# Patient Record
Sex: FEMALE | Race: WHITE | NOT HISPANIC OR LATINO | Employment: FULL TIME | URBAN - METROPOLITAN AREA
[De-identification: names, ages, dates, MRNs, and addresses within clinical notes are randomized per-mention and may not be internally consistent; named-entity substitution may affect disease eponyms.]

---

## 2017-09-13 ENCOUNTER — PRE VISIT (OUTPATIENT)
Dept: ORTHOPEDICS | Facility: CLINIC | Age: 22
End: 2017-09-13

## 2017-09-13 NOTE — TELEPHONE ENCOUNTER
1.  Date/reason for appt: 10/3/17- Bilateral Patella instability    2.  Referring provider: Self     3.  Call to patient (Yes / No - short description): Yes, LM to call back. Need AGUILA's for records. Any surgery?     4.  Previous care at / records requested from:     1. [facility] - Need AGUILA   2. [facility] - Need AGUILA

## 2017-09-28 NOTE — TELEPHONE ENCOUNTER
Received call from Roxana @ [facility]. Stated the records will be faxed over and the imaging disc will be mailed out.

## 2017-09-28 NOTE — TELEPHONE ENCOUNTER
Spoke with patient on the phone. No previous surgery to either knees. Will email her the AGUILA's to sign.     [email or web address]

## 2017-09-29 NOTE — TELEPHONE ENCOUNTER
Records received from [facility]. Forwarded to clinic.     Includes:  Office note: 3/31/14, 4/9/14    Other: EMG   Radiology Reports:   -XR Bilateral Knee Sunrise 3/31/14    -MRI L Knees 4/4/14    -MRI R Hip 4/4/14 (unrelated)

## 2017-09-29 NOTE — TELEPHONE ENCOUNTER
Imaging disc received from [facility]. Sent to film room.     All images from 3/31/14-4/4/14.

## 2017-09-29 NOTE — TELEPHONE ENCOUNTER
Additional records received from [facility].     -MRI R Knee 4/4/14   -EMG Report & tracing  4/4/14

## 2017-10-03 ENCOUNTER — THERAPY VISIT (OUTPATIENT)
Dept: PHYSICAL THERAPY | Facility: CLINIC | Age: 22
End: 2017-10-03
Payer: COMMERCIAL

## 2017-10-03 ENCOUNTER — OFFICE VISIT (OUTPATIENT)
Dept: ORTHOPEDICS | Facility: CLINIC | Age: 22
End: 2017-10-03

## 2017-10-03 VITALS — WEIGHT: 125.3 LBS | BODY MASS INDEX: 18.56 KG/M2 | HEIGHT: 69 IN

## 2017-10-03 DIAGNOSIS — M25.562 BILATERAL CHRONIC KNEE PAIN: Primary | ICD-10-CM

## 2017-10-03 DIAGNOSIS — M25.562 BILATERAL KNEE PAIN: Primary | ICD-10-CM

## 2017-10-03 DIAGNOSIS — G89.29 CHRONIC PAIN OF BOTH KNEES: ICD-10-CM

## 2017-10-03 DIAGNOSIS — M25.561 CHRONIC PAIN OF BOTH KNEES: ICD-10-CM

## 2017-10-03 DIAGNOSIS — M25.561 BILATERAL CHRONIC KNEE PAIN: Primary | ICD-10-CM

## 2017-10-03 DIAGNOSIS — G89.29 BILATERAL CHRONIC KNEE PAIN: Primary | ICD-10-CM

## 2017-10-03 DIAGNOSIS — M25.562 CHRONIC PAIN OF BOTH KNEES: ICD-10-CM

## 2017-10-03 DIAGNOSIS — M25.561 BILATERAL KNEE PAIN: Primary | ICD-10-CM

## 2017-10-03 PROCEDURE — 97161 PT EVAL LOW COMPLEX 20 MIN: CPT | Mod: GP | Performed by: PHYSICAL THERAPIST

## 2017-10-03 PROCEDURE — 97110 THERAPEUTIC EXERCISES: CPT | Mod: GP | Performed by: PHYSICAL THERAPIST

## 2017-10-03 PROCEDURE — 97112 NEUROMUSCULAR REEDUCATION: CPT | Mod: GP | Performed by: PHYSICAL THERAPIST

## 2017-10-03 RX ORDER — NORELGESTROMIN AND ETHINYL ESTRADIOL 35; 150 UG/MG; UG/MG
1 PATCH TRANSDERMAL WEEKLY
COMMUNITY
End: 2020-11-25

## 2017-10-03 ASSESSMENT — ACTIVITIES OF DAILY LIVING (ADL)
SIT WITH YOUR KNEE BENT: NOT ANSWERED
STAND: NOT ANSWERED
RISE FROM A CHAIR: NOT ANSWERED
PAIN: THE SYMPTOM AFFECTS MY ACTIVITY MODERATELY
LIMPING: THE SYMPTOM AFFECTS MY ACTIVITY SLIGHTLY
WEAKNESS: THE SYMPTOM AFFECTS MY ACTIVITY SLIGHTLY
HOW_WOULD_YOU_RATE_THE_OVERALL_FUNCTION_OF_YOUR_KNEE_DURING_YOUR_USUAL_DAILY_ACTIVITIES?: NOT ANSWERED
STIFFNESS: THE SYMPTOM AFFECTS MY ACTIVITY SLIGHTLY
GO DOWN STAIRS: NOT ANSWERED
AS_A_RESULT_OF_YOUR_KNEE_INJURY,_HOW_WOULD_YOU_RATE_YOUR_CURRENT_LEVEL_OF_DAILY_ACTIVITY?: NOT ANSWERED
SWELLING: THE SYMPTOM AFFECTS MY ACTIVITY SLIGHTLY
WALK: NOT ANSWERED
GIVING WAY, BUCKLING OR SHIFTING OF KNEE: THE SYMPTOM AFFECTS MY ACTIVITY SLIGHTLY
KNEE_ACTIVITY_OF_DAILY_LIVING_SUM: 16
GO UP STAIRS: NOT ANSWERED
KNEEL ON THE FRONT OF YOUR KNEE: NOT ANSWERED
SQUAT: NOT ANSWERED

## 2017-10-03 NOTE — LETTER
10/3/2017       RE: Kaitlynn House  [address]  [facility] [address]     Dear Colleague,    Thank you for referring your patient, Kaitlynn House, to the [facility] at [facility]. Please see a copy of my visit note below.    HISTORY OF PRESENT ILLNESS:  The patient is an articulate 22-year-old female who has had a longstanding history of anterior knee pain.  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  She is here with her mother.  She has been seen on numerous occasions at [facility].  She has had an evaluation of bilateral hip pain, bilateral knee pain and bilateral leg complaints.  She has never had a very consistent diagnosis.  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  Before this, she was on her feet a lot.      Approximately 2 or 3 years ago she began to have a history of swelling and this has not always been present.  I was quite clear on trying to understand when swelling came into the picture.  What is interesting is she is a very thin woman and swelling is very dominant in her leg when it does swell.  She does bring with her, 6 pictures of her knee when it has been swollen with her left being greater than her right and she has one particularly extraordinarily impressive picture of knee swelling that seems to go half way up her thigh.      At this point in time she feels that work and school has dominated her life and she has not been able to do much in regards to any kind of sporting activities.  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      There is at least 1 note that reports that she has always had a history of an awkward gait as a child.      She has had previous workup of her knees and hips but most  recently I have seen numerous x-rays dating from 2014, an MRI of her left knee 2014, an MRI of her right knee 2014, an MRI of her hip in 2014.      Further discussion reports that she is overall healthy.  She has been on gabapentin for pain.  She currently takes 1200 mg in 2 divided doses.  She is on an estradiol patch.  I am not clear why she is using that.  She also takes Aleve.      The patient had endometriosis with heavy periods at age 14 and now is on birth control pill which controls that factor.      She is a very thin woman and always has been thin.  Her current BMI computes to 19.      The patient has also had a full rheumatological workup looking at all inflammatory markers which are reported to be negative.  This is not part of our chart.        PHYSICAL EXAMINATION:  Further examination of patient's hips reveals internal rotation greater than external rotation on both sides.  Her right hip measures 45 degrees of external rotation, left side 30 degrees with internal rotation approximately 60.      Examination of patient's knees reveal positive hyperextension bilaterally.  No knee swelling today.  Good straight leg raising effort without a lag.  No crepitus to active extension, no suggestion of chronic synovitis.      The patient has very mobile kneecaps without apprehension.      Looking at other [place]on factors, the patient has 9/9 with extremely lax fingers, wrists, mild laxity at the elbows and knee hyperextension.      Review by our therapist today reveal significant weakness with restricted ankle dorsiflexion, poor body movement patterns and poor strength throughout.      Taping seemed to improve the patient's sense of confidence with mild relief of pain.      IMAGING:  X-rays were reviewed as well as the MRI.  The x-rays are highly suggestive of version.  Overall alignment is neutral.      ASSESSMENT:  Likely bilateral limb torsion which can explain the patient's pain and awkward gait pattern over  time cannot explain the patient's current knee swelling, particularly to the degree that was evident on some of her imaging.      PLAN:   1.  She will obtain a CT scan today looking for version.   2.  I suggested that the next time she has this degree of swelling that she return to an acute injury center, preferably here and an aspiration should be performed with appropriate cell count.     3.  Continue working with physical therapy.  I prefer that she stays here as even though we not close for her, I do not know of any other close PT place that can look some of her body movement patterns to the degree that is required, I believe for her pain pattern.   4.  We will obtain an MRI on left knee only trying to have an explanation for her pain.      Although the version could explain pain, I do not believe that it can explain swelling and certainly not swelling to the degree that was present in some of these images that she showed me.      She will follow up with me as stated above.      Room time 45 minutes, consultation time 30.        ADDENDUM:  Patient's CT scan was performed.  It is significant for the following:  Right femoral anteversion, 16 left, 23 right, external tibial torsion 53, left 54.  Right TT-TG 12, left TT-TG 17.      We will follow up with the patient in 2 weeks' time after she is working more with therapy and an MRI is obtained.  At that time, coupled with this CT scan information, we will also have her see Dr. Dio Olivarez.       Di Schwartz MD

## 2017-10-03 NOTE — NURSING NOTE
"Reason For Visit:   Chief Complaint   Patient presents with     Eval/Assessment     Ignacio. patella instability     Primary: Dr. Alondra Briggs  Ref. MD: self    Occupation Neuro Diagnostic Tech. / student  Currently working? Yes.  Work status?  Full time.  Date of injury: none    Date of surgery: none  Smoker: No      Ht 1.74 m (5' 8.5\")  Wt 56.8 kg (125 lb 4.8 oz)  BMI 18.77 kg/m2      Pain Assessment  Patient Currently in Pain: Yes  0-10 Pain Scale: 3  Primary Pain Location: Knee  Pain Orientation: Left, Right  Pain Descriptors: Aching  Alleviating Factors: Ice, Rest, NSAIDS  Aggravating Factors: Walking                                                 "

## 2017-10-03 NOTE — MR AVS SNAPSHOT
After Visit Summary   10/3/2017    Kaitlynn House    MRN: [id number]           Patient Information     Date Of Birth          1995        Visit Information        Provider Department      10/3/2017 8:30 AM Di Schwartz MD [facility]        Today's Diagnoses     Bilateral chronic knee pain    -  1    Chronic pain of both knees          Care Instructions    CT scan today and they 3T MRI and Physical Therapy with Roxane on the same day. And Dr. Schwartz          Follow-ups after your visit        Your next 10 appointments already scheduled     Oct 24, 2017  8:30 AM CDT   (Arrive by 8:15 AM)   MR LOWER EXTREMITY JOINT LEFT W/O CONTRAST with [id number]   [facility] [place] [facility] ([facility])    [address]  1st Floor  [facility] [address] [phone]           Take your medicines as usual, unless your doctor tells you not to. Bring a list of your current medicines to your exam (including vitamins, minerals and over-the-counter drugs). Also bring the results of similar scans you may have had.  Please remove any body piercings and hair extensions before you arrive.  Follow your doctor s orders. If you do not, we may have to postpone your exam.  You will not have contrast for this exam. You do not need to do anything special to prepare.  The MRI machine uses a strong magnet. Please wear clothes without metal (snaps, zippers). A sweatsuit works well, or we may give you a hospital gown.   **IMPORTANT** THE INSTRUCTIONS BELOW ARE ONLY FOR THOSE PATIENTS WHO HAVE BEEN TOLD THEY WILL RECEIVE SEDATION OR GENERAL ANESTHESIA DURING THEIR MRI PROCEDURE:  IF YOU WILL RECEIVE SEDATION (take medicine to help you relax during your exam):   You must get the medicine from your doctor before you arrive. Bring the medicine to the exam. Do not take it at home.   Arrive one hour early. Bring someone who can take you home after the test. Your medicine will  make you sleepy. After the exam, you may not drive, take a bus or take a taxi by yourself.   No eating 8 hours before your exam. You may have clear liquids up until 4 hours before your exam. (Clear liquids include water, clear tea, black coffee and fruit juice without pulp.)  IF YOU WILL RECEIVE ANESTHESIA (be asleep for your exam):   Arrive 1 1/2 hours early. Bring someone who can take you home after the test. You may not drive, take a bus or take a taxi by yourself.   No eating 8 hours before your exam. You may have clear liquids up until 4 hours before your exam. (Clear liquids include water, clear tea, black coffee and fruit juice without pulp.)   You will spend four to five hours in the recovery room.  Please call the Imaging Department at your exam site with any questions.            Oct 24, 2017 10:00 AM CDT   ELSY Extremity with Di Ahumada PT   [facility] ([facility])    [address] [place] [address] [phone]            Oct 24, 2017 11:00 AM CDT   (Arrive by 10:45 AM)   RETURN KNEE with Di Schwartz MD   [facility] ([facility])    [address] [place] [address] [phone]            Oct 24, 2017 12:20 PM CDT   (Arrive by 12:05 PM)   NEW FOOT/ANKLE with Dio Olivarez MD   [facility] ([facility])    [address] [place] [address] [phone]              Who to contact     Please call your clinic at [phone] to:    Ask questions about your health    Make or cancel appointments    Discuss your medicines    Learn about your test results    Speak to your doctor   If you have compliments or concerns about an experience at your clinic, or if you wish to file a complaint, please contact [facility] Patient Relations at [phone] or  "email us at [email or web address]         Additional Information About Your Visit        YardsaleharmInfo Information     Arisoko is an electronic gateway that provides easy, online access to your medical records. With Arisoko, you can request a clinic appointment, read your test results, renew a prescription or communicate with your care team.     To sign up for [employer] visit the website at [email or web address]   You will be asked to enter the access code listed below, as well as some personal information. Please follow the directions to create your username and password.     Your access code is: [email or web address]  Expires: 2017  6:31 AM     Your access code will  in 90 days. If you need help or a new code, please contact your [facility] or call [phone] for assistance.        Care EveryWhere ID     This is your Care EveryWhere ID. This could be used by other organizations to access your [place] medical records  [id number]        Your Vitals Were     Height BMI (Body Mass Index)                1.74 m (5' 8.5\") 18.77 kg/m2           Blood Pressure from Last 3 Encounters:   No data found for BP    Weight from Last 3 Encounters:   10/03/17 56.8 kg (125 lb 4.8 oz)                 Today's Medication Changes          These changes are accurate as of: 10/3/17 11:59 PM.  If you have any questions, ask your nurse or doctor.               Stop taking these medicines if you haven't already. Please contact your care team if you have questions.     BACTRIM PO   Stopped by:  Di Schwartz MD                    Primary Care Provider Office Phone # Fax #    PREM Ibarra [phone]       [facility] [address] [place] [id number]        Equal Access to Services     CHRISTOS RICHEY AH: Richelle craigo Jacquelin, waaxda luqadaha, qaybta kaalmada alondra, parag caballero. So [facility] " [phone].    ATENCIÓN: Si guem damon, tiene a cristobal disposición servicios gratuitos de asistencia lingüística. Isis villalta [phone].    We comply with applicable federal civil rights laws and [place] laws. We do not discriminate on the basis of race, color, national origin, age, disability, sex, sexual orientation, or gender identity.            Thank you!     Thank you for choosing [facility]  for your care. Our goal is always to provide you with excellent care. Hearing back from our patients is one way we can continue to improve our services. Please take a few minutes to complete the written survey that you may receive in the mail after your visit with us. Thank you!             Your Updated Medication List - Protect others around you: Learn how to safely use, store and throw away your medicines at www.disposemymeds.org.          This list is accurate as of: 10/3/17 11:59 PM.  Always use your most recent med list.                   Brand Name Dispense Instructions for use Diagnosis    ALEVE PO      Take 2 tablets by mouth every 12 hours as needed for moderate pain        GABAPENTIN PO      Take 600 mg by mouth 2 times daily        XULANE 150-35 MCG/24HR patch   Generic drug:  norelgestromin-ethinyl estradiol      Place 1 patch onto the skin once a week Remove old patch and apply new patch onto the skin once a week for 3 weeks (21 days). Do not wear patch week 4 (days 22-28), then repeat.

## 2017-10-03 NOTE — PROGRESS NOTES
Subjective:    HPI                  PT KEY IMPRESSIONS:  Response to taping trial:   R knee:  Medial glide reduced pain from 4/10 to 1/10 with step up/down  L knee:  Medial glide reduced pain from 2/10 to 1/10 with step up/down  Patient had previously been taught to use Myrick tape, but had difficulty keeping the tape on at work. Patient was instructed in how to tape today, and given tubigrip to help keep the tape on.      KEY PT FINDINGS:  1) Significant proximal hip and core weakness resulting in faulty movement patterns  2) Alignment suspicious for tibial torsion  3) Mild relief of pain and increased sense of stability with Myrick taping    PT Responsive Factors    Proximal LE/Trunk muscle weakness +   Quadriceps muscle dysfunction/weakness +   Poor postural stability +   Poor dynamic movement patterns +   Restricted ankle DF -   Inconsistent/non-specific PT intervention +/-   TOTAL 4-5/6     Non-PT Responsive Factors    Abnormal static patellar orientation +   Abnormal dynamic patellar tracking -   Abnormal LE bony alignment +   TOTAL 2/3         Subjective History:      Question Response   Primary Complaint primarily pain (location of pain: suprapatellar, sometimes infrapatellar)   Onset date of current symptoms Patient notes onset of symptoms 6 years ago - was diagnosed with restless leg syndrome.  Her symptoms worsened over the past year.  She notes swelling with activity and corresponding pain.  Patient underwent two rounds of physical therapy in the past six years.   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    Previous treatment for condition PT  and Patellar taping: Myrick   Symptoms with current condition Appropriate   Worst pain < > Best pain Worst: 9/10 < > Best: 1/10   Symptom exacerbation &   Functional limitations 3 worst activities: walking (Prior: no restrictions) , sleeping (Prior: no  restrictions), stairs (Prior: no restrictions)   Symptom relief Ice, Aleve, elevation   Symptom behavior constant low grade pain, increased pain levels with activity.    Symptom trend more often   Time of day dependent? Not related   Prior Diagnostic Testing x-ray and MRI   Prior Interventions PT  and Patellar taping: Elen.      Lifestyle & General Medical History:  Employment: Neurodiagnostic tech.    General Physical Activity Level (within past year): Limited due to knee pain.    General health status (as reported by patient): excellent.     Other orthopaedic history: None.     Lower Extremity/Patellofemoral Exam    Dynamic Movement Screen:  2 leg stance: Alignment suspicious for tibial internal torsion bilaterally  2 leg squat: Excessive anterior knee excursion (reduced posterior hip excursion) and Femoral ADD/IR noted at bilateral limb(s)    1 leg stance:   Right: proprioceptive challenge  Left: proprioceptive challenge    1 leg squat:   Right: proprioceptive challenge, excessive contralateral pelvic drop and excessive femoral IR/ADD  Left: proprioceptive challenge, excessive contralateral pelvic drop and excessive femoral IR/ADD    Gait: Increased transverse plane rotation at bilateral hips, toed in      Patellofemoral Joint Special Testing:    Static Patellar Positioning in 90 degrees KF (Seated)  Right: patella mt visually observed??  Left: patella mt visually observed??    Patellar tracking with OKC knee extension (Seated)  Right: Crepitus       Left: Crepitus    Static Patellar Positioning in full extension (Supine)  Right: patella mt visually observed      Left: patella mt visually observed    Patellar Quadrant Mobility Test (Med:Lat)  Right: 2:2       Left 2:2        Knee Joint AROM   Right Left Difference   Hyperextension 10 deg 10 deg 0 deg   Extension 0 deg 0 deg 0 deg   Flexion 150 deg 150 deg 0 deg     Basic Muscle Activation:  Quadriceps: Right: Fair, Left: Fair    Knee Joint Effusion  (Stroke Test Assessment):  Right: 0; Left: 0    Suprapatellar swelling noted bilaterally    Palpation:   Tender to palpation at the following structures: distal quad tendon      Hip Joint PROM Screen   Right Left   ER 50 deg 45 deg   IR 30 deg 40 deg   Flex 120 deg 120 deg     Lower Extremity Muscle Strength (x/5)   Right Left   Hip ER 4/5 4/5   Hip IR 5-/5 5-/5   Hip ABD 4/5 4/5   Hip Ext 5-/5 5-/5   Knee Flex 5-/5 5-/5     Lower Extremity Flexibility Screen:   Right Left   Hamstring - -   Hip Flexor - -   ITB/Lat Hip - -   Quadriceps - -   Gastroc/ Soleus - -       System    Physical Exam    General     ROS    Assessment/Plan:      Patient is a 22 year old female with both sides knee complaints.    Patient has the following significant findings with corresponding treatment plan.                Diagnosis 1:  Bilateral knee pain    Pain -  hot/cold therapy, manual therapy, splint/taping/bracing/orthotics, self management, education and home program  Decreased strength - therapeutic exercise and therapeutic activities  Impaired balance - neuro re-education and therapeutic activities  Decreased proprioception - neuro re-education and therapeutic activities  Inflammation - cold therapy  Edema - vasopneumatics  Impaired gait - gait training  Impaired muscle performance - neuro re-education  Decreased function - therapeutic activities    Therapy Evaluation Codes:   1) History comprised of:   Personal factors that impact the plan of care:      Time since onset of symptoms.    Comorbidity factors that impact the plan of care are:      Ehler's Danlos.     Medications impacting care: None.  2) Examination of Body Systems comprised of:   Body structures and functions that impact the plan of care:      Knee.   Activity limitations that impact the plan of care are:      Squatting/kneeling, Stairs, Standing, Walking and Sleeping.  3) Clinical presentation characteristics are:   Stable/Uncomplicated.  4) Decision-Making    Low  complexity using standardized patient assessment instrument and/or measureable assessment of functional outcome.  Cumulative Therapy Evaluation is: Low complexity.    Previous and current functional limitations:  (See Goal Flow Sheet for this information)    Short term and Long term goals: (See Goal Flow Sheet for this information)     Communication ability:  Patient appears to be able to clearly communicate and understand verbal and written communication and follow directions correctly.  Treatment Explanation - The following has been discussed with the patient:   RX ordered/plan of care  Anticipated outcomes  Possible risks and side effects  This patient would benefit from PT intervention to resume normal activities.   Rehab potential is good.    Frequency:  1 X week, once daily  Duration:  for 4 weeks tapering to 2 X a month over 8 weeks  Discharge Plan:  Achieve all LTG.  Independent in home treatment program.  Reach maximal therapeutic benefit.    Please refer to the daily flowsheet for treatment today, total treatment time and time spent performing 1:1 timed codes.

## 2017-10-03 NOTE — MR AVS SNAPSHOT
"              After Visit Summary   10/3/2017    Kaitlynn House    MRN: [id number]           Patient Information     Date Of Birth          1995        Visit Information        Provider Department      10/3/2017 7:20 AM Di Ahumada PT [facility] Physical Therapy [facility]        Today's Diagnoses     Bilateral knee pain    -  1       Follow-ups after your visit        Who to contact     If you have questions or need follow up information about today's clinic visit or your schedule please contact [facility] PHYSICAL THERAPY [facility] directly at [phone].  Normal or non-critical lab and imaging results will be communicated to you by [employer], letter or phone within 4 business days after the clinic has received the results. If you do not hear from us within 7 days, please contact the clinic through [employer] or phone. If you have a critical or abnormal lab result, we will notify you by phone as soon as possible.  Submit refill requests through [employer] or call your pharmacy and they will forward the refill request to us. Please allow 3 business days for your refill to be completed.          Additional Information About Your Visit        MyChart Information     [employer] lets you send messages to your doctor, view your test results, renew your prescriptions, schedule appointments and more. To sign up, go to [email or web address] . Click on \"Log in\" on the left side of the screen, which will take you to the Welcome page. Then click on \"Sign up Now\" on the right side of the page.     You will be asked to enter the access code listed below, as well as some personal information. Please follow the directions to create your username and password.     Your access code is: [email or web address]  Expires: 2017  6:31 AM     Your access code will  in 90 days. If you need help or a new code, please call your [place] clinic or [phone].        Care EveryWhere ID     This is your Care EveryWhere ID. This could be used by " other organizations to access your [place] medical records  [id number]         Blood Pressure from Last 3 Encounters:   No data found for BP    Weight from Last 3 Encounters:   10/03/17 56.8 kg (125 lb 4.8 oz)              We Performed the Following     HC PT EVAL, LOW COMPLEXITY     ELSY INITIAL EVAL REPORT     NEUROMUSCULAR RE-EDUCATION     Therapeutic Exercises          Today's Medication Changes          These changes are accurate as of: 10/3/17  9:16 AM.  If you have any questions, ask your nurse or doctor.               Stop taking these medicines if you haven't already. Please contact your care team if you have questions.     BACTRIM PO   Stopped by:  Di Schwartz MD                    Primary Care Provider Office Phone # Fax #    Claudia PREM Munoz [phone]       [facility] [address] [facility] [address]        Equal Access to Services     [facility]: Hadii zina harper hadasho Soomaali, waaxda luqadaha, qaybta kaalmada adealdenyapan, parag penaloza . So [facility] [phone].    ATENCIÓN: Si habla español, tiene a cristobal disposición servicios gratuitos de asistencia lingüística. Isis al [phone].    We comply with applicable federal civil rights laws and [place] laws. We do not discriminate on the basis of race, color, national origin, age, disability, sex, sexual orientation, or gender identity.            Thank you!     Thank you for choosing [facility] PHYSICAL THERAPY [facility]  for your care. Our goal is always to provide you with excellent care. Hearing back from our patients is one way we can continue to improve our services. Please take a few minutes to complete the written survey that you may receive in the mail after your visit with us. Thank you!             Your Updated Medication List - Protect others around you: Learn how to safely use, store and throw away your medicines at www.disposemymeds.org.          This list is accurate as  of: 10/3/17  9:16 AM.  Always use your most recent med list.                   Brand Name Dispense Instructions for use Diagnosis    ALEVE PO      Take 2 tablets by mouth every 12 hours as needed for moderate pain        GABAPENTIN PO      Take 600 mg by mouth 2 times daily        XULANE 150-35 MCG/24HR patch   Generic drug:  norelgestromin-ethinyl estradiol      Place 1 patch onto the skin once a week Remove old patch and apply new patch onto the skin once a week for 3 weeks (21 days). Do not wear patch week 4 (days 22-28), then repeat.

## 2017-10-04 NOTE — PROGRESS NOTES
HISTORY OF PRESENT ILLNESS:  The patient is an articulate 22-year-old female who has had a longstanding history of anterior knee pain.  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  She is here with her mother.  She has been seen on numerous occasions at [facility].  She has had an evaluation of bilateral hip pain, bilateral knee pain and bilateral leg complaints.  She has never had a very consistent diagnosis.  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  Before this, she was on her feet a lot.      Approximately 2 or 3 years ago she began to have a history of swelling and this has not always been present.  I was quite clear on trying to understand when swelling came into the picture.  What is interesting is she is a very thin woman and swelling is very dominant in her leg when it does swell.  She does bring with her, 6 pictures of her knee when it has been swollen with her left being greater than her right and she has one particularly extraordinarily impressive picture of knee swelling that seems to go half way up her thigh.      At this point in time she feels that work and school has dominated her life and she has not been able to do much in regards to any kind of sporting activities.  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      There is at least 1 note that reports that she has always had a history of an awkward gait as a child.      She has had previous workup of her knees and hips but most recently I have seen numerous x-rays dating from 2014, an MRI of her left knee 2014, an MRI of her right knee 2014, an MRI of her hip in 2014.      Further discussion reports that she is overall healthy.  She has been on gabapentin for pain.  She currently takes 1200 mg in 2 divided doses.  She is on an  estradiol patch.  I am not clear why she is using that.  She also takes Aleve.      The patient had endometriosis with heavy periods at age 14 and now is on birth control pill which controls that factor.      She is a very thin woman and always has been thin.  Her current BMI computes to 19.      The patient has also had a full rheumatological workup looking at all inflammatory markers which are reported to be negative.  This is not part of our chart.        PHYSICAL EXAMINATION:  Further examination of patient's hips reveals internal rotation greater than external rotation on both sides.  Her right hip measures 45 degrees of external rotation, left side 30 degrees with internal rotation approximately 60.      Examination of patient's knees reveal positive hyperextension bilaterally.  No knee swelling today.  Good straight leg raising effort without a lag.  No crepitus to active extension, no suggestion of chronic synovitis.      The patient has very mobile kneecaps without apprehension.      Looking at other [place] factors, the patient has 9/9 with extremely lax fingers, wrists, mild laxity at the elbows and knee hyperextension.      Review by our therapist today reveal significant weakness with restricted ankle dorsiflexion, poor body movement patterns and poor strength throughout.      Taping seemed to improve the patient's sense of confidence with mild relief of pain.      IMAGING:  X-rays were reviewed as well as the MRI.  The x-rays are highly suggestive of version.  Overall alignment is neutral.      ASSESSMENT:  Likely bilateral limb torsion which can explain the patient's pain and awkward gait pattern over time cannot explain the patient's current knee swelling, particularly to the degree that was evident on some of her imaging.      PLAN:   1.  She will obtain a CT scan today looking for version.   2.  I suggested that the next time she has this degree of swelling that she return to an acute injury  [place], preferably here and an aspiration should be performed with appropriate cell count.     3.  Continue working with physical therapy.  I prefer that she stays here as even though we not close for her, I do not know of any other close PT place that can look some of her body movement patterns to the degree that is required, I believe for her pain pattern.   4.  We will obtain an MRI on left knee only trying to have an explanation for her pain.      Although the version could explain pain, I do not believe that it can explain swelling and certainly not swelling to the degree that was present in some of these images that she showed me.      She will follow up with me as stated above.      Room time 45 minutes, consultation time 30.        ADDENDUM:  Patient's CT scan was performed.  It is significant for the following:  Right femoral anteversion, 16 left, 23 right, external tibial torsion 53, left 54.  Right TT-TG 12, left TT-TG 17.      We will follow up with the patient in 2 weeks' time after she is working more with therapy and an MRI is obtained.  At that time, coupled with this CT scan information, we will also have her see Dr. Dio Olivarez.

## 2017-10-11 ENCOUNTER — OFFICE VISIT (OUTPATIENT)
Dept: ORTHOPEDICS | Facility: CLINIC | Age: 22
End: 2017-10-11

## 2017-10-11 VITALS
DIASTOLIC BLOOD PRESSURE: 84 MMHG | HEART RATE: 99 BPM | RESPIRATION RATE: 20 BRPM | OXYGEN SATURATION: 98 % | BODY MASS INDEX: 19.62 KG/M2 | WEIGHT: 125 LBS | SYSTOLIC BLOOD PRESSURE: 122 MMHG | HEIGHT: 67 IN

## 2017-10-11 DIAGNOSIS — M25.469 KNEE SWELLING: Primary | ICD-10-CM

## 2017-10-11 NOTE — MR AVS SNAPSHOT
After Visit Summary   10/11/2017    Kaitlynn House    MRN: [id number]           Patient Information     Date Of Birth          1995        Visit Information        Provider Department      10/11/2017 10:00 AM Milo Vicente DO [facility]        Today's Diagnoses     Knee swelling    -  1       Follow-ups after your visit        Your next 10 appointments already scheduled     Oct 24, 2017  8:30 AM CDT   (Arrive by 8:15 AM)   MR LOWER EXTREMITY JOINT LEFT W/O CONTRAST with [id number]   [facility] ([facility])    [address] [address] [phone]           Take your medicines as usual, unless your doctor tells you not to. Bring a list of your current medicines to your exam (including vitamins, minerals and over-the-counter drugs). Also bring the results of similar scans you may have had.  Please remove any body piercings and hair extensions before you arrive.  Follow your doctor s orders. If you do not, we may have to postpone your exam.  You will not have contrast for this exam. You do not need to do anything special to prepare.  The MRI machine uses a strong magnet. Please wear clothes without metal (snaps, zippers). A sweatsuit works well, or we may give you a hospital gown.   **IMPORTANT** THE INSTRUCTIONS BELOW ARE ONLY FOR THOSE PATIENTS WHO HAVE BEEN TOLD THEY WILL RECEIVE SEDATION OR GENERAL ANESTHESIA DURING THEIR MRI PROCEDURE:  IF YOU WILL RECEIVE SEDATION (take medicine to help you relax during your exam):   You must get the medicine from your doctor before you arrive. Bring the medicine to the exam. Do not take it at home.   Arrive one hour early. Bring someone who can take you home after the test. Your medicine will make you sleepy. After the exam, you may not drive, take a bus or take a taxi by yourself.   No eating 8 hours before your exam. You may have clear liquids up until 4  hours before your exam. (Clear liquids include water, clear tea, black coffee and fruit juice without pulp.)  IF YOU WILL RECEIVE ANESTHESIA (be asleep for your exam):   Arrive 1 1/2 hours early. Bring someone who can take you home after the test. You may not drive, take a bus or take a taxi by yourself.   No eating 8 hours before your exam. You may have clear liquids up until 4 hours before your exam. (Clear liquids include water, clear tea, black coffee and fruit juice without pulp.)   You will spend four to five hours in the recovery room.  Please call the Imaging Department at your exam site with any questions.            Oct 24, 2017 10:00 AM CDT   ELSY Extremity with Di Ahumada PT   [facility] ([facility])    [address] [place] [address] [address] [facility] [address] [phone]            Oct 24, 2017 11:00 AM CDT   (Arrive by 10:45 AM)   RETURN KNEE with Di Schwartz MD   [facility] ([facility])    [address] [place] [id number] [phone]            Oct 24, 2017 12:20 PM CDT   (Arrive by 12:05 PM)   NEW FOOT/ANKLE with Dio Olivarez MD   [facility] ([facility])    [address] [place] [id number] [phone]              Who to contact     Please call your clinic at [phone] to:    Ask questions about your health    Make or cancel appointments    Discuss your medicines    Learn about your test results    Speak to your doctor   If you have compliments or concerns about an experience at your clinic, or if you wish to file a complaint, please contact [facility] Patient Relations at [phone] or email us at [email or web address]         Additional Information About Your Visit        [employer] Information     [employer] is an electronic gateway that  "provides easy, online access to your medical records. With [employer], you can request a clinic appointment, read your test results, renew a prescription or communicate with your care team.     To sign up for [employer] visit the website at [email or web address]   You will be asked to enter the access code listed below, as well as some personal information. Please follow the directions to create your username and password.     Your access code is: [email or web address]  Expires: 2017  6:31 AM     Your access code will  in 90 days. If you need help or a new code, please contact your [facility] or call [phone] for assistance.        Care EveryWhere ID     This is your Care EveryWhere ID. This could be used by other organizations to access your [place] medical records  [id number]        Your Vitals Were     Pulse Respirations Height Pulse Oximetry BMI (Body Mass Index)       99 20 1.702 m (5' 7\") 98% 19.58 kg/m2        Blood Pressure from Last 3 Encounters:   10/11/17 122/84    Weight from Last 3 Encounters:   10/11/17 56.7 kg (125 lb)   10/03/17 56.8 kg (125 lb 4.8 oz)              Today, you had the following     No orders found for display       Primary Care Provider Office Phone # Fax #    PREM Ibarra [phone]       [facility] [address] [facility] [address]        Equal Access to Services     [facility] : Hadii zina ku hadasho Soomaali, waaxda luqadaha, qaybta kaalmada adeegyada, parag penaloza . So [facility] [phone].    ATENCIÓN: Si habla español, tiene a cristobal disposición servicios gratuitos de asistencia lingüística. Isis al [phone].    We comply with applicable federal civil rights laws and [place] laws. We do not discriminate on the basis of race, color, national origin, age, disability, sex, sexual orientation, or gender identity.            Thank you!     Thank you for choosing [facility] " ORTHOPAEDIC CLINIC  for your care. Our goal is always to provide you with excellent care. Hearing back from our patients is one way we can continue to improve our services. Please take a few minutes to complete the written survey that you may receive in the mail after your visit with us. Thank you!             Your Updated Medication List - Protect others around you: Learn how to safely use, store and throw away your medicines at www.disposemymeds.org.          This list is accurate as of: 10/11/17  5:35 PM.  Always use your most recent med list.                   Brand Name Dispense Instructions for use Diagnosis    ALEVE PO      Take 2 tablets by mouth every 12 hours as needed for moderate pain        GABAPENTIN PO      Take 600 mg by mouth 2 times daily        XULANE 150-35 MCG/24HR patch   Generic drug:  norelgestromin-ethinyl estradiol      Place 1 patch onto the skin once a week Remove old patch and apply new patch onto the skin once a week for 3 weeks (21 days). Do not wear patch week 4 (days 22-28), then repeat.

## 2017-10-11 NOTE — NURSING NOTE
"Reason For Visit:   Chief Complaint   Patient presents with     Musculoskeletal Problem     Patient is here for evaluation of bilateral knee swelling.  She was seen by Dr. Schwartz on 10/3/2017 and advised to come into the Walk-In Clinic when swelling occurred for possible aspiration.       Primary MD: Claudia Munoz  Ref. MD: Dr. Schwartz    ?  No  Occupation full-time student/EEG technician.  Currently working? Yes.  Work status?  Full time.  Date of injury: none  Date of surgery: none  Smoker: No      /84 (BP Location: Right arm, Patient Position: Chair, Cuff Size: Adult Regular)  Pulse 99  Resp 20  Ht 1.702 m (5' 7\")  Wt 56.7 kg (125 lb)  SpO2 98%  BMI 19.58 kg/m2    Pain Assessment  Patient Currently in Pain: Yes  0-10 Pain Scale: 4  Primary Pain Location: Knee  Pain Orientation: Right, Left  Pain Descriptors: Aching  Alleviating Factors: Rest, Ice, NSAIDS  Aggravating Factors: Walking, Other (comment) (sleeping)       Ashia Yancey LPN  "

## 2017-10-11 NOTE — LETTER
10/11/2017       RE: Kaitlynn House  [address]  [place] [address]     Dear Colleague,    Thank you for referring your patient, Kaitlynn House, to the [facility] ORTHOP[facility] at [facility]. Please see a copy of my visit note below.    NO CHARGE - patient sent home and discussed conditions for return      Again, thank you for allowing me to participate in the care of your patient.      Sincerely,    Milo Vicente, DO

## 2017-10-12 NOTE — TELEPHONE ENCOUNTER
APPT INFORMATION    Date & Time: 10/24/17 12:20PM   Reason for Appt: Discuss Knee MRI   Referring Name/Clinic: Dr. Schwartz    Yes / No COMMENT / NOTES DATE & ACTION   Patient Contacted? no Internal referral        RECORDS CLINIC NAME  (use N/A if no records ) DATE & ACTION RECEIVED RECS & IMG? Y/N   (may include other helpful notes)   External Clinics: [facility]  Records received on 9/29/17 & forwarded to Ortho Clinic.     Imaging in Pacs         Internal Clinics: [facility]  Records with Dr. Schwartz in [facility]    Imaging in Pacs    [facility] Walk-In Clinic  Records in [facility] with Dr. Vicente

## 2017-10-21 ENCOUNTER — HEALTH MAINTENANCE LETTER (OUTPATIENT)
Age: 22
End: 2017-10-21

## 2017-10-24 ENCOUNTER — PRE VISIT (OUTPATIENT)
Dept: ORTHOPEDICS | Facility: CLINIC | Age: 22
End: 2017-10-24

## 2017-10-24 ENCOUNTER — OFFICE VISIT (OUTPATIENT)
Dept: ORTHOPEDICS | Facility: CLINIC | Age: 22
End: 2017-10-24

## 2017-10-24 ENCOUNTER — THERAPY VISIT (OUTPATIENT)
Dept: PHYSICAL THERAPY | Facility: CLINIC | Age: 22
End: 2017-10-24
Payer: COMMERCIAL

## 2017-10-24 VITALS — HEIGHT: 67 IN | BODY MASS INDEX: 19.46 KG/M2 | RESPIRATION RATE: 16 BRPM | WEIGHT: 124 LBS

## 2017-10-24 DIAGNOSIS — M21.862 TIBIAL TORSION, BILATERAL: Primary | ICD-10-CM

## 2017-10-24 DIAGNOSIS — M21.861 TIBIAL TORSION, BILATERAL: Primary | ICD-10-CM

## 2017-10-24 DIAGNOSIS — M23.92 PATELLAR MALALIGNMENT SYNDROME OF LEFT KNEE: ICD-10-CM

## 2017-10-24 DIAGNOSIS — M25.561 CHRONIC PAIN OF BOTH KNEES: ICD-10-CM

## 2017-10-24 DIAGNOSIS — M25.562 CHRONIC PAIN OF BOTH KNEES: ICD-10-CM

## 2017-10-24 DIAGNOSIS — G89.29 CHRONIC PAIN OF BOTH KNEES: ICD-10-CM

## 2017-10-24 DIAGNOSIS — M21.861 TIBIAL TORSION, RIGHT: Primary | ICD-10-CM

## 2017-10-24 PROCEDURE — 97112 NEUROMUSCULAR REEDUCATION: CPT | Mod: GP | Performed by: PHYSICAL THERAPY ASSISTANT

## 2017-10-24 PROCEDURE — 97110 THERAPEUTIC EXERCISES: CPT | Mod: GP | Performed by: PHYSICAL THERAPY ASSISTANT

## 2017-10-24 ASSESSMENT — ENCOUNTER SYMPTOMS
SYNCOPE: 0
HYPERTENSION: 0
ORTHOPNEA: 0
CLAUDICATION: 0
LEG SWELLING: 0
STIFFNESS: 1
HYPOTENSION: 1
PALPITATIONS: 1
TACHYCARDIA: 1
LEG PAIN: 0
LIGHT-HEADEDNESS: 0
ARTHRALGIAS: 1
SLEEP DISTURBANCES DUE TO BREATHING: 0
EXERCISE INTOLERANCE: 0

## 2017-10-24 NOTE — LETTER
10/24/2017       RE: Kaitlynn House  [address] [facility] [address]     Dear Colleague,    Thank you for referring your patient, Kaitlynn House, to the [facility] ORTHOPAEDIC [facility]INIC at [facility]. Please see a copy of my visit note below.    CHIEF COMPLAINT:  Increased tibia torsion bilaterally.      HISTORY OF PRESENT ILLNESS:  Ms. House is a 22-year-old female who presents today for evaluation of her lower legs.  The patient has been diagnosed by Dr. Schwartz's office with increased tibia torsion bilaterally.  The patient also is in need of undergoing a tibial tubercle osteotomy in the left knee.      The patient presents today to our clinic to have further details with regards to the implications of undergoing a tibia and fibula derotational osteotomy of her lower legs.      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  Reports to be standing during these activities.      PAST MEDICAL HISTORY:  None.      PAST SURGICAL HISTORY:  None.      DRUG ALLERGIES:  Amoxicillin, Zithromax.      MEDICATIONS:  Please refer to encounter form.      PHYSICAL EXAMINATION:  On today's she presents as a pleasant female in no apparent distress with a height of 5 feet 7 inches and a weight of 124 pounds.  Denies to have any constitutional symptoms.      On today's visit, she presents with overall full range of motion of the right knee and ankle.  CMS is intact.  Skin is intact.      IMAGING:  On today's visit, the CT scan was reviewed which is significant for showing a right femoral anteversion of 16 with a left one of 23 as well as a right tibia torsion of 53 degrees with the left one being 54 degrees.      ASSESSMENT:  Increased tibia torsion bilaterally.      PLAN:  I discussed with the patient that at this point, we will plan on pursuing a right tibia and fibula derotational osteotomy.  This will consist of bringing her foot by  internal rotation of 33 degrees.      I discussed with them the most likely postoperative course and complications which include but are not limited to infection, bleeding, nerve damage, residual pain, nonunion and stiffness.      All questions were answered.  The patient was pleased with the discussion.  The patient will proceed with surgery at the best of her convenience, which again will consist of a right tibia and fibula derotational osteotomy by 33 degrees.      TT 30 minutes, CT 10 minutes.         Again, thank you for allowing me to participate in the care of your patient.      Sincerely,    Dio Olivarez MD

## 2017-10-24 NOTE — NURSING NOTE
Reason For Visit:   Chief Complaint   Patient presents with     Follow Up For     Bilateral patella instability        Primary: Dr. Alondra Briggs  Ref. MD: self     Occupation Neuro Diagnostic Tech. / student  Currently working? Yes.  Work status?  Full time.  Date of injury: none     Date of surgery: none  Smoker: No    Pain Assessment  Patient Currently in Pain: Yes  0-10 Pain Scale: 4  Primary Pain Location: Knee  Pain Orientation: Right, Left  Pain Descriptors: Aching, Itching  Alleviating Factors: Ice, Rest  Aggravating Factors: Walking, Other (comment) (sleeping)    Ariela Walters, [facility]

## 2017-10-24 NOTE — PROGRESS NOTES
CHIEF COMPLAINT:  Increased tibia torsion bilaterally.      HISTORY OF PRESENT ILLNESS:  Ms. House is a 22-year-old female who presents today for evaluation of her lower legs.  The patient has been diagnosed by Dr. Schwartz's office with increased tibia torsion bilaterally.  The patient also is in need of undergoing a tibial tubercle osteotomy in the left knee.      The patient presents today to our clinic to have further details with regards to the implications of undergoing a tibia and fibula derotational osteotomy of her lower legs.      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  Reports to be standing during these activities.      PAST MEDICAL HISTORY:  None.      PAST SURGICAL HISTORY:  None.      DRUG ALLERGIES:  Amoxicillin, Zithromax.      MEDICATIONS:  Please refer to encounter form.      PHYSICAL EXAMINATION:  On today's she presents as a pleasant female in no apparent distress with a height of 5 feet 7 inches and a weight of 124 pounds.  Denies to have any constitutional symptoms.      On today's visit, she presents with overall full range of motion of the right knee and ankle.  CMS is intact.  Skin is intact.      IMAGING:  On today's visit, the CT scan was reviewed which is significant for showing a right femoral anteversion of 16 with a left one of 23 as well as a right tibia torsion of 53 degrees with the left one being 54 degrees.      ASSESSMENT:  Increased tibia torsion bilaterally.      PLAN:  I discussed with the patient that at this point, we will plan on pursuing a right tibia and fibula derotational osteotomy.  This will consist of bringing her foot by internal rotation of 33 degrees.      I discussed with them the most likely postoperative course and complications which include but are not limited to infection, bleeding, nerve damage, residual pain, nonunion and stiffness.      All questions were answered.  The patient was pleased with the  discussion.  The patient will proceed with surgery at the best of her convenience, which again will consist of a right tibia and fibula derotational osteotomy by 33 degrees.      TT 30 minutes, CT 10 minutes.

## 2017-10-24 NOTE — MR AVS SNAPSHOT
After Visit Summary   10/24/2017    Kaitlynn House    MRN: [id number]           Patient Information     Date Of Birth          1995        Visit Information        Provider Department      10/24/2017 12:20 PM Dio Olivarez MD [facility]        Today's Diagnoses     Tibial torsion, right    -  1       Follow-ups after your visit        Your next 10 appointments already scheduled     Oct 30, 2017  5:00 PM CDT   Ech Complete with UCECHCR2   [facility] ([facility])    [address] [place] [address]  3rd [facility] [address] [phone]           1. Please bring or wear a comfortable two-piece outfit. 2. You may eat, drink and take your normal medicines. 3. For any questions that cannot be answered, please contact the ordering physician            Nov 15, 2017  1:30 PM CST   (Arrive by 1:15 PM)   Post-Op with  U Ortho Nurse   [facility] ([facility])    [address] [place] [address] [phone]              Who to contact     Please call your clinic at [phone] to:    Ask questions about your health    Make or cancel appointments    Discuss your medicines    Learn about your test results    Speak to your doctor   If you have compliments or concerns about an experience at your clinic, or if you wish to file a complaint, please contact [facility] Patient Relations at [phone] or email us at [email or web address]         Additional Information About Your Visit        MyChart Information     [employer] gives you secure access to your electronic health record. If you see a primary care provider, you can also send messages to your care team and make appointments. If you have questions, please call your primary care clinic.  If you do not have a primary care provider, please call [phone] and they will assist you.       2threads is an electronic gateway that provides easy, online access to your medical records. With 2threads, you can request a clinic appointment, read your test results, renew a prescription or communicate with your care team.     To access your existing account, please contact your [facility] or call [phone] for assistance.        Care EveryWhere ID     This is your Care EveryWhere ID. This could be used by other organizations to access your [place] medical records  [id number]         Blood Pressure from Last 3 Encounters:   10/11/17 122/84    Weight from Last 3 Encounters:   10/24/17 56.2 kg (124 lb)   10/11/17 56.7 kg (125 lb)   10/03/17 56.8 kg (125 lb 4.8 oz)              We Performed the Following     Fatou-Operative Worksheet (Sher)          Today's Medication Changes          These changes are accurate as of: 10/24/17 11:59 PM.  If you have any questions, ask your nurse or doctor.               Start taking these medicines.        Dose/Directions    order for DME   Used for:  Tibial torsion, right   Started by:  Dio Olivarez MD Crutmigue   Quantity:  1 Units   Refills:  0            Where to get your medicines      Some of these will need a paper prescription and others can be bought over the counter.  Ask your nurse if you have questions.     Bring a paper prescription for each of these medications     order for DME                Primary Care Provider Office Phone # Fax #    PREM Ibarra [phone]       [facility] [address] [place] [id number]        Equal Access to Services     CHRISTOS RICHEY : Hadii zina craigo Sogiorgi, waaxda luqadaha, qaybta kaalmada alondra, parag penaloza . So [facility] [phone].    ATENCIÓN: Si habla español, tiene a cristobal disposición servicios gratuitos de asistencia lingüística. Llame al [phone].    We comply with applicable federal civil rights laws  and [place] laws. We do not discriminate on the basis of race, color, national origin, age, disability, sex, sexual orientation, or gender identity.            Thank you!     Thank you for choosing [facility]  for your care. Our goal is always to provide you with excellent care. Hearing back from our patients is one way we can continue to improve our services. Please take a few minutes to complete the written survey that you may receive in the mail after your visit with us. Thank you!             Your Updated Medication List - Protect others around you: Learn how to safely use, store and throw away your medicines at www.disposemymeds.org.          This list is accurate as of: 10/24/17 11:59 PM.  Always use your most recent med list.                   Brand Name Dispense Instructions for use Diagnosis    ALEVE PO      Take 2 tablets by mouth every 12 hours as needed for moderate pain        GABAPENTIN PO      Take 600 mg by mouth 2 times daily        order for DME     1 Units    Crutches    Tibial torsion, right       XULANE 150-35 MCG/24HR patch   Generic drug:  norelgestromin-ethinyl estradiol      Place 1 patch onto the skin once a week Remove old patch and apply new patch onto the skin once a week for 3 weeks (21 days). Do not wear patch week 4 (days 22-28), then repeat.

## 2017-10-24 NOTE — NURSING NOTE
Teaching Flowsheet   Relevant Diagnosis: Right tibia torsion  Teaching Topic: Pre-op Right tib-fib derotational osteotomy      Person(s) involved in teaching:   Patient, Mother and Father     Motivation Level:  Asks Questions: Yes  Eager to Learn: Yes  Cooperative: Yes  Receptive (willing/able to accept information): Yes  Any cultural factors/Samaritan beliefs that may influence understanding or compliance? No       Patient and Family demonstrates understanding of the following:  Reason for the appointment, diagnosis and treatment plan: Yes  Knowledge of proper use of medications and conditions for which they are ordered (with special attention to potential side effects or drug interactions): Yes  Which situations necessitate calling provider and whom to contact: Yes       Teaching Concerns Addressed:   Comments: patient verbalized history of benign tachycardia. She stated she does not take any medication for this.  Pre-op physical to be done by primary. Patient will be having her left side done 4 weeks after this first surgery, it will be a combination surgery with Dr. Schwartz and she will be admitted to the hospital following surgery for an overnight stay.      Proper use and care of  (medical equip, care aids, etc.): Yes  Nutritional needs and diet plan: Yes  Pain management techniques: Yes  Wound Care: Yes  How and/when to access community resources: NA     Instructional Materials Used/Given: pre-op packet, TRIA packet, antiseptic soap, post-operative foot and ankle surgery instructions.      Time spent with patient: 15 minutes.

## 2017-10-24 NOTE — LETTER
"10/24/2017       RE: Kaitlynn House  [address] [address]     Dear Colleague,    Thank you for referring your patient, Kaitlynn House, to the [facility] at [facility]. Please see a copy of my visit note below.    Chief Complaint   Patient presents with     Follow Up For     Bilateral patella instability      Bilateral knee pain    Summary of Clinic Encounter:       Ms. House  was last seen on 10/3/17. The time she was initially referred for bilateral patellar instability. It was determined that more of her symptoms were pain and swelling and not so much instability. Again today she notes she has never had a patellar dislocation however does have some apprehension at times with turning movements. Her main symptoms are pain specifically in the left knee especially at night she has difficulty sleeping. Her knee pain is improved when she's sitting in the knee is bent. At last visit she was sent for MRI of the left knee as well as CT scans to evaluate her version. She seen a rheumatologist who thought she might have Carlos-Danlos. She did have an episode swelling and was seen in aspiration attempted however no fluid was able to obtained.  He continues to work full-time.    Objective:  Resp 16  Ht 1.702 m (5' 7\")  Wt 56.2 kg (124 lb)  BMI 19.42 kg/m2  General - well developed and groomed. No acute distress  CV- regular rate  Pulm- No audible stridor or wheazing, nonlabored  MSK -   left knee-   no effusion. Some tenderness laterally juxtaposed the patella. No joint line tenderness. Patella has 4 quadrants of lateral subluxation. No j-sign. Firm andpoint. Range of motion is -. Stable to varus and valgus Lockman's 1A. Prone exam shows for femoral antversion of 20  and thigh foot angle of 45  external.    Right knee -  no effusion. No tenderness. Patella has 3+ quadrant lateral subluxation. No J sign. Firm endpoint. Range of " motion -15 - 140. Stable to varus and valgus Lockman's 1A. Prone exam shows for femoral antversion of 20  and thigh foot angle of 45  external.      Imaging:     Reviewed.    Right femoral anteversion, 16 left, 23 right, external tibial torsion 53, left 54.  Right TT-TG 12, left TT-TG 17. Tilt left- 31. C/D right- 1.5, C/D left-1.4, left PTI-5%    Patient Active Problem List   Diagnosis     Bilateral knee pain         Assessment:  - 22, female with hyper laxity and told the and external tibial torsion measuring 54 . Main symptoms are pain and swelling in the left knee. However she does have malalignment of both lower extremities. We discussed surgical intervention form of tibial tubercle distillate station on the left as well as a deep rotation osteotomy. Given that she has malalignment on both sides we also discussed that the rotation osteotomy on the right side. Dr. Bond was present for this discussion today. In order to accommodate her work and accomplish both extremities within this year no plan to do the right side 1st. This will allow her early weight-bearing and quicker rehab to prepare for the left side.     Will schedule her surgeries as above.    Seen with Dr. Kapadia.    30 minutes spent with patient greater than 50% in direct care coordination.    Leonard Jernigan  PGY4  [phone]    I have personally examined this patient and have reviewed the clinical presentation and progress note with the resident.  I agree with the treatment plan as outlined.  The plan was formulated with the resident on the day of the resident dictation.      Again, thank you for allowing me to participate in the care of your patient.      Sincerely,    Di Schwartz MD

## 2017-10-24 NOTE — MR AVS SNAPSHOT
After Visit Summary   10/24/2017    Kaitlynn House    MRN: [id number]           Patient Information     Date Of Birth          1995        Visit Information        Provider Department      10/24/2017 11:00 AM Di Schwartz MD [facility]        Today's Diagnoses     Tibial torsion, bilateral    -  1    Patellar malalignment syndrome of left knee           Follow-ups after your visit        Your next 10 appointments already scheduled     Oct 30, 2017  5:00 PM CDT   Ech Complete with UCECHCR2   [facility] ([facility])    [address] [place] [address]  3rd [facility] [address] [phone]           1. Please bring or wear a comfortable two-piece outfit. 2. You may eat, drink and take your normal medicines. 3. For any questions that cannot be answered, please contact the ordering physician            Nov 15, 2017  1:30 PM CST   (Arrive by 1:15 PM)   Post-Op with  U Ortho Nurse   [facility] ([facility] [place] [facility])    [address] [place] [address]  4th [facility] [address] [phone]              Who to contact     Please call your clinic at [phone] to:    Ask questions about your health    Make or cancel appointments    Discuss your medicines    Learn about your test results    Speak to your doctor   If you have compliments or concerns about an experience at your clinic, or if you wish to file a complaint, please contact [facility] Patient Relations at [phone] or email us at [email or web address]         Additional Information About Your Visit        MyChart Information     [employer] gives you secure access to your electronic health record. If you see a primary care provider, you can also send messages to your care team and make appointments. If you have questions, please call your primary care clinic.  If you do not have a primary care provider, please  "call [phone] and they will assist you.      [employer] is an electronic gateway that provides easy, online access to your medical records. With [employer], you can request a clinic appointment, read your test results, renew a prescription or communicate with your care team.     To access your existing account, please contact your [facility] or call [phone] for assistance.        Care EveryWhere ID     This is your Care EveryWhere ID. This could be used by other organizations to access your [place] medical records  [id number]        Your Vitals Were     Respirations Height BMI (Body Mass Index)             16 1.702 m (5' 7\") 19.42 kg/m2          Blood Pressure from Last 3 Encounters:   10/11/17 122/84    Weight from Last 3 Encounters:   10/24/17 56.2 kg (124 lb)   10/11/17 56.7 kg (125 lb)   10/03/17 56.8 kg (125 lb 4.8 oz)              We Performed the Following     Fatou-Operative Worksheet          Today's Medication Changes          These changes are accurate as of: 10/24/17 11:59 PM.  If you have any questions, ask your nurse or doctor.               Start taking these medicines.        Dose/Directions    order for DME   Used for:  Tibial torsion, right   Started by:  Dio Olivarez MD Crutches   Quantity:  1 Units   Refills:  0            Where to get your medicines      Some of these will need a paper prescription and others can be bought over the counter.  Ask your nurse if you have questions.     Bring a paper prescription for each of these medications     order for DME                Primary Care Provider Office Phone # Fax #    PREM Ibarra [phone]       [facility] [address] [address]        Equal Access to Services     CHRISTOS RICHEY : erin Flores, parag chua. So [facility] [phone].    ATENCIÓN: Si habla " español, tiene a cristobal disposición servicios gratuitos de asistencia lingüística. Isis villalta [phone].    We comply with applicable federal civil rights laws and [place] laws. We do not discriminate on the basis of race, color, national origin, age, disability, sex, sexual orientation, or gender identity.            Thank you!     Thank you for choosing [facility]  for your care. Our goal is always to provide you with excellent care. Hearing back from our patients is one way we can continue to improve our services. Please take a few minutes to complete the written survey that you may receive in the mail after your visit with us. Thank you!             Your Updated Medication List - Protect others around you: Learn how to safely use, store and throw away your medicines at www.disposemymeds.org.          This list is accurate as of: 10/24/17 11:59 PM.  Always use your most recent med list.                   Brand Name Dispense Instructions for use Diagnosis    ALEVE PO      Take 2 tablets by mouth every 12 hours as needed for moderate pain        GABAPENTIN PO      Take 600 mg by mouth 2 times daily        order for DME     1 Units    Crutches    Tibial torsion, right       XULANE 150-35 MCG/24HR patch   Generic drug:  norelgestromin-ethinyl estradiol      Place 1 patch onto the skin once a week Remove old patch and apply new patch onto the skin once a week for 3 weeks (21 days). Do not wear patch week 4 (days 22-28), then repeat.

## 2017-10-24 NOTE — PROGRESS NOTES
"Chief Complaint   Patient presents with     Follow Up For     Bilateral patella instability      Bilateral knee pain    Summary of Clinic Encounter:       Ms. House  was last seen on 10/3/17. The time she was initially referred for bilateral patellar instability. It was determined that more of her symptoms were pain and swelling and not so much instability. Again today she notes she has never had a patellar dislocation however does have some apprehension at times with turning movements. Her main symptoms are pain specifically in the left knee especially at night she has difficulty sleeping. Her knee pain is improved when she's sitting in the knee is bent. At last visit she was sent for MRI of the left knee as well as CT scans to evaluate her version. She seen a rheumatologist who thought she might have Carlos-Danlos. She did have an episode swelling and was seen in aspiration attempted however no fluid was able to obtained.  He continues to work full-time.    Objective:  Resp 16  Ht 1.702 m (5' 7\")  Wt 56.2 kg (124 lb)  BMI 19.42 kg/m2  General - well developed and groomed. No acute distress  CV- regular rate  Pulm- No audible stridor or wheazing, nonlabored  MSK -   left knee-   no effusion. Some tenderness laterally juxtaposed the patella. No joint line tenderness. Patella has 4 quadrants of lateral subluxation. No j-sign. Firm andpoint. Range of motion is -. Stable to varus and valgus Lockman's 1A. Prone exam shows for femoral antversion of 20  and thigh foot angle of 45  external.    Right knee -  no effusion. No tenderness. Patella has 3+ quadrant lateral subluxation. No J sign. Firm endpoint. Range of motion -15 - 140. Stable to varus and valgus Lockman's 1A. Prone exam shows for femoral antversion of 20  and thigh foot angle of 45  external.      Imaging:     Reviewed.    Right femoral anteversion, 16 left, 23 right, external tibial torsion 53, left 54.  Right TT-TG 12, left TT-TG 17. Tilt left- " 31. C/D right- 1.5, C/D left-1.4, left PTI-5%    Patient Active Problem List   Diagnosis     Bilateral knee pain         Assessment:  - 22, female with hyper laxity and told the and external tibial torsion measuring 54 . Main symptoms are pain and swelling in the left knee. However she does have malalignment of both lower extremities. We discussed surgical intervention form of tibial tubercle distillate station on the left as well as a deep rotation osteotomy. Given that she has malalignment on both sides we also discussed that the rotation osteotomy on the right side. Dr. Bond was present for this discussion today. In order to accommodate her work and accomplish both extremities within this year no plan to do the right side 1st. This will allow her early weight-bearing and quicker rehab to prepare for the left side.     Will schedule her surgeries as above.    Seen with Dr. Kapadia.    30 minutes spent with patient greater than 50% in direct care coordination.    Leonardarlene Jernigan  PGY4  [phone]    I have personally examined this patient and have reviewed the clinical presentation and progress note with the resident.  I agree with the treatment plan as outlined.  The plan was formulated with the resident on the day of the resident dictation.    Di Schwartz MD  Professor Orthopedic Surgery  [facility]    Answers for HPI/ROS submitted by the patient on 10/24/2017   General Symptoms: No  Skin Symptoms: No  HENT Symptoms: No  EYE SYMPTOMS: No  HEART SYMPTOMS: Yes  LUNG SYMPTOMS: No  INTESTINAL SYMPTOMS: No  URINARY SYMPTOMS: No  GYNECOLOGIC SYMPTOMS: No  BREAST SYMPTOMS: No  SKELETAL SYMPTOMS: Yes  BLOOD SYMPTOMS: No  NERVOUS SYSTEM SYMPTOMS: No  MENTAL HEALTH SYMPTOMS: No  Chest pain or pressure: No  Fast or irregular heartbeat: Yes  Pain in legs with walking: No  Swelling in feet or ankles: No  Trouble breathing while lying down: No  Fingers or Toes appear blue: No  High blood pressure:  No  Low blood pressure: Yes  Fainting: No  Murmurs: No  Chest pain on exertion: No  Chest pain at rest: No  Cramping pain in leg during exercise: No  Pacemaker: No  Varicose veins: Yes  Edema or swelling: No  Fast heart beat: Yes  Wake up at night with shortness of breath: No  Heart flutters: No  Light-headedness: No  Exercise intolerance: No  Joint pain: Yes  Bone pain: Yes  Joint stiffness: Yes

## 2017-10-30 ENCOUNTER — RADIANT APPOINTMENT (OUTPATIENT)
Dept: CARDIOLOGY | Facility: CLINIC | Age: 22
End: 2017-10-30

## 2017-10-30 DIAGNOSIS — R55 SYNCOPE: ICD-10-CM

## 2017-11-01 ENCOUNTER — TRANSFERRED RECORDS (OUTPATIENT)
Dept: HEALTH INFORMATION MANAGEMENT | Facility: CLINIC | Age: 22
End: 2017-11-01

## 2017-11-02 ENCOUNTER — TELEPHONE (OUTPATIENT)
Dept: ORTHOPEDICS | Facility: CLINIC | Age: 22
End: 2017-11-02

## 2017-11-02 NOTE — TELEPHONE ENCOUNTER
Received phone call from surgery nurse at [facility] stating that she called the patient today for follow up from her surgery yesterday (s/p right tib-fib derotational osteotomy 11/1/17) and the patient told her that she is currently admitted to [facility] for fainting spells and pain control with IV morphine.  E-mail sent to Dr. Olivarez to update him.

## 2017-11-03 DIAGNOSIS — G90.529 CRPS (COMPLEX REGIONAL PAIN SYNDROME), LOWER LIMB: Primary | ICD-10-CM

## 2017-11-03 RX ORDER — MORPHINE SULFATE 15 MG/1
15 TABLET, FILM COATED, EXTENDED RELEASE ORAL EVERY 12 HOURS
Qty: 14 TABLET | Refills: 0 | Status: SHIPPED | OUTPATIENT
Start: 2017-11-03 | End: 2017-11-15

## 2017-11-08 ENCOUNTER — PRE VISIT (OUTPATIENT)
Dept: CARDIOLOGY | Facility: CLINIC | Age: 22
End: 2017-11-08

## 2017-11-08 DIAGNOSIS — R42 DIZZINESS: Primary | ICD-10-CM

## 2017-11-08 DIAGNOSIS — Z09 SURGICAL FOLLOW-UP CARE: Primary | ICD-10-CM

## 2017-11-08 RX ORDER — OXYCODONE HYDROCHLORIDE 5 MG/1
5-10 TABLET ORAL EVERY 4 HOURS PRN
Qty: 30 TABLET | Refills: 0 | Status: SHIPPED | OUTPATIENT
Start: 2017-11-08 | End: 2017-11-15

## 2017-11-08 RX ORDER — HYDROXYZINE PAMOATE 25 MG/1
25-50 CAPSULE ORAL 4 TIMES DAILY PRN
Qty: 30 CAPSULE | Refills: 1 | Status: ON HOLD | OUTPATIENT
Start: 2017-11-08 | End: 2017-12-15

## 2017-11-08 NOTE — TELEPHONE ENCOUNTER
Patient called for refill of her pain medication. She currently has a prescription for Oxycodone 5mg with 15 tablets remaining. She states she takes 1 tab a day for the breakthrough pain and takes Morphine 15mg every 12-14 hours and will be taking that through Sunday.  She was told that we normally do not refill the Morphine as that was prescribed for her initial severe post operative pain and we expect her pain to be getting better by the time she is done with that prescription. She was instructed to continue taking the Oxycodone 1-2 tablets every 4 hours after she is done with her Morphine prescription on Sunday along with the Vistaril 1-2 tablets 4 times per day.  A refill the Oxycodone and Vistaril was completed today per standing order and mailed to patients home.  Patient states her pain is tolerable at a 4-5 when taking pain medication, when it wears off her pain is at a 7/10.  She stated that she is still having episodes of tachycardia and passed out yesterday. She stated when she was up to the bathroom her heart rate was 180. She was admitted for this last week as well as for pain control at [facility]. She was instructed to call her doctor today to update them on her status and to schedule her cardiology consult that they placed for her upon discharge.  Patient stated she will contact her doctor today.  If symptoms worsen she should present to the ER. She has our number to call back with any questions or concerns that may arise prior to her return to clinic.

## 2017-11-09 ENCOUNTER — OFFICE VISIT (OUTPATIENT)
Dept: CARDIOLOGY | Facility: CLINIC | Age: 22
End: 2017-11-09
Attending: NURSE PRACTITIONER
Payer: COMMERCIAL

## 2017-11-09 VITALS
SYSTOLIC BLOOD PRESSURE: 124 MMHG | HEIGHT: 69 IN | OXYGEN SATURATION: 100 % | HEART RATE: 180 BPM | DIASTOLIC BLOOD PRESSURE: 79 MMHG

## 2017-11-09 DIAGNOSIS — R42 DIZZINESS: ICD-10-CM

## 2017-11-09 PROCEDURE — 93005 ELECTROCARDIOGRAM TRACING: CPT | Mod: ZF

## 2017-11-09 PROCEDURE — 99213 OFFICE O/P EST LOW 20 MIN: CPT | Mod: ZP | Performed by: NURSE PRACTITIONER

## 2017-11-09 PROCEDURE — 93010 ELECTROCARDIOGRAM REPORT: CPT | Mod: ZP | Performed by: INTERNAL MEDICINE

## 2017-11-09 PROCEDURE — 99213 OFFICE O/P EST LOW 20 MIN: CPT

## 2017-11-09 ASSESSMENT — PAIN SCALES - GENERAL: PAINLEVEL: NO PAIN (0)

## 2017-11-09 NOTE — LETTER
"11/9/2017      RE: Kaitlynn House  [address]  [facility] [address]       Dear Colleague,    Thank you for the opportunity to participate in the care of your patient, Kaitlynn House, at the [facility] HEART [facility]. Please see a copy of my visit note below.      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  She is currently recovering from a right tib-fib derotational osteotomy on 11/1/17 by Dr. Olivarez at [facility] and then presented to [facility] Emergency Department on 11/2/2017 for evaluation of syncopal episode and tachycardia and was admitted for further monitoring and pain control.       Kaitlynn House  presents for tachycardia.  Denies chest pain or pressure, headaches,  angina, dyspnea at rest or with exertion, dry cough, orthopnea, PND, abdominal pain, abdominal edema, pedal edema, or claudication.  Denies easy bruising or bleeding, hematuria, hematochezia, and epistaxis.        Patient states that activity and symptoms prior to episode include stand up gets SOB, shaking, looses vision (black spots), then hears a roar, and then loses muscle tone.     Had surgery on 11/1 and was using crutches at home and took 10 steps, symptoms occurred,  picked her up in a cradle position and brought her downstairs the entire time was speaking \"I dont' know what is going on, I cannot see anything\".    laid her down on the couch, her she felt out of breath, and shaky, vision and hearing slowly comes back, she was weak and tired when she could see clearly again.  Her   witnessed the event and stated she looked very pale during the episode.    She describes another episode while " walking around [place] in August 2017, when the temperature was 90 to 100 degrees F after walking around for 3 hours.  She was standng in line, had LOC, and came too when on the ground.  Felt shaky, drank water, ate food after 5 minutes she was able to walk 10 steps and repeated the incident again.   supported her and they walked to a restaurant.    States similar events often occur in or immediately following a hot shower, while she is standing at work. Also feels palpitatons daily at work.   Denies loss of urine, bowel or tongue biting during these episodes.  States she consumes    24  64 oz water/day; denies take salt tablets, does not eat a high sodium diet.  Consumes 1 Ensure or Boost daily;     Presenting EKG:    Tachycardia with ventricular rate of 137      Current Outpatient Prescriptions   Medication Sig Dispense Refill     oxyCODONE IR (ROXICODONE) 5 MG tablet Take 1-2 tablets (5-10 mg) by mouth every 4 hours as needed for moderate to severe pain 30 tablet 0     hydrOXYzine (VISTARIL) 25 MG capsule Take 1-2 capsules (25-50 mg) by mouth 4 times daily as needed for itching 30 capsule 1     morphine (MS CONTIN) 15 MG 12 hr tablet Take 1 tablet (15 mg) by mouth every 12 hours 14 tablet 0     order for DME Crutches 1 Units 0     Naproxen Sodium (ALEVE PO) Take 2 tablets by mouth every 12 hours as needed for moderate pain       norelgestromin-ethinyl estradiol (XULANE) 150-35 MCG/24HR patch Place 1 patch onto the skin once a week Remove old patch and apply new patch onto the skin once a week for 3 weeks (21 days). Do not wear patch week 4 (days 22-28), then repeat.       GABAPENTIN PO Take 600 mg by mouth 2 times daily          No past medical history on file.    No past surgical history on file.    No family history on file.    Social History   Substance Use Topics     Smoking status: Never Smoker     Smokeless tobacco: Never Used     Alcohol use Not on file       Allergies   Allergen Reactions     Egg  "[Chicken-Derived Products (Egg)] Anaphylaxis     Amoxicillin Hives     Zithromax [Azithromycin] Nausea and Vomiting         ROS:   Negative except for as indicated in HPI.    Physical Examination:  Vitals: Ht 1.753 m (5' 9\")   Vitals:    11/09/17 1727 11/09/17 1743 11/09/17 1745 11/09/17 1746   BP: 123/76 121/77 121/60 124/79   BP Location: Left arm Left arm Left arm Left arm   Patient Position: Supine Sitting Standing Chair   Cuff Size: Adult Small Adult Small Adult Small Adult Small   Pulse: 123 134 160 180   SpO2: 97% 96% 98% 100%   Height: 1.753 m (5' 9\")        BMI= There is no height or weight on file to calculate BMI.    GENERAL APPEARANCE: thin, using a wheelchair alert, and no acute distress  HEENT: no icterus, no xanthelasmas, normal pupil size and reaction, no cyanosis.  NECK: no asymmetry,  carotid upstrokes are normal bilaterally, no cervical bruits, no JVD   CHEST: lungs clear to auscultation - no rales, rhonchi or wheezes, no use of accessory muscles, no retractions, respirations are unlabored, normal respiratory rate  CARDIOVASCULAR: tachycardic regular rhythm, normal S1 with physiologic split S2, no S3 or S4 and no murmur, click or rub, precordium quiet with normal PMI.  ABDOMEN: soft, non-tender,  no masses palpable,   EXTREMITIES: no clubbing, cyanosis, or edema  NEURO: alert and oriented to person/place/time, normal speech, gait and affect  VASC: Radial, dorsalis pedis, and posterior tibialis pulses +2 and symmetric bilaterally.   SKIN: no ecchymoses, no rashes    Laboratory:  No results found for: WBC, HGB, HCT, PLT, NA, POTASSIUM, CHLORIDE, CO2, BUN, CR, GLC, SED, DD, DIMER, NTBNPI, NTBNP, TROPONIN, TROPI, TROPR, TROPN, AST, ALT, GGT, ALKPHOS, BILITOTAL, BILIDIRECT, INO, INR      No results found for: CKTOTAL, CKMB, TROPN  No results found for: CHOL, CHOLHDLRATIO, HDL, LDL    ECHO:   Recent Results (from the past 8760 hour(s))   Echocardiogram    Narrative    " [id number]  [facility]  [id number]  [id number]^CORINE^AMANDA^           [facility],[place]  Echocardiography Laboratory  [address] [place] [id number]     Name: OSIEL OSBORNE  MRN: [id number]  : 1995  Study Date: 10/30/2017 05:06 PM  Age: 22 yrs  Gender: Female  [place] [facility] [place] For Study: Syncope and collapse  Ordering Physician: AMANDA POOL  Referring Physician: AMANDA POOL  Performed By: Beck Osei RDCS     BSA: 1.7 m2  Height: 67 in  Weight: 124 lb  HR: 91  BP: 122/84 mmHg  _____________________________________________________________________________  __        Procedure  Echocardiogram with two-dimensional, color and spectral Doppler performed.  _____________________________________________________________________________  __        Interpretation Summary  Global and regional left ventricular function is normal with an EF of 55-60%.  Global right ventricular function is normal.  No significant valvular abnormalities were noted.  The inferior vena cava was normal in size with preserved respiratory  variability.  No pericardial effusion is present.  Previous study not available for comparison.  _____________________________________________________________________________  __        Left Ventricle  Left ventricular size is normal. Left ventricular wall thickness is normal.  Global and regional left ventricular function is normal with an EF of 55-60%.  Normal left ventricular filling for age. No regional wall motion abnormalities  are seen.     Right Ventricle  The right ventricle is normal size. Global right ventricular function is  normal.     Atria  Both atria appear normal.     Mitral Valve  The mitral valve is normal.        Aortic Valve  Aortic valve is normal in structure and function. The aortic valve is  tricuspid.     Tricuspid Valve  The tricuspid valve is normal. The peak velocity of the tricuspid regurgitant  jet is not  obtainable.     Pulmonic Valve  The pulmonic valve is normal.     Vessels  The aorta root is normal. The inferior vena cava was normal in size with  preserved respiratory variability.     Pericardium  No pericardial effusion is present.        Compared to Previous Study  Previous study not available for comparison.     Attestation  I have personally viewed the imaging and agree with the interpretation and  report as documented by the fellow, Durga Goodman, and/or edited by me.  _____________________________________________________________________________  __     MMode/2D Measurements & Calculations  IVSd: 0.71 cm  LVIDd: 4.0 cm  LVIDs: 2.6 cm  LVPWd: 0.68 cm  FS: 34.3 %  EDV(Teich): 68.7 ml  ESV(Teich): 24.8 ml  LV mass(C)d: 76.4 grams  LV mass(C)dI: 46.3 grams/m2  asc Aorta Diam: 2.9 cm  LVOT diam: 2.0 cm  LVOT area: 3.1 cm2  LA Volume (BP): 32.4 ml  LA Volume Index (BP): 19.6 ml/m2     RWT: 0.34        Doppler Measurements & Calculations  MV E max chelly: 79.9 cm/sec  MV A max chelly: 70.6 cm/sec  MV E/A: 1.1  Ao V2 max: 112.0 cm/sec  Ao max P.0 mmHg  SANTOS(V,D): 2.6 cm2  LV V1 max PG: 3.5 mmHg  LV V1 max: 93.0 cm/sec  E/E' av.9  Lateral E/e': 4.9  Med E to E': 6.9  Medial E/e': 6.9        _____________________________________________________________________________  __        Report approved by: Yared Mtz 10/31/2017 09:18 AM            Assessment and recommendations:    Tachycardia   [place] salt diet; add salt to multiple foods throughout the day  50% Propel (or G2) and 50% water for a total of % per day.    Please wear biker shorts daily  No hot showers  Exercise as you can.       Syncope  - as above   - tilt table with DR. Mendoza as able with bilateral leg surgery and not able to bear weight.    Follow up with Dr. Mendoza at next available.    Patient expresses understanding and agreement with the plan.    I appreciate the chance to help with Kaitlynn Chappell [facility]. Please let me know if you  have any questions or concerns.    Shell HARRINGTON, CNP

## 2017-11-09 NOTE — PATIENT INSTRUCTIONS
Cardiology Provider you saw in clinic today: JUANY Hinton CNP      [place] salt diet; add salt to multiple foods throughout the day  50% Propel (or G2) and 50% water for a total of % per day.    Please wear biker shorts daily  No hot showers  Exercise as you can.       You will receive all normal lab and testing results via [employer] or mail if not reviewed in clinic today. Please contact our office if you need assistance with setting up MyChart.    If you need a medication refill please contact your pharmacy. Please allow 3 business days for your refill to be completed.     As always, thank you for trusting us with your health care needs!    If you have any questions regarding your visit please contact your care team:   Cardiology  Telephone Number    EP RN  Electrophysiology Nurse Coordinator [phone]     Call for EP procedure scheduling concerns  ALICE Hebert  [phone]           Device Clinic (Pacemakers, ICDs, Loop)   RN's : Regine Espinoza Connie, Dawn During business hours: [phone]    After business hours:   [phone]- select option 4 and ask for job code [phone].

## 2017-11-09 NOTE — NURSING NOTE
Chief Complaint   Patient presents with     Follow Up For     EKG; ortho BP     Vitals were taken and medications were reconciled. EKG was performed.  MILLICENT Reilly  5:30 PM

## 2017-11-09 NOTE — PROGRESS NOTES
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  She is currently recovering from a right tib-fib derotational osteotomy on 11/1/17 by Dr. Olivarez at [facility] and then presented to [facility] Emergency Department on 11/2/2017 for evaluation of syncopal episode and tachycardia and was admitted for further monitoring and pain control.       Kaitlynn House  presents for tachycardia.  Denies chest pain or pressure, headaches,  angina, dyspnea at rest or with exertion, dry cough, orthopnea, PND, abdominal pain, abdominal edema, pedal edema, or claudication.  Denies easy bruising or bleeding, hematuria, hematochezia, and epistaxis.        Patient states that activity and symptoms prior to episode include stand up gets SOB, shaking, looses vision (black spots), then hears a roar, and then loses muscle tone.     Had surgery on 11/1 and was using crutches at home and took 10 steps, symptoms occurred,  picked her up in a cradle position and brought her downstairs the entire time was speaking \"I dont' know what is going on, I cannot see anything\".    laid her down on the couch, her she felt out of breath, and shaky, vision and hearing slowly comes back, she was weak and tired when she could see clearly again.  Her   witnessed the event and stated she looked very pale during the episode.    She describes another episode while walking around [place] in August 2017, when the temperature was 90 to 100 degrees F after walking around for 3 hours.  She was standng in line, had LOC, and came too when on the ground.  Felt shaky, drank water, ate food after 5 minutes she was able to walk 10 steps and repeated the incident again.   supported her and they " "walked to a restaurant.    States similar events often occur in or immediately following a hot shower, while she is standing at work. Also feels palpitatons daily at work.   Denies loss of urine, bowel or tongue biting during these episodes.  States she consumes    24  64 oz water/day; denies take salt tablets, does not eat a high sodium diet.  Consumes 1 Ensure or Boost daily;     Presenting EKG:    Tachycardia with ventricular rate of 137      Current Outpatient Prescriptions   Medication Sig Dispense Refill     oxyCODONE IR (ROXICODONE) 5 MG tablet Take 1-2 tablets (5-10 mg) by mouth every 4 hours as needed for moderate to severe pain 30 tablet 0     hydrOXYzine (VISTARIL) 25 MG capsule Take 1-2 capsules (25-50 mg) by mouth 4 times daily as needed for itching 30 capsule 1     morphine (MS CONTIN) 15 MG 12 hr tablet Take 1 tablet (15 mg) by mouth every 12 hours 14 tablet 0     order for DME Crutches 1 Units 0     Naproxen Sodium (ALEVE PO) Take 2 tablets by mouth every 12 hours as needed for moderate pain       norelgestromin-ethinyl estradiol (XULANE) 150-35 MCG/24HR patch Place 1 patch onto the skin once a week Remove old patch and apply new patch onto the skin once a week for 3 weeks (21 days). Do not wear patch week 4 (days 22-28), then repeat.       GABAPENTIN PO Take 600 mg by mouth 2 times daily          No past medical history on file.    No past surgical history on file.    No family history on file.    Social History   Substance Use Topics     Smoking status: Never Smoker     Smokeless tobacco: Never Used     Alcohol use Not on file       Allergies   Allergen Reactions     Egg [Chicken-Derived Products (Egg)] Anaphylaxis     Amoxicillin Hives     Zithromax [Azithromycin] Nausea and Vomiting         ROS:   Negative except for as indicated in HPI.    Physical Examination:  Vitals: Ht 1.753 m (5' 9\")   Vitals:    11/09/17 1727 11/09/17 1743 11/09/17 1745 11/09/17 1746   BP: 123/76 121/77 121/60 124/79   BP " "Location: Left arm Left arm Left arm Left arm   Patient Position: Supine Sitting Standing Chair   Cuff Size: Adult Small Adult Small Adult Small Adult Small   Pulse: 123 134 160 180   SpO2: 97% 96% 98% 100%   Height: 1.753 m (5' 9\")        BMI= There is no height or weight on file to calculate BMI.    GENERAL APPEARANCE: thin, using a wheelchair alert, and no acute distress  HEENT: no icterus, no xanthelasmas, normal pupil size and reaction, no cyanosis.  NECK: no asymmetry,  carotid upstrokes are normal bilaterally, no cervical bruits, no JVD   CHEST: lungs clear to auscultation - no rales, rhonchi or wheezes, no use of accessory muscles, no retractions, respirations are unlabored, normal respiratory rate  CARDIOVASCULAR: tachycardic regular rhythm, normal S1 with physiologic split S2, no S3 or S4 and no murmur, click or rub, precordium quiet with normal PMI.  ABDOMEN: soft, non-tender,  no masses palpable,   EXTREMITIES: no clubbing, cyanosis, or edema  NEURO: alert and oriented to person/place/time, normal speech, gait and affect  VASC: Radial, dorsalis pedis, and posterior tibialis pulses +2 and symmetric bilaterally.   SKIN: no ecchymoses, no rashes    Laboratory:  No results found for: WBC, HGB, HCT, PLT, NA, POTASSIUM, CHLORIDE, CO2, BUN, CR, GLC, SED, DD, DIMER, NTBNPI, NTBNP, TROPONIN, TROPI, TROPR, TROPN, AST, ALT, GGT, ALKPHOS, BILITOTAL, BILIDIRECT, INO, INR      No results found for: CKTOTAL, CKMB, TROPN  No results found for: CHOL, CHOLHDLRATIO, HDL, LDL    ECHO:   Recent Results (from the past 8760 hour(s))   Echocardiogram    Narrative    [id number]  [id number]  [id number]  [id number]^CORINE^AMANDA^           [facility]  Echocardiography Laboratory  [address] [place] [id number]     Name: OSIEL OSBORNE  MRN: [id number]  : 1995  Study Date: 10/30/2017 05:06 PM  Age: 22 yrs  Gender: Female  [place] [facility] [place]  Reason For Study: Syncope and " collapse  Ordering Physician: AMANDA POOL  Referring Physician: AMANDA POOL  Performed By: Beck Osei RDCS     BSA: 1.7 m2  Height: 67 in  Weight: 124 lb  HR: 91  BP: 122/84 mmHg  _____________________________________________________________________________  __        Procedure  Echocardiogram with two-dimensional, color and spectral Doppler performed.  _____________________________________________________________________________  __        Interpretation Summary  Global and regional left ventricular function is normal with an EF of 55-60%.  Global right ventricular function is normal.  No significant valvular abnormalities were noted.  The inferior vena cava was normal in size with preserved respiratory  variability.  No pericardial effusion is present.  Previous study not available for comparison.  _____________________________________________________________________________  __        Left Ventricle  Left ventricular size is normal. Left ventricular wall thickness is normal.  Global and regional left ventricular function is normal with an EF of 55-60%.  Normal left ventricular filling for age. No regional wall motion abnormalities  are seen.     Right Ventricle  The right ventricle is normal size. Global right ventricular function is  normal.     Atria  Both atria appear normal.     Mitral Valve  The mitral valve is normal.        Aortic Valve  Aortic valve is normal in structure and function. The aortic valve is  tricuspid.     Tricuspid Valve  The tricuspid valve is normal. The peak velocity of the tricuspid regurgitant  jet is not obtainable.     Pulmonic Valve  The pulmonic valve is normal.     Vessels  The aorta root is normal. The inferior vena cava was normal in size with  preserved respiratory variability.     Pericardium  No pericardial effusion is present.        Compared to Previous Study  Previous study not available for comparison.     Attestation  I have personally viewed the imaging and  agree with the interpretation and  report as documented by the fellow, Durga Goodman, and/or edited by me.  _____________________________________________________________________________  __     MMode/2D Measurements & Calculations  IVSd: 0.71 cm  LVIDd: 4.0 cm  LVIDs: 2.6 cm  LVPWd: 0.68 cm  FS: 34.3 %  EDV(Teich): 68.7 ml  ESV(Teich): 24.8 ml  LV mass(C)d: 76.4 grams  LV mass(C)dI: 46.3 grams/m2  asc Aorta Diam: 2.9 cm  LVOT diam: 2.0 cm  LVOT area: 3.1 cm2  LA Volume (BP): 32.4 ml  LA Volume Index (BP): 19.6 ml/m2     RWT: 0.34        Doppler Measurements & Calculations  MV E max chelly: 79.9 cm/sec  MV A max chelly: 70.6 cm/sec  MV E/A: 1.1  Ao V2 max: 112.0 cm/sec  Ao max P.0 mmHg  SANTOS(V,D): 2.6 cm2  LV V1 max PG: 3.5 mmHg  LV V1 max: 93.0 cm/sec  E/E' av.9  Lateral E/e': 4.9  Med E to E': 6.9  Medial E/e': 6.9        _____________________________________________________________________________  __        Report approved by: Yared Mtz 10/31/2017 09:18 AM            Assessment and recommendations:    Tachycardia   [place] salt diet; add salt to multiple foods throughout the day  50% Propel (or G2) and 50% water for a total of % per day.    Please wear biker shorts daily  No hot showers  Exercise as you can.       Syncope  - as above   - tilt table with DR. Mendoza as able with bilateral leg surgery and not able to bear weight.    Follow up with Dr. Mendoza at next available.    Patient expresses understanding and agreement with the plan.    I appreciate the chance to help with Kaitlynn Chappell [facility]. Please let me know if you have any questions or concerns.    Shell HARRINGTON, CNP

## 2017-11-10 LAB — INTERPRETATION ECG - MUSE: NORMAL

## 2017-11-15 ENCOUNTER — OFFICE VISIT (OUTPATIENT)
Dept: ORTHOPEDICS | Facility: CLINIC | Age: 22
End: 2017-11-15

## 2017-11-15 ENCOUNTER — TELEPHONE (OUTPATIENT)
Dept: ORTHOPEDICS | Facility: CLINIC | Age: 22
End: 2017-11-15

## 2017-11-15 DIAGNOSIS — Z09 SURGICAL FOLLOW-UP CARE: Primary | ICD-10-CM

## 2017-11-15 RX ORDER — OXYCODONE HYDROCHLORIDE 5 MG/1
5-10 TABLET ORAL EVERY 4 HOURS PRN
Qty: 30 TABLET | Refills: 0 | Status: SHIPPED | OUTPATIENT
Start: 2017-11-15 | End: 2017-11-20

## 2017-11-15 ASSESSMENT — ENCOUNTER SYMPTOMS
SWOLLEN GLANDS: 0
SYNCOPE: 1
POLYDIPSIA: 0
NIGHT SWEATS: 1
NECK PAIN: 0
PALPITATIONS: 1
BRUISES/BLEEDS EASILY: 0
LIGHT-HEADEDNESS: 1
FATIGUE: 1
SLEEP DISTURBANCES DUE TO BREATHING: 0
EXERCISE INTOLERANCE: 1
MUSCLE CRAMPS: 1
ORTHOPNEA: 0
BACK PAIN: 0
JOINT SWELLING: 0
CHILLS: 0
ARTHRALGIAS: 1
WEIGHT LOSS: 0
LEG PAIN: 1
POLYPHAGIA: 0
HYPERTENSION: 0
WEIGHT GAIN: 0
DECREASED APPETITE: 1
HALLUCINATIONS: 0
HYPOTENSION: 1
MYALGIAS: 0
MUSCLE WEAKNESS: 1
STIFFNESS: 1
INCREASED ENERGY: 1
ALTERED TEMPERATURE REGULATION: 1
FEVER: 1

## 2017-11-15 NOTE — TELEPHONE ENCOUNTER
Called patient to let her know we can offer her 12/6/17 as a surgery date. The day will be held until we hear back from her. I left our direct number for her to call back as soon as she is able.

## 2017-11-15 NOTE — PROGRESS NOTES
Reason for visit:    Kaitlynn House came in to the clinic for a two week post op check.    Her surgery was done 11/1/17 by Dr Olivarez.  She had right tibial and fibular derotational osteotomy with an internal rotation of 33 degrees.      Assessment:    Kaitlynn came into the clinic in post op cam boot WBAT with crutches.     The Surgical wounds were exposed and found to be well-healed and without evidence of infection; so the sutures were removed.    Patient denies any numbness or tingling. She states her pain is slowly improving. She states she is taking her pain medication about every 6 hours. She was given a refill of this today.       Plan:     She was placed in her cam walker boot.  She was told to remain WBAT and to begin physical therapy without restrictions for range of motion and strengthening.      She has an appointment to see Dr. Olivarez at that time Dr. Olivarez will determine further restrictions.    She has our phone number and will call with questions or problems.

## 2017-11-15 NOTE — MR AVS SNAPSHOT
After Visit Summary   11/15/2017    Kaitlynn House    MRN: [id number]           Patient Information     Date Of Birth          1995        Visit Information        Provider Department      11/15/2017 1:30 PM Nurse, Whit [facility]        Today's Diagnoses     Surgical follow-up care    -  1       Follow-ups after your visit        Additional Services     PHYSICAL THERAPY REFERRAL (External-Prints)       Physical Therapy Referral                  Your next 10 appointments already scheduled     Jan 18, 2018  4:30 PM CST   (Arrive by 4:15 PM)   RETURN ARRHYTHMIA with JUANY Henning CNP   [facility] ([facility])    [address]  3rd Floor  [facility] [address] [phone]            Feb 22, 2018  6:00 PM CST   (Arrive by 5:45 PM)   RETURN ARRHYTHMIA with Milo Mendoza MD   [facility] ([facility])    [address]  [address]rd [facility] [address] [phone]              Who to contact     Please call your clinic at [phone] to:    Ask questions about your health    Make or cancel appointments    Discuss your medicines    Learn about your test results    Speak to your doctor   If you have compliments or concerns about an experience at your clinic, or if you wish to file a complaint, please contact [facility] Patient Relations at [phone] or email us at [email or web address]         Additional Information About Your Visit        MyChart Information     [employer] gives you secure access to your electronic health record. If you see a primary care provider, you can also send messages to your care team and make appointments. If you have questions, please call your primary care clinic.  If you do not have a primary care provider, please call [phone] and they will assist you.      RebelMouse is an electronic gateway  that provides easy, online access to your medical records. With Weesh, you can request a clinic appointment, read your test results, renew a prescription or communicate with your care team.     To access your existing account, please contact your [facility] or call [phone] for assistance.        Care EveryWhere ID     This is your Care EveryWhere ID. This could be used by other organizations to access your [place] medical records  [id number]         Blood Pressure from Last 3 Encounters:   11/09/17 124/79   10/11/17 122/84    Weight from Last 3 Encounters:   10/24/17 56.2 kg (124 lb)   10/11/17 56.7 kg (125 lb)   10/03/17 56.8 kg (125 lb 4.8 oz)              We Performed the Following     PHYSICAL THERAPY REFERRAL (External-Prints)          Where to get your medicines      Some of these will need a paper prescription and others can be bought over the counter.  Ask your nurse if you have questions.     Bring a paper prescription for each of these medications     oxyCODONE IR 5 MG tablet          Primary Care Provider    Physician No Ref-Primary       NO REF-PRIMARY PHYSICIAN        Equal Access to Services     [facility] : Hadii zina Roper, waaxda chad, qaybta mishaalivet cantu, parag caballero. So [facility] [phone].    ATENCIÓN: Si habla español, tiene a cristobal disposición servicios gratuitos de asistencia lingüística. Llame al [phone].    We comply with applicable federal civil rights laws and [place] laws. We do not discriminate on the basis of race, color, national origin, age, disability, sex, sexual orientation, or gender identity.            Thank you!     Thank you for choosing [facility]  for your care. Our goal is always to provide you with excellent care. Hearing back from our patients is one way we can continue to improve our services. Please take a few minutes to complete the written survey that you  may receive in the mail after your visit with us. Thank you!             Your Updated Medication List - Protect others around you: Learn how to safely use, store and throw away your medicines at www.disposemymeds.org.          This list is accurate as of: 11/15/17  3:09 PM.  Always use your most recent med list.                   Brand Name Dispense Instructions for use Diagnosis    ALEVE PO      Take 2 tablets by mouth every 12 hours as needed for moderate pain        GABAPENTIN PO      Take 600 mg by mouth 2 times daily        hydrOXYzine 25 MG capsule    VISTARIL    30 capsule    Take 1-2 capsules (25-50 mg) by mouth 4 times daily as needed for itching    Surgical follow-up care       order for DME     1 Units    Crutches    Tibial torsion, right       oxyCODONE IR 5 MG tablet    ROXICODONE    30 tablet    Take 1-2 tablets (5-10 mg) by mouth every 4 hours as needed for moderate to severe pain    Surgical follow-up care       XULANE 150-35 MCG/24HR patch   Generic drug:  norelgestromin-ethinyl estradiol      Place 1 patch onto the skin once a week Remove old patch and apply new patch onto the skin once a week for 3 weeks (21 days). Do not wear patch week 4 (days 22-28), then repeat.

## 2017-11-20 ENCOUNTER — THERAPY VISIT (OUTPATIENT)
Dept: PHYSICAL THERAPY | Facility: CLINIC | Age: 22
End: 2017-11-20
Payer: COMMERCIAL

## 2017-11-20 DIAGNOSIS — Z09 SURGICAL FOLLOW-UP CARE: ICD-10-CM

## 2017-11-20 DIAGNOSIS — M25.571 ANKLE PAIN, RIGHT: ICD-10-CM

## 2017-11-20 DIAGNOSIS — M25.469 EFFUSION OF LOWER LEG JOINT: Primary | ICD-10-CM

## 2017-11-20 DIAGNOSIS — Z47.89 AFTERCARE FOLLOWING SURGERY OF THE MUSCULOSKELETAL SYSTEM: ICD-10-CM

## 2017-11-20 PROBLEM — M25.572 ANKLE PAIN, LEFT: Status: RESOLVED | Noted: 2017-11-20 | Resolved: 2017-11-20

## 2017-11-20 PROBLEM — M25.572 ANKLE PAIN, LEFT: Status: ACTIVE | Noted: 2017-11-20

## 2017-11-20 PROCEDURE — 97110 THERAPEUTIC EXERCISES: CPT | Mod: GP | Performed by: PHYSICAL THERAPIST

## 2017-11-20 PROCEDURE — 97162 PT EVAL MOD COMPLEX 30 MIN: CPT | Mod: GP | Performed by: PHYSICAL THERAPIST

## 2017-11-20 PROCEDURE — 97016 VASOPNEUMATIC DEVICE THERAPY: CPT | Mod: GP | Performed by: PHYSICAL THERAPIST

## 2017-11-20 RX ORDER — OXYCODONE HYDROCHLORIDE 5 MG/1
TABLET ORAL
Qty: 30 TABLET | Refills: 0 | Status: ON HOLD | OUTPATIENT
Start: 2017-11-20 | End: 2017-12-15

## 2017-11-20 NOTE — PROGRESS NOTES
[facility] Initial Evaluation    Subjective:    Patient is a 22 year old female presenting with rehab right ankle/foot hpi. The history is provided by the patient.   Kaitlynn House is a 22 year old female with a right ankle condition.  Condition occurred with:  Other reason (Tib-fib torsion and anatomical malalignment.).  Condition occurred: other.  This is a new condition  Patient has had knee pain for 6 years with intermittent patellar subluxations. She had tib-fib derotation on 11/1/17 on the R. Future derotation on the L and tib-tubercal transfer on 12/6/17. R tib-tubercle transfer not scheduled yet..    Site of Pain: Anterior lower leg.    Pain is described as aching and is intermittent and reported as 8/10 and 4/10.  Associated symptoms:  Buckling/giving out, loss of strength and edema. Pain is worse in the P.M..  Exacerbated by: Positional pain and pressure sores (being addressed) and relieved by ice and analgesics.  Since onset symptoms are gradually improving.    Previous treatment includes physical therapy and chiropractic.  There was no improvement following previous treatment.  General health as reported by patient is good.  Pertinent medical history includes:  Heart problems (Tachycardia, POTS, Carlos Danlos).  Medical allergies: yes (Omoxicillin).    Current medications:  Pain medication.  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  .  Patient is currently not working due to present treatment problem.  Primary job tasks include:  Prolonged sitting and prolonged standing.    Barriers include:  Stairs.    Red flags:  None as reported by patient.                        Objective:      Gait:    Weight Bearing Status:  WBAT   Assistive Devices:  CAM and crutches            Ankle/Foot Evaluation  ROM:    AROM:    Dorsiflexion:  Left:   18  Right:   5  Plantarflexion:  Left:  60    Right:  35  Inversion:  Left:  34     Right:   17  Eversion:  15     Right:  -2        Strength:    Dorsiflexion:  Left: 5/5     Pain:   Right: 4/5   Pain:  Plantarflexion: Left: 5/5   Pain:   Right: 4/5  Pain:  Inversion:Left: 5/5  Pain:     Right: 3/5  Pain:  Eversion:Left: 5/5  Pain:  Right: 3/5  Pain:                        EDEMA:   Left ankle edema present at: 48 cm  Right ankle edema present at:  47 cm      Figure 8 left: 48 cmFigure 8 right: 47 cm                                                Knee Evaluation:  ROM:    AROM      Extension:  Left: 0    Right:  -22  Flexion: Left: 152    Right: 110        Strength:         Quad Set Left: Good    Pain:   Quad Set Right: Poor    Pain:        Edema:    Circumference:      Joint Line:  Left:  33 cm   Right:  33 cm            General     ROS    Assessment/Plan:      Patient is a 22 year old female with right side ankle complaints.    Patient has the following significant findings with corresponding treatment plan.                Diagnosis 1:  R tibial and fibular derotation  Pain -  hot/cold therapy, US, electric stimulation, manual therapy, splint/taping/bracing/orthotics, self management, education and home program  Decreased ROM/flexibility - manual therapy, therapeutic exercise, therapeutic activity and home program  Decreased joint mobility - manual therapy, therapeutic exercise, therapeutic activity and home program  Decreased strength - therapeutic exercise, therapeutic activities and home program  Impaired balance - neuro re-education, gait training, therapeutic activities, adaptive equipment/assistive device and home program  Decreased proprioception - neuro re-education, gait training, therapeutic activities and home program  Inflammation - cold therapy, US, electric stimulation and self management/home program  Impaired gait - gait training, assistive devices and home program  Impaired muscle performance - electric stimulation, neuro re-education and home program  Decreased function - therapeutic  activities and home program    Therapy Evaluation Codes:   1) History comprised of:   Personal factors that impact the plan of care:      Age.    Comorbidity factors that impact the plan of care are:      Heart problems, Weakness and Carlos Danlos.     Medications impacting care: Pain.  2) Examination of Body Systems comprised of:   Body structures and functions that impact the plan of care:      Ankle and Knee.   Activity limitations that impact the plan of care are:      Bathing, Bending, Driving, Dressing, Jumping, Lifting, Running, Squatting/kneeling, Stairs, Standing, Walking and Working.  3) Clinical presentation characteristics are:   Evolving/Changing.  4) Decision-Making    Moderate complexity using standardized patient assessment instrument and/or measureable assessment of functional outcome.  Cumulative Therapy Evaluation is: Moderate complexity.    Previous and current functional limitations:  (See Goal Flow Sheet for this information)    Short term and Long term goals: (See Goal Flow Sheet for this information)     Communication ability:  Patient appears to be able to clearly communicate and understand verbal and written communication and follow directions correctly.  Treatment Explanation - The following has been discussed with the patient:   RX ordered/plan of care  Anticipated outcomes  Possible risks and side effects  This patient would benefit from PT intervention to resume normal activities.   Rehab potential is good.    Frequency:  1-2 X week, once daily  Duration:  for 3 weeks  Discharge Plan:  Achieve all LTG.  Independent in home treatment program.  Reach maximal therapeutic benefit.    Please refer to the daily flowsheet for treatment today, total treatment time and time spent performing 1:1 timed codes.

## 2017-11-20 NOTE — MR AVS SNAPSHOT
After Visit Summary   11/20/2017    Kaitlynn House    MRN: [id number]           Patient Information     Date Of Birth          1995        Visit Information        Provider Department      11/20/2017 5:10 PM Rikki Britton, PT  [facility]        Today's Diagnoses     Effusion of lower leg joint    -  1    Aftercare following surgery of the musculoskeletal system        Ankle pain, right           Follow-ups after your visit        Your next 10 appointments already scheduled     Nov 22, 2017  9:40 AM CST   ELSY Extremity with Chris Navarrete PT   [facility] ([facility])    [address] [place] [id number] [phone]            Nov 30, 2017  8:20 AM CST   ELSY Extremity with Olga Wood PTA   [facility] ([facility])    [address] [place] [id number] [phone]            Dec 04, 2017 11:20 AM CST   ELSY Extremity with Di Ahumada PT   [facility] ([facility])    [address] [place] [id number] [phone]            Dec 06, 2017   Procedure with Di Schwartz MD   [facility] [place] (--)    [address] [id number] [phone]            Jan 18, 2018  4:30 PM CST   (Arrive by 4:15 PM)   RETURN ARRHYTHMIA with JUANY Henning CNP   [facility] ([facility])    [address] [place] [id number] [phone]            Feb 22, 2018  6:00 PM CST   (Arrive by 5:45 PM)   RETURN ARRHYTHMIA with Milo Mendoza MD   [facility] ([facility])    [address] [place] [id number] [phone]              Who to contact     If you have questions or need follow up  information about today's clinic visit or your schedule please contact [facility] directly at [phone].  Normal or non-critical lab and imaging results will be communicated to you by Fan TVhart, letter or phone within 4 business days after the clinic has received the results. If you do not hear from us within 7 days, please contact the clinic through Fan TVhart or phone. If you have a critical or abnormal lab result, we will notify you by phone as soon as possible.  Submit refill requests through Likeeds or call your pharmacy and they will forward the refill request to us. Please allow 3 business days for your refill to be completed.          Additional Information About Your Visit        Fan TVhartracx Information     Likeeds gives you secure access to your electronic health record. If you see a primary care provider, you can also send messages to your care team and make appointments. If you have questions, please call your primary care clinic.  If you do not have a primary care provider, please call [phone] and they will assist you.        Care EveryWhere ID     This is your Care EveryWhere ID. This could be used by other organizations to access your [place] medical records  [id number]         Blood Pressure from Last 3 Encounters:   11/09/17 124/79   10/11/17 122/84    Weight from Last 3 Encounters:   10/24/17 56.2 kg (124 lb)   10/11/17 56.7 kg (125 lb)   10/03/17 56.8 kg (125 lb 4.8 oz)              We Performed the Following     C VASOPNEUMATIC DEVICE     HC PT EVAL, MODERATE COMPLEXITY     ELSY INITIAL EVAL REPORT     THERAPEUTIC EXERCISES          Today's Medication Changes          These changes are accurate as of: 11/20/17  9:31 PM.  If you have any questions, ask your nurse or doctor.               These medicines have changed or have updated prescriptions.        Dose/Directions    oxyCODONE IR 5 MG tablet   Commonly known as:  ROXICODONE   This may have changed:    - how much to take  -  how to take this  - when to take this  - reasons to take this  - additional instructions   Used for:  Surgical follow-up care   Changed by:  Dio Olivarez MD        Take 1-2 tabs by mouth every 4-8 hours for moderate to severe pain. Wean as tolerated.   Quantity:  30 tablet   Refills:  0            Where to get your medicines      Some of these will need a paper prescription and others can be bought over the counter.  Ask your nurse if you have questions.     Bring a paper prescription for each of these medications     oxyCODONE IR 5 MG tablet                Primary Care Provider    Physician No Ref-Primary       NO REF-PRIMARY PHYSICIAN        Equal Access to Services     [facility]: Hadkaylah Roper, wavince bonilla, wilbur cantu, parag penaloza . So [facility] [phone].    ATENCIÓN: Si habla español, tiene a cristobal disposición servicios gratuitos de asistencia lingüística. Ll[facility] [phone].    We comply with applicable federal civil rights laws and [place] laws. We do not discriminate on the basis of race, color, national origin, age, disability, sex, sexual orientation, or gender identity.            Thank you!     Thank you for choosing [facility] PHYSICAL THERAP[facility]  for your care. Our goal is always to provide you with excellent care. Hearing back from our patients is one way we can continue to improve our services. Please take a few minutes to complete the written survey that you may receive in the mail after your visit with us. Thank you!             Your Updated Medication List - Protect others around you: Learn how to safely use, store and throw away your medicines at www.disposemymeds.org.          This list is accurate as of: 11/20/17  9:31 PM.  Always use your most recent med list.                   Brand Name Dispense Instructions for use Diagnosis    ALEVE PO      Take 2 tablets by mouth every 12 hours as needed for moderate pain         GABAPENTIN PO      Take 600 mg by mouth 2 times daily        hydrOXYzine 25 MG capsule    VISTARIL    30 capsule    Take 1-2 capsules (25-50 mg) by mouth 4 times daily as needed for itching    Surgical follow-up care       order for DME     1 Units    Crutches    Tibial torsion, right       oxyCODONE IR 5 MG tablet    ROXICODONE    30 tablet    Take 1-2 tabs by mouth every 4-8 hours for moderate to severe pain. Wean as tolerated.    Surgical follow-up care       XULANE 150-35 MCG/24HR patch   Generic drug:  norelgestromin-ethinyl estradiol      Place 1 patch onto the skin once a week Remove old patch and apply new patch onto the skin once a week for 3 weeks (21 days). Do not wear patch week 4 (days 22-28), then repeat.

## 2017-11-20 NOTE — TELEPHONE ENCOUNTER
Orthopedic pt of  s/p right tibial and fibular derotational osteotomy with an internal rotation of 33 degrees on 11/1/2017 at [facility]. Kaitlynn sent a pain med refill request via My Chart today. Called pt back to evaluate. She rates her pain now at 5/10 and she's taking 1-2 pills appox every 6 hours. She states she has 10 pills left but she's coming to the [facility] today and would like to p/u Rx while she's here.   Signed Rx taken to first floor locked area for p/u.

## 2017-11-22 ENCOUNTER — THERAPY VISIT (OUTPATIENT)
Dept: PHYSICAL THERAPY | Facility: CLINIC | Age: 22
End: 2017-11-22
Payer: COMMERCIAL

## 2017-11-22 DIAGNOSIS — Z47.89 AFTERCARE FOLLOWING SURGERY OF THE MUSCULOSKELETAL SYSTEM: ICD-10-CM

## 2017-11-22 DIAGNOSIS — M25.469 EFFUSION OF LOWER LEG JOINT: ICD-10-CM

## 2017-11-22 DIAGNOSIS — M25.571 ANKLE PAIN, RIGHT: ICD-10-CM

## 2017-11-22 PROCEDURE — 97110 THERAPEUTIC EXERCISES: CPT | Mod: GP | Performed by: PHYSICAL THERAPIST

## 2017-11-22 PROCEDURE — 97016 VASOPNEUMATIC DEVICE THERAPY: CPT | Mod: GP | Performed by: PHYSICAL THERAPIST

## 2017-11-22 PROCEDURE — 97116 GAIT TRAINING THERAPY: CPT | Mod: GP | Performed by: PHYSICAL THERAPIST

## 2017-11-30 ENCOUNTER — THERAPY VISIT (OUTPATIENT)
Dept: PHYSICAL THERAPY | Facility: CLINIC | Age: 22
End: 2017-11-30
Payer: COMMERCIAL

## 2017-11-30 ENCOUNTER — MYC MEDICAL ADVICE (OUTPATIENT)
Dept: ORTHOPEDICS | Facility: CLINIC | Age: 22
End: 2017-11-30

## 2017-11-30 DIAGNOSIS — M25.571 ANKLE PAIN, RIGHT: ICD-10-CM

## 2017-11-30 DIAGNOSIS — Z47.89 AFTERCARE FOLLOWING SURGERY OF THE MUSCULOSKELETAL SYSTEM: ICD-10-CM

## 2017-11-30 DIAGNOSIS — M25.469 EFFUSION OF LOWER LEG JOINT: ICD-10-CM

## 2017-11-30 PROCEDURE — 97140 MANUAL THERAPY 1/> REGIONS: CPT | Mod: GP | Performed by: PHYSICAL THERAPY ASSISTANT

## 2017-11-30 PROCEDURE — 97110 THERAPEUTIC EXERCISES: CPT | Mod: GP | Performed by: PHYSICAL THERAPY ASSISTANT

## 2017-11-30 PROCEDURE — 97116 GAIT TRAINING THERAPY: CPT | Mod: GP | Performed by: PHYSICAL THERAPY ASSISTANT

## 2017-11-30 NOTE — TELEPHONE ENCOUNTER
"         Hi,     So I had PT today and then did some walking with one crutch quite a bit. This bulge on my tibia randomly showed up. It wasn't there when I had PT this morning. It is hard to the touch. I haven't had any swelling for a week now. Is this anything concerning? I'm icing it now and hope that it helps. It is hard to visualize in the pictures. The lump itself is the size of an egg.     Thanks,   Kaitlynn       Called pt back to r/o DVT and it seems OK in this regard. The lump is on the front of her leg \"between the incisions\", and about the size of half an egg. It's not red, tender, warm. Doesn't feel like pressure and not swollen. She is elevating and icing and thinks it may be somewhat better. Incisions are clean,dry and intact. Dr. Olivarez's team informed.  "

## 2017-12-04 ENCOUNTER — THERAPY VISIT (OUTPATIENT)
Dept: PHYSICAL THERAPY | Facility: CLINIC | Age: 22
End: 2017-12-04
Payer: COMMERCIAL

## 2017-12-04 DIAGNOSIS — M25.469 EFFUSION OF LOWER LEG JOINT: ICD-10-CM

## 2017-12-04 DIAGNOSIS — Z47.89 AFTERCARE FOLLOWING SURGERY OF THE MUSCULOSKELETAL SYSTEM: ICD-10-CM

## 2017-12-04 DIAGNOSIS — M25.571 ANKLE PAIN, RIGHT: ICD-10-CM

## 2017-12-04 PROCEDURE — 97112 NEUROMUSCULAR REEDUCATION: CPT | Mod: GP | Performed by: PHYSICAL THERAPIST

## 2017-12-04 PROCEDURE — 97110 THERAPEUTIC EXERCISES: CPT | Mod: GP | Performed by: PHYSICAL THERAPIST

## 2017-12-04 PROCEDURE — 97140 MANUAL THERAPY 1/> REGIONS: CPT | Mod: GP | Performed by: PHYSICAL THERAPIST

## 2017-12-06 ENCOUNTER — THERAPY VISIT (OUTPATIENT)
Dept: PHYSICAL THERAPY | Facility: CLINIC | Age: 22
End: 2017-12-06
Payer: COMMERCIAL

## 2017-12-06 DIAGNOSIS — Z47.89 AFTERCARE FOLLOWING SURGERY OF THE MUSCULOSKELETAL SYSTEM: ICD-10-CM

## 2017-12-06 DIAGNOSIS — M25.469 EFFUSION OF LOWER LEG JOINT: ICD-10-CM

## 2017-12-06 DIAGNOSIS — M25.571 ANKLE PAIN, RIGHT: ICD-10-CM

## 2017-12-06 PROCEDURE — 97140 MANUAL THERAPY 1/> REGIONS: CPT | Mod: GP | Performed by: PHYSICAL THERAPY ASSISTANT

## 2017-12-06 PROCEDURE — 97110 THERAPEUTIC EXERCISES: CPT | Mod: GP | Performed by: PHYSICAL THERAPY ASSISTANT

## 2017-12-06 PROCEDURE — 97112 NEUROMUSCULAR REEDUCATION: CPT | Mod: GP | Performed by: PHYSICAL THERAPY ASSISTANT

## 2017-12-08 DIAGNOSIS — Z47.89 AFTERCARE FOLLOWING SURGERY OF THE MUSCULOSKELETAL SYSTEM: Primary | ICD-10-CM

## 2017-12-11 ENCOUNTER — MYC MEDICAL ADVICE (OUTPATIENT)
Dept: CARDIOLOGY | Facility: CLINIC | Age: 22
End: 2017-12-11

## 2017-12-11 DIAGNOSIS — R55 SYNCOPE, UNSPECIFIED SYNCOPE TYPE: Primary | ICD-10-CM

## 2017-12-12 ENCOUNTER — OFFICE VISIT (OUTPATIENT)
Dept: ORTHOPEDICS | Facility: CLINIC | Age: 22
End: 2017-12-12

## 2017-12-12 ENCOUNTER — THERAPY VISIT (OUTPATIENT)
Dept: PHYSICAL THERAPY | Facility: CLINIC | Age: 22
End: 2017-12-12
Payer: COMMERCIAL

## 2017-12-12 DIAGNOSIS — Z47.89 AFTERCARE FOLLOWING SURGERY OF THE MUSCULOSKELETAL SYSTEM: ICD-10-CM

## 2017-12-12 DIAGNOSIS — M25.562 CHRONIC PAIN OF LEFT KNEE: Primary | ICD-10-CM

## 2017-12-12 DIAGNOSIS — G89.29 CHRONIC PAIN OF LEFT KNEE: Primary | ICD-10-CM

## 2017-12-12 DIAGNOSIS — M25.571 ANKLE PAIN, RIGHT: ICD-10-CM

## 2017-12-12 DIAGNOSIS — M25.469 EFFUSION OF LOWER LEG JOINT: ICD-10-CM

## 2017-12-12 PROCEDURE — 97112 NEUROMUSCULAR REEDUCATION: CPT | Mod: GP | Performed by: PHYSICAL THERAPY ASSISTANT

## 2017-12-12 PROCEDURE — 97140 MANUAL THERAPY 1/> REGIONS: CPT | Mod: GP | Performed by: PHYSICAL THERAPY ASSISTANT

## 2017-12-12 PROCEDURE — 97110 THERAPEUTIC EXERCISES: CPT | Mod: GP | Performed by: PHYSICAL THERAPY ASSISTANT

## 2017-12-12 NOTE — MR AVS SNAPSHOT
After Visit Summary   12/12/2017    Kaitlynn House    MRN: [id number]           Patient Information     Date Of Birth          1995        Visit Information        Provider Department      12/12/2017 5:40 PM Dio Olivarez MD [facility]        Today's Diagnoses     Chronic pain of left knee    -  1       Follow-ups after your visit        Your next 10 appointments already scheduled     Dec 15, 2017   Procedure with Dio Olivarez MD   [facility] [place], Same Day Surgery (--)    [address]  [facility] [place] [address] [phone]            Dec 20, 2017  3:30 PM CST   ELSY Extremity with HAROLDO [facility] ([facility])    [address] [place] [address] [phone]            Dec 22, 2017  2:10 PM CST   ELSY Extremity with HAROLDO [facility] ([facility])    [address] [place] [address] [phone]            Dec 27, 2017  3:20 PM CST   ELSY Extremity with HAROLDO [facility] ([facility])    [address] [place] [address] [phone]            Dec 29, 2017  2:40 PM CST   ELSY Extremity with HAROLDO [facility] ([facility])    [address] [place] [address] [phone]            Jan 02, 2018  5:10 PM CST   ELSY Extremity with Di Ahumada PT   [facility] ([facility])    [address] [place] [address] [phone]            Jan 04, 2018  3:30 PM CST   ELSY Extremity with BRINA [facility] ([facility])    [address] [place] [address]  Floor  [facility] [id number] [phone]            Jan 18, 2018  4:30 PM CST   (Arrive by 4:15 PM)   RETURN ARRHYTHMIA with JUANY Henning CNP   [facility] ([facility])    [address] [place] [address]  3rd [facility] [id number] [phone]            Feb 22, 2018  6:00 PM CST   (Arrive by 5:45 PM)   RETURN ARRHYTHMIA with Milo Mendoza MD   [facility] ([facility])    [address] [place] [address]  3rd [facility] [id number] [phone]              Future tests that were ordered for you today     Open Future Orders        Priority Expected Expires Ordered    EP study tilt table Routine  3/4/2018 12/14/2017            Who to contact     Please call your clinic at [phone] to:    Ask questions about your health    Make or cancel appointments    Discuss your medicines    Learn about your test results    Speak to your doctor   If you have compliments or concerns about an experience at your clinic, or if you wish to file a complaint, please contact [facility] Patient Relations at [phone] or email us at [email or web address]         Additional Information About Your Visit        [employer] Information     [employer] gives you secure access to your electronic health record. If you see a primary care provider, you can also send messages to your care team and make appointments. If you have questions, please call your primary care clinic.  If you do not have a primary care provider, please call [phone] and they will assist you.      [employer] is an electronic gateway that provides easy, online access to your medical records. With [employer], you can request a clinic appointment, read your test results, renew a prescription or communicate with your care team.     To access your existing account, please contact your [facility] or call [phone] for  assistance.        Care EveryWhere ID     This is your Care EveryWhere ID. This could be used by other organizations to access your [facility] medical records  [id number]         Blood Pressure from Last 3 Encounters:   12/15/17 125/79   11/09/17 124/79   10/11/17 122/84    Weight from Last 3 Encounters:   12/15/17 54.7 kg (120 lb 9.5 oz)   10/24/17 56.2 kg (124 lb)   10/11/17 56.7 kg (125 lb)              We Performed the Following     Fatou-Operative Worksheet (Olivarez)        Primary Care Provider Fax #    Physician No Ref-Primary [phone]       No address on file        Equal Access to Services     CHRISTOS RICHEY : Richelle Roper, wavince bonilla, wilbur kaalmada alondra, parag penaloza . So [facility] [phone].    ATENCIÓN: Si habla español, tiene a cristobal disposición servicios gratuitos de asistencia lingüística. Llame al [phone].    We comply with applicable federal civil rights laws and [place] laws. We do not discriminate on the basis of race, color, national origin, age, disability, sex, sexual orientation, or gender identity.            Thank you!     Thank you for choosing [facility]  for your care. Our goal is always to provide you with excellent care. Hearing back from our patients is one way we can continue to improve our services. Please take a few minutes to complete the written survey that you may receive in the mail after your visit with us. Thank you!             Your Updated Medication List - Protect others around you: Learn how to safely use, store and throw away your medicines at www.disposemymeds.org.          This list is accurate as of: 12/12/17 11:59 PM.  Always use your most recent med list.                   Brand Name Dispense Instructions for use Diagnosis    ALEVE PO      Take 2 tablets by mouth every 12 hours as needed for moderate pain        GABAPENTIN PO      Take 600 mg by mouth 2 times daily        hydrOXYzine 25 MG  capsule    VISTARIL    30 capsule    Take 1-2 capsules (25-50 mg) by mouth 4 times daily as needed for itching    Surgical follow-up care       order for DME     1 Units    Crutches    Tibial torsion, right       oxyCODONE IR 5 MG tablet    ROXICODONE    30 tablet    Take 1-2 tabs by mouth every 4-8 hours for moderate to severe pain. Wean as tolerated.    Surgical follow-up care       XULANE 150-35 MCG/24HR patch   Generic drug:  norelgestromin-ethinyl estradiol      Place 1 patch onto the skin once a week Remove old patch and apply new patch onto the skin once a week for 3 weeks (21 days). Do not wear patch week 4 (days 22-28), then repeat.

## 2017-12-12 NOTE — NURSING NOTE
Reason For Visit:   Chief Complaint   Patient presents with     Surgical Followup     DOS 11/1/17- right tib-fib derotational osteotomy       There were no vitals taken for this visit.    Pain Assessment  Patient Currently in Pain: Yes  0-10 Pain Scale: 3  Primary Pain Location: Ankle  Pain Orientation: Right  Alleviating Factors:  (Tylenol)    Awilda Alegria, ATC

## 2017-12-12 NOTE — LETTER
12/12/2017       RE: Kaitlynn House  [address]  [facility] [address]     Dear Colleague,    Thank you for referring your patient, Kaitlynn House, to the [facility] at [facility] Please see a copy of my visit note below.    CHIEF COMPLAINT:     1.  Status post right tibia and fibula derotational osteotomy performed on 11/01/2017.   2.  Left chronic knee pain secondary to increased tibia torsion.      HISTORY OF PRESENT ILLNESS:  Ms. House presents today for further followup.  Overall, she reports to be doing well with the right lower leg.  On today's visit, Dr. Schwartz as well as a therapist, also has participated in the evaluation of her ability to carry weight with the right lower leg.      PHYSICAL EXAMINATION:  On today's exam, she presents with excellent range of motion of the knee which is from full extension to full flexion.  Presents with well-healed surgical incisions.  CMS is intact.  Skin intact.  Presents with still some internal rotation of the foot which I suspect is related to lack of core strength.  Patient presented with an unremarkable neurovascular exam of her left lower leg.      RADIOGRAPHIC EVALUATION:  AP and lateral x-rays of the tib-fib were reviewed today which were significant for showing excellent consolidation of the osteotomy.  We still do not visualize the full callus formations on the AP view, but clearly there is callus formation posteriorly across the tibia as well as across the fibula.  Hardware is intact and in place.  Alignment is excellent.      ASSESSMENT:     1.  Status post right tibia and fibula derotational osteotomy.   2.  Left tibia increased torsion.      PLAN:  Today after an evaluation of all 3 providers, we came to the conclusion the patient will be ready to proceed with surgery within the next 3 days, which will consist of left tibia and fibula derotational osteotomy with another  procedure performed by Dr. Schwartz in the form of a tibial tubercle osteotomy.      We discussed with the patient the most likely postoperative course and complications which include but are not limited to infection, bleeding, nerve damage, residual pain, nonunion and stiffness.      All questions were answered.  The patient will follow up accordingly.         Again, thank you for allowing me to participate in the care of your patient.      Sincerely,    Dio Olivarez MD

## 2017-12-13 NOTE — NURSING NOTE
Teaching Flowsheet   Relevant Diagnosis: Left tibia torsion  Teaching Topic: Pre-op left tib-fib osteotomy  Dr. Schwartz ( Left distal tibial tubercle osteotomy, possible ACI biopsy)      Person(s) involved in teaching:   Patient and      Motivation Level:  Asks Questions: Yes  Eager to Learn: Yes  Cooperative: Yes  Receptive (willing/able to accept information): Yes  Any cultural factors/Latter-day beliefs that may influence understanding or compliance? No       Patient and Family demonstrates understanding of the following:  Reason for the appointment, diagnosis and treatment plan: Yes  Knowledge of proper use of medications and conditions for which they are ordered (with special attention to potential side effects or drug interactions): Yes  Which situations necessitate calling provider and whom to contact: Yes        Teaching Concerns Addressed:   Comments: patient recently had surgery on her Right leg with Dr. Olviarez. Patient has Postural orthostatic tachycardia syndrome that is being controlled with a high sodium diet. Patient states her pulse is staying out of the 180's and she hasn't passed out since right after surgery.  She is getting her pre-op physical tomorrow, surgery will be Friday.      Proper use and care of post op cam boot (medical equip, care aids, etc.): Yes  Nutritional needs and diet plan: Yes  Pain management techniques: Yes  Wound Care: Yes  How and/when to access community resources: NA     Instructional Materials Used/Given: pre-op packet, antiseptic soap, post-operative foot and ankle surgery instructions.      Time spent with patient: 10 minutes.

## 2017-12-13 NOTE — PROGRESS NOTES
CHIEF COMPLAINT:     1.  Status post right tibia and fibula derotational osteotomy performed on 11/01/2017.   2.  Left chronic knee pain secondary to increased tibia torsion.      HISTORY OF PRESENT ILLNESS:  Ms. House presents today for further followup.  Overall, she reports to be doing well with the right lower leg.  On today's visit, Dr. Schwartz as well as a therapist, also has participated in the evaluation of her ability to carry weight with the right lower leg.      PHYSICAL EXAMINATION:  On today's exam, she presents with excellent range of motion of the knee which is from full extension to full flexion.  Presents with well-healed surgical incisions.  CMS is intact.  Skin intact.  Presents with still some internal rotation of the foot which I suspect is related to lack of core strength.  Patient presented with an unremarkable neurovascular exam of her left lower leg.      RADIOGRAPHIC EVALUATION:  AP and lateral x-rays of the tib-fib were reviewed today which were significant for showing excellent consolidation of the osteotomy.  We still do not visualize the full callus formations on the AP view, but clearly there is callus formation posteriorly across the tibia as well as across the fibula.  Hardware is intact and in place.  Alignment is excellent.      ASSESSMENT:     1.  Status post right tibia and fibula derotational osteotomy.   2.  Left tibia increased torsion.      PLAN:  Today after an evaluation of all 3 providers, we came to the conclusion the patient will be ready to proceed with surgery within the next 3 days, which will consist of left tibia and fibula derotational osteotomy with another procedure performed by Dr. Schwartz in the form of a tibial tubercle osteotomy.      We discussed with the patient the most likely postoperative course and complications which include but are not limited to infection, bleeding, nerve damage, residual pain, nonunion and stiffness.      All questions were  answered.  The patient will follow up accordingly.

## 2017-12-14 ENCOUNTER — THERAPY VISIT (OUTPATIENT)
Dept: PHYSICAL THERAPY | Facility: CLINIC | Age: 22
End: 2017-12-14
Payer: COMMERCIAL

## 2017-12-14 ENCOUNTER — ANESTHESIA EVENT (OUTPATIENT)
Dept: SURGERY | Facility: CLINIC | Age: 22
End: 2017-12-14
Payer: COMMERCIAL

## 2017-12-14 DIAGNOSIS — Z47.89 AFTERCARE FOLLOWING SURGERY OF THE MUSCULOSKELETAL SYSTEM: Primary | ICD-10-CM

## 2017-12-14 DIAGNOSIS — R55 SYNCOPE, UNSPECIFIED SYNCOPE TYPE: Primary | ICD-10-CM

## 2017-12-14 DIAGNOSIS — M25.469 EFFUSION OF LOWER LEG JOINT: ICD-10-CM

## 2017-12-14 DIAGNOSIS — M25.571 ANKLE PAIN, RIGHT: ICD-10-CM

## 2017-12-14 PROCEDURE — 97530 THERAPEUTIC ACTIVITIES: CPT | Mod: GP | Performed by: PHYSICAL THERAPY ASSISTANT

## 2017-12-14 PROCEDURE — 97112 NEUROMUSCULAR REEDUCATION: CPT | Mod: GP | Performed by: PHYSICAL THERAPY ASSISTANT

## 2017-12-14 PROCEDURE — 97110 THERAPEUTIC EXERCISES: CPT | Mod: GP | Performed by: PHYSICAL THERAPY ASSISTANT

## 2017-12-14 RX ORDER — LIDOCAINE 40 MG/G
CREAM TOPICAL
Status: CANCELLED | OUTPATIENT
Start: 2017-12-14

## 2017-12-14 RX ORDER — SODIUM CHLORIDE 9 MG/ML
INJECTION, SOLUTION INTRAVENOUS CONTINUOUS
Status: CANCELLED | OUTPATIENT
Start: 2017-12-14

## 2017-12-15 ENCOUNTER — HOSPITAL ENCOUNTER (OUTPATIENT)
Facility: CLINIC | Age: 22
Setting detail: OBSERVATION
Discharge: HOME | End: 2017-12-16
Attending: ORTHOPAEDIC SURGERY | Admitting: ORTHOPAEDIC SURGERY
Payer: COMMERCIAL

## 2017-12-15 ENCOUNTER — APPOINTMENT (OUTPATIENT)
Dept: GENERAL RADIOLOGY | Facility: CLINIC | Age: 22
End: 2017-12-15
Attending: ORTHOPAEDIC SURGERY
Payer: COMMERCIAL

## 2017-12-15 ENCOUNTER — ANESTHESIA (OUTPATIENT)
Dept: SURGERY | Facility: CLINIC | Age: 22
End: 2017-12-15
Payer: COMMERCIAL

## 2017-12-15 DIAGNOSIS — Z98.890 STATUS POST SURGERY: Primary | ICD-10-CM

## 2017-12-15 PROBLEM — M25.369 PATELLAR INSTABILITY: Status: ACTIVE | Noted: 2017-12-15

## 2017-12-15 LAB
ABO + RH BLD: NORMAL
ABO + RH BLD: NORMAL
BLD GP AB SCN SERPL QL: NORMAL
BLOOD BANK CMNT PATIENT-IMP: NORMAL
GLUCOSE BLDC GLUCOMTR-MCNC: 96 MG/DL (ref 70–99)
HCG UR QL: NEGATIVE
SPECIMEN EXP DATE BLD: NORMAL

## 2017-12-15 PROCEDURE — 71000015 ZZH RECOVERY PHASE 1 LEVEL 2 EA ADDTL HR: Performed by: ORTHOPAEDIC SURGERY

## 2017-12-15 PROCEDURE — 37000009 ZZH ANESTHESIA TECHNICAL FEE, EACH ADDTL 15 MIN: Performed by: ORTHOPAEDIC SURGERY

## 2017-12-15 PROCEDURE — 86900 BLOOD TYPING SEROLOGIC ABO: CPT | Performed by: ANESTHESIOLOGY

## 2017-12-15 PROCEDURE — G0378 HOSPITAL OBSERVATION PER HR: HCPCS

## 2017-12-15 PROCEDURE — 25000128 H RX IP 250 OP 636: Performed by: NURSE ANESTHETIST, CERTIFIED REGISTERED

## 2017-12-15 PROCEDURE — 25000132 ZZH RX MED GY IP 250 OP 250 PS 637: Performed by: ANESTHESIOLOGY

## 2017-12-15 PROCEDURE — 36000064 ZZH SURGERY LEVEL 4 EA 15 ADDTL MIN - UMMC: Performed by: ORTHOPAEDIC SURGERY

## 2017-12-15 PROCEDURE — 25000566 ZZH SEVOFLURANE, EA 15 MIN: Performed by: ORTHOPAEDIC SURGERY

## 2017-12-15 PROCEDURE — 27211024 ZZHC OR SUPPLY OTHER OPNP: Performed by: ORTHOPAEDIC SURGERY

## 2017-12-15 PROCEDURE — 25000128 H RX IP 250 OP 636: Performed by: ANESTHESIOLOGY

## 2017-12-15 PROCEDURE — 25000128 H RX IP 250 OP 636: Performed by: ORTHOPAEDIC SURGERY

## 2017-12-15 PROCEDURE — 25000125 ZZHC RX 250: Performed by: ANESTHESIOLOGY

## 2017-12-15 PROCEDURE — 81025 URINE PREGNANCY TEST: CPT | Performed by: ANESTHESIOLOGY

## 2017-12-15 PROCEDURE — 86901 BLOOD TYPING SEROLOGIC RH(D): CPT | Performed by: ANESTHESIOLOGY

## 2017-12-15 PROCEDURE — C1713 ANCHOR/SCREW BN/BN,TIS/BN: HCPCS | Performed by: ORTHOPAEDIC SURGERY

## 2017-12-15 PROCEDURE — 86850 RBC ANTIBODY SCREEN: CPT | Performed by: ANESTHESIOLOGY

## 2017-12-15 PROCEDURE — 96374 THER/PROPH/DIAG INJ IV PUSH: CPT | Mod: 59

## 2017-12-15 PROCEDURE — 25000125 ZZHC RX 250: Performed by: ORTHOPAEDIC SURGERY

## 2017-12-15 PROCEDURE — 40000170 ZZH STATISTIC PRE-PROCEDURE ASSESSMENT II: Performed by: ORTHOPAEDIC SURGERY

## 2017-12-15 PROCEDURE — 36000066 ZZH SURGERY LEVEL 4 W FLUORO 1ST 30 MIN - UMMC: Performed by: ORTHOPAEDIC SURGERY

## 2017-12-15 PROCEDURE — 40000278 XR SURGERY CARM FLUORO LESS THAN 5 MIN: Mod: TC

## 2017-12-15 PROCEDURE — 40000986 XR TIBIA & FIBULA LT 2 VW: Mod: LT

## 2017-12-15 PROCEDURE — 71000014 ZZH RECOVERY PHASE 1 LEVEL 2 FIRST HR: Performed by: ORTHOPAEDIC SURGERY

## 2017-12-15 PROCEDURE — 37000008 ZZH ANESTHESIA TECHNICAL FEE, 1ST 30 MIN: Performed by: ORTHOPAEDIC SURGERY

## 2017-12-15 PROCEDURE — 27210995 ZZH RX 272: Performed by: ORTHOPAEDIC SURGERY

## 2017-12-15 PROCEDURE — 36415 COLL VENOUS BLD VENIPUNCTURE: CPT | Performed by: ANESTHESIOLOGY

## 2017-12-15 PROCEDURE — 25000125 ZZHC RX 250: Performed by: NURSE ANESTHETIST, CERTIFIED REGISTERED

## 2017-12-15 PROCEDURE — C9290 INJ, BUPIVACAINE LIPOSOME: HCPCS | Performed by: ANESTHESIOLOGY

## 2017-12-15 PROCEDURE — C1762 CONN TISS, HUMAN(INC FASCIA): HCPCS | Performed by: ORTHOPAEDIC SURGERY

## 2017-12-15 PROCEDURE — 00000146 ZZHCL STATISTIC GLUCOSE BY METER IP

## 2017-12-15 PROCEDURE — 25800025 ZZH RX 258: Performed by: ORTHOPAEDIC SURGERY

## 2017-12-15 PROCEDURE — 27210794 ZZH OR GENERAL SUPPLY STERILE: Performed by: ORTHOPAEDIC SURGERY

## 2017-12-15 PROCEDURE — 25000132 ZZH RX MED GY IP 250 OP 250 PS 637: Performed by: ORTHOPAEDIC SURGERY

## 2017-12-15 DEVICE — GRAFT BONE PUTTY DBX 01ML 038010: Type: IMPLANTABLE DEVICE | Site: TIBIA | Status: FUNCTIONAL

## 2017-12-15 DEVICE — GRAFT BONE PUTTY DBX 10ML 038100: Type: IMPLANTABLE DEVICE | Site: TIBIA | Status: FUNCTIONAL

## 2017-12-15 DEVICE — IMP SCR SYN CORTEX 3.5X38MM SELF TAP SS 204.838
Type: IMPLANTABLE DEVICE | Site: TIBIA | Status: NON-FUNCTIONAL
Removed: 2023-05-10

## 2017-12-15 DEVICE — IMP SCR SYN CORTEX 3.5X45MM SELF TAP SS 204.845
Type: IMPLANTABLE DEVICE | Site: TIBIA | Status: NON-FUNCTIONAL
Removed: 2023-05-10

## 2017-12-15 DEVICE — IMPLANTABLE DEVICE
Type: IMPLANTABLE DEVICE | Site: TIBIA | Status: NON-FUNCTIONAL
Removed: 2023-05-10

## 2017-12-15 DEVICE — IMP SCR SYN CORTEX 3.5X40MM SELF TAP SS 204.840
Type: IMPLANTABLE DEVICE | Site: TIBIA | Status: NON-FUNCTIONAL
Removed: 2023-05-10

## 2017-12-15 RX ORDER — MAGNESIUM HYDROXIDE 1200 MG/15ML
LIQUID ORAL PRN
Status: DISCONTINUED | OUTPATIENT
Start: 2017-12-15 | End: 2017-12-15 | Stop reason: HOSPADM

## 2017-12-15 RX ORDER — OXYCODONE HYDROCHLORIDE 5 MG/1
5-10 TABLET ORAL
Status: DISCONTINUED | OUTPATIENT
Start: 2017-12-15 | End: 2017-12-16 | Stop reason: HOSPADM

## 2017-12-15 RX ORDER — NALOXONE HYDROCHLORIDE 0.4 MG/ML
.1-.4 INJECTION, SOLUTION INTRAMUSCULAR; INTRAVENOUS; SUBCUTANEOUS
Status: DISCONTINUED | OUTPATIENT
Start: 2017-12-15 | End: 2017-12-16 | Stop reason: HOSPADM

## 2017-12-15 RX ORDER — FLUMAZENIL 0.1 MG/ML
0.2 INJECTION, SOLUTION INTRAVENOUS
Status: DISCONTINUED | OUTPATIENT
Start: 2017-12-15 | End: 2017-12-15 | Stop reason: HOSPADM

## 2017-12-15 RX ORDER — ACETAMINOPHEN 325 MG/1
650 TABLET ORAL EVERY 6 HOURS PRN
Status: DISCONTINUED | OUTPATIENT
Start: 2017-12-15 | End: 2017-12-16 | Stop reason: HOSPADM

## 2017-12-15 RX ORDER — HYDROMORPHONE HCL/0.9% NACL/PF 0.2MG/0.2
0.2 SYRINGE (ML) INTRAVENOUS
Status: DISCONTINUED | OUTPATIENT
Start: 2017-12-15 | End: 2017-12-15

## 2017-12-15 RX ORDER — ONDANSETRON 2 MG/ML
INJECTION INTRAMUSCULAR; INTRAVENOUS PRN
Status: DISCONTINUED | OUTPATIENT
Start: 2017-12-15 | End: 2017-12-15

## 2017-12-15 RX ORDER — ACETAMINOPHEN 325 MG/1
650 TABLET ORAL EVERY 4 HOURS PRN
Qty: 100 TABLET | Refills: 0 | Status: SHIPPED | OUTPATIENT
Start: 2017-12-15

## 2017-12-15 RX ORDER — FENTANYL CITRATE 50 UG/ML
25-50 INJECTION, SOLUTION INTRAMUSCULAR; INTRAVENOUS
Status: DISCONTINUED | OUTPATIENT
Start: 2017-12-15 | End: 2017-12-15 | Stop reason: HOSPADM

## 2017-12-15 RX ORDER — AMOXICILLIN 250 MG
1-2 CAPSULE ORAL 2 TIMES DAILY
Qty: 26 TABLET | Refills: 0 | Status: SHIPPED | OUTPATIENT
Start: 2017-12-15 | End: 2018-03-20

## 2017-12-15 RX ORDER — BUPIVACAINE HYDROCHLORIDE AND EPINEPHRINE 2.5; 5 MG/ML; UG/ML
INJECTION, SOLUTION INFILTRATION; PERINEURAL PRN
Status: DISCONTINUED | OUTPATIENT
Start: 2017-12-15 | End: 2017-12-15

## 2017-12-15 RX ORDER — CLINDAMYCIN PHOSPHATE 900 MG/50ML
900 INJECTION, SOLUTION INTRAVENOUS SEE ADMIN INSTRUCTIONS
Status: DISCONTINUED | OUTPATIENT
Start: 2017-12-15 | End: 2017-12-15 | Stop reason: HOSPADM

## 2017-12-15 RX ORDER — CLINDAMYCIN PHOSPHATE 900 MG/50ML
900 INJECTION, SOLUTION INTRAVENOUS
Status: DISCONTINUED | OUTPATIENT
Start: 2017-12-15 | End: 2017-12-15 | Stop reason: HOSPADM

## 2017-12-15 RX ORDER — ACETAMINOPHEN 325 MG/1
975 TABLET ORAL ONCE
Status: COMPLETED | OUTPATIENT
Start: 2017-12-15 | End: 2017-12-15

## 2017-12-15 RX ORDER — PHENYLEPHRINE HCL IN 0.9% NACL 1 MG/10 ML
SYRINGE (ML) INTRAVENOUS PRN
Status: DISCONTINUED | OUTPATIENT
Start: 2017-12-15 | End: 2017-12-15

## 2017-12-15 RX ORDER — SCOLOPAMINE TRANSDERMAL SYSTEM 1 MG/1
1 PATCH, EXTENDED RELEASE TRANSDERMAL ONCE
Status: COMPLETED | OUTPATIENT
Start: 2017-12-15 | End: 2017-12-15

## 2017-12-15 RX ORDER — SODIUM CHLORIDE, SODIUM LACTATE, POTASSIUM CHLORIDE, CALCIUM CHLORIDE 600; 310; 30; 20 MG/100ML; MG/100ML; MG/100ML; MG/100ML
INJECTION, SOLUTION INTRAVENOUS CONTINUOUS PRN
Status: DISCONTINUED | OUTPATIENT
Start: 2017-12-15 | End: 2017-12-15

## 2017-12-15 RX ORDER — ONDANSETRON 2 MG/ML
4 INJECTION INTRAMUSCULAR; INTRAVENOUS EVERY 6 HOURS PRN
Status: DISCONTINUED | OUTPATIENT
Start: 2017-12-15 | End: 2017-12-16 | Stop reason: HOSPADM

## 2017-12-15 RX ORDER — OXYCODONE HYDROCHLORIDE 5 MG/1
5-10 TABLET ORAL EVERY 4 HOURS PRN
Qty: 50 TABLET | Refills: 0 | Status: SHIPPED | OUTPATIENT
Start: 2017-12-15 | End: 2017-12-22

## 2017-12-15 RX ORDER — ONDANSETRON 4 MG/1
4 TABLET, ORALLY DISINTEGRATING ORAL EVERY 6 HOURS PRN
Status: DISCONTINUED | OUTPATIENT
Start: 2017-12-15 | End: 2017-12-16 | Stop reason: HOSPADM

## 2017-12-15 RX ORDER — MEPERIDINE HYDROCHLORIDE 25 MG/ML
12.5 INJECTION INTRAMUSCULAR; INTRAVENOUS; SUBCUTANEOUS
Status: DISCONTINUED | OUTPATIENT
Start: 2017-12-15 | End: 2017-12-15 | Stop reason: HOSPADM

## 2017-12-15 RX ORDER — DEXAMETHASONE SODIUM PHOSPHATE 4 MG/ML
4 INJECTION, SOLUTION INTRA-ARTICULAR; INTRALESIONAL; INTRAMUSCULAR; INTRAVENOUS; SOFT TISSUE EVERY 10 MIN PRN
Status: DISCONTINUED | OUTPATIENT
Start: 2017-12-15 | End: 2017-12-15 | Stop reason: HOSPADM

## 2017-12-15 RX ORDER — LIDOCAINE 40 MG/G
CREAM TOPICAL
Status: DISCONTINUED | OUTPATIENT
Start: 2017-12-15 | End: 2017-12-16 | Stop reason: HOSPADM

## 2017-12-15 RX ORDER — FENTANYL CITRATE 50 UG/ML
INJECTION, SOLUTION INTRAMUSCULAR; INTRAVENOUS PRN
Status: DISCONTINUED | OUTPATIENT
Start: 2017-12-15 | End: 2017-12-15

## 2017-12-15 RX ORDER — GABAPENTIN 300 MG/1
300 CAPSULE ORAL ONCE
Status: DISCONTINUED | OUTPATIENT
Start: 2017-12-15 | End: 2017-12-15 | Stop reason: HOSPADM

## 2017-12-15 RX ORDER — ACETAMINOPHEN 325 MG/1
975 TABLET ORAL ONCE
Status: DISCONTINUED | OUTPATIENT
Start: 2017-12-15 | End: 2017-12-15 | Stop reason: HOSPADM

## 2017-12-15 RX ORDER — METOCLOPRAMIDE HYDROCHLORIDE 5 MG/ML
5-10 INJECTION INTRAMUSCULAR; INTRAVENOUS
Status: COMPLETED | OUTPATIENT
Start: 2017-12-15 | End: 2017-12-15

## 2017-12-15 RX ORDER — ALBUTEROL SULFATE 0.83 MG/ML
2.5 SOLUTION RESPIRATORY (INHALATION) EVERY 4 HOURS PRN
Status: DISCONTINUED | OUTPATIENT
Start: 2017-12-15 | End: 2017-12-15 | Stop reason: HOSPADM

## 2017-12-15 RX ORDER — NALOXONE HYDROCHLORIDE 0.4 MG/ML
.1-.4 INJECTION, SOLUTION INTRAMUSCULAR; INTRAVENOUS; SUBCUTANEOUS
Status: DISCONTINUED | OUTPATIENT
Start: 2017-12-15 | End: 2017-12-15 | Stop reason: HOSPADM

## 2017-12-15 RX ORDER — DEXAMETHASONE SODIUM PHOSPHATE 4 MG/ML
INJECTION, SOLUTION INTRA-ARTICULAR; INTRALESIONAL; INTRAMUSCULAR; INTRAVENOUS; SOFT TISSUE PRN
Status: DISCONTINUED | OUTPATIENT
Start: 2017-12-15 | End: 2017-12-15

## 2017-12-15 RX ORDER — LIDOCAINE HYDROCHLORIDE 20 MG/ML
INJECTION, SOLUTION INFILTRATION; PERINEURAL PRN
Status: DISCONTINUED | OUTPATIENT
Start: 2017-12-15 | End: 2017-12-15

## 2017-12-15 RX ORDER — MORPHINE SULFATE 2 MG/ML
2-4 INJECTION, SOLUTION INTRAMUSCULAR; INTRAVENOUS EVERY 5 MIN PRN
Status: DISCONTINUED | OUTPATIENT
Start: 2017-12-15 | End: 2017-12-15 | Stop reason: HOSPADM

## 2017-12-15 RX ORDER — CELECOXIB 200 MG/1
200 CAPSULE ORAL ONCE
Status: COMPLETED | OUTPATIENT
Start: 2017-12-15 | End: 2017-12-15

## 2017-12-15 RX ORDER — SODIUM CHLORIDE, SODIUM LACTATE, POTASSIUM CHLORIDE, CALCIUM CHLORIDE 600; 310; 30; 20 MG/100ML; MG/100ML; MG/100ML; MG/100ML
INJECTION, SOLUTION INTRAVENOUS CONTINUOUS
Status: DISCONTINUED | OUTPATIENT
Start: 2017-12-15 | End: 2017-12-15 | Stop reason: HOSPADM

## 2017-12-15 RX ORDER — ONDANSETRON 4 MG/1
4 TABLET, ORALLY DISINTEGRATING ORAL EVERY 30 MIN PRN
Status: DISCONTINUED | OUTPATIENT
Start: 2017-12-15 | End: 2017-12-15 | Stop reason: HOSPADM

## 2017-12-15 RX ORDER — MORPHINE SULFATE 2 MG/ML
2-4 INJECTION, SOLUTION INTRAMUSCULAR; INTRAVENOUS EVERY 4 HOURS PRN
Status: DISCONTINUED | OUTPATIENT
Start: 2017-12-15 | End: 2017-12-16 | Stop reason: HOSPADM

## 2017-12-15 RX ORDER — PROPOFOL 10 MG/ML
INJECTION, EMULSION INTRAVENOUS PRN
Status: DISCONTINUED | OUTPATIENT
Start: 2017-12-15 | End: 2017-12-15

## 2017-12-15 RX ORDER — HYDROXYZINE HYDROCHLORIDE 25 MG/1
25 TABLET, FILM COATED ORAL EVERY 6 HOURS PRN
Qty: 30 TABLET | Refills: 0 | Status: SHIPPED | OUTPATIENT
Start: 2017-12-15 | End: 2018-04-12

## 2017-12-15 RX ORDER — KETOROLAC TROMETHAMINE 30 MG/ML
30 INJECTION, SOLUTION INTRAMUSCULAR; INTRAVENOUS EVERY 6 HOURS PRN
Status: DISCONTINUED | OUTPATIENT
Start: 2017-12-15 | End: 2017-12-15 | Stop reason: HOSPADM

## 2017-12-15 RX ORDER — ONDANSETRON 2 MG/ML
4 INJECTION INTRAMUSCULAR; INTRAVENOUS EVERY 30 MIN PRN
Status: DISCONTINUED | OUTPATIENT
Start: 2017-12-15 | End: 2017-12-15 | Stop reason: HOSPADM

## 2017-12-15 RX ADMIN — SCOLOPAMINE TRANSDERMAL SYSTEM 1 PATCH: 1 PATCH, EXTENDED RELEASE TRANSDERMAL at 19:30

## 2017-12-15 RX ADMIN — PHENYLEPHRINE HYDROCHLORIDE 0.1 MCG/KG/MIN: 10 INJECTION, SOLUTION INTRAMUSCULAR; INTRAVENOUS; SUBCUTANEOUS at 16:45

## 2017-12-15 RX ADMIN — FENTANYL CITRATE 50 MCG: 50 INJECTION, SOLUTION INTRAMUSCULAR; INTRAVENOUS at 17:47

## 2017-12-15 RX ADMIN — MORPHINE SULFATE 4 MG: 2 INJECTION, SOLUTION INTRAMUSCULAR; INTRAVENOUS at 20:37

## 2017-12-15 RX ADMIN — ONDANSETRON 4 MG: 2 INJECTION INTRAMUSCULAR; INTRAVENOUS at 22:00

## 2017-12-15 RX ADMIN — MIDAZOLAM 2 MG: 1 INJECTION INTRAMUSCULAR; INTRAVENOUS at 15:00

## 2017-12-15 RX ADMIN — FENTANYL CITRATE 50 MCG: 50 INJECTION INTRAMUSCULAR; INTRAVENOUS at 18:34

## 2017-12-15 RX ADMIN — ONDANSETRON 4 MG: 2 INJECTION INTRAMUSCULAR; INTRAVENOUS at 18:49

## 2017-12-15 RX ADMIN — FENTANYL CITRATE 50 MCG: 50 INJECTION INTRAMUSCULAR; INTRAVENOUS at 18:50

## 2017-12-15 RX ADMIN — ACETAMINOPHEN 975 MG: 325 TABLET, FILM COATED ORAL at 13:52

## 2017-12-15 RX ADMIN — HYDROMORPHONE HYDROCHLORIDE 0.5 MG: 1 INJECTION, SOLUTION INTRAMUSCULAR; INTRAVENOUS; SUBCUTANEOUS at 16:22

## 2017-12-15 RX ADMIN — HYDROMORPHONE HYDROCHLORIDE 0.3 MG: 1 INJECTION, SOLUTION INTRAMUSCULAR; INTRAVENOUS; SUBCUTANEOUS at 17:51

## 2017-12-15 RX ADMIN — BUPIVACAINE 10 ML: 13.3 INJECTION, SUSPENSION, LIPOSOMAL INFILTRATION at 14:16

## 2017-12-15 RX ADMIN — CELECOXIB 200 MG: 200 CAPSULE ORAL at 13:52

## 2017-12-15 RX ADMIN — FENTANYL CITRATE 25 MCG: 50 INJECTION INTRAMUSCULAR; INTRAVENOUS at 18:08

## 2017-12-15 RX ADMIN — HYDROMORPHONE HYDROCHLORIDE 0.5 MG: 1 INJECTION, SOLUTION INTRAMUSCULAR; INTRAVENOUS; SUBCUTANEOUS at 20:01

## 2017-12-15 RX ADMIN — LIDOCAINE HYDROCHLORIDE 40 MG: 20 INJECTION, SOLUTION INFILTRATION; PERINEURAL at 15:06

## 2017-12-15 RX ADMIN — SODIUM CHLORIDE, POTASSIUM CHLORIDE, SODIUM LACTATE AND CALCIUM CHLORIDE: 600; 310; 30; 20 INJECTION, SOLUTION INTRAVENOUS at 15:00

## 2017-12-15 RX ADMIN — HYDROMORPHONE HYDROCHLORIDE 0.5 MG: 1 INJECTION, SOLUTION INTRAMUSCULAR; INTRAVENOUS; SUBCUTANEOUS at 19:15

## 2017-12-15 RX ADMIN — Medication 50 MCG: at 16:52

## 2017-12-15 RX ADMIN — BUPIVACAINE HYDROCHLORIDE AND EPINEPHRINE BITARTRATE 10 ML: 2.5; .005 INJECTION, SOLUTION INFILTRATION; PERINEURAL at 14:16

## 2017-12-15 RX ADMIN — Medication 50 MCG: at 16:45

## 2017-12-15 RX ADMIN — METOCLOPRAMIDE 5 MG: 5 INJECTION, SOLUTION INTRAMUSCULAR; INTRAVENOUS at 19:32

## 2017-12-15 RX ADMIN — FENTANYL CITRATE 50 MCG: 50 INJECTION INTRAMUSCULAR; INTRAVENOUS at 18:20

## 2017-12-15 RX ADMIN — DEXAMETHASONE SODIUM PHOSPHATE 4 MG: 4 INJECTION, SOLUTION INTRAMUSCULAR; INTRAVENOUS at 15:06

## 2017-12-15 RX ADMIN — FENTANYL CITRATE 25 MCG: 50 INJECTION, SOLUTION INTRAMUSCULAR; INTRAVENOUS at 15:43

## 2017-12-15 RX ADMIN — HYDROMORPHONE HYDROCHLORIDE 0.2 MG: 1 INJECTION, SOLUTION INTRAMUSCULAR; INTRAVENOUS; SUBCUTANEOUS at 17:28

## 2017-12-15 RX ADMIN — MIDAZOLAM 1 MG: 1 INJECTION INTRAMUSCULAR; INTRAVENOUS at 14:10

## 2017-12-15 RX ADMIN — Medication 50 MCG: at 16:38

## 2017-12-15 RX ADMIN — MORPHINE SULFATE 2 MG: 2 INJECTION, SOLUTION INTRAMUSCULAR; INTRAVENOUS at 23:43

## 2017-12-15 RX ADMIN — FENTANYL CITRATE 50 MCG: 50 INJECTION INTRAMUSCULAR; INTRAVENOUS at 14:10

## 2017-12-15 RX ADMIN — ONDANSETRON 4 MG: 2 INJECTION INTRAMUSCULAR; INTRAVENOUS at 17:11

## 2017-12-15 RX ADMIN — CLINDAMYCIN PHOSPHATE 900 MG: 18 INJECTION, SOLUTION INTRAVENOUS at 15:15

## 2017-12-15 RX ADMIN — FENTANYL CITRATE 50 MCG: 50 INJECTION, SOLUTION INTRAMUSCULAR; INTRAVENOUS at 15:06

## 2017-12-15 RX ADMIN — FENTANYL CITRATE 50 MCG: 50 INJECTION, SOLUTION INTRAMUSCULAR; INTRAVENOUS at 17:39

## 2017-12-15 RX ADMIN — FENTANYL CITRATE 25 MCG: 50 INJECTION INTRAMUSCULAR; INTRAVENOUS at 18:16

## 2017-12-15 RX ADMIN — PROPOFOL 150 MG: 10 INJECTION, EMULSION INTRAVENOUS at 15:06

## 2017-12-15 RX ADMIN — SODIUM CHLORIDE, POTASSIUM CHLORIDE, SODIUM LACTATE AND CALCIUM CHLORIDE: 600; 310; 30; 20 INJECTION, SOLUTION INTRAVENOUS at 16:14

## 2017-12-15 RX ADMIN — FENTANYL CITRATE 25 MCG: 50 INJECTION, SOLUTION INTRAMUSCULAR; INTRAVENOUS at 16:21

## 2017-12-15 RX ADMIN — OXYCODONE HYDROCHLORIDE 10 MG: 5 TABLET ORAL at 21:56

## 2017-12-15 ASSESSMENT — PAIN DESCRIPTION - DESCRIPTORS: DESCRIPTORS: CRUSHING;STABBING

## 2017-12-15 NOTE — ANESTHESIA PREPROCEDURE EVALUATION
Anesthesia Evaluation     .             ROS/MED HX    ENT/Pulmonary:  - neg pulmonary ROS     Neurologic:  - neg neurologic ROS     Cardiovascular:  - neg cardiovascular ROS       METS/Exercise Tolerance:     Hematologic:  - neg hematologic  ROS       Musculoskeletal:  - neg musculoskeletal ROS       GI/Hepatic:  - neg GI/hepatic ROS       Renal/Genitourinary:  - ROS Renal section negative       Endo:  - neg endo ROS       Psychiatric:  - neg psychiatric ROS       Infectious Disease:  - neg infectious disease ROS       Malignancy:      - no malignancy   Other:    - neg other ROS                 Physical Exam  Normal systems: cardiovascular, pulmonary and dental    Airway   Mallampati: II  TM distance: >3 FB  Neck ROM: full    Dental     Cardiovascular       Pulmonary                     Anesthesia Plan      History & Physical Review  History and physical reviewed and following examination; no interval change.    ASA Status:  1 .    NPO Status:  > 8 hours    Plan for General and LMA with Intravenous induction. Maintenance will be Balanced.    PONV prophylaxis:  Ondansetron (or other 5HT-3) and Dexamethasone or Solumedrol       Postoperative Care  Postoperative pain management:  IV analgesics and Oral pain medications.      Consents  Anesthetic plan, risks, benefits and alternatives discussed with:  Patient..                          .

## 2017-12-15 NOTE — ANESTHESIA PROCEDURE NOTES
Peripheral Nerve Block Procedure Note    Staff:     Anesthesiologist:  JAQUELINE DUFFY    Referred By:  SHAHRZAD BUTLER  Location: Pre-op  Procedure Start/Stop TImes:      12/15/2017 2:11 PM     12/15/2017 2:17 PM    patient identified, IV checked, site marked, risks and benefits discussed, informed consent, monitors and equipment checked, pre-op evaluation, at physician/surgeon's request and post-op pain management      Correct Patient: Yes      Correct Position: Yes      Correct Site: Yes      Correct Procedure: Yes      Correct Laterality:  Yes    Site Marked:  Yes  Procedure details:     Procedure:  Adductor canal    ASA:  1    Laterality:  Left    Position:  Supine    Sterile Prep: chloraprep, mask and sterile gloves      Local skin infiltration:  None    Needle:  Short bevel and insulated    Needle gauge:  21    Needle length (inches):  3.13    Ultrasound: Yes      Ultrasound used to identify targeted nerve, plexus, or vascular structure and placed a needle adjacent to it      Permanent Image entered into patiient's record      Abnormal pain on injection: No      Blood Aspirated: No      Paresthesias:  No    Bleeding at site: No      Bolus via:  Needle    Infusion Method:  Single Shot    Complications:  None  Assessment/Narrative:     Injection made incrementally with aspirations every (mL):  5

## 2017-12-15 NOTE — BRIEF OP NOTE
Orthopaedic Surgery Brief Op-Note     Patient: Kaitlynn oHuse  : 1995  Date of Service: 12/15/2017 3:44 PM    Preoperative Diagnosis: Left Tibial Torsion      Postoperative Diagnosis: same    Procedure: Procedure(s):  Left Knee Arthroscopy, Tibial Osteotomy with Derotational Osteotomy, Distal Tibial Tubercle Osteotomy    - Wound Class: I-Clean   - Wound Class: I-Clean    Surgeon: Dr. Schwartz & Dr. Olivarez    Assistants:  Kalin North MD    Anesthesia: General     Estimated Blood Loss: 50 cc    Tourniquet Time: 40 minutes at 250 mmHg     Specimen: none       Complications: none    Condition: stable    Findings: please see dictated operative note    Plan:    PWB, <50% weight bearing to operative extremity with hinged knee brace on and locked at 10 degrees and assistive devices as needed    Hinged knee brace is to be set at 10 degrees to 90 degrees; lock at 10 degrees flexion while ambulating; the brace is to be worn at all times except with range of motion exercises and CPM use    Night splint is to be worn to promote dorsiflexion; wear frequently, but may be removed for comfort    CPM to be set at 10 degrees to flexion tolerance with goal of 90 degrees; advance aggressively in 5 degree increments as tolerated by the patient    PT as outpatient; if patient is registered to observation, we would appreciate assistance throughout the perioperative period; please see the protocol below          ADAT    Pain control with orals prn    Bowel prophylaxis with senna-docusate    DVT prophylaxis: mechanical with ASA 325mg q day x 28 days     Future Appointments  Date Time Provider Department Center   2017 3:30 PM Di Ahumada, PT [facility]   2017 2:10 PM Di Ahumada, PT [facility]   2017 3:20 PM Tatiana Pereira PT [facility]   2017 2:40 PM Tatiana Pereira, PT [facility]   2018 5:10 PM Di Ahumada, PT [facility]   2018 3:30 PM [facility]   1/18/2018 4:30 PM Shell Watkins APRN CNP [facility]   2/22/2018 6:00 PM Milo Mendoza MD [facility]       Disposition: The patient will be monitored after surgery. Given the extent of the procedure, the patient may be admitted to observation due to concerns of pain control and safety with ambulation following surgery.       Richy Orozco PA-C  Orthopaedic Surgery    Please page me at [phone] with any questions/concerns. If there is no response, if it is a weekend, or if it is during evening hours, please page the orthopaedic surgery resident on call.

## 2017-12-15 NOTE — IP AVS SNAPSHOT
[facility] [address]    [address]    [place] [address]    Phone:  [phone]                                       After Visit Summary   12/15/2017    Kaitlynn House    MRN: [id number]           After Visit Summary Signature Page     I have received my discharge instructions, and my questions have been answered. I have discussed any challenges I see with this plan with the nurse or doctor.    ..........................................................................................................................................  Patient/Patient Representative Signature      ..........................................................................................................................................  Patient Representative Print Name and Relationship to Patient    ..................................................               ................................................  Date                                            Time    ..........................................................................................................................................  Reviewed by Signature/Title    ...................................................              ..............................................  Date                                                            Time

## 2017-12-15 NOTE — PROGRESS NOTES
Call received from Richy Orozco requesting a night splint for this patient.  He requested that I drop at the surgery control desk for him to place on the patient in the OR. A  medium Coreline nightsplint was provided to the control desk/Richy.

## 2017-12-15 NOTE — ANESTHESIA CARE TRANSFER NOTE
Patient: Kaitlynn House    Procedure(s):  Left Knee Arthroscopy, Tibial Osteotomy with Derotational Osteotomy, Distal Tibial Tubercle Osteotomy    - Wound Class: I-Clean   - Wound Class: I-Clean    Diagnosis: Left Tibial Torsion   Diagnosis Additional Information: No value filed.    Anesthesia Type:   General, LMA     Note:  Airway :Face Mask  Patient transferred to:PACU  Handoff Report: Identifed the Patient, Identified the Reponsible Provider, Reviewed the pertinent medical history, Discussed the surgical course, Reviewed Intra-OP anesthesia mangement and issues during anesthesia, Set expectations for post-procedure period and Allowed opportunity for questions and acknowledgement of understanding      Vitals: (Last set prior to Anesthesia Care Transfer)    CRNA VITALS  12/15/2017 1717 - 12/15/2017 1754      12/15/2017             Pulse: 111    SpO2: 100 %                Electronically Signed By: JUANY Armenta CRNA  December 15, 2017  5:54 PM

## 2017-12-15 NOTE — IP AVS SNAPSHOT
MRN:[id number]                      After Visit Summary   12/15/2017    Kaitlynn House    MRN: [id number]           Thank you!     Thank you for choosing [facility] for your care. Our goal is always to provide you with excellent care. Hearing back from our patients is one way we can continue to improve our services. Please take a few minutes to complete the written survey that you may receive in the mail after you visit with us. Thank you!        Patient Information     Date Of Birth          1995        Designated Caregiver       Most Recent Value    Caregiver    Will someone help with your care after discharge? yes    Name of designated caregiver Max    Phone number of caregiver [phone]    Caregiver address [address] [address]      About your hospital stay     You were admitted on:  December 15, 2017 You last received care in the:  [facility] 8A    You were discharged on:  December 16, 2017       Who to Call     For medical emergencies, please call 911.  For non-urgent questions about your medical care, please call your primary care provider or clinic, None  For questions related to your surgery, please call your surgery clinic        Attending Provider     Provider Specialty    Dio Olivarez MD Orthopedics    [facility]       Primary Care Provider Fax #    Physician No Ref-Primary [phone]      After Care Instructions      Diet as Tolerated       Return to diet before surgery, unless instructed otherwise.            CPM machine       Set to 0 degrees to flexion tolerance with goal of 90 degrees flexion. Advance aggressively in 5 degree increments as tolerated by patient            Discharge Instructions       POSTOPERATIVE INSTRUCTIONS    A.  COMFORT:  -Elevation: Elevate your knee and ankle above the level of your heart. The best position is lying down with two pillows lengthwise under your entire leg. This should be done for the  first several days after surgery.  -Swelling: An ice pack will be provided to control swelling and discomfort. Once the initial dressing is removed, place a thin towel between your skin and the ice pack.  -Pain Medication: Take medication as prescribed, but only as often as necessary.  Avoid alcohol and driving if you are taking pain medication.  Remember that Tylenol (acetaminophen) can be used for pain relief as well, as you wean off the narcotics.  Maximum Tylenol dose for a single day is 3000 mg.            B.  ACTIVITIES:  -Hinged knee brace: The brace is to be locked at 10 degrees knee flexion at all times except with CPM or P/AAROM exercises. The brace will be worn for up to 3-6 weeks following surgery. When directed by your doctor or physical therapist, you may unlock the brace while sitting but lock the brace before standing.  Sleep with the brace on until directed.   -Night splint: The splint will be place intraoperatively to keep the ankle in dorsiflexion. It should be worn frequently, but may be removed for comfort  -CPM: to be set at 0 degrees to flexion tolerance with goal of 90 degrees flexion; advance aggressively in increments of 5 degrees  -Exercises: Point and flex your feet and wiggle your toes, move your ankle around in a [place], to help prevent complications such as blood clotting in your legs. Thigh muscle tightening exercises should begin the day of surgery and should be done for 10 to 15 minutes, 3 times a day, for the first few weeks after surgery.    -Weight bearing status: You are allowed partial weight bearing status (less than or equal to 50% body weight) on your operative leg, but realize that this may be limited due to pain. You may  tandem for brief periods during the first three weeks.   -Physical therapy: A prescription for physical therapy and a protocol sheet will be sent home with you and must be taken to your first therapy visit  -Driving: Driving is NOT permitted for  3-4 weeks following right knee surgery, 2 weeks after left knee surgery (once the brace can be comfortably unlocked with knee bending to 90 degrees).  -Athletic activities: Athletic activities, such as swimming, bicycling, jogging, running and stop-and-go-sports should be avoided until allowed by your doctor.  -Return to work: Desk/sitting jobs/school in 3-4 days; Heavy labor jobs in up to 3 months.      C. INCISION CARE:    -Keep the initial post-op dressing on, clean and dry for the first day after surgery. You may then remove the dressing down to the PRINEO layer (surgical mesh) and replace with new 4x4s and tubigrip. The steri-strips (small white tape that is directly on the incision areas) should be left on until they fall off or are removed at your first office visit.    Tub bathing, swimming, and soaking of the knee should be avoided until allowed by your doctor - usually 4 weeks after your surgery.    D. CALL YOUR PHYSICIAN IF:  -Pain in your knee persists or worsens in the first few days after surgery.  -Excessive redness or drainage of cloudy or bloody material from the wounds (Clear red tinted fluid and some mild drainage should be expected). Drainage of any kind 5 days after surgery should be reported to the doctor.  -You have a temperature elevation greater than 101.5    -You have pain, swelling or redness in your calf.  -You have numbness or weakness in your leg or foot.    You may contact your physician at [phone] during business hours.    For after hours or weekend calls, you may contact the hospital at [phone] and ask for the on-call orthopedic resident            Discharge Instructions       Follow up appointment as instructed by Provider and/or Nurse.            Discharge Instructions - diet       Resume pre procedure diet.            Do not immerse incision in water        until the sutures are removed.            Dressing       Keep dressing clean and dry.  Dressing / incision care  as instructed by Provider and/or Nurse. Ok to remove dressing on POD 7. Do not submerge or soak wound until cleared by your physician.            Ice to affected area       Apply Active Ice pack to surgical site every 15 minutes per hour for 24 hours            Ice to affected area       Ice to operative site, as needed.            Weight bearing - Partial       Do not apply greater than 50% total body weight to operative extremity                  Your next 10 appointments already scheduled     Dec 20, 2017  3:30 PM CST   ELSY Extremity with HAROLDO Rodriguez [facility] ([facility])    [address] [place] [address] [id number] [phone]            Dec 22, 2017  2:10 PM CST   ELSY Extremity with HAROLDO Rodriguez [facility] ([facility])    [address] [place] [id number] [phone]            Dec 27, 2017  3:20 PM CST   ELSY Extremity with HAROLDO Macdonald [facility] ([facility])    [address] [place] [id number] [phone]            Dec 29, 2017  2:40 PM CST   ELSY Extremity with HAROLDO Macdonald [facility] ([facility])    [address] [place] [id number] [phone]            Jan 02, 2018  5:10 PM CST   ELSY Extremity with HAROLDO Rodriguez [facility] ([facility])    [address] [place] [id number] [phone]            Jan 04, 2018  3:30 PM CST   ELSY Extremity with BRINA Stack [facility] ([facility])    [address] [place] [address] [id number] [phone]            Jan 18, 2018  4:30 PM CST   (Arrive by 4:15 PM)   RETURN  "ARRHYTHMIA with JUANY Henning CNP   [facility] ([facility])    [address]  3rd Floor  [facility] [id number] [phone]            Feb 22, 2018  6:00 PM CST   (Arrive by 5:45 PM)   RETURN ARRHYTHMIA with Milo Mendoza MD   [facility] ([facility])    [address]  [address] Floor  [facility] [phone] [phone]              Additional Services     [facility] PT, HAND, AND CHIROPRACTIC REFERRAL                 Further instructions from your care team                 Pending Results     No orders found for last 3 day(s).            Admission Information     Date & Time Provider Department Dept. Phone    12/15/2017 Di Schwartz MD  8A [phone]      Your Vitals Were     Blood Pressure Pulse Temperature Respirations Height Weight    100/56 (BP Location: Left arm) 100 97.7  F (36.5  C) (Oral) 14 1.753 m (5' 9\") 54.7 kg (120 lb 9.5 oz)    Last Period Pulse Oximetry BMI (Body Mass Index)             12/01/2017 (Approximate) 99% 17.81 kg/m2         [employer] Information     [employer] gives you secure access to your electronic health record. If you see a primary care provider, you can also send messages to your care team and make appointments. If you have questions, please call your primary care clinic.  If you do not have a primary care provider, please call [phone] and they will assist you.        Care EveryWhere ID     This is your Care EveryWhere ID. This could be used by other organizations to access your [facility] medical records  [id number]        Equal Access to Services     [facility] Center[facility] C : Hadii aad ku hadashmanuel Sogiorgi, waaxda luqadaha, qaybta kaalmada parag cantu. So [facility] [phone].    ATENCIÓN: Si habla español, tiene a cristobal disposición servicios gratuitos de asistencia lingüística. Llame al [phone].    We comply with applicable federal civil " rights laws and [place] laws. We do not discriminate on the basis of race, color, national origin, age, disability, sex, sexual orientation, or gender identity.               Review of your medicines      START taking        Dose / Directions    acetaminophen 325 MG tablet   Commonly known as:  TYLENOL   Used for:  Status post surgery        Dose:  650 mg   Take 2 tablets (650 mg) by mouth every 4 hours as needed for other (mild pain)   Quantity:  100 tablet   Refills:  0       aspirin  MG EC tablet   Used for:  Status post surgery        Dose:  325 mg   Take 1 tablet (325 mg) by mouth daily To be taken for DVT Prophylaxis daily   Quantity:  28 tablet   Refills:  0       hydrOXYzine 25 MG tablet   Commonly known as:  ATARAX   Used for:  Status post surgery        Dose:  25 mg   Take 1 tablet (25 mg) by mouth every 6 hours as needed for itching (and nausea)   Quantity:  30 tablet   Refills:  0       senna-docusate 8.6-50 MG per tablet   Commonly known as:  SENOKOT-S;PERICOLACE   Used for:  Status post surgery        Dose:  1-2 tablet   Take 1-2 tablets by mouth 2 times daily Take while on oral narcotics to prevent or treat constipation.   Quantity:  26 tablet   Refills:  0         CONTINUE these medicines which may have CHANGED, or have new prescriptions. If we are uncertain of the size of tablets/capsules you have at home, strength may be listed as something that might have changed.        Dose / Directions    * oxyCODONE IR 5 MG tablet   Commonly known as:  ROXICODONE   This may have changed:    - how much to take  - how to take this  - when to take this  - reasons to take this  - additional instructions   Used for:  Status post surgery        Dose:  5-10 mg   Take 1-2 tablets (5-10 mg) by mouth every 4 hours as needed for pain or other (Moderate to Severe)   Quantity:  50 tablet   Refills:  0       * oxyCODONE 10 MG 12 hr tablet   Commonly known as:  OXYCONTIN   This may have changed:  You were already  taking a medication with the same name, and this prescription was added. Make sure you understand how and when to take each.   Used for:  Status post surgery        Dose:  10 mg   Take 1 tablet (10 mg) by mouth nightly as needed for moderate to severe pain   Quantity:  5 tablet   Refills:  0       * Notice:  This list has 2 medication(s) that are the same as other medications prescribed for you. Read the directions carefully, and ask your doctor or other care provider to review them with you.      CONTINUE these medicines which have NOT CHANGED        Dose / Directions    ALEVE PO        Dose:  2 tablet   Take 2 tablets by mouth every 12 hours as needed for moderate pain   Refills:  0       AMITRIPTYLINE HCL PO        Dose:  20 mg   Take 20 mg by mouth nightly as needed for sleep   Refills:  0       order for DME   Used for:  Tibial torsion, right        Crutches   Quantity:  1 Units   Refills:  0       XULANE 150-35 MCG/24HR patch   Generic drug:  norelgestromin-ethinyl estradiol        Dose:  1 patch   Place 1 patch onto the skin once a week Remove old patch and apply new patch onto the skin once a week for 3 weeks (21 days). Do not wear patch week 4 (days 22-28), then repeat.   Refills:  0         STOP taking     GABAPENTIN PO           hydrOXYzine 25 MG capsule   Commonly known as:  VISTARIL                Where to get your medicines      These medications were sent to [facility] [place], [place] - [address]  [address] [facility] [address] [address]     Phone:  [phone]     acetaminophen 325 MG tablet    aspirin  MG EC tablet    hydrOXYzine 25 MG tablet    senna-docusate 8.6-50 MG per tablet         Some of these will need a paper prescription and others can be bought over the counter. Ask your nurse if you have questions.     Bring a paper prescription for each of these medications     oxyCODONE 10 MG 12 hr tablet    oxyCODONE IR 5 MG tablet                Protect others  around you: Learn how to safely use, store and throw away your medicines at www.disposemymeds.org.             Medication List: This is a list of all your medications and when to take them. Check marks below indicate your daily home schedule. Keep this list as a reference.      Medications           Morning Afternoon Evening Bedtime As Needed    acetaminophen 325 MG tablet   Commonly known as:  TYLENOL   Take 2 tablets (650 mg) by mouth every 4 hours as needed for other (mild pain)   Last time this was given:  650 mg on 12/16/2017  1:23 PM                                ALEVE PO   Take 2 tablets by mouth every 12 hours as needed for moderate pain                                AMITRIPTYLINE HCL PO   Take 20 mg by mouth nightly as needed for sleep                                aspirin  MG EC tablet   Take 1 tablet (325 mg) by mouth daily To be taken for DVT Prophylaxis daily                                hydrOXYzine 25 MG tablet   Commonly known as:  ATARAX   Take 1 tablet (25 mg) by mouth every 6 hours as needed for itching (and nausea)                                order for DME   Crutches                                * oxyCODONE IR 5 MG tablet   Commonly known as:  ROXICODONE   Take 1-2 tablets (5-10 mg) by mouth every 4 hours as needed for pain or other (Moderate to Severe)   Last time this was given:  10 mg on 12/16/2017  1:23 PM                                * oxyCODONE 10 MG 12 hr tablet   Commonly known as:  OXYCONTIN   Take 1 tablet (10 mg) by mouth nightly as needed for moderate to severe pain                                senna-docusate 8.6-50 MG per tablet   Commonly known as:  SENOKOT-S;PERICOLACE   Take 1-2 tablets by mouth 2 times daily Take while on oral narcotics to prevent or treat constipation.                                XULANE 150-35 MCG/24HR patch   Place 1 patch onto the skin once a week Remove old patch and apply new patch onto the skin once a week for 3 weeks (21 days). Do not  wear patch week 4 (days 22-28), then repeat.   Generic drug:  norelgestromin-ethinyl estradiol                                * Notice:  This list has 2 medication(s) that are the same as other medications prescribed for you. Read the directions carefully, and ask your doctor or other care provider to review them with you.

## 2017-12-16 VITALS
WEIGHT: 120.59 LBS | DIASTOLIC BLOOD PRESSURE: 56 MMHG | RESPIRATION RATE: 14 BRPM | TEMPERATURE: 97.7 F | HEIGHT: 69 IN | HEART RATE: 100 BPM | OXYGEN SATURATION: 99 % | SYSTOLIC BLOOD PRESSURE: 100 MMHG | BODY MASS INDEX: 17.86 KG/M2

## 2017-12-16 PROCEDURE — 96375 TX/PRO/DX INJ NEW DRUG ADDON: CPT

## 2017-12-16 PROCEDURE — 25000132 ZZH RX MED GY IP 250 OP 250 PS 637: Performed by: ORTHOPAEDIC SURGERY

## 2017-12-16 PROCEDURE — 40000893 ZZH STATISTIC PT IP EVAL DEFER

## 2017-12-16 PROCEDURE — 25000132 ZZH RX MED GY IP 250 OP 250 PS 637: Performed by: STUDENT IN AN ORGANIZED HEALTH CARE EDUCATION/TRAINING PROGRAM

## 2017-12-16 PROCEDURE — G0378 HOSPITAL OBSERVATION PER HR: HCPCS

## 2017-12-16 PROCEDURE — 25000128 H RX IP 250 OP 636: Performed by: ORTHOPAEDIC SURGERY

## 2017-12-16 RX ORDER — KETOROLAC TROMETHAMINE 30 MG/ML
30 INJECTION, SOLUTION INTRAMUSCULAR; INTRAVENOUS ONCE
Status: COMPLETED | OUTPATIENT
Start: 2017-12-16 | End: 2017-12-16

## 2017-12-16 RX ORDER — OXYCODONE HCL 10 MG/1
10 TABLET, FILM COATED, EXTENDED RELEASE ORAL
Qty: 5 TABLET | Refills: 0 | Status: SHIPPED | OUTPATIENT
Start: 2017-12-16 | End: 2018-03-20

## 2017-12-16 RX ORDER — GABAPENTIN 600 MG/1
600 TABLET ORAL ONCE
Status: COMPLETED | OUTPATIENT
Start: 2017-12-16 | End: 2017-12-16

## 2017-12-16 RX ORDER — GABAPENTIN 300 MG/1
600 CAPSULE ORAL 2 TIMES DAILY
Status: DISCONTINUED | OUTPATIENT
Start: 2017-12-16 | End: 2017-12-16 | Stop reason: HOSPADM

## 2017-12-16 RX ADMIN — ACETAMINOPHEN 650 MG: 325 TABLET, FILM COATED ORAL at 07:54

## 2017-12-16 RX ADMIN — OXYCODONE HYDROCHLORIDE 10 MG: 5 TABLET ORAL at 07:37

## 2017-12-16 RX ADMIN — GABAPENTIN 600 MG: 300 CAPSULE ORAL at 09:32

## 2017-12-16 RX ADMIN — OXYCODONE HYDROCHLORIDE 10 MG: 5 TABLET ORAL at 10:34

## 2017-12-16 RX ADMIN — OXYCODONE HYDROCHLORIDE 10 MG: 5 TABLET ORAL at 04:25

## 2017-12-16 RX ADMIN — KETOROLAC TROMETHAMINE 30 MG: 30 INJECTION, SOLUTION INTRAMUSCULAR at 09:32

## 2017-12-16 RX ADMIN — ACETAMINOPHEN 650 MG: 325 TABLET, FILM COATED ORAL at 13:23

## 2017-12-16 RX ADMIN — GABAPENTIN 600 MG: 600 TABLET, FILM COATED ORAL at 00:14

## 2017-12-16 RX ADMIN — OXYCODONE HYDROCHLORIDE 10 MG: 5 TABLET ORAL at 13:23

## 2017-12-16 RX ADMIN — OXYCODONE HYDROCHLORIDE 10 MG: 5 TABLET ORAL at 01:15

## 2017-12-16 ASSESSMENT — ACTIVITIES OF DAILY LIVING (ADL)
AMBULATION: 1-->ASSISTIVE EQUIPMENT
SWALLOWING: 0-->SWALLOWS FOODS/LIQUIDS WITHOUT DIFFICULTY
TRANSFERRING: 1-->ASSISTIVE EQUIPMENT
COGNITION: 0 - NO COGNITION ISSUES REPORTED
BATHING: 1-->ASSISTIVE EQUIPMENT
WHICH_OF_THE_ABOVE_FUNCTIONAL_RISKS_HAD_A_RECENT_ONSET_OR_CHANGE?: AMBULATION;TRANSFERRING;TOILETING;BATHING;DRESSING
RETIRED_COMMUNICATION: 0-->UNDERSTANDS/COMMUNICATES WITHOUT DIFFICULTY
FALL_HISTORY_WITHIN_LAST_SIX_MONTHS: NO
RETIRED_EATING: 0-->INDEPENDENT
TOILETING: 1-->ASSISTIVE EQUIPMENT
DRESS: 2-->ASSISTIVE PERSON

## 2017-12-16 NOTE — PROGRESS NOTES
House Physician Note:    Called for patient desire to receive her nightly gabapentin dose, ordered.    Robin Ac,   Internal Medicine

## 2017-12-16 NOTE — ANESTHESIA POSTPROCEDURE EVALUATION
Patient: Kaitlynn House    Procedure(s):  Left Knee Arthroscopy, Tibial Derotational Osteotomy with Intermedullary Rodding, Fibular Derotational Osteotmy, Distal Tibial Tubercle Osteotomy - Wound Class: I-Clean   - Wound Class: I-Clean    Diagnosis:Left Tibial Torsion   Diagnosis Additional Information: No value filed.    Anesthesia Type:  General, LMA    Note:  Anesthesia Post Evaluation    Patient location during evaluation: PACU  Patient participation: Able to fully participate in evaluation  Level of consciousness: awake and alert  Pain management: adequate  Airway patency: patent  Cardiovascular status: acceptable  Respiratory status: acceptable  Hydration status: acceptable  PONV: controlled     Anesthetic complications: None          Last vitals:  Vitals:    12/15/17 1915 12/15/17 1930 12/15/17 1945   BP: 103/60 103/59 102/57   Pulse:      Resp: 14 9 13   Temp:   36.7  C (98.1  F)   SpO2: 96% 96% 93%         Electronically Signed By: Fermín Christie MD  December 15, 2017  8:36 PM

## 2017-12-16 NOTE — PLAN OF CARE
2200  Observations goals  -adequate pain control using oral analgesics - pt nauseous, IV zofran given. Oral PO meds given and kept down   -Tolerates oral intake -pt tolerating sips  -Cleared for discharge per Provider  -Vital signs at baseline - vital signs stable.

## 2017-12-16 NOTE — PROGRESS NOTES
Patient required IV pain medication overnight.  IV toradol given this AM.  Has been upright with increase pain in limb, will try again this PM.  Feeling better with pain management.    If able to safely ambulate will D/C this PM.  All questions answered for patient and her .    Di Schwartz MD  Professor Orthopedic Surgery  [facility]

## 2017-12-16 NOTE — OR NURSING
PACU to Inpatient Nursing Handoff    Patient Kaitlynn House is a 22 year old female who speaks English.   Procedure Procedure(s):  Left Knee Arthroscopy, Tibial Derotational Osteotomy with Intermedullary Rodding, Fibular Derotational Osteotmy, Distal Tibial Tubercle Osteotomy - Wound Class: I-Clean   - Wound Class: I-Clean   Surgeon(s) Primary: Dio Olivarez MD  Assisting: Di Schwartz MD  Resident - Assisting: Kalin North MD     Allergies   Allergen Reactions     Egg [Chicken-Derived Products (Egg)] Anaphylaxis     Amoxicillin Hives     Zithromax [Azithromycin] Nausea and Vomiting     Compazine [Prochlorperazine] Other (See Comments)     delirious       Isolation  No active isolations    Past Medical History   has a past medical history of Restless legs syndrome. She also has no past medical history of Complication of anesthesia.    Anesthesia Combined General with Block   Dermatome Level     Preop Meds acetaminophen (Tylenol) - time given: 1352, [id number] @1352   Nerve block Adductor canal.  Location:left. Med:Exparel (liposomal bupivacaine). Time given: 1345   Intraop Meds  Fentanyl 200mcg,Cleicin 900, Versed 2mg,Zofran 4mg,Decadron 4mg, Dilaudid 1mg   Local Meds No   Antibiotics clindamycin (Cleocin) - last given at 1515     Pain Patient Currently in Pain: yes  Comfort: comfortably manageable  Pain Control: partially effective   PACU meds  Fentanyl 200mcg, last @1850, Duilaudid 1mg, last @ 2001, Reglan 5mg @1932, Zofran 4mg @1850, Scop. Patch @1930   PCA / epidural No   Capnography     Telemetry ECG Rhythm: Sinus tachycardia      Labs Glucose No results found for: GLC    Hgb No results found for: HGB    INR No results found for: INR   PACU Imaging Completed     Wound/Incision Incision/Surgical Site 12/15/17 Left Leg (Active)   Incision Assessment UTV 12/15/2017  7:45 PM   Closure Sutures;Liquid bandage 12/15/2017  5:34 PM   Incision Drainage Amount None 12/15/2017  7:45 PM    Dressing Intervention Clean, dry, intact 12/15/2017  7:45 PM   Number of days:0      CMS Peripheral Neurovascular WDL: WDL (12/15/17 1750)  LLE Temperature: warm (12/15/17 1750)  LLE Color: no discoloration (12/15/17 1750)  LLE Sensation: no numbness;no tingling (12/15/17 1750)  RLE Temperature: cool (12/15/17 1314)  RLE Color: no discoloration (12/15/17 1314)  RLE Sensation: no numbness;no tingling (12/15/17 1314)   Equipment ice pack   Other LDA       IV Access Peripheral IV 12/15/17 Right Lower forearm (Active)   Site Assessment WDL 12/15/2017  7:15 PM   Line Status Infusing 12/15/2017  7:15 PM   Phlebitis Scale 0-->no symptoms 12/15/2017  7:15 PM   Infiltration Scale 0 12/15/2017  7:15 PM   Infiltration Site Treatment Method  None 12/15/2017  2:25 PM   Extravasation? No 12/15/2017  2:25 PM   Dressing Intervention New dressing  12/15/2017  2:25 PM   Number of days:0      Blood Products Not applicable EBL 50 mL   Intake/Output Date 12/15/17 0700 - 12/16/17 0659   Shift 3546-1950 4589-7168 0489-2183 24 Hour Total   I  N  T  A  K  E   P.O.  120  120    I.V.  1200  1200    Shift Total  (mL/kg)  1320  (24.13)  1320  (24.13)   O  U  T  P  U  T   Blood  50  50    Shift Total  (mL/kg)  50  (0.91)  50  (0.91)   Weight (kg) 54.7 54.7 54.7 54.7        Drains / Kimbrough     Time of void PreOp Void Prior to Procedure: 1250 (12/15/17 1329)    PostOp     Bladder Scan      mL (apple juice) (12/15/17 1845)  Apple juice 200, saltines     Vitals    B/P: 102/57  T: 98.1  F (36.7  C)    Temp src: Oral  P:  Pulse: 125 (12/15/17 1246)    Heart Rate: 114 (12/15/17 1945)     R: 13  O2:  SpO2: 93 %    O2 Device: None (Room air) (12/15/17 1945)    Oxygen Delivery: 6 LPM (12/15/17 5312)         Family/support present    Patient belongings Patient Belongings: cell phone/electronics;clothing;shoes  Disposition of Belongings: Locker   Patient transported on Cart, air mat   DC meds/scripts (obs/outpt) Scripts in pt chart     Special  needs/considerations Orthostatic postural tachycardia, fainting spells   Tasks needing completion n/a       Viri Del Castillo RN  [facility] [id number]

## 2017-12-16 NOTE — PLAN OF CARE
Problem: Patient Care Overview  Goal: Plan of Care/Patient Progress Review  Outcome: Improving  Patient A/Ox4, spouse in room. VSS ex slightly tachycardic at baseline. Denies CP, SOB, dizziness/LH. LSCTA. +BS. Voided in bathroom on evening shift. CMS intact ex some numbness in fingers bilaterally. Dressing to left CDI. Tolerating regular diet without NV, pt has not taken anything further for n/v but does have scopolamine patch in place. IS encouraged. Activity level is good, pt has been sleeping overnight. IV SL and patent. Pain rated as tolerable throughout shift, managed with one dose of IV morphine at the beginning of shift and oral oxycodone Q3H otherwise. Patient has demonstrated ability to call appropriately. Patient is resting with call light within reach. Will continue to monitor.

## 2017-12-16 NOTE — PROVIDER NOTIFICATION
Giovani notified of pt request/needs for IV medications    MD in 807 arrived from surgery at approx. 2130.  She needs IV pain meds but cannot tolerated IV dilaudid. Other options for her.  Kassie LOPEZ [address]

## 2017-12-16 NOTE — PROVIDER NOTIFICATION
Ortho on-call notified of pt desire/need for IV pain control changes:    MD arrive post op around 2130.  We need to have IV pain meds but pt states she can't take IV dilaudid.  Do we have another option for her IV pain control. She states she's taken morphine and torodol in the past.

## 2017-12-16 NOTE — PLAN OF CARE
Problem: Patient Care Overview  Goal: Discharge Needs Assessment  Outcome: Improving  1400:  -Adequate pain control using oral analgesics: Pt pain is tolerable with discomfort, continuing with ice application and oxycodone Q3H, tylenol Q4H, and gabapentin 2x daily.  -Tolerates oral intake: Adequate intake of PO fluids without N/V, pt ate breakfast without N/V. Scop patch in place.  -Cleared for discharge per Provider: Per ortho, okay to discharge once pain is under control. Dr. Schwartz saw pt this afternoon and ok with pt leaving after PT evaluates/assess pt.   -Vital Signs stable at baseline: VSS, no tachycardia noted      Pt okay to leave after Physical Therapy clears patient.

## 2017-12-16 NOTE — OR NURSING
Still working on treating pt's pain.  Also c/o nausea.  Have given zofran, reglan, and put on a scopolamine patch.   Plan to admit overnight for pain control.  Report given to Viri Del Castillo RN.

## 2017-12-16 NOTE — PROGRESS NOTES
"Ortho Progress Note     S: No acute overnight events. Pain intermittently high especially with mobilization. Block wearing off, regaining sensation in foot.     O:  /56 (BP Location: Left arm)  Pulse 100  Temp 97.7  F (36.5  C) (Oral)  Resp 14  Ht 1.753 m (5' 9\")  Wt 54.7 kg (120 lb 9.5 oz)  LMP 12/01/2017 (Approximate)  SpO2 99%  BMI 17.81 kg/m2  Gen: A&Ox3, no acute distress  CV: 2+ dp/pt pulses, capillary refill < 2sec  Resp: breathing equal and non-labored, no wheezing  Extremities:  LLE: dressings cdi. Compartments soft, minimal pain w/ compression of all compartments. Fires ehl, ghl, ta weakly. Endorses sensation in sp/dp, still dull in TN. Toes wwp.   \          A/P:    PWB, <50% weight bearing to operative extremity with hinged knee brace on and locked at 10 degrees and assistive devices as needed    Hinged knee brace is to be set at 10 degrees to 90 degrees; lock at 10 degrees flexion while ambulating; the brace is to be worn at all times except with range of motion exercises and CPM use    Night splint is to be worn to promote dorsiflexion; wear frequently, but may be removed for comfort    CPM to be set at 10 degrees to flexion tolerance with goal of 90 degrees; advance aggressively in 5 degree increments as tolerated by the patient    PT as outpatient; if patient is registered to observation, we would appreciate assistance throughout the perioperative period; please see the protocol below      Pending pain control, patient may dc today. If pain poorly controlled, would anticipate tomorrow.       Yamil Crawford MD  PGY-4, Orthopaedic Surgery  [phone]        "

## 2017-12-16 NOTE — PLAN OF CARE
1153     Observations goals  -adequate pain control using oral analgesics - No more nausea. IV morphine given for pain  -Tolerates oral intake -pt tolerating sips and small bits of food.  -Cleared for discharge per Provider  -Vital signs at baseline - vital signs stable on RA

## 2017-12-16 NOTE — PLAN OF CARE
Problem: Patient Care Overview  Goal: Discharge Needs Assessment  0800:  -Adequate pain control using oral analgesics: Pt states pain is at 8/10 this morning, increasing with movement. Pt given 10 mg oxycodone and 650 mg tylenol. Will talk to provider to see if we can order gabapentin and toradol x1.    -Tolerates oral intake: Adequate intake of PO fluids without N/V, pt will order breakfast this morning.  -Cleared for discharge per Provider: Per ortho, okay to discharge once pain is under control  -Vital Signs stable at baseline: VSS, capnography on

## 2017-12-16 NOTE — PLAN OF CARE
Problem: Patient Care Overview  Goal: Discharge Needs Assessment  Outcome: Improving  Outpatient/Observation goals to be met before discharge home:  -Adequate pain control using oral analgesics,   -Tolerates oral intake,   -Cleared for discharge per Provider.  -Vital signs at baseline.  0453-3505    Pt tolerating oral pain control thus far.  Resting between cares.  Pt drinking some water, will more earnestly try food in morning.  Not cleared for d/c as of yet.  CAPNO still doing well, no issues at this time.

## 2017-12-16 NOTE — PLAN OF CARE
Problem: Patient Care Overview  Goal: Discharge Needs Assessment  Outcome: Improving  Outpatient/Observation goals to be met before discharge home:    -Adequate pain control using oral analgesics,   -Tolerates oral intake,   -Cleared for discharge per Provider.  -Vital signs at baseline.    4147-5211:    Pt tolerating pain management, resting between cares.  No new concerns. CAPNO good.  Will be seen by primary team for POC, poss. d/c today.

## 2017-12-16 NOTE — PLAN OF CARE
Problem: Patient Care Overview  Goal: Discharge Needs Assessment  Outcome: Improving  1000:  -Adequate pain control using oral analgesics: Pt pain has improved since morning dose of oxycodone. Given gabapentin and 1 time dose of toradol. Ice applied.  -Tolerates oral intake: Adequate intake of PO fluids without N/V, pt ate breakfast without N/V. Scop patch in place.  -Cleared for discharge per Provider: Per ortho, okay to discharge once pain is under control  -Vital Signs stable at baseline: VSS, capnography on

## 2017-12-16 NOTE — PLAN OF CARE
Problem: Patient Care Overview  Goal: Discharge Needs Assessment  Outcome: Improving  Outpatient/Observation goals to be met before discharge home:  -Adequate pain control using oral analgesics,   -Tolerates oral intake,   -Cleared for discharge per Provider.  -Vital signs at baseline.    2775-3165  Pt did take one dose of IV morphine near beginning of shift.  Plan overnight is to take oral oxycodone when it is due and pt will call if not adequate.  Pt reporting she is eating slowly to reduce risk of vomiting, stated she feels better with nausea.  Not cleared overnight for d/c at this time.  CAPNO doing well, no further concerns.  S/O sleeping in room next to pt, they are planning for d/c in AM.

## 2017-12-16 NOTE — PROGRESS NOTES
Orthopaedic Surgery Post-Op Check     S: Patient seen in PACU post-op.  Mild pain in left leg.  Denies chest pain, shortness of breath, nausea, or vomiting.     O: Alert, drowsy.  Compartments soft x4, fires ELH/FHL wiggles lesser toes.  Toes warm and well perfused.  Brace in place.  Dressings c/d/i.    A: Kaitlynn House is a 22 year old female s/p L tibia derotational osteotomy, tibial tubercle osteotomy and knee arthroscopy with Dr. Schwartz & Sher, doing well.     P: Continue current cares as outlined in brief op note.    Kalin North MD  Orthopedic Surgery, PGY-2  Pager: [phone]  6:02 PM  December 15, 2017

## 2017-12-16 NOTE — PLAN OF CARE
"Pt and spouse given \"What is observation\" handout earlier this shift, pt understands and is agreeable.  "

## 2017-12-16 NOTE — PLAN OF CARE
Problem: Patient Care Overview  Goal: Plan of Care/Patient Progress Review  PT order received and chart reviewed. Patient currently on observation status, did not complete full PT evaluation as patient familiar with protocol from last surgery. Did indicate this was a different brace. Went over how to lock/unlock, adjust and manage brace- verbalized good understanding overall. Aware of <50% PWB status. Completes supine<>sit transfer with supervision. Has walker and crutches at home, plans to use walker initially. Completes sit<>stand transfer with walker and supervision, safe throughout. Tolerated ambulating in [place], weight bearing status maintained. Does have stairs but spouse will lift patient into their home (did this last time). No further acute inpatient PT needs. Safe to d/c home today with spouse assist and OP PT (has appointment on Wednesday).

## 2017-12-16 NOTE — PLAN OF CARE
Problem: Surgery Nonspecified (Adult)  Goal: Signs and Symptoms of Listed Potential Problems Will be Absent, Minimized or Managed (Surgery Nonspecified)  Signs and symptoms of listed potential problems will be absent, minimized or managed by discharge/transition of care (reference Surgery Nonspecified (Adult) CPG).   Outcome: Adequate for Discharge Date Met: 12/16/17    VS: VSS. Incentive spirometry encouraged   O2: RA   Output: Pt voiding in bathroom without difficulty   Last BM: LBM 12/14 per pt report, denies passing flatus, BS active   Activity: Activity with min assist of 1, gait steady. Up with walker. Up in hallway with PT, PT cleared pt for discharge   Skin: Skin is intact except for incisions and previous scars   Pain: Pain is being managed utilizing around-the-clock dosing of oxycodone 5-10 mg Q3H, receiving 10 mg at a time. Also given 1x dose of toradol and added 600 mg gabapentin 2x daily. Pain is tolerable with discomfort and improving per pt report. Has not required any IV pain medications.    CMS: CMS intact except for numbness/tingling in fingertips, improving per pt report. DP +2 bilaterally, able to wiggle toes bilaterally.   Dressing: Dressing to LLE CDI, some scant drainage noted at end of shift.   Diet: Pt tolerated regular diet without N/V, adequate intake of PO fluids   LDA: PIV removed    Equipment: Walker, commode, PCD's, HKB, and personal belongings at bedside.   Plan: Discharge home with assist from spouse. Pt has met all observation goals and cleared for D/C by ortho and PT. Spouse will transport home.   Additional Info: Pt will be discharging with oxycontin for additional pain management.         Pt and family were given all discharge instructions and discharge medications. Pt and spouse stated no further questions. Pt has all personal belongings. MD gave pt dressing change supplies. Pt left unit 8A at 1515 with transport in a wheelchair. Spouse will transport home.

## 2017-12-16 NOTE — PLAN OF CARE
Problem: Patient Care Overview  Goal: Discharge Needs Assessment  Outcome: Improving  Outpatient/Observation goals to be met before discharge home:  -Adequate pain control using oral analgesics,   -Tolerates oral intake,   -Cleared for discharge per Provider.  -Vital signs at baseline.    [id number]  Pt took oxycodone again at 0425, resting between cares.  Pt waiting to see if pain control OK as block wears off.  No other concerns at this time.

## 2017-12-16 NOTE — PLAN OF CARE
Problem: Surgery Nonspecified (Adult)  Goal: Signs and Symptoms of Listed Potential Problems Will be Absent, Minimized or Managed (Surgery Nonspecified)  Signs and symptoms of listed potential problems will be absent, minimized or managed by discharge/transition of care (reference Surgery Nonspecified (Adult) CPG).   1200:  -Adequate pain control using oral analgesics: Pt pain is tolerable with discomfort, continuing with ice application and oxycodone Q3H, tylenol Q4H, and gabapentin 2x daily.  -Tolerates oral intake: Adequate intake of PO fluids without N/V, pt ate breakfast without N/V. Scop patch in place.  -Cleared for discharge per Provider: Per ortho, okay to discharge once pain is under control. Dr. Schwartz saw pt this afternoon.  -Vital Signs stable at baseline: VSS, no tachycardia noted

## 2017-12-18 ENCOUNTER — TELEPHONE (OUTPATIENT)
Dept: CARDIOLOGY | Facility: CLINIC | Age: 22
End: 2017-12-18

## 2017-12-18 NOTE — TELEPHONE ENCOUNTER
Per the request of Shell Watkins, NP - writer called the patient to  her for a tilt table study and the patient informed the writer that she just had surgery bilateral LE and is unable to stand. Writer left her phone number and the patient indicated that she would call back to schedule her tilt table study when she's able to bear weight.     Aimee Gomes  Periop Electrophysiology   [phone]

## 2017-12-18 NOTE — DISCHARGE SUMMARY
ORTHOPAEDIC DISCHARGE SUMMARY     Date of Admission: 12/15/2017  Date of Discharge: 12/16/2017  Disposition: Home  Staff Physician: No att. providers found  Primary Care Provider: No Ref-Primary, Physician    DISCHARGE DIAGNOSIS:  Left Tibial Torsion     PROCEDURES: Procedure(s):  OPEN REDUCTION INTERNAL FIXATION RODDING INTRAMEDULLARY TIBIA  ARTHROSCOPY KNEE WITH TIBIAL TUBERCLE SHIFT on 12/15/2017    BRIEF HISTORY:  Patient w/ hx of knee pain     HOSPITAL COURSE:    Surgery was uncomplicated. Kaitlynn House has done well post-operatively. The patient received routine nursing cares and is medically stable. Vital signs are stable. The patient is tolerating a regular diet without GI distress/nausea or vomiting. Voiding spontaneously. All PT/OT goals have been met for safe mobility. Pain is now controlled on oral medications which will be available on discharge. Stool softeners have been used while taking pain medications to help prevent constipation. Kaitlynn House is deemed medically safe to discharge.     Antibiotics:   given periop and 24 hours postop.   DVT prophylaxis:  Lovenox daily   PT Progress: Has met PT/OT goals for safe mobility.    Pain Meds:  Weaned off all IV pain meds by discharge.  Inpatient Events: No significant events or complications.     Discharge orders and instructions as below.    FOLLOWUP:    Follow up with Dr. Schwartz at 2 weeks postoperatively.  Future Appointments  Date Time Provider Department Center   12/20/2017 3:30 PM Di Ahumada, PT [facility]   12/22/2017 2:10 PM Di Ahumada, PT [facility]   12/27/2017 3:20 PM Tatiana Pereira, PT [facility]   12/29/2017 2:40 PM Tatiana Pereira, PT [facility]   1/2/2018 5:10 PM Di Ahumada, PT [facility]   1/4/2018 3:30 PM Olga Wood PTA [facility]   1/18/2018 4:30 PM Shell Watkins APRN [facility]   2/22/2018 6:00 PM Milo Mendoza MD [facility]       Appointments on  [facility]. Call [phone] if you haven't heard regarding these appointments within 7 days of discharge.    PLANNED DISCHARGE ORDERS:           Discharge Medication List as of 12/16/2017  2:31 PM      START taking these medications    Details   oxyCODONE (OXYCONTIN) 10 MG 12 hr tablet Take 1 tablet (10 mg) by mouth nightly as needed for moderate to severe pain, Disp-5 tablet, R-0, Local Print      acetaminophen (TYLENOL) 325 MG tablet Take 2 tablets (650 mg) by mouth every 4 hours as needed for other (mild pain), Disp-100 tablet, R-0, E-Prescribe      senna-docusate (SENOKOT-S;PERICOLACE) 8.6-50 MG per tablet Take 1-2 tablets by mouth 2 times daily Take while on oral narcotics to prevent or treat constipation., Disp-26 tablet, R-0, E-PrescribeWhile on narcotics      hydrOXYzine (ATARAX) 25 MG tablet Take 1 tablet (25 mg) by mouth every 6 hours as needed for itching (and nausea), Disp-30 tablet, R-0, E-Prescribe      aspirin  MG EC tablet Take 1 tablet (325 mg) by mouth daily To be taken for DVT Prophylaxis daily, Disp-28 tablet, R-0, E-Prescribe         CONTINUE these medications which have CHANGED    Details   oxyCODONE IR (ROXICODONE) 5 MG tablet Take 1-2 tablets (5-10 mg) by mouth every 4 hours as needed for pain or other (Moderate to Severe), Disp-50 tablet, R-0, Local PrintDo not exceed 8 tablets in a 24 hour period         CONTINUE these medications which have NOT CHANGED    Details   AMITRIPTYLINE HCL PO Take 20 mg by mouth nightly as needed for sleep, Historical      order for DME CrutchesDisp-1 Units, R-0, Local Print      Naproxen Sodium (ALEVE PO) Take 2 tablets by mouth every 12 hours as needed for moderate pain, Historical      norelgestromin-ethinyl estradiol (XULANE) 150-35 MCG/24HR patch Place 1 patch onto the skin once a week Remove old patch and apply new patch onto the skin once a week for 3 weeks (21 days). Do not wear patch week 4 (days 22-28), then  repeat., Historical         STOP taking these medications       hydrOXYzine (VISTARIL) 25 MG capsule Comments:   Reason for Stopping:         GABAPENTIN PO Comments:   Reason for Stopping:                 Discharge Procedure Orders  [facility], [facility] REFERRAL   Referral Type: [facility]     Ice to affected area   Order Comments: Apply Active Ice pack to surgical site every 15 minutes per hour for 24 hours     Diet as Tolerated   Order Comments: Return to diet before surgery, unless instructed otherwise.     Weight bearing - Partial   Order Comments: Do not apply greater than 50% total body weight to operative extremity     CPM machine   Order Comments: Set to 0 degrees to flexion tolerance with goal of 90 degrees flexion. Advance aggressively in 5 degree increments as tolerated by patient     Discharge Instructions   Order Comments: POSTOPERATIVE INSTRUCTIONS    A.  COMFORT:  -Elevation: Elevate your knee and ankle above the level of your heart. The best position is lying down with two pillows lengthwise under your entire leg. This should be done for the first several days after surgery.  -Swelling: An ice pack will be provided to control swelling and discomfort. Once the initial dressing is removed, place a thin towel between your skin and the ice pack.  -Pain Medication: Take medication as prescribed, but only as often as necessary.  Avoid alcohol and driving if you are taking pain medication.  Remember that Tylenol (acetaminophen) can be used for pain relief as well, as you wean off the narcotics.  Maximum Tylenol dose for a single day is 3000 mg.            B.  ACTIVITIES:  -Hinged knee brace: The brace is to be locked at 10 degrees knee flexion at all times except with CPM or P/AAROM exercises. The brace will be worn for up to 3-6 weeks following surgery. When directed by your doctor or physical therapist, you may unlock the brace while sitting but lock the brace before standing.  Sleep  with the brace on until directed.   -Night splint: The splint will be place intraoperatively to keep the ankle in dorsiflexion. It should be worn frequently, but may be removed for comfort  -CPM: to be set at 0 degrees to flexion tolerance with goal of 90 degrees flexion; advance aggressively in increments of 5 degrees  -Exercises: Point and flex your feet and wiggle your toes, move your ankle around in a Lummi, to help prevent complications such as blood clotting in your legs. Thigh muscle tightening exercises should begin the day of surgery and should be done for 10 to 15 minutes, 3 times a day, for the first few weeks after surgery.    -Weight bearing status: You are allowed partial weight bearing status (less than or equal to 50% body weight) on your operative leg, but realize that this may be limited due to pain. You may  tandem for brief periods during the first three weeks.   -Physical therapy: A prescription for physical therapy and a protocol sheet will be sent home with you and must be taken to your first therapy visit  -Driving: Driving is NOT permitted for 3-4 weeks following right knee surgery, 2 weeks after left knee surgery (once the brace can be comfortably unlocked with knee bending to 90 degrees).  -Athletic activities: Athletic activities, such as swimming, bicycling, jogging, running and stop-and-go-sports should be avoided until allowed by your doctor.  -Return to work: Desk/sitting jobs/school in 3-4 days; Heavy labor jobs in up to 3 months.      C. INCISION CARE:    -Keep the initial post-op dressing on, clean and dry for the first day after surgery. You may then remove the dressing down to the PRINEO layer (surgical mesh) and replace with new 4x4s and tubigrip. The steri-strips (small white tape that is directly on the incision areas) should be left on until they fall off or are removed at your first office visit.    Tub bathing, swimming, and soaking of the knee should be avoided  until allowed by your doctor - usually 4 weeks after your surgery.    D. CALL YOUR PHYSICIAN IF:  -Pain in your knee persists or worsens in the first few days after surgery.  -Excessive redness or drainage of cloudy or bloody material from the wounds (Clear red tinted fluid and some mild drainage should be expected). Drainage of any kind 5 days after surgery should be reported to the doctor.  -You have a temperature elevation greater than 101.5    -You have pain, swelling or redness in your calf.  -You have numbness or weakness in your leg or foot.    You may contact your physician at [phone] during business hours.    For after hours or weekend calls, you may contact the hospital at [phone] and ask for the on-call orthopedic resident     Do not immerse incision in water    Order Comments: until the sutures are removed.     Dressing   Order Comments: Keep dressing clean and dry.  Dressing / incision care as instructed by Provider and/or Nurse. Ok to remove dressing on POD 7. Do not submerge or soak wound until cleared by your physician.     Ice to affected area   Order Comments: Ice to operative site, as needed.     Discharge Instructions   Order Comments: Follow up appointment as instructed by Provider and/or Nurse.     Discharge Instructions - diet   Order Comments: Resume pre procedure diet.         Yamil Crawford MD  PGY-4 Orthopaedic Surgery

## 2017-12-19 ENCOUNTER — HOSPITAL ENCOUNTER (EMERGENCY)
Facility: CLINIC | Age: 22
Discharge: HOME | End: 2017-12-20
Attending: EMERGENCY MEDICINE | Admitting: EMERGENCY MEDICINE
Payer: COMMERCIAL

## 2017-12-19 DIAGNOSIS — T50.905A ADVERSE EFFECT OF DRUG, INITIAL ENCOUNTER: ICD-10-CM

## 2017-12-19 LAB — INTERPRETATION ECG - MUSE: NORMAL

## 2017-12-19 PROCEDURE — 25000128 H RX IP 250 OP 636: Performed by: EMERGENCY MEDICINE

## 2017-12-19 PROCEDURE — 80048 BASIC METABOLIC PNL TOTAL CA: CPT | Performed by: EMERGENCY MEDICINE

## 2017-12-19 PROCEDURE — 25000131 ZZH RX MED GY IP 250 OP 636 PS 637: Performed by: EMERGENCY MEDICINE

## 2017-12-19 PROCEDURE — 85025 COMPLETE CBC W/AUTO DIFF WBC: CPT | Performed by: EMERGENCY MEDICINE

## 2017-12-19 PROCEDURE — 99284 EMERGENCY DEPT VISIT MOD MDM: CPT | Mod: 25

## 2017-12-19 PROCEDURE — 96361 HYDRATE IV INFUSION ADD-ON: CPT

## 2017-12-19 PROCEDURE — 93005 ELECTROCARDIOGRAM TRACING: CPT

## 2017-12-19 PROCEDURE — 96374 THER/PROPH/DIAG INJ IV PUSH: CPT

## 2017-12-19 RX ORDER — ONDANSETRON 2 MG/ML
4 INJECTION INTRAMUSCULAR; INTRAVENOUS ONCE
Status: COMPLETED | OUTPATIENT
Start: 2017-12-19 | End: 2017-12-19

## 2017-12-19 RX ORDER — MECLIZINE HYDROCHLORIDE 25 MG/1
50 TABLET ORAL ONCE
Status: COMPLETED | OUTPATIENT
Start: 2017-12-19 | End: 2017-12-19

## 2017-12-19 RX ADMIN — ONDANSETRON 4 MG: 2 INJECTION INTRAMUSCULAR; INTRAVENOUS at 22:58

## 2017-12-19 RX ADMIN — MECLIZINE 50 MG: 25 TABLET ORAL at 22:58

## 2017-12-19 RX ADMIN — SODIUM CHLORIDE 1000 ML: 9 INJECTION, SOLUTION INTRAVENOUS at 22:52

## 2017-12-19 ASSESSMENT — ENCOUNTER SYMPTOMS
COUGH: 0
NECK PAIN: 0
NUMBNESS: 0
SHORTNESS OF BREATH: 0
MYALGIAS: 0
VOMITING: 1
FEVER: 0
PHOTOPHOBIA: 0
NECK STIFFNESS: 0
DIZZINESS: 1
HEADACHES: 0
PALPITATIONS: 0
CHILLS: 0
LIGHT-HEADEDNESS: 0
NAUSEA: 1

## 2017-12-19 NOTE — OP NOTE
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      POSTOPERATIVE DIAGNOSIS:  Increased tibia torsion of the left lower leg.      PROCEDURE:  Left tibia derotational osteotomy.      SURGEON:  Dio Olivarez MD      COMPLICATIONS:  None.      DRAINS:  None.      ESTIMATED BLOOD LOSS:  100 mL.      ANESTHESIA:  General endotracheal.      INDICATIONS:  Please refer to clinic note regarding discussions and indications of Ms. House's case.      DESCRIPTION OF PROCEDURE:  On 12/15/2017, patient was taken to surgery.  After successful induction of general endotracheal anesthesia, she was placed supine on the operating table.  The left lower extremity was prepped and draped in a sterile fashion.      The pause for the cause was performed according to institution's policy, which confirmed the laterality of the procedure.      The procedure was started by a left knee arthroscopy followed by a left tibial tubercle osteotomy.  Both portions of the procedure were performed by Dr. Schwartz.  Please refer to her note for further details.      Once the tibial tubercle was mobilized, we proceeded with placement of a K-wire through the proximal portion of the tibia in order to have an entry point into the canal.  This was done without any complications and eventually we proceeded with placement of an opening reamer proximally.  Subsequent to this, we proceeded with reaming up to 11.5 mm, and we proceeded with placement of a Eros nail measuring 340 x 10 mm in size.      Eventually, we proceeded with placement of a K-wire through the proximal metaphysis as well as a K-wire along the distal metaphysis.  The K-wires were ff set by 35 degrees.      Along the mid portion of the tibia and the mid third, we proceeded with placement of incision.  Subcutaneous tissues were dissected.  Periosteum was incised and elevated.  We proceeded with multiple perforations with a drill in order to perform with an osteotomy  "of the mid-third of the tibia.      An incision was made also along the lateral aspect of the leg, also along the mid-third.  The dissection was carried down, and we proceeded with exposure of the fibula.  We proceeded with an oblique osteotomy with an oscillating saw without any complications.      At this point, a guide wire was placed back into the canal.  The foot was internally rotated, until it was felt to be neutral with respect to the patella which was less than the 35 degrees plan.  It was estimated to be in the ballpark of 27 degrees.  Then we proceeded with placement of a nail down into the canal.  We locked the nail proximally through the static hole given the fact that we did not have any purchase through the dynamic hole after the tibial tubercle osteotomy was performed.  Subsequent to this, we proceeded with further fine tuning of the rotation and we proceeded with a distal locking of the nail as well.  K-wires for a reference point were removed and then we proceeded with fixation of the tibial tubercle which was done with 3 screws and the purchase of 6 cortices.  Excellent purchase was noticed in all screws.  Special attention was placed to apply distraction on distal translation of the tibial tubercle.      Given the compression accomplished with the tibial tubercle fixation, we felt that we did not require any further proximal tension across the nail and, therefore, we chose to just leave it with 1 proximal locking screw, given the fact that the nail would have been \"sandwiched\" anteriorly by the tibial tubercle and posteriorly by the posterior cortex of the tibia.      AP and lateral x-rays of the tib-fib were obtained which confirmed satisfactory location of the hardware as well as successful alignment of the tibia.  These images were sent to PACS for definitive documentation.      Of note, the tourniquet cuff was deflated assisted through the procedure, prior to performing the osteotomy of the " tibia.      The wound will be closed in layers.  Sterile dressings will be applied.  The patient will be placed in a hinged knee brace with a night splint and transferred in stable condition to PACU.      PLAN:  The patient will remain weightbearing as tolerated.  The patient will return to clinic in 2 weeks for suture removal.  At that time, she will proceed with range of motion exercises without any restrictions except the ones dictated by the tibial tubercle portion of the procedure.      The patient will return to clinic at 6 weeks from surgery.  At that time, AP and lateral x-rays of the tib-fib will be obtained and based on those findings, further recommendations will be given to the patient.         MANDI BOB MD             D: 12/15/2017 17:31   T: 12/15/2017 18:17   MT: al      Name:     OSIEL OSBORNE   MRN:      [id number]        Account:        [id number]   :      1995           Procedure Date: 12/15/2017      Document: [id number]

## 2017-12-19 NOTE — ED AVS SNAPSHOT
Emergency Department    [address] [place] [address]    Phone:  [phone]    Fax:  [phone]                                       Kaitlynn House   MRN: [id number]    Department:   Emergency Department   Date of Visit:  12/19/2017           Patient Information     Date Of Birth          1995        Your diagnoses for this visit were:     Adverse effect of drug, initial encounter        You were seen by Paulino Stern MD.      Follow-up Information     Please follow up.    Why:  wean the opiate treatment as tolerated - zofran for nausea - meclizine for vertigo        Discharge Instructions         Drug Reaction (Other Type)  You are having a reaction to a drug you have taken. This may not be the same as an allergic reaction. It is an undesired, unfavorable reaction, or a side effect of a medicine. This can cause a variety of symptoms, including:    Dizziness or headache    Rash    Flushing or hot sensation    Nausea, vomiting, or stomach pain    Diarrhea or constipation    Trouble breathing    High or low blood pressure  A reaction can be an upset stomach from something like aspirin or ibuprofen, feeling faint after taking a blood pressure medicine, feeling anxious, and many other things. Symptoms of a drug reaction can range from very mild to very severe.  In most cases, the reaction goes away within 1 to 12 hours. However, it will probably occur again if you take this same medicine. Your healthcare provider will advise you whether to change how much, when, or how often you take this medicine. He or she may also advise you to discontinue this medicine or switch to another one.   Home care    Another medicine may be recommended to reduce your symptoms until the drug s effect wears off. Follow your healthcare provider s advice.    When the drug s effect has worn off, there should be no further problem as long as you don't take the same drug again.     Ask your healthcare  provider if you should also avoid similar drugs. Write down the information so you will remember it.    Make certain the drug reaction is documented in your medical record.  Follow-up care  Follow up with your healthcare provider, or as advised if your symptoms are not better within 24 hours.  When to seek medical advice  Call your healthcare provider right away if any of these occur.    New symptoms that concern you    Worsening of your current symptoms, including rash or facial swelling    Symptoms that are not relieved by the treatment advised    A fever of 100.4 F (38 C),or as advised by your healthcare provider  Call 911  Call emergency services right away if any of these occur:    Trouble breathing or swallowing, or wheezing    Hoarse voice or trouble speaking    Confusion    Extreme drowsiness or trouble awakening    Fainting or loss of consciousness    Rapid heart rate or slow heart rate    Very low or very high blood pressure    Vomiting blood, or large amounts of blood in stool    Seizure  Date Last Reviewed: 4/1/2017 2000-2017 [employer]. [address] [place] [address]. All rights reserved. This information is not intended as a substitute for professional medical care. Always follow your healthcare professional's instructions.          Future Appointments        Provider Department Dept Phone Center    12/20/2017 3:30 PM Di Ahumada PT [facility] [phone] [facility]    12/22/2017 2:10 PM Di Ahumada PT [facility] [phone] [facility]    12/27/2017 3:20 PM Tatiana Pereira PT [facility] [phone] [facility]    12/29/2017 2:40 PM Tatiana Pereira PT [facility] [phone] [facility]    1/2/2018 5:10 PM Di Ahumada PT [facility] [phone] [facility]    1/4/2018 3:30 PM Olga Wood PTA [facility] [phone] [facility]    1/18/2018 4:30 PM Shell LUCERO  Stoesz, APRN CNP [facility] [phone] [facility]    2/22/2018 6:00 PM Milo Mendoza MD [facility] [phone] [facility]      24 Hour Appointment Hotline       To make an appointment at any [facility], call [phone] ([phone]). If you don't have a family doctor or clinic, we will help you find one. [facility] are conveniently located to serve the needs of you and your family.             Review of your medicines      START taking        Dose / Directions Last dose taken    ondansetron 4 MG ODT tab   Commonly known as:  ZOFRAN ODT   Dose:  4 mg   Quantity:  10 tablet        Take 1 tablet (4 mg) by mouth every 8 hours as needed for nausea   Refills:  1          Our records show that you are taking the medicines listed below. If these are incorrect, please call your family doctor or clinic.        Dose / Directions Last dose taken    acetaminophen 325 MG tablet   Commonly known as:  TYLENOL   Dose:  650 mg   Quantity:  100 tablet        Take 2 tablets (650 mg) by mouth every 4 hours as needed for other (mild pain)   Refills:  0        ALEVE PO   Dose:  2 tablet        Take 2 tablets by mouth every 12 hours as needed for moderate pain   Refills:  0        AMITRIPTYLINE HCL PO   Dose:  20 mg        Take 20 mg by mouth nightly as needed for sleep   Refills:  0        aspirin  MG EC tablet   Dose:  325 mg   Quantity:  28 tablet        Take 1 tablet (325 mg) by mouth daily To be taken for DVT Prophylaxis daily   Refills:  0        gabapentin 300 MG capsule   Commonly known as:  NEURONTIN   Dose:  300 mg   Quantity:  15 capsule        Take 1 capsule (300 mg) by mouth daily   Refills:  1        hydrOXYzine 25 MG tablet   Commonly known as:  ATARAX   Dose:  25 mg   Quantity:  30 tablet        Take 1 tablet (25 mg) by mouth every 6 hours as needed for itching (and nausea)   Refills:  0        order for DME   Quantity:  1 Units        Crutches   Refills:  0        * oxyCODONE IR 5  MG tablet   Commonly known as:  ROXICODONE   Dose:  5-10 mg   Quantity:  50 tablet        Take 1-2 tablets (5-10 mg) by mouth every 4 hours as needed for pain or other (Moderate to Severe)   Refills:  0        * oxyCODONE 10 MG 12 hr tablet   Commonly known as:  OXYCONTIN   Dose:  10 mg   Quantity:  5 tablet        Take 1 tablet (10 mg) by mouth nightly as needed for moderate to severe pain   Refills:  0        senna-docusate 8.6-50 MG per tablet   Commonly known as:  SENOKOT-S;PERICOLACE   Dose:  1-2 tablet   Quantity:  26 tablet        Take 1-2 tablets by mouth 2 times daily Take while on oral narcotics to prevent or treat constipation.   Refills:  0        XULANE 150-35 MCG/24HR patch   Dose:  1 patch   Generic drug:  norelgestromin-ethinyl estradiol        Place 1 patch onto the skin once a week Remove old patch and apply new patch onto the skin once a week for 3 weeks (21 days). Do not wear patch week 4 (days 22-28), then repeat.   Refills:  0        * Notice:  This list has 2 medication(s) that are the same as other medications prescribed for you. Read the directions carefully, and ask your doctor or other care provider to review them with you.            Prescriptions were sent or printed at these locations (1 Prescription)                   [facility] [address] [place] [address] & [address] [address]    Telephone:  [phone]   Fax:  [phone]   Hours:                  E-Prescribed (1 of 1)         ondansetron (ZOFRAN ODT) 4 MG ODT tab                Procedures and tests performed during your visit     Procedure/Test Number of Times Performed    Basic metabolic panel 2    CBC with platelets differential 2    EKG 12-lead, tracing only 1      Orders Needing Specimen Collection     None      Pending Results     No orders found for last 3 day(s).            Pending Culture Results     No orders found for last 3 day(s).             Pending Results Instructions     If you had any lab results that were not finalized at the time of your Discharge, you can call the ED Lab Result RN at [phone]. You will be contacted by this team for any positive Lab results or changes in treatment. The nurses are available 7 days a week from 10A to 6:30P.  You can leave a message 24 hours per day and they will return your call.        Test Results From Your Hospital Stay        12/20/2017 12:11 AM      Component Results     Component Value Ref Range & Units Status    Sodium 138 133 - 144 mmol/L Final    Potassium 3.8 3.4 - 5.3 mmol/L Final    Chloride 104 94 - 109 mmol/L Final    Carbon Dioxide 29 20 - 32 mmol/L Final    Anion Gap 5 3 - 14 mmol/L Final    Glucose 80 70 - 99 mg/dL Final    Urea Nitrogen 10 7 - 30 mg/dL Final    Creatinine 0.54 0.52 - 1.04 mg/dL Final    GFR Estimate >90 >60 mL/min/1.7m2 Corrected    CORRECTED ON 12/20 AT 0011: PREVIOUSLY REPORTED AS GFR not calculated, patient   <16 years old. Non  GFR Calc      GFR Estimate If Black >90 >60 mL/min/1.7m2 Corrected    CORRECTED ON 12/20 AT 0011: PREVIOUSLY REPORTED AS GFR not calculated, patient   <16 years old.  GFR Calc      Calcium 8.6 8.5 - 10.1 mg/dL Final         12/20/2017 12:03 AM      Component Results     Component Value Ref Range & Units Status    WBC 5.9 4.0 - 11.0 10e9/L Final    RBC Count 3.39 (L) 3.8 - 5.2 10e12/L Final    Hemoglobin 10.2 (L) 11.7 - 15.7 g/dL Final    Hematocrit 29.3 (L) 35.0 - 47.0 % Final    MCV 86 78 - 100 fl Final    MCH 30.1 26.5 - 33.0 pg Final    MCHC 34.8 31.5 - 36.5 g/dL Final    RDW 12.3 10.0 - 15.0 % Final    Platelet Count 287 150 - 450 10e9/L Final    Diff Method Automated Method  Final    % Neutrophils 50.6 % Final    % Lymphocytes 38.6 % Final    % Monocytes 7.9 % Final    % Eosinophils 2.5 % Final    % Basophils 0.2 % Final    % Immature Granulocytes 0.2 % Final    Absolute Neutrophil 3.0 1.6 - 8.3 10e9/L Final     Absolute Lymphocytes 2.3 0.8 - 5.3 10e9/L Final    Absolute Monocytes 0.5 0.0 - 1.3 10e9/L Final    Absolute Eosinophils 0.2 0.0 - 0.7 10e9/L Final    Absolute Basophils 0.0 0.0 - 0.2 10e9/L Final    Abs Immature Granulocytes 0.0 0 - 0.4 10e9/L Final                Clinical Quality Measure: Blood Pressure Screening     Your blood pressure was checked while you were in the emergency department today. The last reading we obtained was  BP: 101/70 . Please read the guidelines below about what these numbers mean and what you should do about them.  If your systolic blood pressure (the top number) is less than 120 and your diastolic blood pressure (the bottom number) is less than 80, then your blood pressure is normal. There is nothing more that you need to do about it.  If your systolic blood pressure (the top number) is 120-139 or your diastolic blood pressure (the bottom number) is 80-89, your blood pressure may be higher than it should be. You should have your blood pressure rechecked within a year by a primary care provider.  If your systolic blood pressure (the top number) is 140 or greater or your diastolic blood pressure (the bottom number) is 90 or greater, you may have high blood pressure. High blood pressure is treatable, but if left untreated over time it can put you at risk for heart attack, stroke, or kidney failure. You should have your blood pressure rechecked by a primary care provider within the next 4 weeks.  If your provider in the emergency department today gave you specific instructions to follow-up with your doctor or provider even sooner than that, you should follow that instruction and not wait for up to 4 weeks for your follow-up visit.        Thank you for choosing [facility]       Thank you for choosing [facility] for your care. Our goal is always to provide you with excellent care. Hearing back from our patients is one way we can continue to improve our services. Please take a few minutes to  complete the written survey that you may receive in the mail after you visit with us. Thank you!        [employer] Information     [employer] gives you secure access to your electronic health record. If you see a primary care provider, you can also send messages to your care team and make appointments. If you have questions, please call your primary care clinic.  If you do not have a primary care provider, please call [phone] and they will assist you.        Care EveryWhere ID     This is your Care EveryWhere ID. This could be used by other organizations to access your [facility] medical records  [id number]        Equal Access to Services     [facility] campusJOSE C : Richelle Roper, erin bonilla, parag chua. [facility] [phone].    ATENCIÓN: Si habla español, tiene a cristobal disposición servicios gratuitos de asistencia lingüística. Llame al [phone].    We comply with applicable federal civil rights laws and [place] laws. We do not discriminate on the basis of race, color, national origin, age, disability, sex, sexual orientation, or gender identity.            After Visit Summary       This is your record. Keep this with you and show to your community pharmacist(s) and doctor(s) at your next visit.

## 2017-12-19 NOTE — ED AVS SNAPSHOT
Emergency Department    [address] [facility] [address]    Phone:  [phone]    Fax:  [phone]                                       Kaitlynn House   MRN: [id number]    Department:   Emergency Department   Date of Visit:  12/19/2017           After Visit Summary Signature Page     I have received my discharge instructions, and my questions have been answered. I have discussed any challenges I see with this plan with the nurse or doctor.    ..........................................................................................................................................  Patient/Patient Representative Signature      ..........................................................................................................................................  Patient Representative Print Name and Relationship to Patient    ..................................................               ................................................  Date                                            Time    ..........................................................................................................................................  Reviewed by Signature/Title    ...................................................              ..............................................  Date                                                            Time

## 2017-12-20 ENCOUNTER — THERAPY VISIT (OUTPATIENT)
Dept: PHYSICAL THERAPY | Facility: CLINIC | Age: 22
End: 2017-12-20
Payer: COMMERCIAL

## 2017-12-20 VITALS
HEART RATE: 84 BPM | HEIGHT: 69 IN | DIASTOLIC BLOOD PRESSURE: 74 MMHG | TEMPERATURE: 99.5 F | RESPIRATION RATE: 18 BRPM | SYSTOLIC BLOOD PRESSURE: 109 MMHG | OXYGEN SATURATION: 99 %

## 2017-12-20 DIAGNOSIS — M25.561 BILATERAL KNEE PAIN: Primary | ICD-10-CM

## 2017-12-20 DIAGNOSIS — R60.9 EDEMA: ICD-10-CM

## 2017-12-20 DIAGNOSIS — M25.562 BILATERAL KNEE PAIN: Primary | ICD-10-CM

## 2017-12-20 DIAGNOSIS — Z98.890 S/P KNEE SURGERY: ICD-10-CM

## 2017-12-20 DIAGNOSIS — R26.9 ABNORMAL GAIT: ICD-10-CM

## 2017-12-20 LAB
ANION GAP SERPL CALCULATED.3IONS-SCNC: 5 MMOL/L (ref 3–14)
BASOPHILS # BLD AUTO: 0 10E9/L (ref 0–0.2)
BASOPHILS NFR BLD AUTO: 0.2 %
BUN SERPL-MCNC: 10 MG/DL (ref 7–30)
CALCIUM SERPL-MCNC: 8.6 MG/DL (ref 8.5–10.1)
CHLORIDE SERPL-SCNC: 104 MMOL/L (ref 94–109)
CO2 SERPL-SCNC: 29 MMOL/L (ref 20–32)
CREAT SERPL-MCNC: 0.54 MG/DL (ref 0.52–1.04)
DIFFERENTIAL METHOD BLD: ABNORMAL
EOSINOPHIL # BLD AUTO: 0.2 10E9/L (ref 0–0.7)
EOSINOPHIL NFR BLD AUTO: 2.5 %
ERYTHROCYTE [DISTWIDTH] IN BLOOD BY AUTOMATED COUNT: 12.3 % (ref 10–15)
GFR SERPL CREATININE-BSD FRML MDRD: >90 ML/MIN/1.7M2
GLUCOSE SERPL-MCNC: 80 MG/DL (ref 70–99)
HCT VFR BLD AUTO: 29.3 % (ref 35–47)
HGB BLD-MCNC: 10.2 G/DL (ref 11.7–15.7)
IMM GRANULOCYTES # BLD: 0 10E9/L (ref 0–0.4)
IMM GRANULOCYTES NFR BLD: 0.2 %
LYMPHOCYTES # BLD AUTO: 2.3 10E9/L (ref 0.8–5.3)
LYMPHOCYTES NFR BLD AUTO: 38.6 %
MCH RBC QN AUTO: 30.1 PG (ref 26.5–33)
MCHC RBC AUTO-ENTMCNC: 34.8 G/DL (ref 31.5–36.5)
MCV RBC AUTO: 86 FL (ref 78–100)
MONOCYTES # BLD AUTO: 0.5 10E9/L (ref 0–1.3)
MONOCYTES NFR BLD AUTO: 7.9 %
NEUTROPHILS # BLD AUTO: 3 10E9/L (ref 1.6–8.3)
NEUTROPHILS NFR BLD AUTO: 50.6 %
PLATELET # BLD AUTO: 287 10E9/L (ref 150–450)
POTASSIUM SERPL-SCNC: 3.8 MMOL/L (ref 3.4–5.3)
RBC # BLD AUTO: 3.39 10E12/L (ref 3.8–5.2)
SODIUM SERPL-SCNC: 138 MMOL/L (ref 133–144)
WBC # BLD AUTO: 5.9 10E9/L (ref 4–11)

## 2017-12-20 PROCEDURE — 97110 THERAPEUTIC EXERCISES: CPT | Mod: GP | Performed by: PHYSICAL THERAPIST

## 2017-12-20 PROCEDURE — 97016 VASOPNEUMATIC DEVICE THERAPY: CPT | Mod: GP | Performed by: PHYSICAL THERAPIST

## 2017-12-20 PROCEDURE — 97530 THERAPEUTIC ACTIVITIES: CPT | Mod: GP | Performed by: PHYSICAL THERAPIST

## 2017-12-20 PROCEDURE — 97161 PT EVAL LOW COMPLEX 20 MIN: CPT | Mod: GP | Performed by: PHYSICAL THERAPIST

## 2017-12-20 RX ORDER — ONDANSETRON 4 MG/1
4 TABLET, ORALLY DISINTEGRATING ORAL EVERY 8 HOURS PRN
Qty: 10 TABLET | Refills: 1 | Status: SHIPPED | OUTPATIENT
Start: 2017-12-20 | End: 2018-06-19

## 2017-12-20 NOTE — DISCHARGE INSTRUCTIONS
Drug Reaction (Other Type)  You are having a reaction to a drug you have taken. This may not be the same as an allergic reaction. It is an undesired, unfavorable reaction, or a side effect of a medicine. This can cause a variety of symptoms, including:    Dizziness or headache    Rash    Flushing or hot sensation    Nausea, vomiting, or stomach pain    Diarrhea or constipation    Trouble breathing    High or low blood pressure  A reaction can be an upset stomach from something like aspirin or ibuprofen, feeling faint after taking a blood pressure medicine, feeling anxious, and many other things. Symptoms of a drug reaction can range from very mild to very severe.  In most cases, the reaction goes away within 1 to 12 hours. However, it will probably occur again if you take this same medicine. Your healthcare provider will advise you whether to change how much, when, or how often you take this medicine. He or she may also advise you to discontinue this medicine or switch to another one.   Home care    Another medicine may be recommended to reduce your symptoms until the drug s effect wears off. Follow your healthcare provider s advice.    When the drug s effect has worn off, there should be no further problem as long as you don't take the same drug again.     Ask your healthcare provider if you should also avoid similar drugs. Write down the information so you will remember it.    Make certain the drug reaction is documented in your medical record.  Follow-up care  Follow up with your healthcare provider, or as advised if your symptoms are not better within 24 hours.  When to seek medical advice  Call your healthcare provider right away if any of these occur.    New symptoms that concern you    Worsening of your current symptoms, including rash or facial swelling    Symptoms that are not relieved by the treatment advised    A fever of 100.4 F (38 C),or as advised by your healthcare provider  Call 911  Call emergency  services right away if any of these occur:    Trouble breathing or swallowing, or wheezing    Hoarse voice or trouble speaking    Confusion    Extreme drowsiness or trouble awakening    Fainting or loss of consciousness    Rapid heart rate or slow heart rate    Very low or very high blood pressure    Vomiting blood, or large amounts of blood in stool    Seizure  Date Last Reviewed: 4/1/2017 2000-2017 [employer]. [address] [place], [place] [address]. All rights reserved. This information is not intended as a substitute for professional medical care. Always follow your healthcare professional's instructions.

## 2017-12-20 NOTE — MR AVS SNAPSHOT
After Visit Summary   12/20/2017    Kaitlynn House    MRN: [id number]           Patient Information     Date Of Birth          1995        Visit Information        Provider Department      12/20/2017 3:30 PM Di Ahumada [facility]        Today's Diagnoses     Bilateral knee pain    -  1    S/P knee surgery        Edema        Abnormal gait           Follow-ups after your visit        Your next 10 appointments already scheduled     Dec 22, 2017  2:10 PM CST   ELSY Extremity with [facility]OLDO [facility] ([facility])    [address] [place] [id number] [phone]            Dec 27, 2017  3:20 PM CST   ELSY Extremity with Tatiana Pereira PT   [facility] ([facility])    [address] [place] [id number] [phone]            Dec 29, 2017  2:40 PM CST   ELSY Extremity with HAROLDO Macdonald [facility] ([facility])    [address] [place] [id number] [phone]            Jan 02, 2018  5:10 PM CST   ELSY Extremity with [facility] ([facility])    [address] [place] [id number] [phone]            Jan 04, 2018  3:30 PM CST   ELSY Extremity with BRINA Stack [facility] ([facility])    [address] [place] [id number] [phone]            Jan 18, 2018  4:30 PM CST   (Arrive by 4:15 PM)   RETURN ARRHYTHMIA with JUANY Henning [facility] ([facility])    [address] [place] [id number] [phone]            Feb 22, 2018  6:00 PM CST   (Arrive by 5:45 PM)    RETURN ARRHYTHMIA with Milo Mendoza MD   [facility] ([facility])    [address]  3rd Floor  [facility] [address] [phone]              Who to contact     If you have questions or need follow up information about today's clinic visit or your schedule please contact [facility] directly at [phone].  Normal or non-critical lab and imaging results will be communicated to you by Vnomicshart, letter or phone within 4 business days after the clinic has received the results. If you do not hear from us within 7 days, please contact the clinic through Vnomicshart or phone. If you have a critical or abnormal lab result, we will notify you by phone as soon as possible.  Submit refill requests through Cloudvu or call your pharmacy and they will forward the refill request to us. Please allow 3 business days for your refill to be completed.          Additional Information About Your Visit        Vnomicshart Information     Cloudvu gives you secure access to your electronic health record. If you see a primary care provider, you can also send messages to your care team and make appointments. If you have questions, please call your primary care clinic.  If you do not have a primary care provider, please call [phone] and they will assist you.        Care EveryWhere ID     This is your Care EveryWhere ID. This could be used by other organizations to access your [facility] medical records  [id number]        Your Vitals Were     Last Period                   12/01/2017 (Approximate)            Blood Pressure from Last 3 Encounters:   12/20/17 109/74   12/16/17 100/56   11/09/17 124/79    Weight from Last 3 Encounters:   12/15/17 54.7 kg (120 lb 9.5 oz)   10/24/17 56.2 kg (124 lb)   10/11/17 56.7 kg (125 lb)              We Performed the Following     C VASOPNEUMATIC DEVICE     HC PT EVAL, LOW COMPLEXITY     ELSY INITIAL EVAL REPORT     THERAPEUTIC ACTIVITIES      THERAPEUTIC EXERCISES        Primary Care Provider Fax #    Physician No Ref-Primary [phone]       No address on file        Equal Access to Services     CHRISTOS RICHEY : Richelle aad ku hadsaulmanuel Jacquelin, erin fideboniha, wilbur mishachristine dipakdallin, parag robin hayaaarlene quesadaalden gonzalez laDelfinavictoriano caballero. So [facility] [phone].    ATENCIÓN: Si habla español, tiene a cristobal disposición servicios gratuitos de asistencia lingüística. Llame al [phone].    We comply with applicable federal civil rights laws and [place] laws. We do not discriminate on the basis of race, color, national origin, age, disability, sex, sexual orientation, or gender identity.            Thank you!     Thank you for choosing [facility]  for your care. Our goal is always to provide you with excellent care. Hearing back from our patients is one way we can continue to improve our services. Please take a few minutes to complete the written survey that you may receive in the mail after your visit with us. Thank you!             Your Updated Medication List - Protect others around you: Learn how to safely use, store and throw away your medicines at www.disposemymeds.org.          This list is accurate as of: 12/20/17 11:59 PM.  Always use your most recent med list.                   Brand Name Dispense Instructions for use Diagnosis    acetaminophen 325 MG tablet    TYLENOL    100 tablet    Take 2 tablets (650 mg) by mouth every 4 hours as needed for other (mild pain)    Status post surgery       ALEVE PO      Take 2 tablets by mouth every 12 hours as needed for moderate pain        AMITRIPTYLINE HCL PO      Take 20 mg by mouth nightly as needed for sleep        aspirin  MG EC tablet     28 tablet    Take 1 tablet (325 mg) by mouth daily To be taken for DVT Prophylaxis daily    Status post surgery       gabapentin 300 MG capsule    NEURONTIN    15 capsule    Take 1 capsule (300 mg) by mouth daily    Pain at surgical site        hydrOXYzine 25 MG tablet    ATARAX    30 tablet    Take 1 tablet (25 mg) by mouth every 6 hours as needed for itching (and nausea)    Status post surgery       ondansetron 4 MG ODT tab    ZOFRAN ODT    10 tablet    Take 1 tablet (4 mg) by mouth every 8 hours as needed for nausea        order for DME     1 Units    Crutches    Tibial torsion, right       * oxyCODONE IR 5 MG tablet    ROXICODONE    50 tablet    Take 1-2 tablets (5-10 mg) by mouth every 4 hours as needed for pain or other (Moderate to Severe)    Status post surgery       * oxyCODONE 10 MG 12 hr tablet    OXYCONTIN    5 tablet    Take 1 tablet (10 mg) by mouth nightly as needed for moderate to severe pain    Status post surgery       senna-docusate 8.6-50 MG per tablet    SENOKOT-S;PERICOLACE    26 tablet    Take 1-2 tablets by mouth 2 times daily Take while on oral narcotics to prevent or treat constipation.    Status post surgery       XULANE 150-35 MCG/24HR patch   Generic drug:  norelgestromin-ethinyl estradiol      Place 1 patch onto the skin once a week Remove old patch and apply new patch onto the skin once a week for 3 weeks (21 days). Do not wear patch week 4 (days 22-28), then repeat.        * Notice:  This list has 2 medication(s) that are the same as other medications prescribed for you. Read the directions carefully, and ask your doctor or other care provider to review them with you.

## 2017-12-20 NOTE — OP NOTE
DATE OF SERVICE:  12/15/2017      PREOPERATIVE DIAGNOSES:   1.  External tibial torsion of left lower limb.   2.  History of recurrent knee swelling.   3.  Patella mt.      POSTOPERATIVE DIAGNOSES:   1.  External tibial torsion as judged by CT scan.   2.  Minimal cartilage wear pattern inferior patella with no cartilage wear in trochlear groove.   3.  Pristine tibiofemoral joint.      PROCEDURES: Left limb:  1.  Derotation osteotomy of the tibia with intramedullary fixation.   2.  Derotation osteotomy of fibula.   3.  Distalization of the tibial tubercle.        SURGEON: Dio Olivarez MD; Di Cesar MD; Kalin North MD     ANESTHESIA:  General.      FINDINGS:  By CT measurements, the patient had and 54 degrees of right tibial torsion.      The TTTG distance was 12 mm by MRI.     The patella mt by C/D measurements measured 1.3     DESCRIPTION OF PROCEDURE:  Under general anesthesia, Patient was positioned supine on the operating table.  The patient's right leg was prepped and draped in the usual sterile fashion.  The patient was appropriately covered with lead for the anticipated use of C-arm magnification.      The plan of the procedure was to do a diagnostic arthroscopy, make a midline incision to remove the tibial tubercle, and do the derotation tibial osteotomy through the tibial tubercle region once this was complete to re-secure the tibial tubercle osteotomy in the appropriate position.      Diagnostic arthroscopy was performed using an anterolateral portal for visualization.  Findings as above.      We then elevated the tourniquet to 250 mmHg after Esmarch exsanguination of the leg.  The tourniquet was left down after the initial dissection, was then let down at this point in time and kept down   for the duration of the procedure.      A midline incision was made starting at approximately the distal tip of the patella and extending distally to extend 4 cm below the tibial tubercle region.  We  carried this down through skin.  The medial and lateral sides of the patella were elevated.  We then removed a segment of bone approximately 5 cm long and retracted this superiorly.      Through this, a derotation osteotomy was performed.  This was done by Dr. Dio Olivarez.     Following Dr. Olivarez's procedure, Dr. Schwartz returned to the room.  We then distalized the tibial tubercle 10 mm, moving it a few  mm medially as well.  We secured this in place with three 3.5 bicortical screws placed in a compression mode.  We felt we had excellent fixation with all 3 screws.      At the end of the procedure, the wound was well irrigated.  Bone grafting was placed in the proximal segment of the tibial tubercle region.  The subcutaneous tissue was closed with 2-0 Vicryl.  The skin was closed with a running Monocryl, Steri-Strips, Betadine, Adaptic and sterile dressing and Tubigrip stockinette was put in place.  Prineo wound closure system was used on the skin.  All incisions were closed with the above closure system.      The patient was placed in a hinged brace at the knee locked at 10 degrees with a night splint placed on the foot.  The night splint can be removed for ankle movement as needed.  The brace can be opened when sitting or for CPM.  The patient should be maintained on partial weightbearing on crutches.     Di Schwartz MD  Professor Orthopedic Surgery  [facility]

## 2017-12-20 NOTE — PROGRESS NOTES
[facility] Evaluation  Subjective:    HPI                  Physical Therapy Initial Evaluation: Subjective History  Date of Surgery: 12/15/17.    Surgical Procedure/Limb: S/P TTT, derotation osteotomies of tibia and fibula  Surgeon Name: Dr. Schwartz and Dr. Olivarez  Average Daily Pain Levels: 6-7/10 (Location: anterior; Quality: Sharp)  Other Symptoms: swelling, vertigo, nausea  Symptom Mgmt Strategies: icing, immobilization  Prior orthopaedic history/procedures: s/p derotation ostetomy of tibia and fibula  Prior non-operative management: PT  Next MD Appt Date: 2 weeks    Functional limitations following procedure: difficulty with ambulation, transfers  Previous level of function: no restrictions    Patient Employment: [employer]  Typical Physical Activities: swimming, biking,     Post-Operative Physical Therapy Examination    Physical Mobility Status  Gait: SBA with TTWB and bilateral axillary crutches  Transfers:  SBA    Anthropometric Measures     Right Left Difference   Knee Joint ROM      Hyperextension 5 deg 2 deg 3 deg   Extension 0 deg 0 deg 0 deg   Flexion 150 deg 52 deg 98 deg   Ankle Joint ROM      Dorsiflexion 10 5 5 deg   Plantarflexion 70 60 10 deg   Inversion 30  15 15 deg   Eversion 20 10 10 deg   Circumferential Measures      Joint Line 36.5 cm 37.5 cm 1 cm   15 cm Prox 37 cm 40.5 cm 3.5 cm   Figure 8 51 cm 46.5 cm 4.5 cm   Effusion 0 1+        Quadriceps Muscle Activation Right Left   Isometric Quad Activation Good Poor   Straight Leg Raising No extensor lag NT d/t poor quad isometric     Status of Incision: Clean & healing      Objective:    System    Physical Exam    General     ROS    Assessment/Plan:      Patient is a 22 year old female with left side knee and right side ankle complaints.    Patient has the following significant findings with corresponding treatment plan.                Diagnosis 1:  S/p TTT, derotation osteotomies of tibia and fibula    Pain -  hot/cold therapy,  electric stimulation, manual therapy, splint/taping/bracing/orthotics, self management, education and home program  Decreased ROM/flexibility - manual therapy and therapeutic exercise  Decreased joint mobility - manual therapy and therapeutic exercise  Decreased strength - therapeutic exercise and therapeutic activities  Impaired balance - neuro re-education and therapeutic activities  Decreased proprioception - neuro re-education and therapeutic activities  Inflammation - cold therapy  Edema - vasopneumatics  Impaired gait - gait training  Impaired muscle performance - neuro re-education  Decreased function - therapeutic activities    Therapy Evaluation Codes:   1) History comprised of:   Personal factors that impact the plan of care:      None.    Comorbidity factors that impact the plan of care are:      Heart problems.     Medications impacting care: Pain.  2) Examination of Body Systems comprised of:   Body structures and functions that impact the plan of care:      Ankle and Knee.   Activity limitations that impact the plan of care are:      Bathing, Bending, Driving, Dressing, Squatting/kneeling, Stairs, Standing and Walking.  3) Clinical presentation characteristics are:   Stable/Uncomplicated.  4) Decision-Making    Low complexity using standardized patient assessment instrument and/or measureable assessment of functional outcome.  Cumulative Therapy Evaluation is: Low complexity.    Previous and current functional limitations:  (See Goal Flow Sheet for this information)    Short term and Long term goals: (See Goal Flow Sheet for this information)     Communication ability:  Patient appears to be able to clearly communicate and understand verbal and written communication and follow directions correctly.  Treatment Explanation - The following has been discussed with the patient:   RX ordered/plan of care  Anticipated outcomes  Possible risks and side effects  This patient would benefit from PT intervention  to resume normal activities.   Rehab potential is good.    Frequency:  2 X week, once daily  Duration:  for 4 weeks tapering to 1 X a week over 6 weeks tapering to 2 x month x 3 months  Discharge Plan:  Achieve all LTG.  Independent in home treatment program.  Reach maximal therapeutic benefit.    Please refer to the daily flowsheet for treatment today, total treatment time and time spent performing 1:1 timed codes.

## 2017-12-20 NOTE — ED PROVIDER NOTES
"  History     Chief Complaint:  Nausea & Vomiting     HPI   Kaitlynn House is a 22-year-old female four days status post left leg surgery who presents with multiple complaints. The patient reports that today she woke up and felt \"off,\" stating that she has had nausea and vomiting, dizziness, and some vision changes. The patient comments that she had post-operative nausea and vomiting after her last surgery in November that lasted about a week. She states that this is similar to how she feels tonight, except she has new dizziness and visions changes which is why she presents tonight. The patient reports dizziness like the room is spinning and states that she \"feels like I am underwater.\" The patient states that she has had a blurred visual field that goes with her field of vision. She states that it looks like she has tunnel vision in her right eye, but this has gotten better and does not have it now. Interestingly, the patient states that her mouth was numb when she woke up from a nap this afternoon, but this has also gotten better since. She denies fever, chills, myalgias, neck pain, headache, cough, heart palpitations, numbness or tingling.     Of note, the patient has a history of POTS and she states that her symptoms with this have gotten worse since her recent surgeries; however, she has not been able to start any medications for this issue due to the surgeries.     Allergies:  Amoxicillin--Hives   Zithromax--Nausea and Vomiting   Compazine    Medications:    Gabapentin   Oxycodone   Senokot   Atarax   Amitriptyline   Xulane   Aspirin 81 mg   Tylenol     Past Medical History:    RLS  POTS     Past Surgical History:    Arthroscopy Knee with Tibial Tubercle Shift, Left--12/15/2017  ORIF Intramedullary Tibia, Left--12/15/2017     Family History:    History reviewed. No pertinent family history.     Social History:  Marital Status:   Presents to the ED with    Tobacco Use: Never   Alcohol Use: " "No      Review of Systems   Constitutional: Negative for chills and fever.   Eyes: Positive for visual disturbance. Negative for photophobia.   Respiratory: Negative for cough and shortness of breath.    Cardiovascular: Negative for palpitations.   Gastrointestinal: Positive for nausea and vomiting.   Musculoskeletal: Negative for myalgias, neck pain and neck stiffness.   Neurological: Positive for dizziness. Negative for light-headedness, numbness and headaches.   All other systems reviewed and are negative.    Physical Exam   First Vitals:  BP: 121/73  Pulse: 84  Temp: 99.5  F (37.5  C)  Resp: 18  Height: 175.3 cm (5' 9\")  SpO2: 99 %    Physical Exam  SKIN:  Warm, dry.  HEMATOLOGIC/IMMUNOLOGIC/LYMPHATIC:  No pallor.  Edematous left dorsal foot.  HENT:  Moist oral mucosa.  Painless ROM head and neck.  No carotid bruit.    EYES:  Conjunctivae normal.  PERRL.  No nystagmus.  Normal EOM.  EOM exacerbates the vertigo.  CARDIOVASCULAR:  Regular rate and rhythm.  No murmur.  RESPIRATORY:  No respiratory distress, breath sounds equal and normal.  MUSCULOSKELETAL:  Full upper and lower extremity range of motion.  NEUROLOGIC:  Alert, conversant.  Oriented.  No aphasia.  No dysarthria.  No gross motor or sensory deficit.  No limb ataxia.  PSYCHIATRIC:  Normal mood.    Emergency Department Course   ECG:  @ 2252  Indication: Dizziness   Vent. Rate 105 bpm. SD interval 190 ms. QRS duration 72 ms. QT/QTc 334/441 ms. P-R-T axis 65 84 72.   Sinus tachycardia. Otherwise normal ECG.  No significant change when compared to previous ECG from 11/9/2017   Read @ 2311 by Dr. Stern.     Laboratory:  Blood:  CBC:  WBC 5.9, HGB 10.2,   BMP: WNL (Creatinine 0.54)     Interventions:  (2252) Normal Saline, 1 liter, IV bolus   (2258) Zofran, 4 mg, IV injection   (2258) Antivert, 50 mg, PO      Emergency Department Course:  Nursing notes and vitals reviewed.    (2231) I entered the room with my scribe, obtained the history, and performed an " exam of the patient as documented above.    EKG was done, interpretation as above.     A peripheral IV was established. Blood was drawn from the patient. This was sent for laboratory testing, findings above.      (64531) I went to update the patient on the findings. She reports that she is feeling well enough to go home.     Findings and plan explained to the patient and her . Patient discharged home with instructions regarding supportive care, medications, and reasons to return. The importance of close follow-up was reviewed. The patient was prescribed Zofran.       Impression & Plan    Medical Decision Making:  Kaitlynn House is a 22-year-old female who presents with symptoms of vertigo and vomiting with recent surgery and I suspect her medications may be causing adverse reactions with these symptoms. She suffered the same, similar, vomiting after her other surgery on the leg about a month or so ago. She responded well to treatment with respect to the vomiting and vertigo. Neurologically she is intact and I do not think she is suffering occult cerebrovascular disease based on the history and exam. Visual acuity intact, specifically right-sided. Unclear exactly her right eye blurred vision and black spots in the visual field represent. She feels ween enough to be discharged so I prescribed her Zofran as she just ran out and I have advised over the counter meclizine if needed and try to ween the opiates as quickly as possible. Return with any concerns.      Diagnosis:    ICD-10-CM   1. Adverse effect of drug, initial encounter T88.7XXA     Disposition:  discharged to home    Discharge Medications:  New Prescriptions    ONDANSETRON (ZOFRAN ODT) 4 MG ODT TAB    Take 1 tablet (4 mg) by mouth every 8 hours as needed for nausea         [facility] EMERGENCY DEPARTMENT       Scribe Disclosure:  Jeffery CHURCHILL, am serving as a scribe on 12/19/2017 at 10:31 PM to personally document services  performed by Paulino Stern MD, based on my observations and the provider's statements to me.                  Paulino Stern MD  12/20/17 0529

## 2017-12-21 PROBLEM — M25.571 ANKLE PAIN, RIGHT: Status: RESOLVED | Noted: 2017-11-20 | Resolved: 2017-12-21

## 2017-12-21 PROBLEM — M25.561 BILATERAL KNEE PAIN: Status: RESOLVED | Noted: 2017-10-03 | Resolved: 2017-12-21

## 2017-12-21 PROBLEM — Z47.89 AFTERCARE FOLLOWING SURGERY OF THE MUSCULOSKELETAL SYSTEM: Status: RESOLVED | Noted: 2017-11-20 | Resolved: 2017-12-21

## 2017-12-21 PROBLEM — R60.9 EDEMA: Status: ACTIVE | Noted: 2017-12-21

## 2017-12-21 PROBLEM — M25.561 BILATERAL KNEE PAIN: Status: ACTIVE | Noted: 2017-12-21

## 2017-12-21 PROBLEM — M25.469 EFFUSION OF LOWER LEG JOINT: Status: RESOLVED | Noted: 2017-11-20 | Resolved: 2017-12-21

## 2017-12-21 PROBLEM — M25.562 BILATERAL KNEE PAIN: Status: ACTIVE | Noted: 2017-12-21

## 2017-12-21 PROBLEM — M25.562 BILATERAL KNEE PAIN: Status: RESOLVED | Noted: 2017-10-03 | Resolved: 2017-12-21

## 2017-12-22 ENCOUNTER — MYC REFILL (OUTPATIENT)
Dept: OTHER | Age: 22
End: 2017-12-22

## 2017-12-22 ENCOUNTER — THERAPY VISIT (OUTPATIENT)
Dept: PHYSICAL THERAPY | Facility: CLINIC | Age: 22
End: 2017-12-22
Payer: COMMERCIAL

## 2017-12-22 DIAGNOSIS — R60.9 EDEMA: ICD-10-CM

## 2017-12-22 DIAGNOSIS — Z98.890 S/P KNEE SURGERY: ICD-10-CM

## 2017-12-22 DIAGNOSIS — R26.9 ABNORMAL GAIT: ICD-10-CM

## 2017-12-22 DIAGNOSIS — M25.562 BILATERAL KNEE PAIN: ICD-10-CM

## 2017-12-22 DIAGNOSIS — Z98.890 STATUS POST SURGERY: ICD-10-CM

## 2017-12-22 DIAGNOSIS — M25.561 BILATERAL KNEE PAIN: ICD-10-CM

## 2017-12-22 PROCEDURE — 97016 VASOPNEUMATIC DEVICE THERAPY: CPT | Mod: GP | Performed by: PHYSICAL THERAPIST

## 2017-12-22 PROCEDURE — 97110 THERAPEUTIC EXERCISES: CPT | Mod: GP | Performed by: PHYSICAL THERAPIST

## 2017-12-22 PROCEDURE — 97140 MANUAL THERAPY 1/> REGIONS: CPT | Mod: GP | Performed by: PHYSICAL THERAPIST

## 2017-12-22 RX ORDER — OXYCODONE HYDROCHLORIDE 5 MG/1
5-10 TABLET ORAL EVERY 4 HOURS PRN
Qty: 50 TABLET | Refills: 0 | Status: SHIPPED | OUTPATIENT
Start: 2017-12-22 | End: 2017-12-29

## 2017-12-22 RX ORDER — OXYCODONE HYDROCHLORIDE 5 MG/1
5-10 TABLET ORAL EVERY 4 HOURS PRN
Qty: 50 TABLET | Refills: 0 | Status: SHIPPED | OUTPATIENT
Start: 2017-12-22 | End: 2017-12-22

## 2017-12-22 NOTE — TELEPHONE ENCOUNTER
Message from [place]:  Original authorizing provider: OCTAVIO Morelabbi House would like a refill of the following medications:  oxyCODONE IR (ROXICODONE) 5 MG tablet [Richy Orozco PA-C]    Preferred pharmacy: [place], [place] - [address]    Comment:  1 week post op - continue to have severe pain with a 6-7 when not moving which goes to a 9 when standing.

## 2017-12-22 NOTE — TELEPHONE ENCOUNTER
DOS: Left tibial derotational osteotomy with rodding, fibular derotational osteotomy, tibial tubercle osteotomy.    Patient calls today requesting a refill on her oxycodone 5 mg. She states she still has 9/10 pain when she stands and 6-7/10 while seated; she is taking 2 tablets every 4 hours for this pain. Patient is also taking hydroxyzine and is icing frequently to help with pain management. Paper prescription placed in lockbox for patient to pick after physical therapy today.

## 2017-12-27 ENCOUNTER — THERAPY VISIT (OUTPATIENT)
Dept: PHYSICAL THERAPY | Facility: CLINIC | Age: 22
End: 2017-12-27
Payer: COMMERCIAL

## 2017-12-27 DIAGNOSIS — R60.0 LOCALIZED EDEMA: ICD-10-CM

## 2017-12-27 DIAGNOSIS — Z98.890 S/P KNEE SURGERY: ICD-10-CM

## 2017-12-27 DIAGNOSIS — M25.562 PAIN IN BOTH KNEES, UNSPECIFIED CHRONICITY: ICD-10-CM

## 2017-12-27 DIAGNOSIS — M25.561 PAIN IN BOTH KNEES, UNSPECIFIED CHRONICITY: ICD-10-CM

## 2017-12-27 DIAGNOSIS — R26.9 ABNORMAL GAIT: ICD-10-CM

## 2017-12-27 PROCEDURE — 97016 VASOPNEUMATIC DEVICE THERAPY: CPT | Mod: GP | Performed by: PHYSICAL THERAPIST

## 2017-12-27 PROCEDURE — 97140 MANUAL THERAPY 1/> REGIONS: CPT | Mod: GP | Performed by: PHYSICAL THERAPIST

## 2017-12-27 PROCEDURE — 97110 THERAPEUTIC EXERCISES: CPT | Mod: GP | Performed by: PHYSICAL THERAPIST

## 2017-12-29 ENCOUNTER — THERAPY VISIT (OUTPATIENT)
Dept: PHYSICAL THERAPY | Facility: CLINIC | Age: 22
End: 2017-12-29
Payer: COMMERCIAL

## 2017-12-29 DIAGNOSIS — M25.362 PATELLAR INSTABILITY OF BOTH KNEES: Primary | ICD-10-CM

## 2017-12-29 DIAGNOSIS — R26.9 ABNORMAL GAIT: ICD-10-CM

## 2017-12-29 DIAGNOSIS — M25.361 PATELLAR INSTABILITY OF BOTH KNEES: Primary | ICD-10-CM

## 2017-12-29 DIAGNOSIS — R60.0 LOCALIZED EDEMA: ICD-10-CM

## 2017-12-29 DIAGNOSIS — M25.561 PAIN IN BOTH KNEES, UNSPECIFIED CHRONICITY: ICD-10-CM

## 2017-12-29 DIAGNOSIS — M25.562 PAIN IN BOTH KNEES, UNSPECIFIED CHRONICITY: ICD-10-CM

## 2017-12-29 DIAGNOSIS — Z98.890 S/P KNEE SURGERY: ICD-10-CM

## 2017-12-29 PROCEDURE — 97530 THERAPEUTIC ACTIVITIES: CPT | Mod: GP | Performed by: PHYSICAL THERAPIST

## 2017-12-29 PROCEDURE — 97016 VASOPNEUMATIC DEVICE THERAPY: CPT | Mod: GP | Performed by: PHYSICAL THERAPIST

## 2017-12-29 PROCEDURE — 97110 THERAPEUTIC EXERCISES: CPT | Mod: GP | Performed by: PHYSICAL THERAPIST

## 2017-12-29 PROCEDURE — 97140 MANUAL THERAPY 1/> REGIONS: CPT | Mod: GP | Performed by: PHYSICAL THERAPIST

## 2017-12-29 RX ORDER — OXYCODONE HYDROCHLORIDE 5 MG/1
5-10 TABLET ORAL EVERY 4 HOURS PRN
Qty: 30 TABLET | Refills: 0 | Status: SHIPPED | OUTPATIENT
Start: 2017-12-29 | End: 2018-01-03

## 2018-01-02 ENCOUNTER — THERAPY VISIT (OUTPATIENT)
Dept: PHYSICAL THERAPY | Facility: CLINIC | Age: 23
End: 2018-01-02
Payer: COMMERCIAL

## 2018-01-02 DIAGNOSIS — M25.362 PATELLAR INSTABILITY OF BOTH KNEES: ICD-10-CM

## 2018-01-02 DIAGNOSIS — Z98.890 S/P KNEE SURGERY: ICD-10-CM

## 2018-01-02 DIAGNOSIS — M25.561 PAIN IN BOTH KNEES, UNSPECIFIED CHRONICITY: ICD-10-CM

## 2018-01-02 DIAGNOSIS — M25.562 PAIN IN BOTH KNEES, UNSPECIFIED CHRONICITY: ICD-10-CM

## 2018-01-02 DIAGNOSIS — R60.0 LOCALIZED EDEMA: ICD-10-CM

## 2018-01-02 DIAGNOSIS — R26.9 ABNORMAL GAIT: ICD-10-CM

## 2018-01-02 DIAGNOSIS — M25.361 PATELLAR INSTABILITY OF BOTH KNEES: ICD-10-CM

## 2018-01-02 PROCEDURE — 97140 MANUAL THERAPY 1/> REGIONS: CPT | Mod: GP | Performed by: PHYSICAL THERAPIST

## 2018-01-02 PROCEDURE — 97016 VASOPNEUMATIC DEVICE THERAPY: CPT | Mod: GP | Performed by: PHYSICAL THERAPIST

## 2018-01-02 PROCEDURE — 97110 THERAPEUTIC EXERCISES: CPT | Mod: GP | Performed by: PHYSICAL THERAPIST

## 2018-01-03 ENCOUNTER — OFFICE VISIT (OUTPATIENT)
Dept: ORTHOPEDICS | Facility: CLINIC | Age: 23
End: 2018-01-03
Payer: COMMERCIAL

## 2018-01-03 ENCOUNTER — THERAPY VISIT (OUTPATIENT)
Dept: PHYSICAL THERAPY | Facility: CLINIC | Age: 23
End: 2018-01-03
Payer: COMMERCIAL

## 2018-01-03 DIAGNOSIS — Z98.890 S/P KNEE SURGERY: ICD-10-CM

## 2018-01-03 DIAGNOSIS — M25.361 PATELLAR INSTABILITY OF BOTH KNEES: ICD-10-CM

## 2018-01-03 DIAGNOSIS — R60.0 LOCALIZED EDEMA: Primary | ICD-10-CM

## 2018-01-03 DIAGNOSIS — Z98.890 STATUS POST SURGERY: ICD-10-CM

## 2018-01-03 DIAGNOSIS — R26.9 ABNORMAL GAIT: ICD-10-CM

## 2018-01-03 DIAGNOSIS — M25.561 PAIN IN BOTH KNEES, UNSPECIFIED CHRONICITY: ICD-10-CM

## 2018-01-03 DIAGNOSIS — M25.562 PAIN IN BOTH KNEES, UNSPECIFIED CHRONICITY: ICD-10-CM

## 2018-01-03 DIAGNOSIS — M25.362 PATELLAR INSTABILITY OF BOTH KNEES: ICD-10-CM

## 2018-01-03 DIAGNOSIS — Z98.890 S/P KNEE SURGERY: Primary | ICD-10-CM

## 2018-01-03 PROCEDURE — 97110 THERAPEUTIC EXERCISES: CPT | Mod: GP | Performed by: PHYSICAL THERAPIST

## 2018-01-03 PROCEDURE — 97140 MANUAL THERAPY 1/> REGIONS: CPT | Mod: GP | Performed by: PHYSICAL THERAPIST

## 2018-01-03 PROCEDURE — 97016 VASOPNEUMATIC DEVICE THERAPY: CPT | Mod: GP | Performed by: PHYSICAL THERAPIST

## 2018-01-03 RX ORDER — OXYCODONE HYDROCHLORIDE 5 MG/1
5 TABLET ORAL EVERY 4 HOURS PRN
Qty: 30 TABLET | Refills: 0 | Status: SHIPPED | OUTPATIENT
Start: 2018-01-03 | End: 2018-01-11

## 2018-01-03 NOTE — MR AVS SNAPSHOT
After Visit Summary   1/3/2018    Kaitlynn House    MRN: [id number]           Patient Information     Date Of Birth          1995        Visit Information        Provider Department      1/3/2018 12:00 PM Nurse, [facility]        Today's Diagnoses     S/P knee surgery    -  1    Status post surgery           Follow-ups after your visit        Additional Services     PHYSICAL THERAPY REFERRAL (Internal)       Physical Therapy Referral    PROCEDURES: Left limb:  1.  Derotation osteotomy of the tibia with intramedullary fixation.   2.  Derotation osteotomy of fibula.   3.  Distalization of the tibial tubercle.                  Your next 10 appointments already scheduled     Jan 09, 2018 12:50 PM CST   ELSY Extremity with Olga Wood PTA   [facility] ([facility])    [address] [place] [id number] [phone]            Jan 12, 2018  2:00 PM CST   ELSY Extremity with Tatiana Pereira PT   [facility] ([facility])    [address] [place] [id number] [phone]            Jan 12, 2018  3:00 PM CST   (Arrive by 2:45 PM)   RETURN ARRHYTHMIA with Milo Mendoza MD   [facility] ([facility])    [address] [place] [id number] [phone]            Jan 16, 2018 12:50 PM CST   ELSY Extremity with Olga Wood PTA   [facility] ([facility])    [address] [place] [address] [phone]            Jan 19, 2018  2:50 PM CST   ELSY Extremity with Di Ahumada PT   [facility] ([facility])    [address] [place] [address] [phone]            Jan 23, 2018 12:50 PM CST   ELSY Extremity with  Olga Wood PTA   [facility] ([facility])    [address] [place] [id number] [phone]            Jan 25, 2018 12:50 PM CST   ELSY Extremity with Tatiana Pereira PT   [facility] ([facility])    [address] [place] [id number] [phone]            Jan 30, 2018 12:50 PM CST   ELSY Extremity with Tatiana Pereira PT   [facility] ([facility])    [address] [place] [id number] [phone]            Jan 30, 2018  2:45 PM CST   (Arrive by 2:30 PM)   RETURN KNEE with Di Schwartz MD   [facility] ([facility])    [address] [place] [id number] [phone]            Jan 30, 2018  3:00 PM CST   (Arrive by 2:45 PM)   POST-OP FOOT/ANKLE with Dio Olivarez MD   [facility] ([facility])    [address] [place] [id number] [phone]              Who to contact     Please call your clinic at [phone] to:    Ask questions about your health    Make or cancel appointments    Discuss your medicines    Learn about your test results    Speak to your doctor   If you have compliments or concerns about an experience at your clinic, or if you wish to file a complaint, please contact [facility] Patient Relations at [phone] or email us at [email or web address]         Additional Information About Your Visit        MyChart Information     [employer] gives you secure access to your electronic health record. If you see a primary care provider, you can also send messages to your care team and make appointments. If you have questions, please call your primary care clinic.  If you do not have a primary care  provider, please call [phone] and they will assist you.      tuul is an electronic gateway that provides easy, online access to your medical records. With tuul, you can request a clinic appointment, read your test results, renew a prescription or communicate with your care team.     To access your existing account, please contact your [facility] or call [phone] for assistance.        Care EveryWhere ID     This is your Care EveryWhere ID. This could be used by other organizations to access your [facility] medical records  [id number]        Your Vitals Were     Last Period                   12/01/2017 (Approximate)            Blood Pressure from Last 3 Encounters:   12/20/17 109/74   12/16/17 100/56   11/09/17 124/79    Weight from Last 3 Encounters:   12/15/17 54.7 kg (120 lb 9.5 oz)   10/24/17 56.2 kg (124 lb)   10/11/17 56.7 kg (125 lb)              We Performed the Following     PHYSICAL THERAPY REFERRAL (Internal)          Today's Medication Changes          These changes are accurate as of: 1/3/18 12:13 PM.  If you have any questions, ask your nurse or doctor.               These medicines have changed or have updated prescriptions.        Dose/Directions    * oxyCODONE 10 MG 12 hr tablet   Commonly known as:  OXYCONTIN   This may have changed:  Another medication with the same name was changed. Make sure you understand how and when to take each.   Used for:  Status post surgery        Dose:  10 mg   Take 1 tablet (10 mg) by mouth nightly as needed for moderate to severe pain   Quantity:  5 tablet   Refills:  0       * oxyCODONE IR 5 MG tablet   Commonly known as:  ROXICODONE   This may have changed:  how much to take   Used for:  Status post surgery   Changed by:  [facility]        Dose:  5 mg   Take 1 tablet (5 mg) by mouth every 4 hours as needed for pain or other (Moderate to Severe)   Quantity:  30 tablet   Refills:  0       * Notice:  This list has 2  medication(s) that are the same as other medications prescribed for you. Read the directions carefully, and ask your doctor or other care provider to review them with you.         Where to get your medicines      Some of these will need a paper prescription and others can be bought over the counter.  Ask your nurse if you have questions.     Bring a paper prescription for each of these medications     oxyCODONE IR 5 MG tablet                Primary Care Provider Fax #    Physician No Ref-Primary [phone]       No address on file        Equal Access to Services     CHRISTOS RICHEY : Hadii zina ku hadasho Soomaali, waaxda luqadaha, qaybta kaalmada adeegyada, waxmadelin blisslalylizz penaloza . So [facility] [place] [phone].    ATENCIÓN: Si trinidadla espphuc, tiene a cristobal disposición servicios gratuitos de asistencia lingüística. [facility] [phone].    We comply with applicable federal civil rights laws and [place] laws. We do not discriminate on the basis of race, color, national origin, age, disability, sex, sexual orientation, or gender identity.            Thank you!     Thank you for choosing [facility]  for your care. Our goal is always to provide you with excellent care. Hearing back from our patients is one way we can continue to improve our services. Please take a few minutes to complete the written survey that you may receive in the mail after your visit with us. Thank you!             Your Updated Medication List - Protect others around you: Learn how to safely use, store and throw away your medicines at www.disposemymeds.org.          This list is accurate as of: 1/3/18 12:13 PM.  Always use your most recent med list.                   Brand Name Dispense Instructions for use Diagnosis    acetaminophen 325 MG tablet    TYLENOL    100 tablet    Take 2 tablets (650 mg) by mouth every 4 hours as needed for other (mild pain)    Status post surgery       ALEVE PO      Take 2 tablets by mouth every  12 hours as needed for moderate pain        AMITRIPTYLINE HCL PO      Take 20 mg by mouth nightly as needed for sleep        aspirin  MG EC tablet     28 tablet    Take 1 tablet (325 mg) by mouth daily To be taken for DVT Prophylaxis daily    Status post surgery       gabapentin 300 MG capsule    NEURONTIN    15 capsule    Take 1 capsule (300 mg) by mouth daily    Pain at surgical site       hydrOXYzine 25 MG tablet    ATARAX    30 tablet    Take 1 tablet (25 mg) by mouth every 6 hours as needed for itching (and nausea)    Status post surgery       ondansetron 4 MG ODT tab    ZOFRAN ODT    10 tablet    Take 1 tablet (4 mg) by mouth every 8 hours as needed for nausea        order for DME     1 Units    Crutches    Tibial torsion, right       * oxyCODONE 10 MG 12 hr tablet    OXYCONTIN    5 tablet    Take 1 tablet (10 mg) by mouth nightly as needed for moderate to severe pain    Status post surgery       * oxyCODONE IR 5 MG tablet    ROXICODONE    30 tablet    Take 1 tablet (5 mg) by mouth every 4 hours as needed for pain or other (Moderate to Severe)    Status post surgery       senna-docusate 8.6-50 MG per tablet    SENOKOT-S;PERICOLACE    26 tablet    Take 1-2 tablets by mouth 2 times daily Take while on oral narcotics to prevent or treat constipation.    Status post surgery       XULANE 150-35 MCG/24HR patch   Generic drug:  norelgestromin-ethinyl estradiol      Place 1 patch onto the skin once a week Remove old patch and apply new patch onto the skin once a week for 3 weeks (21 days). Do not wear patch week 4 (days 22-28), then repeat.        * Notice:  This list has 2 medication(s) that are the same as other medications prescribed for you. Read the directions carefully, and ask your doctor or other care provider to review them with you.

## 2018-01-03 NOTE — PROGRESS NOTES
Reason for visit:    Kaitlynn House came in to the clinic for a two week post op check.    Her surgery was done 12/15/17 by Dr Olivarez.  She had Left tibia derotational osteotomy.      Assessment:    Kaitlynn came into the clinic in post op knee brace and night splint Non-WB in a wheelchair.     The Surgical wounds were exposed and found to be well-healed and without evidence of infection; incisions were cleansed and the suture ends were clipped. She states that her pain is improving and she has the goal of being off of her pain medication by the end of next week. She was given a refill today and she was encouraged to try and stretch out her pain medication to 1 tablet every 5 hours.  She complains of numbness at her incisions and on the top of her foot. She states she gets some shooting/burning pain but the gabapentin increase has helped.      Plan:    She was placed in her knee brace and night splint.  She was told she can be WBAT    She has an appointment to see Dr. Olivarez at that time Dr. Olivarez will determine further restrictions.    She has our phone number and will call with questions or problems.

## 2018-01-09 ENCOUNTER — THERAPY VISIT (OUTPATIENT)
Dept: PHYSICAL THERAPY | Facility: CLINIC | Age: 23
End: 2018-01-09
Payer: COMMERCIAL

## 2018-01-09 DIAGNOSIS — M25.561 PAIN IN BOTH KNEES, UNSPECIFIED CHRONICITY: Primary | ICD-10-CM

## 2018-01-09 DIAGNOSIS — R26.9 ABNORMAL GAIT: ICD-10-CM

## 2018-01-09 DIAGNOSIS — R60.0 LOCALIZED EDEMA: ICD-10-CM

## 2018-01-09 DIAGNOSIS — Z98.890 S/P KNEE SURGERY: ICD-10-CM

## 2018-01-09 DIAGNOSIS — M25.562 PAIN IN BOTH KNEES, UNSPECIFIED CHRONICITY: Primary | ICD-10-CM

## 2018-01-09 PROCEDURE — 97110 THERAPEUTIC EXERCISES: CPT | Mod: GP | Performed by: PHYSICAL THERAPY ASSISTANT

## 2018-01-09 PROCEDURE — 97140 MANUAL THERAPY 1/> REGIONS: CPT | Mod: GP | Performed by: PHYSICAL THERAPY ASSISTANT

## 2018-01-09 PROCEDURE — 97016 VASOPNEUMATIC DEVICE THERAPY: CPT | Mod: GP | Performed by: PHYSICAL THERAPY ASSISTANT

## 2018-01-11 ENCOUNTER — PRE VISIT (OUTPATIENT)
Dept: CARDIOLOGY | Facility: CLINIC | Age: 23
End: 2018-01-11

## 2018-01-11 DIAGNOSIS — Z98.890 STATUS POST SURGERY: ICD-10-CM

## 2018-01-11 RX ORDER — OXYCODONE HYDROCHLORIDE 5 MG/1
5 TABLET ORAL EVERY 4 HOURS PRN
Qty: 30 TABLET | Refills: 0 | Status: SHIPPED | OUTPATIENT
Start: 2018-01-11 | End: 2018-03-20

## 2018-01-11 NOTE — TELEPHONE ENCOUNTER
"  HPI:   Kaitlynn House is a 22 year old female who was initially evaluated by Jose Watkins NP on 11/9/2017. 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  She is currently recovering from a right tib-fib derotational osteotomy on 11/1/17 by Dr. Olivarez at [facility] and then presented to [facility] on 11/2/2017 for evaluation of syncopal episode and tachycardia and was admitted for further monitoring and pain control.       Kaitlynn House  presents for tachycardia.  Denies chest pain or pressure, headaches,  angina, dyspnea at rest or with exertion, dry cough, orthopnea, PND, abdominal pain, abdominal edema, pedal edema, or claudication.  Denies easy bruising or bleeding, hematuria, hematochezia, and epistaxis.      Patient states that activity and symptoms prior to episode include stand up gets SOB, shaking, looses vision (black spots), then hears a roar, and then loses muscle tone.     Had surgery on 11/1 and was using crutches at home and took 10 steps, symptoms occurred,  picked her up in a cradle position and brought her downstairs the entire time was speaking \"I dont' know what is going on, I cannot see anything\".    laid her down on the couch, her she felt out of breath, and shaky, vision and hearing slowly comes back, she was weak and tired when she could see clearly again.  Her   witnessed the event and stated she looked very pale during the episode.    She describes another episode while walking around [place] in August 2017, when the temperature was 90 to 100 degrees F after walking around for 3 hours.  She was standng in line, had LOC, and came too when on the ground.  Felt shaky, drank water, ate food after 5 minutes she was able to walk 10 steps and repeated the incident again.   supported her and they walked to a restaurant.  "   States similar events often occur in or immediately following a hot shower, while she is standing at work. Also feels palpitatons daily at work.   Denies loss of urine, bowel or tongue biting during these episodes.  States she consumes    24  64 oz water/day; denies take salt tablets, does not eat a high sodium diet.  Consumes 1 Ensure or Boost daily;     11/9/2017: EKG:    Tachycardia with ventricular rate of 137      PAST MEDICAL HISTORY:  Past Medical History:   Diagnosis Date     Restless legs syndrome        CURRENT MEDICATIONS:  Current Outpatient Prescriptions   Medication Sig Dispense Refill     oxyCODONE IR (ROXICODONE) 5 MG tablet Take 1 tablet (5 mg) by mouth every 4 hours as needed for pain or other (Moderate to Severe) 30 tablet 0     ondansetron (ZOFRAN ODT) 4 MG ODT tab Take 1 tablet (4 mg) by mouth every 8 hours as needed for nausea 10 tablet 1     gabapentin (NEURONTIN) 300 MG capsule Take 1 capsule (300 mg) by mouth daily 15 capsule 1     oxyCODONE (OXYCONTIN) 10 MG 12 hr tablet Take 1 tablet (10 mg) by mouth nightly as needed for moderate to severe pain 5 tablet 0     acetaminophen (TYLENOL) 325 MG tablet Take 2 tablets (650 mg) by mouth every 4 hours as needed for other (mild pain) 100 tablet 0     senna-docusate (SENOKOT-S;PERICOLACE) 8.6-50 MG per tablet Take 1-2 tablets by mouth 2 times daily Take while on oral narcotics to prevent or treat constipation. 26 tablet 0     hydrOXYzine (ATARAX) 25 MG tablet Take 1 tablet (25 mg) by mouth every 6 hours as needed for itching (and nausea) 30 tablet 0     aspirin  MG EC tablet Take 1 tablet (325 mg) by mouth daily To be taken for DVT Prophylaxis daily 28 tablet 0     AMITRIPTYLINE HCL PO Take 20 mg by mouth nightly as needed for sleep       order for DME Crutches 1 Units 0     Naproxen Sodium (ALEVE PO) Take 2 tablets by mouth every 12 hours as needed for moderate pain       norelgestromin-ethinyl estradiol (XULANE) 150-35 MCG/24HR patch Place  1 patch onto the skin once a week Remove old patch and apply new patch onto the skin once a week for 3 weeks (21 days). Do not wear patch week 4 (days 22-28), then repeat.         PAST SURGICAL HISTORY:  Past Surgical History:   Procedure Laterality Date     ARTHROSCOPY KNEE WITH TIBIAL TUBERCLE SHIFT Left 12/15/2017    Procedure: ARTHROSCOPY KNEE WITH TIBIAL TUBERCLE SHIFT;;  Surgeon: Di Schwartz MD;  Location: [facility] OR     OPEN REDUCTION INTERNAL FIXATION RODDING INTRAMEDULLARY TIBIA Left 12/15/2017    Procedure: OPEN REDUCTION INTERNAL FIXATION RODDING INTRAMEDULLARY TIBIA;  Left Knee Arthroscopy, Tibial Derotational Osteotomy with Intermedullary Rodding, Fibular Derotational Osteotmy, Distal Tibial Tubercle Osteotomy;  Surgeon: Dio Olivarez MD;  Location: [facility] OR       ALLERGIES:     Allergies   Allergen Reactions     Egg [Chicken-Derived Products (Egg)] Anaphylaxis     Amoxicillin Hives     Zithromax [Azithromycin] Nausea and Vomiting     Compazine [Prochlorperazine] Other (See Comments)     delirious       FAMILY HISTORY:  No family history on file.     SOCIAL HISTORY:  Social History   Substance Use Topics     Smoking status: Never Smoker     Smokeless tobacco: Never Used     Alcohol use No       ROS:   Constitutional: No fever, chills, or sweats. Weight stable.   ENT: No visual disturbance, ear ache, epistaxis, sore throat.   Cardiovascular: As per HPI.   Respiratory: No cough, hemoptysis.    GI: No nausea, vomiting, hematemesis, melena, or hematochezia.   : No hematuria.   Integument: Negative.   Psychiatric: Negative.   Hematologic:  Easy bruising, no easy bleeding.  Neuro: Negative.   Endocrinology: No significant heat or cold intolerance   Musculoskeletal: No myalgia.    Exam:  [facility] 12/01/2017 (Approximate)  GENERAL APPEARANCE: healthy, alert and no distress  HEENT: no icterus, no xanthelasmas, normal pupil size and reaction, normal palate, mucosa moist, no central cyanosis  NECK: no  adenopathy, no asymmetry, masses, or scars, thyroid normal to palpation and no bruits, JVP not elevated  RESPIRATORY: lungs clear to auscultation - no rales, rhonchi or wheezes, no use of accessory muscles, no retractions, respirations are unlabored, normal respiratory rate  CARDIOVASCULAR: regular rhythm, normal S1 with physiologic split S2, no S3 or S4 and no murmur, click or rub, precordium quiet with normal PMI.  ABDOMEN: soft, non tender, without hepatosplenomegaly, no masses palpable, bowel sounds normal, aorta not enlarged by palpation, no abdominal bruits  EXTREMITIES: peripheral pulses normal, no edema, no bruits  NEURO: alert and oriented to person/place/time, normal speech, gait and affect  VASC: Radial, femoral, dorsalis pedis and posterior tibialis pulses are normal in volumes and symmetric bilaterally. No bruits are heard.  SKIN: no ecchymoses, no rashes    Labs:  CBC RESULTS:   Lab Results   Component Value Date    WBC 5.9 2017    RBC 3.39 (L) 2017    HGB 10.2 (L) 2017    HCT 29.3 (L) 2017    MCV 86 2017    MCH 30.1 2017    MCHC 34.8 2017    RDW 12.3 2017     2017       BMP RESULTS:  Lab Results   Component Value Date     2017    POTASSIUM 3.8 2017    CHLORIDE 104 2017    CO2 29 2017    ANIONGAP 5 2017    GLC 80 2017    BUN 10 2017    CR 0.54 2017    GFRESTIMATED >90 2017    GFRESTBLACK >90 2017    CHANI 8.6 2017        Procedures:    ECHOCARDIOGRAM:   Recent Results (from the past 8760 hour(s))   Echocardiogram    Narrative    [id number]  [facility]  [id number]  [id number]^CORINE^AMANDA^           [facility],[place]  Echocardiography Laboratory  [address] [place] [id number]     Name: OSIEL OSBORNE  MRN: [id number]  : 1995  Study Date: 10/30/2017 05:06 PM  Age: 22 yrs  Gender: Female  Patient Location: [facility]  Reason For  Study: Syncope and collapse  Ordering Physician: AMANDA POOL  Referring Physician: AMANDA POOL  Performed By: Beck Osei RDCS     BSA: 1.7 m2  Height: 67 in  Weight: 124 lb  HR: 91  BP: 122/84 mmHg  _____________________________________________________________________________  __        Procedure  Echocardiogram with two-dimensional, color and spectral Doppler performed.  _____________________________________________________________________________  __        Interpretation Summary  Global and regional left ventricular function is normal with an EF of 55-60%.  Global right ventricular function is normal.  No significant valvular abnormalities were noted.  The inferior vena cava was normal in size with preserved respiratory  variability.  No pericardial effusion is present.  Previous study not available for comparison.  _____________________________________________________________________________  __        Left Ventricle  Left ventricular size is normal. Left ventricular wall thickness is normal.  Global and regional left ventricular function is normal with an EF of 55-60%.  Normal left ventricular filling for age. No regional wall motion abnormalities  are seen.     Right Ventricle  The right ventricle is normal size. Global right ventricular function is  normal.     Atria  Both atria appear normal.     Mitral Valve  The mitral valve is normal.        Aortic Valve  Aortic valve is normal in structure and function. The aortic valve is  tricuspid.     Tricuspid Valve  The tricuspid valve is normal. The peak velocity of the tricuspid regurgitant  jet is not obtainable.     Pulmonic Valve  The pulmonic valve is normal.     Vessels  The aorta root is normal. The inferior vena cava was normal in size with  preserved respiratory variability.     Pericardium  No pericardial effusion is present.        Compared to Previous Study  Previous study not available for comparison.     Attestation  I have personally  viewed the imaging and agree with the interpretation and  report as documented by the fellow, Durga Goodman, and/or edited by me.  _____________________________________________________________________________  __     MMode/2D Measurements & Calculations  IVSd: 0.71 cm  LVIDd: 4.0 cm  LVIDs: 2.6 cm  LVPWd: 0.68 cm  FS: 34.3 %  EDV(Teich): 68.7 ml  ESV(Teich): 24.8 ml  LV mass(C)d: 76.4 grams  LV mass(C)dI: 46.3 grams/m2  asc Aorta Diam: 2.9 cm  LVOT diam: 2.0 cm  LVOT area: 3.1 cm2  LA Volume (BP): 32.4 ml  LA Volume Index (BP): 19.6 ml/m2     RWT: 0.34        Doppler Measurements & Calculations  MV E max chelly: 79.9 cm/sec  MV A max chelly: 70.6 cm/sec  MV E/A: 1.1  Ao V2 max: 112.0 cm/sec  Ao max P.0 mmHg  SANTOS(V,D): 2.6 cm2  LV V1 max PG: 3.5 mmHg  LV V1 max: 93.0 cm/sec  E/E' av.9  Lateral E/e': 4.9  Med E to E': 6.9  Medial E/e': 6.9        _____________________________________________________________________________  __        Report approved by: Yared Mtz 10/31/2017 09:18 AM             Assessment and Plan:       CC

## 2018-01-11 NOTE — TELEPHONE ENCOUNTER
Her surgery was done 12/15/17 by Dr Olivarez.  She had Left tibia derotational osteotomy.  She is calling for an oxycodone refill.  This was refilled for her, it will be dropped off at the [facility] pharmacy and patient will  on 1/12/2018 when she is in the building for PT.

## 2018-01-12 ENCOUNTER — OFFICE VISIT (OUTPATIENT)
Dept: CARDIOLOGY | Facility: CLINIC | Age: 23
End: 2018-01-12
Attending: INTERNAL MEDICINE
Payer: COMMERCIAL

## 2018-01-12 ENCOUNTER — THERAPY VISIT (OUTPATIENT)
Dept: PHYSICAL THERAPY | Facility: CLINIC | Age: 23
End: 2018-01-12
Payer: COMMERCIAL

## 2018-01-12 VITALS
BODY MASS INDEX: 18.46 KG/M2 | SYSTOLIC BLOOD PRESSURE: 118 MMHG | HEIGHT: 69 IN | DIASTOLIC BLOOD PRESSURE: 88 MMHG | OXYGEN SATURATION: 100 % | HEART RATE: 159 BPM | WEIGHT: 124.6 LBS

## 2018-01-12 DIAGNOSIS — G90.A POTS (POSTURAL ORTHOSTATIC TACHYCARDIA SYNDROME): Primary | ICD-10-CM

## 2018-01-12 DIAGNOSIS — M25.561 PAIN IN BOTH KNEES, UNSPECIFIED CHRONICITY: ICD-10-CM

## 2018-01-12 DIAGNOSIS — Z98.890 S/P KNEE SURGERY: ICD-10-CM

## 2018-01-12 DIAGNOSIS — R60.0 LOCALIZED EDEMA: ICD-10-CM

## 2018-01-12 DIAGNOSIS — R26.9 ABNORMAL GAIT: ICD-10-CM

## 2018-01-12 DIAGNOSIS — M25.562 PAIN IN BOTH KNEES, UNSPECIFIED CHRONICITY: ICD-10-CM

## 2018-01-12 DIAGNOSIS — M25.362 PATELLAR INSTABILITY OF BOTH KNEES: ICD-10-CM

## 2018-01-12 DIAGNOSIS — M25.361 PATELLAR INSTABILITY OF BOTH KNEES: ICD-10-CM

## 2018-01-12 DIAGNOSIS — Q79.60 EDS (EHLERS-DANLOS SYNDROME): ICD-10-CM

## 2018-01-12 PROCEDURE — 97140 MANUAL THERAPY 1/> REGIONS: CPT | Mod: GP | Performed by: PHYSICAL THERAPIST

## 2018-01-12 PROCEDURE — 97110 THERAPEUTIC EXERCISES: CPT | Mod: GP | Performed by: PHYSICAL THERAPIST

## 2018-01-12 PROCEDURE — G0463 HOSPITAL OUTPT CLINIC VISIT: HCPCS | Mod: ZF

## 2018-01-12 PROCEDURE — 99214 OFFICE O/P EST MOD 30 MIN: CPT | Mod: ZP | Performed by: INTERNAL MEDICINE

## 2018-01-12 RX ORDER — FLUDROCORTISONE ACETATE 0.1 MG/1
0.1 TABLET ORAL 2 TIMES DAILY
Qty: 180 TABLET | Refills: 3 | Status: SHIPPED | OUTPATIENT
Start: 2018-01-12 | End: 2018-03-20

## 2018-01-12 ASSESSMENT — PAIN SCALES - GENERAL: PAINLEVEL: NO PAIN (0)

## 2018-01-12 NOTE — PROGRESS NOTES
HPI:   Clarke is a pleasant 22 year old female who was initially evaluated by Jose Watkins NP on 11/9/2017. 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  She is currently recovering from a right tib-fib derotational osteotomy on 11/1/17 and more recent Left leg procedure for which she has braces in place currently.    Kailtynn  presents for  Rx of postural tachycardia. If she is up more than 2-3 min, may faint    Denies chest pain or pressure or palpit at rest sitting/supine    Today was able to stand 3 min but HR kept climbing and eventually she felt near faint.    Currently on no meds. Uses salt liberally and also Propel or Pedialyte.    We spent approx 45 min reviewing history and overall Rx strategy      11/9/2017: EKG:    Tachycardia with ventricular rate of 137      PAST MEDICAL HISTORY:  Past Medical History:   Diagnosis Date     Restless legs syndrome        CURRENT MEDICATIONS:  Current Outpatient Prescriptions   Medication Sig Dispense Refill     oxyCODONE IR (ROXICODONE) 5 MG tablet Take 1 tablet (5 mg) by mouth every 4 hours as needed for pain or other (Moderate to Severe) 30 tablet 0     ondansetron (ZOFRAN ODT) 4 MG ODT tab Take 1 tablet (4 mg) by mouth every 8 hours as needed for nausea 10 tablet 1     gabapentin (NEURONTIN) 300 MG capsule Take 1 capsule (300 mg) by mouth daily 15 capsule 1     acetaminophen (TYLENOL) 325 MG tablet Take 2 tablets (650 mg) by mouth every 4 hours as needed for other (mild pain) 100 tablet 0     senna-docusate (SENOKOT-S;PERICOLACE) 8.6-50 MG per tablet Take 1-2 tablets by mouth 2 times daily Take while on oral narcotics to prevent or treat constipation. 26 tablet 0     hydrOXYzine (ATARAX) 25 MG tablet Take 1  tablet (25 mg) by mouth every 6 hours as needed for itching (and nausea) 30 tablet 0     aspirin  MG EC tablet Take 1 tablet (325 mg) by mouth daily To be taken for DVT Prophylaxis daily 28 tablet 0     AMITRIPTYLINE HCL PO Take 20 mg by mouth nightly as needed for sleep       order for DME Crutches 1 Units 0     Naproxen Sodium (ALEVE PO) Take 2 tablets by mouth every 12 hours as needed for moderate pain       norelgestromin-ethinyl estradiol (XULANE) 150-35 MCG/24HR patch Place 1 patch onto the skin once a week Remove old patch and apply new patch onto the skin once a week for 3 weeks (21 days). Do not wear patch week 4 (days 22-28), then repeat.       oxyCODONE (OXYCONTIN) 10 MG 12 hr tablet Take 1 tablet (10 mg) by mouth nightly as needed for moderate to severe pain (Patient not taking: Reported on 1/12/2018) 5 tablet 0       PAST SURGICAL HISTORY:  Past Surgical History:   Procedure Laterality Date     ARTHROSCOPY KNEE WITH TIBIAL TUBERCLE SHIFT Left 12/15/2017    Procedure: ARTHROSCOPY KNEE WITH TIBIAL TUBERCLE SHIFT;;  Surgeon: Di Schwartz MD;  [facility]     OPEN REDUCTION INTERNAL FIXATION RODDING INTRAMEDULLARY TIBIA Left 12/15/2017    Procedure: OPEN REDUCTION INTERNAL FIXATION RODDING INTRAMEDULLARY TIBIA;  Left Knee Arthroscopy, Tibial Derotational Osteotomy with Intermedullary Rodding, Fibular Derotational Osteotmy, Distal Tibial Tubercle Osteotomy;  Surgeon: Dio Olivarez MD;  [facility]       ALLERGIES:     Allergies   Allergen Reactions     Egg [Chicken-Derived Products (Egg)] Anaphylaxis     Amoxicillin Hives     Zithromax [Azithromycin] Nausea and Vomiting     Compazine [Prochlorperazine] Other (See Comments)     delirious       FAMILY HISTORY:  No family history on file.     SOCIAL HISTORY:  Social History   Substance Use Topics     Smoking status: Never Smoker     Smokeless tobacco: Never Used     Alcohol use No       ROS:   Constitutional: No fever, chills, or  "sweats. Weight stable.   ENT: No visual disturbance, ear ache, epistaxis, sore throat.   Cardiovascular: As per HPI.   Respiratory: No cough, hemoptysis.    GI: No nausea, vomiting, hematemesis, melena, or hematochezia.   : No hematuria.   Integument: Negative.   Psychiatric: Negative.   Hematologic:  Easy bruising, no easy bleeding.  Neuro: Negative.   Endocrinology: No significant heat or cold intolerance   Musculoskeletal: Multiple ortho issues with current braces on Left leg.    Exam:  /88 (BP Location: Right arm, Patient Position: Standing, Cuff Size: Adult Regular)  Pulse 159  Ht 1.753 m (5' 9\")  Wt 56.5 kg (124 lb 9.6 oz)  LMP 12/01/2017 (Approximate)  SpO2 100%  BMI 18.4 kg/m2  GENERAL APPEARANCE: Thin , pleasant, alert and no distress but lying on exam table as has brace ion Left leg  HEENT: no icterus, no xanthelasmas, normal pupil size and reaction, normal palate, mucosa moist, no central cyanosis  NECK: no adenopathy, no asymmetry, masses, or scars, thyroid normal to palpation and no bruits, JVP not elevated  RESPIRATORY: lungs clear to auscultation - no rales, rhonchi or wheezes, no use of accessory muscles, no retractions, respirations are unlabored, normal respiratory rate  CARDIOVASCULAR: regular rhythm, normal S1 with physiologic split S2, no S3 or S4 and no murmur, click or rub, precordium quiet with normal PMI.  ABDOMEN: soft, non tender,  bowel sounds normal, aorta not enlarged by palpation, no abdominal bruits  EXTREMITIES: peripheral pulses normal, no edema, no bruits  NEURO: alert and oriented to person/place/time, normal speech, gait and affect  VASC: pulses are normal in volumes and symmetric bilaterally. No bruits are heard.  SKIN: no ecchymoses, no rashes    Labs:  CBC RESULTS:   Lab Results   Component Value Date    WBC 5.9 12/19/2017    RBC 3.39 (L) 12/19/2017    HGB 10.2 (L) 12/19/2017    HCT 29.3 (L) 12/19/2017    MCV 86 12/19/2017    MCH 30.1 12/19/2017    MCHC 34.8 " 2017    RDW 12.3 2017     2017       BMP RESULTS:  Lab Results   Component Value Date     2017    POTASSIUM 3.8 2017    CHLORIDE 104 2017    CO2 29 2017    ANIONGAP 5 2017    GLC 80 2017    BUN 10 2017    CR 0.54 2017    GFRESTIMATED >90 2017    GFRESTBLACK >90 2017    CHANI 8.6 2017        Procedures:    ECHOCARDIOGRAM:   Recent Results (from the past 8760 hour(s))   Echocardiogram    Narrative    [id number]  [id number]  [id number]  [id number]^CORINE^AMANDA^           [facility]  Echocardiography Laboratory  [address] [place] [id number]     Name: OSIEL OSBORNE  MRN: [id number]  : 1995  Study Date: 10/30/2017 05:06 PM  Age: 22 yrs  Gender: Female  Patient Location: [facility]  Reason For Study: Syncope and collapse  Ordering Physician: AMANDA POOL  Referring Physician: AMANDA POOL  Performed By: Beck Osei RDCS     BSA: 1.7 m2  Height: 67 in  Weight: 124 lb  HR: 91  BP: 122/84 mmHg  _____________________________________________________________________________  __        Procedure  Echocardiogram with two-dimensional, color and spectral Doppler performed.  _____________________________________________________________________________  __        Interpretation Summary  Global and regional left ventricular function is normal with an EF of 55-60%.  Global right ventricular function is normal.  No significant valvular abnormalities were noted.  The inferior vena cava was normal in size with preserved respiratory  variability.  No pericardial effusion is present.  Previous study not available for comparison.  _____________________________________________________________________________  __        Left Ventricle  Left ventricular size is normal. Left ventricular wall thickness is normal.  Global and regional left ventricular function is normal with an EF of  55-60%.  Normal left ventricular filling for age. No regional wall motion abnormalities  are seen.     Right Ventricle  The right ventricle is normal size. Global right ventricular function is  normal.     Atria  Both atria appear normal.     Mitral Valve  The mitral valve is normal.        Aortic Valve  Aortic valve is normal in structure and function. The aortic valve is  tricuspid.     Tricuspid Valve  The tricuspid valve is normal. The peak velocity of the tricuspid regurgitant  jet is not obtainable.     Pulmonic Valve  The pulmonic valve is normal.     Vessels  The aorta root is normal. The inferior vena cava was normal in size with  preserved respiratory variability.     Pericardium  No pericardial effusion is present.        Compared to Previous Study  Previous study not available for comparison.     Attestation  I have personally viewed the imaging and agree with the interpretation and  report as documented by the fellow, Durga Goodman, and/or edited by me.  _____________________________________________________________________________  __     MMode/2D Measurements & Calculations  IVSd: 0.71 cm  LVIDd: 4.0 cm  LVIDs: 2.6 cm  LVPWd: 0.68 cm  FS: 34.3 %  EDV(Teich): 68.7 ml  ESV(Teich): 24.8 ml  LV mass(C)d: 76.4 grams  LV mass(C)dI: 46.3 grams/m2  asc Aorta Diam: 2.9 cm  LVOT diam: 2.0 cm  LVOT area: 3.1 cm2  LA Volume (BP): 32.4 ml  LA Volume Index (BP): 19.6 ml/m2     RWT: 0.34        Doppler Measurements & Calculations  MV E max chelly: 79.9 cm/sec  MV A max chelly: 70.6 cm/sec  MV E/A: 1.1  Ao V2 max: 112.0 cm/sec  Ao max P.0 mmHg  SANTOS(V,D): 2.6 cm2  LV V1 max PG: 3.5 mmHg  LV V1 max: 93.0 cm/sec  E/E' av.9  Lateral E/e': 4.9  Med E to E': 6.9  Medial E/e': 6.9        _____________________________________________________________________________  __        Report approved by: Yared Mtz 10/31/2017 09:18 AM             Assessment and Plan:   1. POTS assoc with prob E-D. High hRs and near syncope  2.  Recent ortho surgeries >> both legs    PLAN  1. Discussed Nutritional strategy. Add Vitassium (2 tabs BID). Incorp smaller meals. K+ replenishment . Maintain dietary salt    2. Discussed Exercise strategy. Isometrics reviewed (espec exersize bands), but limirted by orth issues. Start Standing training slowly    3. Discussed meds. Will start fludrocortisone 0.2 QD . May add Midodrine later    RTC approx 2 months    I very much appreciated the opportunity to see and assess Mrs Kaitlynn House in the clinic today. I spent approx 45 min with patient and spouse reviewing items noted above.    Please do not hesitate to contact my office if you have any questions or concerns.      Milo Mendoza MD  Cardiac Arrhythmia Service  [facility]  [phone]      CC

## 2018-01-12 NOTE — PATIENT INSTRUCTIONS
You will be scheduled for a follow up visit: in about 2 months with Dr Mendoza    New Medications: Fludrocortisone 0.1 mg tablets. Take 2 tablets daily.     Use vitassium 2 capsules twice daily in addition to 60-80 oz of electrolyte fluid daily. (1/2 pedialyte, 1/2 water)    Try to eat foods high in potassium    Standing training- follow protocol/sheet    We encourage you to use My Chart as your primary form of communication if possible. If you need assistance in setting this up, please contact our office or ask at your follow up visit.     If you need a medication refill please contact your pharmacy. Please allow at least 3 business days for your refill to be completed.       Cardiology  Telephone Number    Rosa Andrade [phone]   Or send a message to your provider via my chart.   For scheduling procedures:    [facility]imee [facility]    Clinic appointments       [phone]   For the Device Clinic (Pacemakers and ICD's)   RN's :   Olga Pinto  During business hours: [phone]    After business hours:   [phone]- select option 4 and ask for job code [id number].          As always, Thank you for trusting us with your health care needs!

## 2018-01-12 NOTE — LETTER
1/12/2018      RE: Kaitlynn House  [address] [address]       Dear Colleague,    Thank you for the opportunity to participate in the care of your patient, Kaitlynn House, at the [facility] at [facility]. Please see a copy of my visit note below.      HPI:   Clarke is a pleasant 22 year old female who was initially evaluated by Jose Watkins NP on 11/9/2017. 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  She is currently recovering from a right tib-fib derotational osteotomy on 11/1/17 and more recent Left leg procedure for which she has braces in place currently.    Kaitlynn  presents for  Rx of postural tachycardia. If she is up more than 2-3 min, may faint    Denies chest pain or pressure or palpit at rest sitting/supine    Today was able to stand 3 min but HR kept climbing and eventually she felt near faint.    Currently on no meds. Uses salt liberally and also Propel or Pedialyte.    We spent approx 45 min reviewing history and overall Rx strategy      11/9/2017: EKG:    Tachycardia with ventricular rate of 137      PAST MEDICAL HISTORY:  Past Medical History:   Diagnosis Date     Restless legs syndrome        CURRENT MEDICATIONS:  Current Outpatient Prescriptions   Medication Sig Dispense Refill     oxyCODONE IR (ROXICODONE) 5 MG tablet Take 1 tablet (5 mg) by mouth every 4 hours as needed for pain or other (Moderate to Severe) 30 tablet 0     ondansetron (ZOFRAN ODT) 4 MG ODT tab Take 1 tablet (4 mg) by mouth every 8 hours as needed for nausea 10 tablet 1     gabapentin (NEURONTIN) 300 MG capsule Take 1 capsule (300 mg) by mouth daily 15 capsule 1     acetaminophen  (TYLENOL) 325 MG tablet Take 2 tablets (650 mg) by mouth every 4 hours as needed for other (mild pain) 100 tablet 0     senna-docusate (SENOKOT-S;PERICOLACE) 8.6-50 MG per tablet Take 1-2 tablets by mouth 2 times daily Take while on oral narcotics to prevent or treat constipation. 26 tablet 0     hydrOXYzine (ATARAX) 25 MG tablet Take 1 tablet (25 mg) by mouth every 6 hours as needed for itching (and nausea) 30 tablet 0     aspirin  MG EC tablet Take 1 tablet (325 mg) by mouth daily To be taken for DVT Prophylaxis daily 28 tablet 0     AMITRIPTYLINE HCL PO Take 20 mg by mouth nightly as needed for sleep       order for DME Crutches 1 Units 0     Naproxen Sodium (ALEVE PO) Take 2 tablets by mouth every 12 hours as needed for moderate pain       norelgestromin-ethinyl estradiol (XULANE) 150-35 MCG/24HR patch Place 1 patch onto the skin once a week Remove old patch and apply new patch onto the skin once a week for 3 weeks (21 days). Do not wear patch week 4 (days 22-28), then repeat.       oxyCODONE (OXYCONTIN) 10 MG 12 hr tablet Take 1 tablet (10 mg) by mouth nightly as needed for moderate to severe pain (Patient not taking: Reported on 1/12/2018) 5 tablet 0       PAST SURGICAL HISTORY:  Past Surgical History:   Procedure Laterality Date     ARTHROSCOPY KNEE WITH TIBIAL TUBERCLE SHIFT Left 12/15/2017    Procedure: ARTHROSCOPY KNEE WITH TIBIAL TUBERCLE SHIFT;;  Surgeon: Di Schwartz MD;  [facility]     OPEN REDUCTION INTERNAL FIXATION RODDING INTRAMEDULLARY TIBIA Left 12/15/2017    Procedure: OPEN REDUCTION INTERNAL FIXATION RODDING INTRAMEDULLARY TIBIA;  Left Knee Arthroscopy, Tibial Derotational Osteotomy with Intermedullary Rodding, Fibular Derotational Osteotmy, Distal Tibial Tubercle Osteotomy;  Surgeon: Dio Olivarez MD;  [facility]       ALLERGIES:     Allergies   Allergen Reactions     Egg [Chicken-Derived Products (Egg)] Anaphylaxis     Amoxicillin Hives     Zithromax  "[Azithromycin] Nausea and Vomiting     Compazine [Prochlorperazine] Other (See Comments)     delirious       FAMILY HISTORY:  No family history on file.     SOCIAL HISTORY:  Social History   Substance Use Topics     Smoking status: Never Smoker     Smokeless tobacco: Never Used     Alcohol use No       ROS:   Constitutional: No fever, chills, or sweats. Weight stable.   ENT: No visual disturbance, ear ache, epistaxis, sore throat.   Cardiovascular: As per HPI.   Respiratory: No cough, hemoptysis.    GI: No nausea, vomiting, hematemesis, melena, or hematochezia.   : No hematuria.   Integument: Negative.   Psychiatric: Negative.   Hematologic:  Easy bruising, no easy bleeding.  Neuro: Negative.   Endocrinology: No significant heat or cold intolerance   Musculoskeletal: Multiple ortho issues with current braces on Left leg.    Exam:  /88 (BP Location: Right arm, Patient Position: Standing, Cuff Size: Adult Regular)  Pulse 159  Ht 1.753 m (5' 9\")  Wt 56.5 kg (124 lb 9.6 oz)  LMP 12/01/2017 (Approximate)  SpO2 100%  BMI 18.4 kg/m2  GENERAL APPEARANCE: Thin , pleasant, alert and no distress but lying on exam table as has brace ion Left leg  HEENT: no icterus, no xanthelasmas, normal pupil size and reaction, normal palate, mucosa moist, no central cyanosis  NECK: no adenopathy, no asymmetry, masses, or scars, thyroid normal to palpation and no bruits, JVP not elevated  RESPIRATORY: lungs clear to auscultation - no rales, rhonchi or wheezes, no use of accessory muscles, no retractions, respirations are unlabored, normal respiratory rate  CARDIOVASCULAR: regular rhythm, normal S1 with physiologic split S2, no S3 or S4 and no murmur, click or rub, precordium quiet with normal PMI.  ABDOMEN: soft, non tender,  bowel sounds normal, aorta not enlarged by palpation, no abdominal bruits  EXTREMITIES: peripheral pulses normal, no edema, no bruits  NEURO: alert and oriented to person/place/time, normal speech, gait and " affect  VASC: pulses are normal in volumes and symmetric bilaterally. No bruits are heard.  SKIN: no ecchymoses, no rashes    Labs:  CBC RESULTS:   Lab Results   Component Value Date    WBC 5.9 2017    RBC 3.39 (L) 2017    HGB 10.2 (L) 2017    HCT 29.3 (L) 2017    MCV 86 2017    MCH 30.1 2017    MCHC 34.8 2017    RDW 12.3 2017     2017       BMP RESULTS:  Lab Results   Component Value Date     2017    POTASSIUM 3.8 2017    CHLORIDE 104 2017    CO2 29 2017    ANIONGAP 5 2017    GLC 80 2017    BUN 10 2017    CR 0.54 2017    GFRESTIMATED >90 2017    GFRESTBLACK >90 2017    CHANI 8.6 2017        Procedures:    ECHOCARDIOGRAM:   Recent Results (from the past 8760 hour(s))   Echocardiogram    Narrative    [id number]  [id number]  [id number]  [id number]^CORINE^AMANDA^           [facility]  Echocardiography Laboratory  [address] [place] [address]     Name: OSIEL OSBORNE  MRN: [id number]  : 1995  Study Date: 10/30/2017 05:06 PM  Age: 22 yrs  Gender: Female  Patient Location: [facility] [place]  Reason For Study: Syncope and collapse  Ordering Physician: AMANDA POOL  Referring Physician: AMANDA POOL  Performed By: Beck Osei RDCS     BSA: 1.7 m2  Height: 67 in  Weight: 124 lb  HR: 91  BP: 122/84 mmHg  _____________________________________________________________________________  __        Procedure  Echocardiogram with two-dimensional, color and spectral Doppler performed.  _____________________________________________________________________________  __        Interpretation Summary  Global and regional left ventricular function is normal with an EF of 55-60%.  Global right ventricular function is normal.  No significant valvular abnormalities were noted.  The inferior vena cava was normal in size with preserved respiratory  variability.  No  pericardial effusion is present.  Previous study not available for comparison.  _____________________________________________________________________________  __        Left Ventricle  Left ventricular size is normal. Left ventricular wall thickness is normal.  Global and regional left ventricular function is normal with an EF of 55-60%.  Normal left ventricular filling for age. No regional wall motion abnormalities  are seen.     Right Ventricle  The right ventricle is normal size. Global right ventricular function is  normal.     Atria  Both atria appear normal.     Mitral Valve  The mitral valve is normal.        Aortic Valve  Aortic valve is normal in structure and function. The aortic valve is  tricuspid.     Tricuspid Valve  The tricuspid valve is normal. The peak velocity of the tricuspid regurgitant  jet is not obtainable.     Pulmonic Valve  The pulmonic valve is normal.     Vessels  The aorta root is normal. The inferior vena cava was normal in size with  preserved respiratory variability.     Pericardium  No pericardial effusion is present.        Compared to Previous Study  Previous study not available for comparison.     Attestation  I have personally viewed the imaging and agree with the interpretation and  report as documented by the fellow, Durga Goodman, and/or edited by me.  _____________________________________________________________________________  __     MMode/2D Measurements & Calculations  IVSd: 0.71 cm  LVIDd: 4.0 cm  LVIDs: 2.6 cm  LVPWd: 0.68 cm  FS: 34.3 %  EDV(Teich): 68.7 ml  ESV(Teich): 24.8 ml  LV mass(C)d: 76.4 grams  LV mass(C)dI: 46.3 grams/m2  asc Aorta Diam: 2.9 cm  LVOT diam: 2.0 cm  LVOT area: 3.1 cm2  LA Volume (BP): 32.4 ml  LA Volume Index (BP): 19.6 ml/m2     RWT: 0.34        Doppler Measurements & Calculations  MV E max chelly: 79.9 cm/sec  MV A max chelly: 70.6 cm/sec  MV E/A: 1.1  Ao V2 max: 112.0 cm/sec  Ao max P.0 mmHg  SANTOS(V,D): 2.6 cm2  LV V1 max PG: 3.5 mmHg  LV V1  max: 93.0 cm/sec  E/E' av.9  Lateral E/e': 4.9  Med E to E': 6.9  Medial E/e': 6.9        _____________________________________________________________________________  __        Report approved by: Yared Mtz 10/31/2017 09:18 AM          Assessment and Plan:   1. POTS assoc with prob E-D. High hRs and near syncope  2. Recent ortho surgeries >> both legs    PLAN  1. Discussed Nutritional strategy. Add Vitassium (2 tabs BID). Incorp smaller meals. K+ replenishment . Maintain dietary salt    2. Discussed Exercise strategy. Isometrics reviewed (espec exersize bands), but limirted by orth issues. Start Standing training slowly    3. Discussed meds. Will start fludrocortisone 0.2 QD . May add Midodrine later    RTC approx 2 months    I very much appreciated the opportunity to see and assess Mrs Kaitlynn House in the clinic today. I spent approx 45 min with patient and spouse reviewing items noted above.    Please do not hesitate to contact my office if you have any questions or concerns.      Milo Mendoza MD  Cardiac Arrhythmia Service  [facility]  [phone]

## 2018-01-12 NOTE — MR AVS SNAPSHOT
After Visit Summary   1/12/2018    Kaitlynn House    MRN: [id number]           Patient Information     Date Of Birth          1995        Visit Information        Provider Department      1/12/2018 3:00 PM Milo Mendoza MD [facility]        Today's Diagnoses     POTS (postural orthostatic tachycardia syndrome)    -  1    EDS (Carlos-Danlos syndrome)          Care Instructions    You will be scheduled for a follow up visit: in about 2 months with Dr Mendoza    New Medications: Fludrocortisone 0.1 mg tablets. Take 2 tablets daily.     Use vitassium 2 capsules twice daily in addition to 60-80 oz of electrolyte fluid daily. (1/2 pedialyte, 1/2 water)    Try to eat foods high in potassium    Standing training- follow protocol/sheet    We encourage you to use My Chart as your primary form of communication if possible. If you need assistance in setting this up, please contact our office or ask at your follow up visit.     If you need a medication refill please contact your pharmacy. Please allow at least 3 business days for your refill to be completed.       Cardiology  Telephone Number    Rosa Andrade [phone]   Or send a message to your provider via my chart.   For scheduling procedures:    [facility]    Clinic appointments       [phone]   For the Device Clinic (Pacemakers and ICD's)   RN's :   Olga Pinto  During business hours: [phone]    After business hours:   [phone]- select option 4 and ask for job code [id number].          As always, Thank you for trusting us with your health care needs!          Follow-ups after your visit        Additional Services     Follow-Up with Electrophysiologist - 3 Months                 Your next 10 appointments already scheduled     Jan 16, 2018 12:50 PM CST   ELSY Extremity with BRINA Stack [facility] ELSY ([facility])    [address] [place] [address]  [address] [id number] [phone]            Jan 19, 2018  2:50 PM CST   ELSY Extremity with Di Ahumada PT   [facility] E[facility] ([facility])    [address] [place] [phone]            Jan 23, 2018 12:50 PM CST   ELSY Extremity with Olga Wood PTA   [facility] E[facility]Y ([facility] [place])    [address] [place] [address] [phone]            Jan 25, 2018 12:50 PM CST   ELSY Extremity with Tatiana Pereira PT   [facility] E[facility]Y ([facility])    [address] [place] [address] [phone]            Jan 30, 2018 12:50 PM CST   ELSY Extremity with Tatiana Pereira PT   [facility] E[facility]Y ([facility])    [address] [place] [address] [phone]            Jan 30, 2018  2:45 PM CST   (Arrive by 2:30 PM)   RETURN KNEE with Di Schwartz MD   [facility] ([facility] [place])    [address] [place] [address] [phone] [phone]            Jan 30, 2018  3:00 PM CST   (Arrive by 2:45 PM)   POST-OP FOOT/ANKLE with Dio Olivarez MD   [facility] ([facility])    [address] [place] [phone] [phone]            Feb 01, 2018 12:50 PM CST   ELSY Extremity with Tatiana Pereira PT   [facility] E[facility]Y ([facility])    [address] [place] [address] [phone]            Apr 12, 2018  1:10 PM CDT   (Arrive by 12:55 PM)   RETURN ARRHYTHMIA with Milo Mendoza MD   [facility] ([facility])    [address] [place] [id number]  "  [phone]              Future tests that were ordered for you today     Open Future Orders        Priority Expected Expires Ordered    Follow-Up with Electrophysiologist - 3 Months Routine 4/12/2018 7/11/2018 1/12/2018            Who to contact     If you have questions or need follow up information about today's clinic visit or your schedule please contact [facility] directly at [phone].  Normal or non-critical lab and imaging results will be communicated to you by [employer], letter or phone within 4 business days after the clinic has received the results. If you do not hear from us within 7 days, please contact the clinic through Mash[employer] or phone. If you have a critical or abnormal lab result, we will notify you by phone as soon as possible.  Submit refill requests through [employer] or call your pharmacy and they will forward the refill request to us. Please allow 3 business days for your refill to be completed.          Additional Information About Your Visit        [employer] Information     Term[employer]out gives you secure access to your electronic health record. If you see a primary care provider, you can also send messages to your care team and make appointments. If you have questions, please call your primary care clinic.  If you do not have a primary care provider, please call [phone] and they will assist you.        Care EveryWhere ID     This is your Care EveryWhere ID. This could be used by other organizations to access your [place] medical records  [id number]        Your Vitals Were     Pulse Height Last Period Pulse Oximetry BMI (Body Mass Index)       159 1.753 m (5' 9\") 12/01/2017 (Approximate) 100% 18.4 kg/m2        Blood Pressure from Last 3 Encounters:   01/12/18 118/88   12/20/17 109/74   12/16/17 100/56    Weight from Last 3 Encounters:   01/12/18 56.5 kg (124 lb 9.6 oz)   12/15/17 54.7 kg (120 lb 9.5 oz)   10/24/17 56.2 kg (124 lb)                 Today's Medication Changes        "   These changes are accurate as of: 1/12/18  4:05 PM.  If you have any questions, ask your nurse or doctor.               Start taking these medicines.        Dose/Directions    fludrocortisone 0.1 MG tablet   Commonly known as:  FLORINEF   Used for:  POTS (postural orthostatic tachycardia syndrome), EDS (Carlos-Danlos syndrome)   Started by:  Milo Mendoza MD        Dose:  0.1 mg   Take 1 tablet (0.1 mg) by mouth 2 times daily   Quantity:  180 tablet   Refills:  3            Where to get your medicines      These medications were sent to [facility] [address] [place] [address] [address] [address]     Phone:  [phone]     fludrocortisone 0.1 MG tablet                Primary Care Provider Fax #    Physician No Ref-Primary [phone]       No address on file        Equal Access to Services     [facility]: Richelle Roper, wavince bonilla, wilbur kaalmada alondra, parag penaloza . So [facility] [phone].    ATENCIÓN: Si habla español, tiene a cristobal disposición servicios gratuitos de asistencia lingüística. Isis al [phone].    We comply with applicable federal civil rights laws and [place] laws. We do not discriminate on the basis of race, color, national origin, age, disability, sex, sexual orientation, or gender identity.            Thank you!     Thank you for choosing [facility]  for your care. Our goal is always to provide you with excellent care. Hearing back from our patients is one way we can continue to improve our services. Please take a few minutes to complete the written survey that you may receive in the mail after your visit with us. Thank you!             Your Updated Medication List - Protect others around you: Learn how to safely use, store and throw away your medicines at www.disposemymeds.org.          This list is accurate as of: 1/12/18  4:05 PM.   Always use your most recent med list.                   Brand Name Dispense Instructions for use Diagnosis    acetaminophen 325 MG tablet    TYLENOL    100 tablet    Take 2 tablets (650 mg) by mouth every 4 hours as needed for other (mild pain)    Status post surgery       ALEVE PO      Take 2 tablets by mouth every 12 hours as needed for moderate pain        AMITRIPTYLINE HCL PO      Take 20 mg by mouth nightly as needed for sleep        aspirin  MG EC tablet     28 tablet    Take 1 tablet (325 mg) by mouth daily To be taken for DVT Prophylaxis daily    Status post surgery       fludrocortisone 0.1 MG tablet    FLORINEF    180 tablet    Take 1 tablet (0.1 mg) by mouth 2 times daily    POTS (postural orthostatic tachycardia syndrome), EDS (Carlos-Danlos syndrome)       gabapentin 300 MG capsule    NEURONTIN    15 capsule    Take 1 capsule (300 mg) by mouth daily    Pain at surgical site       hydrOXYzine 25 MG tablet    ATARAX    30 tablet    Take 1 tablet (25 mg) by mouth every 6 hours as needed for itching (and nausea)    Status post surgery       ondansetron 4 MG ODT tab    ZOFRAN ODT    10 tablet    Take 1 tablet (4 mg) by mouth every 8 hours as needed for nausea        order for DME     1 Units    Crutches    Tibial torsion, right       * oxyCODONE 10 MG 12 hr tablet    OXYCONTIN    5 tablet    Take 1 tablet (10 mg) by mouth nightly as needed for moderate to severe pain    Status post surgery       * oxyCODONE IR 5 MG tablet    ROXICODONE    30 tablet    Take 1 tablet (5 mg) by mouth every 4 hours as needed for pain or other (Moderate to Severe)    Status post surgery       senna-docusate 8.6-50 MG per tablet    SENOKOT-S;PERICOLACE    26 tablet    Take 1-2 tablets by mouth 2 times daily Take while on oral narcotics to prevent or treat constipation.    Status post surgery       XULANE 150-35 MCG/24HR patch   Generic drug:  norelgestromin-ethinyl estradiol      Place 1 patch onto the skin once a week  Remove old patch and apply new patch onto the skin once a week for 3 weeks (21 days). Do not wear patch week 4 (days 22-28), then repeat.        * Notice:  This list has 2 medication(s) that are the same as other medications prescribed for you. Read the directions carefully, and ask your doctor or other care provider to review them with you.

## 2018-01-12 NOTE — NURSING NOTE
Chief Complaint   Patient presents with     Follow Up For     ortho b/p, ref by Shell Godwin NP for POTS. recommended tilt study- but unable to schd r/t knee surgeries. (11/9/17- June BENSON)     Vitals were taken and medications were reconciled.   Julia Leung MA    2:52 PM

## 2018-01-19 ENCOUNTER — THERAPY VISIT (OUTPATIENT)
Dept: PHYSICAL THERAPY | Facility: CLINIC | Age: 23
End: 2018-01-19
Payer: COMMERCIAL

## 2018-01-19 DIAGNOSIS — R26.9 ABNORMAL GAIT: ICD-10-CM

## 2018-01-19 DIAGNOSIS — Z98.890 S/P KNEE SURGERY: ICD-10-CM

## 2018-01-19 DIAGNOSIS — M25.362 PATELLAR INSTABILITY OF BOTH KNEES: ICD-10-CM

## 2018-01-19 DIAGNOSIS — M25.561 PAIN IN BOTH KNEES, UNSPECIFIED CHRONICITY: ICD-10-CM

## 2018-01-19 DIAGNOSIS — M25.562 PAIN IN BOTH KNEES, UNSPECIFIED CHRONICITY: ICD-10-CM

## 2018-01-19 DIAGNOSIS — M25.361 PATELLAR INSTABILITY OF BOTH KNEES: ICD-10-CM

## 2018-01-19 DIAGNOSIS — R60.0 LOCALIZED EDEMA: ICD-10-CM

## 2018-01-19 PROCEDURE — 97110 THERAPEUTIC EXERCISES: CPT | Mod: GP | Performed by: PHYSICAL THERAPIST

## 2018-01-19 PROCEDURE — 97016 VASOPNEUMATIC DEVICE THERAPY: CPT | Mod: GP | Performed by: PHYSICAL THERAPIST

## 2018-01-23 ENCOUNTER — THERAPY VISIT (OUTPATIENT)
Dept: PHYSICAL THERAPY | Facility: CLINIC | Age: 23
End: 2018-01-23
Payer: COMMERCIAL

## 2018-01-23 DIAGNOSIS — M25.362 PATELLAR INSTABILITY OF BOTH KNEES: ICD-10-CM

## 2018-01-23 DIAGNOSIS — M25.361 PATELLAR INSTABILITY OF BOTH KNEES: ICD-10-CM

## 2018-01-23 DIAGNOSIS — M25.562 PAIN IN BOTH KNEES, UNSPECIFIED CHRONICITY: ICD-10-CM

## 2018-01-23 DIAGNOSIS — R26.9 ABNORMAL GAIT: ICD-10-CM

## 2018-01-23 DIAGNOSIS — M25.561 PAIN IN BOTH KNEES, UNSPECIFIED CHRONICITY: ICD-10-CM

## 2018-01-23 DIAGNOSIS — Z98.890 S/P KNEE SURGERY: ICD-10-CM

## 2018-01-23 DIAGNOSIS — R60.0 LOCALIZED EDEMA: ICD-10-CM

## 2018-01-23 PROCEDURE — 97016 VASOPNEUMATIC DEVICE THERAPY: CPT | Mod: GP | Performed by: PHYSICAL THERAPY ASSISTANT

## 2018-01-23 PROCEDURE — 97110 THERAPEUTIC EXERCISES: CPT | Mod: GP | Performed by: PHYSICAL THERAPY ASSISTANT

## 2018-01-23 PROCEDURE — 97140 MANUAL THERAPY 1/> REGIONS: CPT | Mod: GP | Performed by: PHYSICAL THERAPY ASSISTANT

## 2018-01-25 ENCOUNTER — THERAPY VISIT (OUTPATIENT)
Dept: PHYSICAL THERAPY | Facility: CLINIC | Age: 23
End: 2018-01-25
Payer: COMMERCIAL

## 2018-01-25 DIAGNOSIS — R26.9 ABNORMAL GAIT: ICD-10-CM

## 2018-01-25 DIAGNOSIS — M25.362 PATELLAR INSTABILITY OF BOTH KNEES: ICD-10-CM

## 2018-01-25 DIAGNOSIS — M25.361 PATELLAR INSTABILITY OF BOTH KNEES: ICD-10-CM

## 2018-01-25 DIAGNOSIS — M25.562 PAIN IN BOTH KNEES, UNSPECIFIED CHRONICITY: ICD-10-CM

## 2018-01-25 DIAGNOSIS — R60.0 LOCALIZED EDEMA: ICD-10-CM

## 2018-01-25 DIAGNOSIS — Z98.890 S/P KNEE SURGERY: ICD-10-CM

## 2018-01-25 DIAGNOSIS — M25.561 PAIN IN BOTH KNEES, UNSPECIFIED CHRONICITY: ICD-10-CM

## 2018-01-25 PROCEDURE — 97110 THERAPEUTIC EXERCISES: CPT | Mod: GP | Performed by: PHYSICAL THERAPIST

## 2018-01-25 PROCEDURE — 97140 MANUAL THERAPY 1/> REGIONS: CPT | Mod: GP | Performed by: PHYSICAL THERAPIST

## 2018-01-29 ENCOUNTER — PRE VISIT (OUTPATIENT)
Dept: ORTHOPEDICS | Facility: CLINIC | Age: 23
End: 2018-01-29

## 2018-01-29 DIAGNOSIS — Z98.890 STATUS POST OSTEOTOMY: Primary | ICD-10-CM

## 2018-01-29 NOTE — TELEPHONE ENCOUNTER
Patient is s/p left derotation of tibia and fibula osteotomy and distilization of the tibial tunercle on 12/19/2017.    Patient was last seen on 1/3/2018 for a nurse post op visit.    Patient is coming to clinic for 6 weeks post op visit.      X-ray imaging has been ordered and scheduled.  Left Tib/Fib AP and Lat and left knee 20 degree  Patient is also seeing Dr. Olivarez at 3:00pm     Patient visit type and questionnaires have been reviewed and are correct for this appointment? Yes    Marimar Bender ATC

## 2018-01-30 ENCOUNTER — RADIANT APPOINTMENT (OUTPATIENT)
Dept: GENERAL RADIOLOGY | Facility: CLINIC | Age: 23
End: 2018-01-30
Attending: ORTHOPAEDIC SURGERY
Payer: COMMERCIAL

## 2018-01-30 ENCOUNTER — OFFICE VISIT (OUTPATIENT)
Dept: ORTHOPEDICS | Facility: CLINIC | Age: 23
End: 2018-01-30
Payer: COMMERCIAL

## 2018-01-30 ENCOUNTER — THERAPY VISIT (OUTPATIENT)
Dept: PHYSICAL THERAPY | Facility: CLINIC | Age: 23
End: 2018-01-30
Payer: COMMERCIAL

## 2018-01-30 DIAGNOSIS — R26.9 ABNORMAL GAIT: ICD-10-CM

## 2018-01-30 DIAGNOSIS — R60.0 LOCALIZED EDEMA: ICD-10-CM

## 2018-01-30 DIAGNOSIS — Z98.890 S/P KNEE SURGERY: ICD-10-CM

## 2018-01-30 DIAGNOSIS — M25.361 PATELLAR INSTABILITY OF BOTH KNEES: ICD-10-CM

## 2018-01-30 DIAGNOSIS — M25.561 PAIN IN BOTH KNEES, UNSPECIFIED CHRONICITY: ICD-10-CM

## 2018-01-30 DIAGNOSIS — Z98.890 STATUS POST OSTEOTOMY: ICD-10-CM

## 2018-01-30 DIAGNOSIS — M25.562 PAIN IN BOTH KNEES, UNSPECIFIED CHRONICITY: ICD-10-CM

## 2018-01-30 DIAGNOSIS — G89.29 CHRONIC PAIN OF LEFT KNEE: Primary | ICD-10-CM

## 2018-01-30 DIAGNOSIS — Z98.890 STATUS POST OSTEOTOMY: Primary | ICD-10-CM

## 2018-01-30 DIAGNOSIS — M25.362 PATELLAR INSTABILITY OF BOTH KNEES: ICD-10-CM

## 2018-01-30 DIAGNOSIS — M25.562 CHRONIC PAIN OF LEFT KNEE: Primary | ICD-10-CM

## 2018-01-30 PROCEDURE — 97110 THERAPEUTIC EXERCISES: CPT | Mod: GP | Performed by: PHYSICAL THERAPIST

## 2018-01-30 PROCEDURE — 97530 THERAPEUTIC ACTIVITIES: CPT | Mod: GP | Performed by: PHYSICAL THERAPIST

## 2018-01-30 NOTE — LETTER
1/30/2018       RE: Kaitlynn House  [address]  [facility] [address]     Dear Colleague,    Thank you for referring your patient, Kaitlynn House, to the [facility] ORTHOP[facility]ED[facility]IC at [facility]. Please see a copy of my visit note below.    CHIEF COMPLAINT:     1.  Status post left tibia and fibula derotational osteotomy performed on 12/15/2017.   2.  Status post right tibia and fibula derotational osteotomy performed on 11/01/2017.      HISTORY OF PRESENT ILLNESS:  Mrs. House presents today for further followup in the accompaniment of her .  Reports to be doing well.  Reports to be making progress.  She presents with full range of motion of the right ankle and knee as well as well-healed surgical incisions.  On the left knee, she presents with approximately 20 degrees short of full flexion which would __ full against her buttocks.  Presents with full range of motion of the ankle joint.  Presents with well-healed surgical incisions.        The patient reports to have some pain and discomfort along the right lower leg osteotomy sites.      RADIOGRAPHIC EVALUATION:  AP and lateral x-rays of the bilateral tibias and fibulas were obtained today which were significant for showing some callus formation across the right lower leg which is a clear improvement when compared to the previous x-ray in December and there is still some radiolucency across the left lower leg.  The  hardware is intact and in place.  Alignment is excellent.      ASSESSMENT:  Status post bilateral distal tibia and fibula derotational osteotomies.      PLAN:  I discussed with the patient and her  that at this point I think that she just needs to keep advancing her level of activities.  I discussed with her that I believe that the right leg pain is secondary to the overload that she has been doing in order to protect the left one.      The patient will follow up in 6 weeks  from today.  At that time, a left knee lateral x-ray will be obtained in order to assess the tibial tubercle.      The patient will proceed with physical therapy, further advancement of it, which will be instructed by Dr. Schwartz.      All questions were answered.  The patient was pleased with the discussion.  The patient will follow up accordingly.           Again, thank you for allowing me to participate in the care of your patient.      Sincerely,    Dio Olivarez MD

## 2018-01-30 NOTE — MR AVS SNAPSHOT
After Visit Summary   1/30/2018    Kaitlynn House    MRN: [id number]           Patient Information     Date Of Birth          1995        Visit Information        Provider Department      1/30/2018 3:00 PM Dio Olivarez MD [facility]        Today's Diagnoses     Chronic pain of left knee    -  1       Follow-ups after your visit        Your next 10 appointments already scheduled     Feb 06, 2018  4:10 PM CST   ELSY Extremity with Olga Wood PTA   [facility] Physical Therapy ELSY ([facility])    [address] [place] [address] 5th [facility] [phone]            Mar 20, 2018  4:10 PM CDT   (Arrive by 3:55 PM)   POST-OP FOOT/ANKLE with Dio Olivarez MD   [facility] Orthopaedic [facility] ([facility] [place])    [address] [place] [phone] [phone]            Mar 20, 2018  4:15 PM CDT   (Arrive by 4:00 PM)   RETURN KNEE with Di Schwartz MD   [facility] ([facility] [place])    [address] [place] [phone]            Apr 12, 2018  1:10 PM CDT   (Arrive by 12:55 PM)   RETURN ARRHYTHMIA with Milo Mendoza MD   [facility] ([facility] [place])    [address] [place] [address]  [address] [place] [phone]              Who to contact     Please call your clinic at [phone] to:    Ask questions about your health    Make or cancel appointments    Discuss your medicines    Learn about your test results    Speak to your doctor   If you have compliments or concerns about an experience at your clinic, or if you wish to file a complaint, please contact [facility] Patient Relations at [phone] or email us at [email or web address]         Additional Information About Your Visit        MyChart Information      [employer] gives you secure access to your electronic health record. If you see a primary care provider, you can also send messages to your care team and make appointments. If you have questions, please call your primary care clinic.  If you do not have a primary care provider, please call [phone] and they will assist you.      [employer] is an electronic gateway that provides easy, online access to your medical records. With [employer], you can request a clinic appointment, read your test results, renew a prescription or communicate with your care team.     To access your existing account, please contact your [facility] or call [phone] for assistance.        Care EveryWhere ID     This is your Care EveryWhere ID. This could be used by other organizations to access your [place] medical records  [id number]         Blood Pressure from Last 3 Encounters:   01/12/18 118/88   12/20/17 109/74   12/16/17 100/56    Weight from Last 3 Encounters:   01/12/18 56.5 kg (124 lb 9.6 oz)   12/15/17 54.7 kg (120 lb 9.5 oz)   10/24/17 56.2 kg (124 lb)              Today, you had the following     No orders found for display       Primary Care Provider Fax #    Physician No Ref-Primary [phone]       No address on file        Equal Access to Services     CHRISTOS RICHEY : Hadii zina ku hadasho Soomaali, waaxda luqadaha, qaybta kaalmada adeegyada, parag penaloza . So [facility] [phone].    ATENCIÓN: Si habla español, tiene a cristobal disposición servicios gratuitos de asistencia lingüística. Llame al [phone].    We comply with applicable federal civil rights laws and [place] laws. We do not discriminate on the basis of race, color, national origin, age, disability, sex, sexual orientation, or gender identity.            Thank you!     Thank you for choosing [facility]TH[facility] [place]  for your care. Our goal is always to provide you with excellent care. Hearing back from  our patients is one way we can continue to improve our services. Please take a few minutes to complete the written survey that you may receive in the mail after your visit with us. Thank you!             Your Updated Medication List - Protect others around you: Learn how to safely use, store and throw away your medicines at www.disposemymeds.org.          This list is accurate as of 1/30/18 11:59 PM.  Always use your most recent med list.                   Brand Name Dispense Instructions for use Diagnosis    acetaminophen 325 MG tablet    TYLENOL    100 tablet    Take 2 tablets (650 mg) by mouth every 4 hours as needed for other (mild pain)    Status post surgery       ALEVE PO      Take 2 tablets by mouth every 12 hours as needed for moderate pain        AMITRIPTYLINE HCL PO      Take 20 mg by mouth nightly as needed for sleep        aspirin  MG EC tablet     28 tablet    Take 1 tablet (325 mg) by mouth daily To be taken for DVT Prophylaxis daily    Status post surgery       fludrocortisone 0.1 MG tablet    FLORINEF    180 tablet    Take 1 tablet (0.1 mg) by mouth 2 times daily    POTS (postural orthostatic tachycardia syndrome), EDS (Carlos-Danlos syndrome)       gabapentin 300 MG capsule    NEURONTIN    15 capsule    Take 1 capsule (300 mg) by mouth daily    Pain at surgical site       hydrOXYzine 25 MG tablet    ATARAX    30 tablet    Take 1 tablet (25 mg) by mouth every 6 hours as needed for itching (and nausea)    Status post surgery       ondansetron 4 MG ODT tab    ZOFRAN ODT    10 tablet    Take 1 tablet (4 mg) by mouth every 8 hours as needed for nausea        order for DME     1 Units    Crutches    Tibial torsion, right       * oxyCODONE 10 MG 12 hr tablet    OXYCONTIN    5 tablet    Take 1 tablet (10 mg) by mouth nightly as needed for moderate to severe pain    Status post surgery       * oxyCODONE IR 5 MG tablet    ROXICODONE    30 tablet    Take 1 tablet (5 mg) by mouth every 4 hours as needed  for pain or other (Moderate to Severe)    Status post surgery       senna-docusate 8.6-50 MG per tablet    SENOKOT-S;PERICOLACE    26 tablet    Take 1-2 tablets by mouth 2 times daily Take while on oral narcotics to prevent or treat constipation.    Status post surgery       XULANE 150-35 MCG/24HR patch   Generic drug:  norelgestromin-ethinyl estradiol      Place 1 patch onto the skin once a week Remove old patch and apply new patch onto the skin once a week for 3 weeks (21 days). Do not wear patch week 4 (days 22-28), then repeat.        * Notice:  This list has 2 medication(s) that are the same as other medications prescribed for you. Read the directions carefully, and ask your doctor or other care provider to review them with you.

## 2018-01-30 NOTE — LETTER
1/30/2018       RE: Kaitlynn House  [address]  [facility] [address]     Dear Colleague,    Thank you for referring your patient, Kaitlynn House, to the [facility] ORTHOPAEDIC [facility]INIC at [facility]. Please see a copy of my visit note below.    Postoperative follow-up clinic visit    Diagnosis: Left external tibial torsion, patella mt, right external tibial torsion  Treatment:   11/16/2017: Right tibia and fibula derotational osteotomy -Olivarez  12/15/2017 left tibial tubercle osteotomy, and tibia and fibula derotational osteotomy with fixation with intramedullary nail - Allidt    Kaitlynn House is doing fair after last surgery listed above.  She is been progressive full weightbearing on the right lower extremity 6 weeks ago. She is now 6 weeks status post the most recent left tibia and fibular derotational osteotomy with tibial tubercle osteotomy. Although she is progressing with her ambulatory status she is still using crutches of course. She has some increased soreness in the right tibia and fibula localized the osteotomy sites and is most significant with the initial impact of weightbearing as each step    EXAM:  Incision healing, clean and dry.  Joint range of motion 0-130  on the left, 0-150  on the right.  Effusion: none  Periarticular swelling or leg edema: There is no significant edema to the right lower extremity however on the left there is edema through the lower leg. It is also dependent rubor. Incisions of the bilateral knees and legs are well-healed and well approximated without erythema however there is a Vicryl stitch that is spitting over the lateral incision in the tibial tubercle incision. These were removed with an Adson. She has decreased sensation in the superficial peroneal nerve distribution on the left compared to the right. However 5 out of 5 strength with tibialis anterior gastrocsoleus complex.  Gait: Ambulates with  crutches in full weightbearing on the right lower extremity toe-touch on the left.      IMAGING:    X-ray of the right tibia and fibula reveals 3 cortices of bridging callus on the tibial osteotomy. There is not yet bridging callous anteriorly. The fibula has robust callus formation circumferentially    X-ray of left tibia and fibula reveals a very small amount of callus formation posteriorly and medially. Stable fixation with intramedullary nail. The tibial tubercle osteotomy is well fixed.    IMPRESSION:      PLAN:    Continue physical therapy.    She may continue partial weightbearing on the left lower extremity and have the brace open. We will decide at the next visit if we progress her to full weightbearing. She may be full weightbearing on the right lower extremity.    Next follow-up visit in 6 weeks with Dr. Olivarez. Recommend tibia and fibula x-rays as well as a lateral x-ray of the left knee to evaluate the tibial tubercle osteotomy.     RT: 20 minutes.     Katerin Gillis PGY-4  Orthopedic Surgery            Again, thank you for allowing me to participate in the care of your patient.      Sincerely,    Di Schwartz MD

## 2018-01-30 NOTE — NURSING NOTE
Reason For Visit:   Chief Complaint   Patient presents with     Surgical Followup     s/p left tib-fib derotational osteotomy 12/15/17       Primary MD: None  Referring MD: Self    ?  No    Occupation: Neuro Diagnostic Technician.  Currently working? No.  Work status?  short term disability.    Date of injury: None  Type of injury: NA.    Date of surgery: 12/15/17  Type of surgery:   Left limb:  1.  Derotation osteotomy of the tibia with intramedullary fixation.   2.  Derotation osteotomy of fibula.   3.  Distalization of the tibial tubercle.       Smoker: No    There were no vitals taken for this visit.    Pain Assessment  Patient Currently in Pain: Yes  0-10 Pain Scale: 4  Primary Pain Location: Knee  Pain Orientation: Left  Pain Descriptors: Sharp, Aching  Aggravating Factors:  (sharp pains with certain movements)    Lori Marti CMA  1/30/2018

## 2018-01-30 NOTE — PROGRESS NOTES
CHIEF COMPLAINT:     1.  Status post left tibia and fibula derotational osteotomy performed on 12/15/2017.   2.  Status post right tibia and fibula derotational osteotomy performed on 11/01/2017.      HISTORY OF PRESENT ILLNESS:  Mrs. House presents today for further followup in the accompaniment of her .  Reports to be doing well.  Reports to be making progress.  She presents with full range of motion of the right ankle and knee as well as well-healed surgical incisions.  On the left knee, she presents with approximately 20 degrees short of full flexion which would __ full against her buttocks.  Presents with full range of motion of the ankle joint.  Presents with well-healed surgical incisions.        The patient reports to have some pain and discomfort along the right lower leg osteotomy sites.      RADIOGRAPHIC EVALUATION:  AP and lateral x-rays of the bilateral tibias and fibulas were obtained today which were significant for showing some callus formation across the right lower leg which is a clear improvement when compared to the previous x-ray in December and there is still some radiolucency across the left lower leg.  The  hardware is intact and in place.  Alignment is excellent.      ASSESSMENT:  Status post bilateral distal tibia and fibula derotational osteotomies.      PLAN:  I discussed with the patient and her  that at this point I think that she just needs to keep advancing her level of activities.  I discussed with her that I believe that the right leg pain is secondary to the overload that she has been doing in order to protect the left one.      The patient will follow up in 6 weeks from today.  At that time, a left knee lateral x-ray will be obtained in order to assess the tibial tubercle.      The patient will proceed with physical therapy, further advancement of it, which will be instructed by Dr. Schwartz.      All questions were answered.  The patient was pleased with the  discussion.  The patient will follow up accordingly.

## 2018-01-30 NOTE — MR AVS SNAPSHOT
After Visit Summary   1/30/2018    Kaitlynn House    MRN: [id number]           Patient Information     Date Of Birth          1995        Visit Information        Provider Department      1/30/2018 2:45 PM Di Schwartz MD [facility]        Today's Diagnoses     Status post osteotomy    -  1       Follow-ups after your visit        Your next 10 appointments already scheduled     Feb 06, 2018  4:10 PM CST   ELSY Extremity with Olga Wood PTA   [facility] ([facility])    [address] [place] [address] 5th [facility] [id number] [phone]            Mar 20, 2018  4:10 PM CDT   (Arrive by 3:55 PM)   POST-OP FOOT/ANKLE with Dio Olivarez MD   [facility] ([facility])    [address] [place] [id number] [phone]            Mar 20, 2018  4:15 PM CDT   (Arrive by 4:00 PM)   RETURN KNEE with Di Schwartz MD   [facility] ([facility])    [address] [place] [id number] [phone]            Apr 12, 2018  1:10 PM CDT   (Arrive by 12:55 PM)   RETURN ARRHYTHMIA with Milo Mendoza MD   [facility] ([facility])    [address] [place] [id number] [phone]              Who to contact     Please call your clinic at [phone] to:    Ask questions about your health    Make or cancel appointments    Discuss your medicines    Learn about your test results    Speak to your doctor   If you have compliments or concerns about an experience at your clinic, or if you wish to file a complaint, please contact [facility] Patient Relations at [phone] or email us at [email or web address]         Additional Information About Your Visit        MyChart Information      [employer] gives you secure access to your electronic health record. If you see a primary care provider, you can also send messages to your care team and make appointments. If you have questions, please call your primary care clinic.  If you do not have a primary care provider, please call [phone] and they will assist you.      [employer] is an electronic gateway that provides easy, online access to your medical records. With [employer], you can request a clinic appointment, read your test results, renew a prescription or communicate with your care team.     To access your existing account, please contact your [facility] or call [phone] for assistance.        Care EveryWhere ID     This is your Care EveryWhere ID. This could be used by other organizations to access your [place] medical records  [id number]         Blood Pressure from Last 3 Encounters:   01/12/18 118/88   12/20/17 109/74   12/16/17 100/56    Weight from Last 3 Encounters:   01/12/18 56.5 kg (124 lb 9.6 oz)   12/15/17 54.7 kg (120 lb 9.5 oz)   10/24/17 56.2 kg (124 lb)               Primary Care Provider Fax #    Physician No Ref-Primary [phone]       No address on file        Equal Access to Services     CHRISTOS RICHEY AH: Hadii zina harper hadasho Soomaali, waaxda luqadaha, qaybta kaalmada adeegyada, parag caballero. So [facility] [phone].    ATENCIÓN: Si habla español, tiene a cristobal disposición servicios gratuitos de asistencia lingüística. Llame al [phone].    We comply with applicable federal civil rights laws and [place] laws. We do not discriminate on the basis of race, color, national origin, age, disability, sex, sexual orientation, or gender identity.            Thank you!     Thank you for choosing [facility]TH[facility]  for your care. Our goal is always to provide you with excellent care. Hearing back from our patients is one way we can continue to improve our services.  Please take a few minutes to complete the written survey that you may receive in the mail after your visit with us. Thank you!             Your Updated Medication List - Protect others around you: Learn how to safely use, store and throw away your medicines at www.disposemymeds.org.          This list is accurate as of 1/30/18 11:59 PM.  Always use your most recent med list.                   Brand Name Dispense Instructions for use Diagnosis    acetaminophen 325 MG tablet    TYLENOL    100 tablet    Take 2 tablets (650 mg) by mouth every 4 hours as needed for other (mild pain)    Status post surgery       ALEVE PO      Take 2 tablets by mouth every 12 hours as needed for moderate pain        AMITRIPTYLINE HCL PO      Take 20 mg by mouth nightly as needed for sleep        aspirin  MG EC tablet     28 tablet    Take 1 tablet (325 mg) by mouth daily To be taken for DVT Prophylaxis daily    Status post surgery       fludrocortisone 0.1 MG tablet    FLORINEF    180 tablet    Take 1 tablet (0.1 mg) by mouth 2 times daily    POTS (postural orthostatic tachycardia syndrome), EDS (Carlos-Danlos syndrome)       gabapentin 300 MG capsule    NEURONTIN    15 capsule    Take 1 capsule (300 mg) by mouth daily    Pain at surgical site       hydrOXYzine 25 MG tablet    ATARAX    30 tablet    Take 1 tablet (25 mg) by mouth every 6 hours as needed for itching (and nausea)    Status post surgery       ondansetron 4 MG ODT tab    ZOFRAN ODT    10 tablet    Take 1 tablet (4 mg) by mouth every 8 hours as needed for nausea        order for DME     1 Units    Crutches    Tibial torsion, right       * oxyCODONE 10 MG 12 hr tablet    OXYCONTIN    5 tablet    Take 1 tablet (10 mg) by mouth nightly as needed for moderate to severe pain    Status post surgery       * oxyCODONE IR 5 MG tablet    ROXICODONE    30 tablet    Take 1 tablet (5 mg) by mouth every 4 hours as needed for pain or other (Moderate to Severe)    Status post surgery        senna-docusate 8.6-50 MG per tablet    SENOKOT-S;PERICOLACE    26 tablet    Take 1-2 tablets by mouth 2 times daily Take while on oral narcotics to prevent or treat constipation.    Status post surgery       XULANE 150-35 MCG/24HR patch   Generic drug:  norelgestromin-ethinyl estradiol      Place 1 patch onto the skin once a week Remove old patch and apply new patch onto the skin once a week for 3 weeks (21 days). Do not wear patch week 4 (days 22-28), then repeat.        * Notice:  This list has 2 medication(s) that are the same as other medications prescribed for you. Read the directions carefully, and ask your doctor or other care provider to review them with you.

## 2018-01-30 NOTE — PROGRESS NOTES
Postoperative follow-up clinic visit    Diagnosis: Left external tibial torsion, patella mt, right external tibial torsion  Treatment:   11/16/2017: Right tibia and fibula derotational osteotomy -Olivarez  12/15/2017 left tibial tubercle osteotomy, and tibia and fibula derotational osteotomy with fixation with intramedullary nail - Efren Chappell Lacy is doing fair after last surgery listed above.  She is been progressive full weightbearing on the right lower extremity 6 weeks ago. She is now 6 weeks status post the most recent left tibia and fibular derotational osteotomy with tibial tubercle osteotomy. Although she is progressing with her ambulatory status she is still using crutches of course. She has some increased soreness in the right tibia and fibula localized the osteotomy sites and is most significant with the initial impact of weightbearing as each step    EXAM:  Incision healing, clean and dry.  Joint range of motion 0-130  on the left, 0-150  on the right.  Effusion: none  Periarticular swelling or leg edema: There is no significant edema to the right lower extremity however on the left there is edema through the lower leg. It is also dependent rubor. Incisions of the bilateral knees and legs are well-healed and well approximated without erythema however there is a Vicryl stitch that is spitting over the lateral incision in the tibial tubercle incision. These were removed with an Adson. She has decreased sensation in the superficial peroneal nerve distribution on the left compared to the right. However 5 out of 5 strength with tibialis anterior gastrocsoleus complex.  Gait: Ambulates with crutches in full weightbearing on the right lower extremity toe-touch on the left.      IMAGING:    X-ray of the right tibia and fibula reveals 3 cortices of bridging callus on the tibial osteotomy. There is not yet bridging callous anteriorly. The fibula has robust callus formation circumferentially    X-ray  of left tibia and fibula reveals a very small amount of callus formation posteriorly and medially. Stable fixation with intramedullary nail. The tibial tubercle osteotomy is well fixed.    IMPRESSION:      PLAN:    Continue physical therapy.    She may continue partial weightbearing on the left lower extremity and have the brace open. We will decide at the next visit if we progress her to full weightbearing. She may be full weightbearing on the right lower extremity.    Next follow-up visit in 6 weeks with Dr. Olivarez. Recommend tibia and fibula x-rays as well as a lateral x-ray of the left knee to evaluate the tibial tubercle osteotomy.     RT: 20 minutes.     Katerin Gillis PGY-4  Orthopedic Surgery

## 2018-01-30 NOTE — NURSING NOTE
Reason For Visit:   Chief Complaint   Patient presents with     Surgical Followup     s/p Left tib fib osteotomy DOS 12/15/17. Right leg pain. s/p Status post right tibia and fibula derotational osteotomy performed on 11/01/2017       Pain Assessment  Patient Currently in Pain: Yes  0-10 Pain Scale: 4  Primary Pain Location: Knee  Pain Orientation: Left               HEIGHT: Data Unavailable, WEIGHT: 0 lbs 0 oz, BMI: There is no height or weight on file to calculate BMI.      Current Outpatient Prescriptions   Medication Sig Dispense Refill     fludrocortisone (FLORINEF) 0.1 MG tablet Take 1 tablet (0.1 mg) by mouth 2 times daily 180 tablet 3     oxyCODONE IR (ROXICODONE) 5 MG tablet Take 1 tablet (5 mg) by mouth every 4 hours as needed for pain or other (Moderate to Severe) 30 tablet 0     ondansetron (ZOFRAN ODT) 4 MG ODT tab Take 1 tablet (4 mg) by mouth every 8 hours as needed for nausea 10 tablet 1     gabapentin (NEURONTIN) 300 MG capsule Take 1 capsule (300 mg) by mouth daily 15 capsule 1     oxyCODONE (OXYCONTIN) 10 MG 12 hr tablet Take 1 tablet (10 mg) by mouth nightly as needed for moderate to severe pain 5 tablet 0     acetaminophen (TYLENOL) 325 MG tablet Take 2 tablets (650 mg) by mouth every 4 hours as needed for other (mild pain) 100 tablet 0     senna-docusate (SENOKOT-S;PERICOLACE) 8.6-50 MG per tablet Take 1-2 tablets by mouth 2 times daily Take while on oral narcotics to prevent or treat constipation. 26 tablet 0     hydrOXYzine (ATARAX) 25 MG tablet Take 1 tablet (25 mg) by mouth every 6 hours as needed for itching (and nausea) (Patient not taking: Reported on 1/30/2018) 30 tablet 0     aspirin  MG EC tablet Take 1 tablet (325 mg) by mouth daily To be taken for DVT Prophylaxis daily (Patient not taking: Reported on 1/30/2018) 28 tablet 0     AMITRIPTYLINE HCL PO Take 20 mg by mouth nightly as needed for sleep       order for DME Crutches 1 Units 0     Naproxen Sodium (ALEVE PO) Take 2  tablets by mouth every 12 hours as needed for moderate pain       norelgestromin-ethinyl estradiol (XULANE) 150-35 MCG/24HR patch Place 1 patch onto the skin once a week Remove old patch and apply new patch onto the skin once a week for 3 weeks (21 days). Do not wear patch week 4 (days 22-28), then repeat.            Allergies   Allergen Reactions     Egg [Chicken-Derived Products (Egg)] Anaphylaxis     Amoxicillin Hives     Zithromax [Azithromycin] Nausea and Vomiting     Compazine [Prochlorperazine] Other (See Comments)     delirious

## 2018-01-31 ENCOUNTER — CARE COORDINATION (OUTPATIENT)
Dept: CARDIOLOGY | Facility: CLINIC | Age: 23
End: 2018-01-31

## 2018-01-31 DIAGNOSIS — G90.A POTS (POSTURAL ORTHOSTATIC TACHYCARDIA SYNDROME): Primary | ICD-10-CM

## 2018-01-31 NOTE — PROGRESS NOTES
Date: 1/31/2018    Time of Call: 8:04 AM     Diagnosis: tachycardia.  Pt sent msg:   I'm still experiencing a lot of tachycardia. Not using the wheelchair anymore and it's been hard. My heart rate is still 100 at resting and can get up to 190 standing. 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 (Will still be on crutches too until March). Is there anyway we can try a different medication. I've been doing the standing exercises too everytime I get up which I think is helping with pushing through but my heart rate is still crazy and I'm always out of breath     [ TORB ] Ordering provider: Dr Mendoza  Order:   Start Midodrine 5 mg at 8 am, 12 noon and 4 pm  Continue fludrocortisone    Order received by:  Rosa Andrade Rn      Follow-up/additional notes:  msg sent to pt, pt verbalized understanding, script sent. See my chart msg

## 2018-02-01 RX ORDER — MIDODRINE HYDROCHLORIDE 5 MG/1
5 TABLET ORAL 3 TIMES DAILY
Qty: 270 TABLET | Refills: 3 | Status: SHIPPED | OUTPATIENT
Start: 2018-02-01 | End: 2018-04-12

## 2018-02-06 ENCOUNTER — THERAPY VISIT (OUTPATIENT)
Dept: PHYSICAL THERAPY | Facility: CLINIC | Age: 23
End: 2018-02-06
Payer: COMMERCIAL

## 2018-02-06 DIAGNOSIS — M25.562 PAIN IN BOTH KNEES, UNSPECIFIED CHRONICITY: ICD-10-CM

## 2018-02-06 DIAGNOSIS — R26.9 ABNORMAL GAIT: ICD-10-CM

## 2018-02-06 DIAGNOSIS — M25.362 PATELLAR INSTABILITY OF BOTH KNEES: ICD-10-CM

## 2018-02-06 DIAGNOSIS — R60.0 LOCALIZED EDEMA: ICD-10-CM

## 2018-02-06 DIAGNOSIS — M25.561 PAIN IN BOTH KNEES, UNSPECIFIED CHRONICITY: ICD-10-CM

## 2018-02-06 DIAGNOSIS — Z98.890 S/P KNEE SURGERY: ICD-10-CM

## 2018-02-06 DIAGNOSIS — M25.361 PATELLAR INSTABILITY OF BOTH KNEES: ICD-10-CM

## 2018-02-06 PROCEDURE — 97530 THERAPEUTIC ACTIVITIES: CPT | Mod: GP | Performed by: PHYSICAL THERAPY ASSISTANT

## 2018-02-06 PROCEDURE — 97140 MANUAL THERAPY 1/> REGIONS: CPT | Mod: GP | Performed by: PHYSICAL THERAPY ASSISTANT

## 2018-02-06 PROCEDURE — 97110 THERAPEUTIC EXERCISES: CPT | Mod: GP | Performed by: PHYSICAL THERAPY ASSISTANT

## 2018-02-16 ENCOUNTER — THERAPY VISIT (OUTPATIENT)
Dept: PHYSICAL THERAPY | Facility: CLINIC | Age: 23
End: 2018-02-16
Payer: COMMERCIAL

## 2018-02-16 DIAGNOSIS — Z98.890 S/P KNEE SURGERY: ICD-10-CM

## 2018-02-16 DIAGNOSIS — M25.361 PATELLAR INSTABILITY OF BOTH KNEES: ICD-10-CM

## 2018-02-16 DIAGNOSIS — M25.561 PAIN IN BOTH KNEES, UNSPECIFIED CHRONICITY: ICD-10-CM

## 2018-02-16 DIAGNOSIS — R60.0 LOCALIZED EDEMA: ICD-10-CM

## 2018-02-16 DIAGNOSIS — M25.562 PAIN IN BOTH KNEES, UNSPECIFIED CHRONICITY: ICD-10-CM

## 2018-02-16 DIAGNOSIS — R26.9 ABNORMAL GAIT: ICD-10-CM

## 2018-02-16 DIAGNOSIS — M25.362 PATELLAR INSTABILITY OF BOTH KNEES: ICD-10-CM

## 2018-02-16 PROCEDURE — 97530 THERAPEUTIC ACTIVITIES: CPT | Mod: GP | Performed by: PHYSICAL THERAPIST

## 2018-02-16 PROCEDURE — 97110 THERAPEUTIC EXERCISES: CPT | Mod: GP | Performed by: PHYSICAL THERAPIST

## 2018-02-16 PROCEDURE — 97140 MANUAL THERAPY 1/> REGIONS: CPT | Mod: GP | Performed by: PHYSICAL THERAPIST

## 2018-02-20 ENCOUNTER — THERAPY VISIT (OUTPATIENT)
Dept: PHYSICAL THERAPY | Facility: CLINIC | Age: 23
End: 2018-02-20
Payer: COMMERCIAL

## 2018-02-20 DIAGNOSIS — M25.562 PAIN IN BOTH KNEES, UNSPECIFIED CHRONICITY: ICD-10-CM

## 2018-02-20 DIAGNOSIS — M25.561 PAIN IN BOTH KNEES, UNSPECIFIED CHRONICITY: ICD-10-CM

## 2018-02-20 DIAGNOSIS — M25.361 PATELLAR INSTABILITY OF BOTH KNEES: ICD-10-CM

## 2018-02-20 DIAGNOSIS — R26.9 ABNORMAL GAIT: ICD-10-CM

## 2018-02-20 DIAGNOSIS — R60.0 LOCALIZED EDEMA: ICD-10-CM

## 2018-02-20 DIAGNOSIS — M25.362 PATELLAR INSTABILITY OF BOTH KNEES: ICD-10-CM

## 2018-02-20 DIAGNOSIS — Z98.890 S/P KNEE SURGERY: ICD-10-CM

## 2018-02-20 PROCEDURE — 97110 THERAPEUTIC EXERCISES: CPT | Mod: GP | Performed by: PHYSICAL THERAPY ASSISTANT

## 2018-02-20 PROCEDURE — 97140 MANUAL THERAPY 1/> REGIONS: CPT | Mod: GP | Performed by: PHYSICAL THERAPY ASSISTANT

## 2018-02-20 PROCEDURE — 97530 THERAPEUTIC ACTIVITIES: CPT | Mod: GP | Performed by: PHYSICAL THERAPY ASSISTANT

## 2018-03-01 ENCOUNTER — THERAPY VISIT (OUTPATIENT)
Dept: PHYSICAL THERAPY | Facility: CLINIC | Age: 23
End: 2018-03-01
Payer: COMMERCIAL

## 2018-03-01 DIAGNOSIS — M25.561 PAIN IN BOTH KNEES, UNSPECIFIED CHRONICITY: ICD-10-CM

## 2018-03-01 DIAGNOSIS — R26.9 ABNORMAL GAIT: ICD-10-CM

## 2018-03-01 DIAGNOSIS — M25.562 PAIN IN BOTH KNEES, UNSPECIFIED CHRONICITY: ICD-10-CM

## 2018-03-01 DIAGNOSIS — M25.361 PATELLAR INSTABILITY OF BOTH KNEES: ICD-10-CM

## 2018-03-01 DIAGNOSIS — R60.0 LOCALIZED EDEMA: ICD-10-CM

## 2018-03-01 DIAGNOSIS — Z98.890 S/P KNEE SURGERY: ICD-10-CM

## 2018-03-01 DIAGNOSIS — M25.362 PATELLAR INSTABILITY OF BOTH KNEES: ICD-10-CM

## 2018-03-01 PROCEDURE — 97140 MANUAL THERAPY 1/> REGIONS: CPT | Mod: GP | Performed by: PHYSICAL THERAPIST

## 2018-03-01 PROCEDURE — 97112 NEUROMUSCULAR REEDUCATION: CPT | Mod: GP | Performed by: PHYSICAL THERAPIST

## 2018-03-01 PROCEDURE — 97110 THERAPEUTIC EXERCISES: CPT | Mod: GP | Performed by: PHYSICAL THERAPIST

## 2018-03-06 ENCOUNTER — THERAPY VISIT (OUTPATIENT)
Dept: PHYSICAL THERAPY | Facility: CLINIC | Age: 23
End: 2018-03-06
Payer: COMMERCIAL

## 2018-03-06 DIAGNOSIS — R60.0 LOCALIZED EDEMA: ICD-10-CM

## 2018-03-06 DIAGNOSIS — M25.361 PATELLAR INSTABILITY OF BOTH KNEES: ICD-10-CM

## 2018-03-06 DIAGNOSIS — Z98.890 S/P KNEE SURGERY: ICD-10-CM

## 2018-03-06 DIAGNOSIS — M25.362 PATELLAR INSTABILITY OF BOTH KNEES: ICD-10-CM

## 2018-03-06 DIAGNOSIS — M25.561 PAIN IN BOTH KNEES, UNSPECIFIED CHRONICITY: ICD-10-CM

## 2018-03-06 DIAGNOSIS — R26.9 ABNORMAL GAIT: ICD-10-CM

## 2018-03-06 DIAGNOSIS — M25.562 PAIN IN BOTH KNEES, UNSPECIFIED CHRONICITY: ICD-10-CM

## 2018-03-06 PROCEDURE — 97016 VASOPNEUMATIC DEVICE THERAPY: CPT | Mod: GP | Performed by: PHYSICAL THERAPY ASSISTANT

## 2018-03-06 PROCEDURE — 97110 THERAPEUTIC EXERCISES: CPT | Mod: GP | Performed by: PHYSICAL THERAPY ASSISTANT

## 2018-03-06 PROCEDURE — 97140 MANUAL THERAPY 1/> REGIONS: CPT | Mod: GP | Performed by: PHYSICAL THERAPY ASSISTANT

## 2018-03-06 NOTE — MR AVS SNAPSHOT
After Visit Summary   3/6/2018    Kaitlynn House    MRN: [id number]           Patient Information     Date Of Birth          1995        Visit Information        Provider Department      3/6/2018 8:20 AM Olga Wood PTA [facility]        Today's Diagnoses     Pain in both knees, unspecified chronicity        Localized edema        S/P knee surgery        Abnormal gait        Patellar instability of both knees           Follow-ups after your visit        Your next 10 appointments already scheduled     Mar 13, 2018  8:20 AM CDT   ELSY Extremity with Tatiana Pereira PT   [facility] Therapy [facility] ([facility])    [address] [place] [id number] [phone]            Mar 20, 2018  2:10 PM CDT   ELSY Extremity with Tatiana Pereira PT   [facility] Therapy [facility] ([facility])    [address] [place] [id number] [phone]            Mar 20, 2018  4:10 PM CDT   (Arrive by 3:55 PM)   POST-OP FOOT/ANKLE with Dio Olivarez MD   [facility] ([facility])    [address] [place] [id number] [phone]            Mar 20, 2018  4:15 PM CDT   (Arrive by 4:00 PM)   RETURN KNEE with Di Schwartz MD   [facility] Orthopa[facility] ([facility])    [address] [place] [id number] [phone]            Apr 12, 2018  1:10 PM CDT   (Arrive by 12:55 PM)   RETURN ARRHYTHMIA with Milo Mendoza MD   [facility] Care ([facility])    [address] [place] [address]  [address] [place] [id number] [phone]              Who to contact     If you have questions or need follow up information about today's clinic visit or your schedule please contact [facility] PHYSICAL THERAPY ELSY directly at  [phone].  Normal or non-critical lab and imaging results will be communicated to you by MyChart, letter or phone within 4 business days after the clinic has received the results. If you do not hear from us within 7 days, please contact the clinic through [facility] or phone. If you have a critical or abnormal lab result, we will notify you by phone as soon as possible.  Submit refill requests through [employer] or call your pharmacy and they will forward the refill request to us. Please allow 3 business days for your refill to be completed.          Additional Information About Your Visit        [facility] Information     [employer] gives you secure access to your electronic health record. If you see a primary care provider, you can also send messages to your care team and make appointments. If you have questions, please call your primary care clinic.  If you do not have a primary care provider, please call [phone] and they will assist you.        Care EveryWhere ID     This is your Care EveryWhere ID. This could be used by other organizations to access your [facility] medical records  [id number]         Blood Pressure from Last 3 Encounters:   01/12/18 118/88   12/20/17 109/74   12/16/17 100/56    Weight from Last 3 Encounters:   01/12/18 56.5 kg (124 lb 9.6 oz)   12/15/17 54.7 kg (120 lb 9.5 oz)   10/24/17 56.2 kg (124 lb)              We Performed the Following     Manual Ther Tech, 1+Regions, EA 15 min     Therapeutic Exercises     Vasopneumatic Device        Primary Care Provider Fax #    Physician No Ref-Primary [phone]       No address on file        Equal Access to Services     CHRISTOS RICHEY : Hadii aad ku hadasho Soomaali, waaxda luqadaha, qaybta kaalmada alondra, parag caballero. So [facility] [phone].    ATENCIÓN: Si habla español, tiene a cristobal disposición servicios gratuitos de asistencia lingüística. Llame al [phone].    We comply with applicable federal civil rights laws  and [place] laws. We do not discriminate on the basis of race, color, national origin, age, disability, sex, sexual orientation, or gender identity.            Thank you!     Thank you for choosing [facility] PHYSICAL THERAPY [facility]  for your care. Our goal is always to provide you with excellent care. Hearing back from our patients is one way we can continue to improve our services. Please take a few minutes to complete the written survey that you may receive in the mail after your visit with us. Thank you!             Your Updated Medication List - Protect others around you: Learn how to safely use, store and throw away your medicines at www.disposemymeds.org.          This list is accurate as of 3/6/18 10:21 AM.  Always use your most recent med list.                   Brand Name Dispense Instructions for use Diagnosis    acetaminophen 325 MG tablet    TYLENOL    100 tablet    Take 2 tablets (650 mg) by mouth every 4 hours as needed for other (mild pain)    Status post surgery       ALEVE PO      Take 2 tablets by mouth every 12 hours as needed for moderate pain        AMITRIPTYLINE HCL PO      Take 20 mg by mouth nightly as needed for sleep        aspirin  MG EC tablet     28 tablet    Take 1 tablet (325 mg) by mouth daily To be taken for DVT Prophylaxis daily    Status post surgery       fludrocortisone 0.1 MG tablet    FLORINEF    180 tablet    Take 1 tablet (0.1 mg) by mouth 2 times daily    POTS (postural orthostatic tachycardia syndrome), EDS (Carlos-Danlos syndrome)       gabapentin 300 MG capsule    NEURONTIN    15 capsule    Take 1 capsule (300 mg) by mouth daily    Pain at surgical site       hydrOXYzine 25 MG tablet    ATARAX    30 tablet    Take 1 tablet (25 mg) by mouth every 6 hours as needed for itching (and nausea)    Status post surgery       midodrine 5 MG tablet    PROAMATINE    270 tablet    Take 1 tablet (5 mg) by mouth 3 times daily    POTS (postural orthostatic tachycardia syndrome)        ondansetron 4 MG ODT tab    ZOFRAN ODT    10 tablet    Take 1 tablet (4 mg) by mouth every 8 hours as needed for nausea        order for DME     1 Units    Crutches    Tibial torsion, right       * oxyCODONE 10 MG 12 hr tablet    OXYCONTIN    5 tablet    Take 1 tablet (10 mg) by mouth nightly as needed for moderate to severe pain    Status post surgery       * oxyCODONE IR 5 MG tablet    ROXICODONE    30 tablet    Take 1 tablet (5 mg) by mouth every 4 hours as needed for pain or other (Moderate to Severe)    Status post surgery       senna-docusate 8.6-50 MG per tablet    SENOKOT-S;PERICOLACE    26 tablet    Take 1-2 tablets by mouth 2 times daily Take while on oral narcotics to prevent or treat constipation.    Status post surgery       XULANE 150-35 MCG/24HR patch   Generic drug:  norelgestromin-ethinyl estradiol      Place 1 patch onto the skin once a week Remove old patch and apply new patch onto the skin once a week for 3 weeks (21 days). Do not wear patch week 4 (days 22-28), then repeat.        * Notice:  This list has 2 medication(s) that are the same as other medications prescribed for you. Read the directions carefully, and ask your doctor or other care provider to review them with you.

## 2018-03-13 NOTE — PROGRESS NOTES
A software change was implemented that had a deleterious effect on the cosign function for 3/6/18 - 3/8/18.  The software change was reversed.  This statement is replacing the electronic cosign function for this chart after review and approval that the service and billing provided were appropriate.

## 2018-03-14 DIAGNOSIS — Z98.890 S/P KNEE SURGERY: Primary | ICD-10-CM

## 2018-03-15 ENCOUNTER — PRE VISIT (OUTPATIENT)
Dept: ORTHOPEDICS | Facility: CLINIC | Age: 23
End: 2018-03-15

## 2018-03-15 NOTE — TELEPHONE ENCOUNTER
Patient is s/p left tib/fib osteotomy on 12/15/2017.    Patient was last seen on 1/30/2018 by Dr. Schwartz and Dr. Olivarez.    Patient is coming to clinic for 3 month post-op visit.      Per the last clinic note, plan is for Left knee Lat and left Tibia and Fibula AP and Lat.  Those radiographs have been ordered and scheduled by Dr. Olivarez's ATC.     Patient visit type and questionnaires have been reviewed and are correct for this appointment? Yes    Marimar Bender ATC

## 2018-03-20 ENCOUNTER — THERAPY VISIT (OUTPATIENT)
Dept: PHYSICAL THERAPY | Facility: CLINIC | Age: 23
End: 2018-03-20
Payer: COMMERCIAL

## 2018-03-20 ENCOUNTER — OFFICE VISIT (OUTPATIENT)
Dept: ORTHOPEDICS | Facility: CLINIC | Age: 23
End: 2018-03-20
Payer: COMMERCIAL

## 2018-03-20 ENCOUNTER — RADIANT APPOINTMENT (OUTPATIENT)
Dept: GENERAL RADIOLOGY | Facility: CLINIC | Age: 23
End: 2018-03-20
Attending: ORTHOPAEDIC SURGERY
Payer: COMMERCIAL

## 2018-03-20 DIAGNOSIS — M25.362 PATELLAR INSTABILITY OF BOTH KNEES: ICD-10-CM

## 2018-03-20 DIAGNOSIS — Z98.890 S/P KNEE SURGERY: ICD-10-CM

## 2018-03-20 DIAGNOSIS — R60.0 LOCALIZED EDEMA: ICD-10-CM

## 2018-03-20 DIAGNOSIS — G89.29 CHRONIC PAIN OF LEFT KNEE: Primary | ICD-10-CM

## 2018-03-20 DIAGNOSIS — M25.361 PATELLAR INSTABILITY OF BOTH KNEES: ICD-10-CM

## 2018-03-20 DIAGNOSIS — M25.561 PAIN IN BOTH KNEES, UNSPECIFIED CHRONICITY: ICD-10-CM

## 2018-03-20 DIAGNOSIS — R26.9 ABNORMAL GAIT: ICD-10-CM

## 2018-03-20 DIAGNOSIS — M25.562 CHRONIC PAIN OF LEFT KNEE: Primary | ICD-10-CM

## 2018-03-20 DIAGNOSIS — Z98.890 S/P KNEE SURGERY: Primary | ICD-10-CM

## 2018-03-20 DIAGNOSIS — M25.562 PAIN IN BOTH KNEES, UNSPECIFIED CHRONICITY: ICD-10-CM

## 2018-03-20 PROCEDURE — 97530 THERAPEUTIC ACTIVITIES: CPT | Mod: GP | Performed by: PHYSICAL THERAPIST

## 2018-03-20 PROCEDURE — 97110 THERAPEUTIC EXERCISES: CPT | Mod: GP | Performed by: PHYSICAL THERAPIST

## 2018-03-20 ASSESSMENT — ENCOUNTER SYMPTOMS
BLOATING: 0
INCREASED ENERGY: 0
HEARTBURN: 1
ARTHRALGIAS: 1
SINUS CONGESTION: 1
TASTE DISTURBANCE: 0
SMELL DISTURBANCE: 0
DYSURIA: 1
EYE WATERING: 1
DIFFICULTY URINATING: 1
NECK PAIN: 1
POLYPHAGIA: 0
BACK PAIN: 1
TROUBLE SWALLOWING: 0
PALPITATIONS: 1
SLEEP DISTURBANCES DUE TO BREATHING: 0
MUSCLE WEAKNESS: 1
DIARRHEA: 1
EYE PAIN: 1
HEMATURIA: 0
HOARSE VOICE: 0
ORTHOPNEA: 0
EXERCISE INTOLERANCE: 1
SYNCOPE: 0
HYPERTENSION: 0
HYPOTENSION: 1
STIFFNESS: 1
RECTAL PAIN: 0
FEVER: 0
ALTERED TEMPERATURE REGULATION: 0
LIGHT-HEADEDNESS: 0
LEG PAIN: 1
CHILLS: 0
ABDOMINAL PAIN: 1
SINUS PAIN: 1
JAUNDICE: 0
JOINT SWELLING: 1
EYE REDNESS: 1
WEIGHT GAIN: 0
WEIGHT LOSS: 1
FATIGUE: 1
EYE IRRITATION: 1
BLOOD IN STOOL: 0
NIGHT SWEATS: 0
DECREASED APPETITE: 1
VOMITING: 1
MYALGIAS: 1
HALLUCINATIONS: 0
SORE THROAT: 0
BOWEL INCONTINENCE: 0
NAUSEA: 1
CONSTIPATION: 1
POLYDIPSIA: 0
FLANK PAIN: 1
MUSCLE CRAMPS: 1
DOUBLE VISION: 1

## 2018-03-20 NOTE — MR AVS SNAPSHOT
After Visit Summary   3/20/2018    Kaitlynn House    MRN: [id number]           Patient Information     Date Of Birth          1995        Visit Information        Provider Department      3/20/2018 4:10 PM Dio Olivarez MD [facility]        Today's Diagnoses     Chronic pain of left knee    -  1       Follow-ups after your visit        Your next 10 appointments already scheduled     Mar 28, 2018 10:20 AM CDT   ELSY Extremity with Nirav Tang PT   [facility] ([facility])    [address] [place] [id number]   [phone]            Apr 05, 2018  9:40 AM CDT   ELSY Extremity with Nirav Tang PT   [facility] ([facility])    [address] [place] [id number] [phone]            Apr 11, 2018  3:50 PM CDT   ELSY Extremity with Nirav Tang PT   [facility] ([facility])    [address] [place] [id number] [phone]            Apr 12, 2018  1:10 PM CDT   (Arrive by 12:55 PM)   RETURN ARRHYTHMIA with Milo Mendoza MD   [facility] ([facility])    [address] [place] [id number] [phone]            Jun 19, 2018  8:20 AM CDT   ELSY Extremity with Tatiana Pereira PT   [facility] ([facility])    [address] [place] [address] [address] [facility] [id number] [phone]            Jun 19, 2018  9:45 AM CDT   (Arrive by 9:30 AM)   RETURN KNEE with Di Schwartz MD   [facility] ([facility])    [address] [place] [id number] [phone]              Who to contact     Please call your clinic at [phone] to:    Ask questions about your health    Make or cancel  appointments    Discuss your medicines    Learn about your test results    Speak to your doctor            Additional Information About Your Visit        [facility] Information     [employer] gives you secure access to your electronic health record. If you see a primary care provider, you can also send messages to your care team and make appointments. If you have questions, please call your primary care clinic.  If you do not have a primary care provider, please call [phone] and they will assist you.      [employer] is an electronic gateway that provides easy, online access to your medical records. With [employer], you can request a clinic appointment, read your test results, renew a prescription or communicate with your care team.     To access your existing account, please contact your [facility] or call [phone] for assistance.        Care EveryWhere ID     This is your Care EveryWhere ID. This could be used by other organizations to access your [place] medical records  [id number]         Blood Pressure from Last 3 Encounters:   01/12/18 118/88   12/20/17 109/74   12/16/17 100/56    Weight from Last 3 Encounters:   01/12/18 56.5 kg (124 lb 9.6 oz)   12/15/17 54.7 kg (120 lb 9.5 oz)   10/24/17 56.2 kg (124 lb)              Today, you had the following     No orders found for display         Today's Medication Changes          These changes are accurate as of 3/20/18 11:59 PM.  If you have any questions, ask your nurse or doctor.               Stop taking these medicines if you haven't already. Please contact your care team if you have questions.     fludrocortisone 0.1 MG tablet   Commonly known as:  FLORINEF   Stopped by:  Dio Olivarez MD           oxyCODONE 10 MG 12 hr tablet   Commonly known as:  OXYCONTIN   Stopped by:  Dio Olivarez MD           oxyCODONE IR 5 MG tablet   Commonly known as:  ROXICODONE   Stopped by:  Dio Olivarez MD            senna-docusate 8.6-50 MG per tablet   Commonly known as:  SENOKOT-S;PERICOLACE   Stopped by:  Dio Olivarez MD                    Primary Care Provider Fax #    Physician No Ref-Primary [phone]       No address on file        Equal Access to Services     [facility] : Hadii zina harper hadsaulo Soomaali, waaxda luqadaha, qaybta kaalmada adeegyada, waxay nickie giovanaarlene campos eribertolizz penaloza . So [facility] [phone].    ATENCIÓN: Si habla español, tiene a cristobal disposición servicios gratuitos de asistencia lingüística. Llame al [phone].    We comply with applicable federal civil rights laws and [place] laws. We do not discriminate on the basis of race, color, national origin, age, disability, sex, sexual orientation, or gender identity.            Thank you!     Thank you for choosing [facility]  for your care. Our goal is always to provide you with excellent care. Hearing back from our patients is one way we can continue to improve our services. Please take a few minutes to complete the written survey that you may receive in the mail after your visit with us. Thank you!             Your Updated Medication List - Protect others around you: Learn how to safely use, store and throw away your medicines at www.disposemymeds.org.          This list is accurate as of 3/20/18 11:59 PM.  Always use your most recent med list.                   Brand Name Dispense Instructions for use Diagnosis    acetaminophen 325 MG tablet    TYLENOL    100 tablet    Take 2 tablets (650 mg) by mouth every 4 hours as needed for other (mild pain)    Status post surgery       ALEVE PO      Take 2 tablets by mouth every 12 hours as needed for moderate pain        AMITRIPTYLINE HCL PO      Take 20 mg by mouth nightly as needed for sleep        aspirin  MG EC tablet     28 tablet    Take 1 tablet (325 mg) by mouth daily To be taken for DVT Prophylaxis daily    Status post surgery       gabapentin 300 MG capsule     NEURONTIN    15 capsule    Take 1 capsule (300 mg) by mouth daily    Pain at surgical site       hydrOXYzine 25 MG tablet    ATARAX    30 tablet    Take 1 tablet (25 mg) by mouth every 6 hours as needed for itching (and nausea)    Status post surgery       midodrine 5 MG tablet    PROAMATINE    270 tablet    Take 1 tablet (5 mg) by mouth 3 times daily    POTS (postural orthostatic tachycardia syndrome)       ondansetron 4 MG ODT tab    ZOFRAN ODT    10 tablet    Take 1 tablet (4 mg) by mouth every 8 hours as needed for nausea        order for DME     1 Units    Crutches    Tibial torsion, right       XULANE 150-35 MCG/24HR patch   Generic drug:  norelgestromin-ethinyl estradiol      Place 1 patch onto the skin once a week Remove old patch and apply new patch onto the skin once a week for 3 weeks (21 days). Do not wear patch week 4 (days 22-28), then repeat.

## 2018-03-20 NOTE — LETTER
3/20/2018       RE: Kaitlynn House  [address]  [place] [address]     Dear Colleague,    Thank you for referring your patient, Kaitlynn House, to the [facility] at [facility]. Please see a copy of my visit note below.    HISTORY OF PRESENT ILLNESS:  Patient is now 3 months status post a left tib-fib derotation osteotomy and a distalization of her tibial tubercle.  This followed a derotation osteotomy on her right side in October.      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  She sometimes uses an ambulatory aid, but it is mainly for support and balance.  She continues to have some weakness and feels unsteady as she walks, but she thinks overall she is progressing faster than she was on the opposite side.      She is not taking pain meds on a regular basis.      She has been seeing Tatiana.  At this point in time, she feels the right knee pain is more tolerable.  There is not any left knee pain even though that was her bigger surgery.  She continues to have some stiffness with her left ankle.      Physical exam of bilateral legs reveal well-healed surgical incisions.   Leg alignment looks excellent.  Range of motion, right knee mild hyperextension to 150, left knee mild hyperextension to 140.  Kneecap tracks well through an active arc of motion.      In regards to her PT assessment, Roxane felt that she had an abnormal gait pattern because she does not have full confidence to put full weight on her left ankle and full weight on her right tibia.  She felt that she might benefit from walking in the Alter G so that she could not modulate how much weightbearing she has put on it.  She also felt she would benefit from a leg press machine which is lacking in our small unit upstairs.  Therefore, we will switch her therapy over to Nirav Tang.      At this point in time,  she knows our therapist well enough and she knows me well enough that she is a functional return to activity.  If she is not able to get back to function, she will see me sooner.  In the absence of that, she will follow up with me at the 6-month heike or sooner if there should be issues.      This is a postop visit.       Sincerely,    Di Schwartz MD

## 2018-03-20 NOTE — PROGRESS NOTES
Subjective:  HPI                    Objective:  System    Physical Exam    General     ROS    Assessment/Plan:    PROGRESS  REPORT      SUBJECTIVE  Subjective changes noted by patient:  Right knee pain is more tolerable - happens when she is unloading her leg. No left knee pain. Left ankle pain with inversion/laying on her side. The left ankle pain is more medial. Right ankle is not painful. The left sided ankle pain - it does not feel nerve. Swelling gets worse with activity. The calf pain is better. POTS is better. The Ankle pain is sharp. Is not using the crutches all the time - 1 crutch at home. Is not taking pain medication any more.     Current pain level is 2/10  .     Previous pain level was  8/10  .   Changes in function:  Yes (See Goal flowsheet attached for changes in current functional level)  Adverse reaction to treatment or activity: None    OBJECTIVE  Changes noted in objective findings:   ROM:   Right Knee: 4-0-150  Left Knee: 5-0-140     Ankle ROM:   DF Right: 14  DF Left: 0     No effusion of either knee.     Swelling of the left ankle continues. Improved tolerance to anterior ankle pressure and joint mobilizations - trial of talocrual band mobilizations for at home.     Abnormal gait pattern - limited quad activation.       ASSESSMENT/PLAN  Updated problem list and treatment plan: Diagnosis 1:  Right tib-fib derotation osteotomy  Pain -  hot/cold therapy, manual therapy, STS, splint/taping/bracing/orthotics, self management and education  Decreased ROM/flexibility - manual therapy, therapeutic exercise and home program  Decreased joint mobility - manual therapy, therapeutic exercise and home program  Decreased strength - therapeutic exercise, therapeutic activities and home program  Impaired balance - neuro re-education, therapeutic activities and home program  Decreased proprioception - neuro re-education, therapeutic activities and home program  Impaired gait - gait training and home  program  Impaired muscle performance - neuro re-education and home program  Decreased function - therapeutic activities and home program  Diagnosis 2:  Left TTO + tib-fib derotation osteotomy   Pain -  hot/cold therapy, manual therapy, STS, splint/taping/bracing/orthotics, self management and education  Decreased ROM/flexibility - manual therapy, therapeutic exercise and home program  Decreased joint mobility - manual therapy, therapeutic exercise and home program  Decreased strength - therapeutic exercise, therapeutic activities and home program  Impaired balance - neuro re-education, therapeutic activities and home program  Decreased proprioception - neuro re-education, therapeutic activities and home program  Impaired gait - gait training and home program  Impaired muscle performance - neuro re-education and home program  Decreased function - therapeutic activities and home program  STG/LTGs have been met or progress has been made towards goals:  Yes (See Goal flow sheet completed today.)  Assessment of Progress: The patient's condition is improving.  The patient's condition has potential to improve.  Self Management Plans:  Patient has been instructed in a home treatment program.  Patient  has been instructed in self management of symptoms.  I have re-evaluated this patient and find that the nature, scope, duration and intensity of the therapy is appropriate for the medical condition of the patient.  Kaitlynn continues to require the following intervention to meet STG and LTG's:  PT    Recommendations:  This patient would benefit from continued therapy.     Frequency:  1 X week, once daily  Duration:  for 6 weeks     Kaitlynn will follow-up with Nirav Tang for Alter-G treadmill and leg press training.         Please refer to the daily flowsheet for treatment today, total treatment time and time spent performing 1:1 timed codes.

## 2018-03-20 NOTE — PROGRESS NOTES
HISTORY OF PRESENT ILLNESS:  Patient is now 3 months status post a left tib-fib derotation osteotomy and a distalization of her tibial tubercle.  This followed a derotation osteotomy on her right side in October.      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  She sometimes uses an ambulatory aid, but it is mainly for support and balance.  She continues to have some weakness and feels unsteady as she walks, but she thinks overall she is progressing faster than she was on the opposite side.      She is not taking pain meds on a regular basis.      She has been seeing Tatiana.  At this point in time, she feels the right knee pain is more tolerable.  There is not any left knee pain even though that was her bigger surgery.  She continues to have some stiffness with her left ankle.      Physical exam of bilateral legs reveal well-healed surgical incisions.   Leg alignment looks excellent.  Range of motion, right knee mild hyperextension to 150, left knee mild hyperextension to 140.  Kneecap tracks well through an active arc of motion.      In regards to her PT assessment, Roxane felt that she had an abnormal gait pattern because she does not have full confidence to put full weight on her left ankle and full weight on her right tibia.  She felt that she might benefit from walking in the Alter G so that she could not modulate how much weightbearing she has put on it.  She also felt she would benefit from a leg press machine which is lacking in our small unit upstairs.  Therefore, we will switch her therapy over to Nirav Tang.      At this point in time, she knows our therapist well enough and she knows me well enough that she is a functional return to activity.  If she is not able to get back to function, she will see me sooner.  In the absence of that, she will follow up with me at the 6-month heike or sooner if there should be issues.       This is a postop visit.       Answers for HPI/ROS submitted by the patient on 3/20/2018   General Symptoms: Yes  Skin Symptoms: No  HENT Symptoms: Yes  EYE SYMPTOMS: Yes  HEART SYMPTOMS: Yes  LUNG SYMPTOMS: No  INTESTINAL SYMPTOMS: Yes  URINARY SYMPTOMS: Yes  GYNECOLOGIC SYMPTOMS: No  BREAST SYMPTOMS: No  SKELETAL SYMPTOMS: Yes  BLOOD SYMPTOMS: No  NERVOUS SYSTEM SYMPTOMS: No  MENTAL HEALTH SYMPTOMS: No  Fever: No  Loss of appetite: Yes  Weight loss: Yes  Weight gain: No  Fatigue: Yes  Night sweats: No  Chills: No  Increased stress: No  Excessive hunger: No  Excessive thirst: No  Feeling hot or cold when others believe the temperature is normal: No  Loss of height: No  Post-operative complications: No  Surgical site pain: No  Hallucinations: No  Change in or Loss of Energy: No  Confusion: No  Ear pain: No  Ear discharge: No  Hearing loss: No  Tinnitus: No  Nosebleeds: Yes  Congestion: Yes  Sinus pain: Yes  Trouble swallowing: No   Voice hoarseness: No  Mouth sores: Yes  Sore throat: No  Tooth pain: No  Gum tenderness: No  Bleeding gums: No  Change in taste: No  Change in sense of smell: No  Dry mouth: No  Hearing aid used: No  Eye pain: Yes  Vision loss: No  Dry eyes: Yes  Watery eyes: Yes  Eye bulging: No  Double vision: Yes  Flashing of lights: No  Spots: No  Floaters: No  Redness: Yes  Crossed eyes: No  Tunnel Vision: No  Yellowing of eyes: No  Eye irritation: Yes  Chest pain or pressure: Yes  Fast or irregular heartbeat: Yes  Pain in legs with walking: Yes  Trouble breathing while lying down: No  Fingers or toes appear blue: Yes  High blood pressure: No  Low blood pressure: Yes  Fainting: No  Murmurs: No  Pacemaker: No  Varicose veins: Yes  Edema or swelling: Yes  Wake up at night with shortness of breath: No  Light-headedness: No  Exercise intolerance: Yes  Heart burn or indigestion: Yes  Nausea: Yes  Vomiting: Yes  Abdominal pain: Yes  Bloating: No  Constipation: Yes  Diarrhea: Yes  Blood in stool: No  Black  stools: No  Rectal or Anal pain: No  Fecal incontinence: No  Yellowing of skin or eyes: No  Vomit with blood: No  Change in stools: No  Trouble holding urine or incontinence: No  Pain or burning: Yes  Trouble starting or stopping: No  Increased frequency of urination: No  Blood in urine: No  Decreased frequency of urination: No  Frequent nighttime urination: No  Flank pain: Yes  Difficulty emptying bladder: Yes  Back pain: Yes  Muscle aches: Yes  Neck pain: Yes  Swollen joints: Yes  Joint pain: Yes  Bone pain: Yes  Muscle cramps: Yes  Muscle weakness: Yes  Joint stiffness: Yes  Bone fracture: Yes

## 2018-03-20 NOTE — NURSING NOTE
Reason For Visit:   Chief Complaint   Patient presents with     Surgical Followup     s/p left tib-fib derotational osteotomy 12/15/17          Primary MD: None  Referring MD: Self     ?  No     Occupation: Neuro Diagnostic Technician.  Currently working? No.  Work status?  short term disability.     Date of injury: None  Type of injury: NA.     Date of surgery: 12/15/17  Type of surgery:   Left limb:  1.  Derotation osteotomy of the tibia with intramedullary fixation.   2.  Derotation osteotomy of fibula.   3.  Distalization of the tibial tubercle.          Smoker: No    There were no vitals taken for this visit.    Pain Assessment  Patient Currently in Pain: Denies (just aches)    Lori Marti CMA  3/20/2018

## 2018-03-20 NOTE — NURSING NOTE
Reason For Visit:   Chief Complaint   Patient presents with     RECHECK     Left tib-fib osteotomies. DOS 12/15/17         Pain Assessment  Patient Currently in Pain: Denies (just aches)

## 2018-03-20 NOTE — PROGRESS NOTES
CHIEF COMPLAINT:   1.  Status post right tibia and fibula derotational osteotomy performed on 11/01/2017.   2.  Status post left tibia and fibula derotational osteotomy with tibial tubercle osteotomy performed on 12/15/2017.      HISTORY OF PRESENT ILLNESS:  Ms. House presents today for further followup.  Overall, she reports to be doing well.  Reports to be making progress.  Reports, in fact, to have improvement of her right knee quite a bit over the past 3 weeks.  Continues using crutches and continues attending physical therapy.      PHYSICAL EXAMINATION:  On today's visit, she presented with full range of motion of bilateral knees.  Surgical incisions are well-healed.  CMS is intact.  Skin is intact.  She presents with minimum pain with palpation of the osteotomy sites, but presented with some diffuse swelling with 1+ pitting edema through the distal half of the lower leg on the left, as well as the forefoot area.      RADIOGRAPHIC EVALUATION:  Two views of the left tib/fib were obtained today as well as the lateral projection of the proximal tibia were significant for showing partial consolidation across the osteotomy sites for the tibia and the fibula, with full incorporation of the tibial tubercle osteotomy.  Hardware is intact and in place.  Alignment is excellent.      ASSESSMENT:  Status post bilateral tibial and fibula derotational osteotomies with left tibial tubercle osteotomy.      PLAN:  I discussed with the patient that she is making progress according to the plan.  We encouraged her to proceed with the use of a compression stocking along the left lower leg in order to control her swelling.      The patient also will proceed with further physical therapy.  She can be discharged from our clinic with regards to further follow-up.  The patient will maintain further visits with Dr. Schwartz with regards to her core strengthening and physical therapy program.      All questions were answered.      TT 15  minutes, CT 10 minutes.

## 2018-03-20 NOTE — LETTER
3/20/2018       RE: Kaitlynn House  [address]  [place] [address]     Dear Colleague,    Thank you for referring your patient, Kaitlynn House, to the [facility] at [facility]. Please see a copy of my visit note below.    CHIEF COMPLAINT:   1.  Status post right tibia and fibula derotational osteotomy performed on 11/01/2017.   2.  Status post left tibia and fibula derotational osteotomy with tibial tubercle osteotomy performed on 12/15/2017.      HISTORY OF PRESENT ILLNESS:  Ms. House presents today for further followup.  Overall, she reports to be doing well.  Reports to be making progress.  Reports, in fact, to have improvement of her right knee quite a bit over the past 3 weeks.  Continues using crutches and continues attending physical therapy.      PHYSICAL EXAMINATION:  On today's visit, she presented with full range of motion of bilateral knees.  Surgical incisions are well-healed.  CMS is intact.  Skin is intact.  She presents with minimum pain with palpation of the osteotomy sites, but presented with some diffuse swelling with 1+ pitting edema through the distal half of the lower leg on the left, as well as the forefoot area.      RADIOGRAPHIC EVALUATION:  Two views of the left tib/fib were obtained today as well as the lateral projection of the proximal tibia were significant for showing partial consolidation across the osteotomy sites for the tibia and the fibula, with full incorporation of the tibial tubercle osteotomy.  Hardware is intact and in place.  Alignment is excellent.      ASSESSMENT:  Status post bilateral tibial and fibula derotational osteotomies with left tibial tubercle osteotomy.      PLAN:  I discussed with the patient that she is making progress according to the plan.  We encouraged her to proceed with the use of a compression stocking along the left lower leg in order to control her swelling.      The patient  also will proceed with further physical therapy.  She can be discharged from our clinic with regards to further follow-up.  The patient will maintain further visits with Dr. Schwartz with regards to her core strengthening and physical therapy program.      All questions were answered.      TT 15 minutes, CT 10 minutes.       Again, thank you for allowing me to participate in the care of your patient.      Sincerely,    Dio Olivarez MD

## 2018-03-20 NOTE — MR AVS SNAPSHOT
After Visit Summary   3/20/2018    Kaitlynn House    MRN: [id number]           Patient Information     Date Of Birth          1995        Visit Information        Provider Department      3/20/2018 2:10 PM Tatiana Pereira PT [facility]        Today's Diagnoses     Pain in both knees, unspecified chronicity        Localized edema        S/P knee surgery        Abnormal gait        Patellar instability of both knees           Follow-ups after your visit        Your next 10 appointments already scheduled     Mar 28, 2018 10:20 AM CDT   ELSY Extremity with Nirav Tang PT   [email or web address] [place] ([facility])    [address] [place] [id number]   [phone]            Apr 05, 2018  9:40 AM CDT   ELSY Extremity with Nirav Tang PT   [facility] [place] [facility])    [address] [place] [address]   [phone]            Apr 11, 2018  3:50 PM CDT   ELSY Extremity with Nirav Tang PT   [facility] [place] ([facility])    [address] [place] [address]   [phone]            Apr 12, 2018  1:10 PM CDT   (Arrive by 12:55 PM)   RETURN ARRHYTHMIA with Milo Mendoza MD   [facility] [place] [facility] ([facility])    [address] [place] [id number] [phone]            Jun 19, 2018  8:20 AM CDT   ELSY Extremity with Tatiana Pereira PT   [facility] ([facility])    [address] [place] [address] 5th [facility] [id number] [phone]            Jun 19, 2018  9:45 AM CDT   (Arrive by 9:30 AM)   RETURN KNEE with Di Schwartz MD   [facility] ([facility])    [address] [place] [id number] [phone]              Who to contact      If you have questions or need follow up information about today's clinic visit or your schedule please contact [facility] PHYS[facility]ERAP[facility] directly at [phone].  Normal or non-critical lab and imaging results will be communicated to you by LOOKCASThart, letter or phone within 4 business days after the clinic has received the results. If you do not hear from us within 7 days, please contact the clinic through [employer] or phone. If you have a critical or abnormal lab result, we will notify you by phone as soon as possible.  Submit refill requests through [employer]ol[employer] or call your pharmacy and they will forward the refill request to us. Please allow 3 business days for your refill to be completed.          Additional Information About Your Visit        [employer] Information     [employer] gives you secure access to your electronic health record. If you see a primary care provider, you can also send messages to your care team and make appointments. If you have questions, please call your primary care clinic.  If you do not have a primary care provider, please call [phone] and they will assist you.        Care EveryWhere ID     This is your Care EveryWhere ID. This could be used by other organizations to access your [facility] medical records  [id number]         Blood Pressure from Last 3 Encounters:   01/12/18 118/88   12/20/17 109/74   12/16/17 100/56    Weight from Last 3 Encounters:   01/12/18 56.5 kg (124 lb 9.6 oz)   12/15/17 54.7 kg (120 lb 9.5 oz)   10/24/17 56.2 kg (124 lb)              We Performed the Following     THERAPEUTIC ACTIVITIES     THERAPEUTIC EXERCISES          Today's Medication Changes          These changes are accurate as of 3/20/18  6:04 PM.  If you have any questions, ask your nurse or doctor.               Stop taking these medicines if you haven't already. Please contact your care team if you have questions.     fludrocortisone 0.1 MG tablet   Commonly known as:  FLORINEF   Stopped by:  Sher  Dio Kurtz MD           oxyCODONE 10 MG 12 hr tablet   Commonly known as:  OXYCONTIN   Stopped by:  Dio Olivarez MD           oxyCODONE IR 5 MG tablet   Commonly known as:  ROXICODONE   Stopped by:  Dio Olivarez MD           senna-docusate 8.6-50 MG per tablet   Commonly known as:  SENOKOT-S;PERICOLACE   Stopped by:  Dio Olivarez MD                    Primary Care Provider Fax #    Physician No Ref-Primary [phone]       No address on file        Equal Access to Services     [facility]: Hadii zina harper hadasho Soomaali, waaxda luqadaha, qaybta kaalmada adeegyapan, parag penaloza . So [facility] [phone].    ATENCIÓN: Si habla español, tiene a cristobal disposición servicios gratuitos de asistencia lingüística. [facility] [phone].    We comply with applicable federal civil rights laws and [place] laws. We do not discriminate on the basis of race, color, national origin, age, disability, sex, sexual orientation, or gender identity.            Thank you!     Thank you for choosing [facility] PHYSICAL THER[facility]  for your care. Our goal is always to provide you with excellent care. Hearing back from our patients is one way we can continue to improve our services. Please take a few minutes to complete the written survey that you may receive in the mail after your visit with us. Thank you!             Your Updated Medication List - Protect others around you: Learn how to safely use, store and throw away your medicines at www.disposemymeds.org.          This list is accurate as of 3/20/18  6:04 PM.  Always use your most recent med list.                   Brand Name Dispense Instructions for use Diagnosis    acetaminophen 325 MG tablet    TYLENOL    100 tablet    Take 2 tablets (650 mg) by mouth every 4 hours as needed for other (mild pain)    Status post surgery       ALEVE PO      Take 2 tablets by mouth every 12 hours as needed for moderate pain         AMITRIPTYLINE HCL PO      Take 20 mg by mouth nightly as needed for sleep        aspirin  MG EC tablet     28 tablet    Take 1 tablet (325 mg) by mouth daily To be taken for DVT Prophylaxis daily    Status post surgery       gabapentin 300 MG capsule    NEURONTIN    15 capsule    Take 1 capsule (300 mg) by mouth daily    Pain at surgical site       hydrOXYzine 25 MG tablet    ATARAX    30 tablet    Take 1 tablet (25 mg) by mouth every 6 hours as needed for itching (and nausea)    Status post surgery       midodrine 5 MG tablet    PROAMATINE    270 tablet    Take 1 tablet (5 mg) by mouth 3 times daily    POTS (postural orthostatic tachycardia syndrome)       ondansetron 4 MG ODT tab    ZOFRAN ODT    10 tablet    Take 1 tablet (4 mg) by mouth every 8 hours as needed for nausea        order for DME     1 Units    Crutches    Tibial torsion, right       XULANE 150-35 MCG/24HR patch   Generic drug:  norelgestromin-ethinyl estradiol      Place 1 patch onto the skin once a week Remove old patch and apply new patch onto the skin once a week for 3 weeks (21 days). Do not wear patch week 4 (days 22-28), then repeat.

## 2018-03-20 NOTE — MR AVS SNAPSHOT
After Visit Summary   3/20/2018    Kaitlynn House    MRN: [id number]           Patient Information     Date Of Birth          1995        Visit Information        Provider Department      3/20/2018 4:15 PM Di Schwartz MD [facility]        Today's Diagnoses     S/P knee surgery    -  1       Follow-ups after your visit        Your next 10 appointments already scheduled     Mar 28, 2018 10:20 AM CDT   ELSY Extremity with Nirav Tang PT   [facility] ([facility])    [address] [place] [address]   [phone]            Apr 05, 2018  9:40 AM CDT   ELSY Extremity with Nirav Tang PT   [facility] ([facility])    [address] [place] [id number] [phone]            Apr 11, 2018  3:50 PM CDT   ELSY Extremity with Nirav Tang PT   [facility] ([facility])    [address] [place] [id number] [phone]            Apr 12, 2018  1:10 PM CDT   (Arrive by 12:55 PM)   RETURN ARRHYTHMIA with Milo Mendoza MD   [facility] ([facility] [place])    [address] [place] [id number] [phone]            Jun 19, 2018  8:20 AM CDT   ELSY Extremity with Tatiana Pereira PT   [facility] ([facility] [place])    [address] [place] [address] 5th [facility] [id number] [phone]            Jun 19, 2018  9:45 AM CDT   (Arrive by 9:30 AM)   RETURN KNEE with Di Schwartz MD   [facility] ([facility] [place])    [address] [place] [address]  4th [facility] [phone] [phone]              Who to contact     Please call your clinic at [phone] to:    Ask questions about your health    Make or cancel appointments    Discuss your  medicines    Learn about your test results    Speak to your doctor            Additional Information About Your Visit        [employer] Information     [employer] gives you secure access to your electronic health record. If you see a primary care provider, you can also send messages to your care team and make appointments. If you have questions, please call your primary care clinic.  If you do not have a primary care provider, please call [phone] and they will assist you.      [employer] is an electronic gateway that provides easy, online access to your medical records. With [employer], you can request a clinic appointment, read your test results, renew a prescription or communicate with your care team.     To access your existing account, please contact your [facility] or call [phone] for assistance.        Care EveryWhere ID     This is your Care EveryWhere ID. This could be used by other organizations to access your [place] medical records  [id number]         Blood Pressure from Last 3 Encounters:   01/12/18 118/88   12/20/17 109/74   12/16/17 100/56    Weight from Last 3 Encounters:   01/12/18 56.5 kg (124 lb 9.6 oz)   12/15/17 54.7 kg (120 lb 9.5 oz)   10/24/17 56.2 kg (124 lb)              Today, you had the following     No orders found for display         Today's Medication Changes          These changes are accurate as of 3/20/18 11:59 PM.  If you have any questions, ask your nurse or doctor.               Stop taking these medicines if you haven't already. Please contact your care team if you have questions.     fludrocortisone 0.1 MG tablet   Commonly known as:  FLORINEF   Stopped by:  Dio Olivarez MD           oxyCODONE 10 MG 12 hr tablet   Commonly known as:  OXYCONTIN   Stopped by:  Dio Olivarez MD           oxyCODONE IR 5 MG tablet   Commonly known as:  ROXICODONE   Stopped by:  Dio Olivarez MD           senna-docusate 8.6-50 MG per  tablet   Commonly known as:  SENOKOT-S;PERICOLACE   Stopped by:  Dio Olivarez MD                    Primary Care Provider Fax #    Physician No Ref-Primary [phone]       No address on file        Equal Access to Services     [facility]ST[facility] : Richelle zina harper divien Roper, waaxda luqadaha, qaybta kaalmada adealdenyada, parag gonzalez laDelfinavictoriano caballero. So [facility] [phone].    ATENCIÓN: Si habla español, tiene a cristobal disposición servicios gratuitos de asistencia lingüística. Llame al [phone].    We comply with applicable federal civil rights laws and [place] laws. We do not discriminate on the basis of race, color, national origin, age, disability, sex, sexual orientation, or gender identity.            Thank you!     Thank you for choosing [facility]  for your care. Our goal is always to provide you with excellent care. Hearing back from our patients is one way we can continue to improve our services. Please take a few minutes to complete the written survey that you may receive in the mail after your visit with us. Thank you!             Your Updated Medication List - Protect others around you: Learn how to safely use, store and throw away your medicines at www.disposemymeds.org.          This list is accurate as of 3/20/18 11:59 PM.  Always use your most recent med list.                   Brand Name Dispense Instructions for use Diagnosis    acetaminophen 325 MG tablet    TYLENOL    100 tablet    Take 2 tablets (650 mg) by mouth every 4 hours as needed for other (mild pain)    Status post surgery       ALEVE PO      Take 2 tablets by mouth every 12 hours as needed for moderate pain        AMITRIPTYLINE HCL PO      Take 20 mg by mouth nightly as needed for sleep        aspirin  MG EC tablet     28 tablet    Take 1 tablet (325 mg) by mouth daily To be taken for DVT Prophylaxis daily    Status post surgery       gabapentin 300 MG capsule    NEURONTIN    15 capsule     Take 1 capsule (300 mg) by mouth daily    Pain at surgical site       hydrOXYzine 25 MG tablet    ATARAX    30 tablet    Take 1 tablet (25 mg) by mouth every 6 hours as needed for itching (and nausea)    Status post surgery       midodrine 5 MG tablet    PROAMATINE    270 tablet    Take 1 tablet (5 mg) by mouth 3 times daily    POTS (postural orthostatic tachycardia syndrome)       ondansetron 4 MG ODT tab    ZOFRAN ODT    10 tablet    Take 1 tablet (4 mg) by mouth every 8 hours as needed for nausea        order for DME     1 Units    Crutches    Tibial torsion, right       XULANE 150-35 MCG/24HR patch   Generic drug:  norelgestromin-ethinyl estradiol      Place 1 patch onto the skin once a week Remove old patch and apply new patch onto the skin once a week for 3 weeks (21 days). Do not wear patch week 4 (days 22-28), then repeat.

## 2018-03-28 ENCOUNTER — THERAPY VISIT (OUTPATIENT)
Dept: PHYSICAL THERAPY | Facility: CLINIC | Age: 23
End: 2018-03-28
Payer: COMMERCIAL

## 2018-03-28 DIAGNOSIS — R60.0 LOCALIZED EDEMA: ICD-10-CM

## 2018-03-28 DIAGNOSIS — M25.361 PATELLAR INSTABILITY OF BOTH KNEES: ICD-10-CM

## 2018-03-28 DIAGNOSIS — Z98.890 S/P KNEE SURGERY: ICD-10-CM

## 2018-03-28 DIAGNOSIS — M25.561 PAIN IN BOTH KNEES, UNSPECIFIED CHRONICITY: ICD-10-CM

## 2018-03-28 DIAGNOSIS — M25.562 PAIN IN BOTH KNEES, UNSPECIFIED CHRONICITY: ICD-10-CM

## 2018-03-28 DIAGNOSIS — M25.362 PATELLAR INSTABILITY OF BOTH KNEES: ICD-10-CM

## 2018-03-28 DIAGNOSIS — R26.9 ABNORMAL GAIT: ICD-10-CM

## 2018-03-28 PROCEDURE — 97530 THERAPEUTIC ACTIVITIES: CPT | Mod: GP | Performed by: PHYSICAL THERAPIST

## 2018-03-28 PROCEDURE — 97112 NEUROMUSCULAR REEDUCATION: CPT | Mod: GP | Performed by: PHYSICAL THERAPIST

## 2018-03-28 PROCEDURE — 97110 THERAPEUTIC EXERCISES: CPT | Mod: GP | Performed by: PHYSICAL THERAPIST

## 2018-04-02 ENCOUNTER — THERAPY VISIT (OUTPATIENT)
Dept: PHYSICAL THERAPY | Facility: CLINIC | Age: 23
End: 2018-04-02
Payer: COMMERCIAL

## 2018-04-02 DIAGNOSIS — M25.361 PATELLAR INSTABILITY OF BOTH KNEES: ICD-10-CM

## 2018-04-02 DIAGNOSIS — M25.561 PAIN IN BOTH KNEES, UNSPECIFIED CHRONICITY: ICD-10-CM

## 2018-04-02 DIAGNOSIS — M25.562 PAIN IN BOTH KNEES, UNSPECIFIED CHRONICITY: ICD-10-CM

## 2018-04-02 DIAGNOSIS — R26.9 ABNORMAL GAIT: ICD-10-CM

## 2018-04-02 DIAGNOSIS — Z98.890 S/P KNEE SURGERY: ICD-10-CM

## 2018-04-02 DIAGNOSIS — R60.0 LOCALIZED EDEMA: ICD-10-CM

## 2018-04-02 DIAGNOSIS — M25.362 PATELLAR INSTABILITY OF BOTH KNEES: ICD-10-CM

## 2018-04-02 PROCEDURE — 97112 NEUROMUSCULAR REEDUCATION: CPT | Mod: GP | Performed by: PHYSICAL THERAPIST

## 2018-04-02 PROCEDURE — 97530 THERAPEUTIC ACTIVITIES: CPT | Mod: GP | Performed by: PHYSICAL THERAPIST

## 2018-04-02 PROCEDURE — 97110 THERAPEUTIC EXERCISES: CPT | Mod: GP | Performed by: PHYSICAL THERAPIST

## 2018-04-05 ENCOUNTER — THERAPY VISIT (OUTPATIENT)
Dept: PHYSICAL THERAPY | Facility: CLINIC | Age: 23
End: 2018-04-05
Payer: COMMERCIAL

## 2018-04-05 DIAGNOSIS — M25.562 PAIN IN BOTH KNEES, UNSPECIFIED CHRONICITY: ICD-10-CM

## 2018-04-05 DIAGNOSIS — M25.561 PAIN IN BOTH KNEES, UNSPECIFIED CHRONICITY: ICD-10-CM

## 2018-04-05 DIAGNOSIS — Z98.890 S/P KNEE SURGERY: ICD-10-CM

## 2018-04-05 DIAGNOSIS — R60.0 LOCALIZED EDEMA: ICD-10-CM

## 2018-04-05 DIAGNOSIS — R26.9 ABNORMAL GAIT: ICD-10-CM

## 2018-04-05 DIAGNOSIS — M25.362 PATELLAR INSTABILITY OF BOTH KNEES: ICD-10-CM

## 2018-04-05 DIAGNOSIS — M25.361 PATELLAR INSTABILITY OF BOTH KNEES: ICD-10-CM

## 2018-04-05 PROCEDURE — 97112 NEUROMUSCULAR REEDUCATION: CPT | Mod: GP | Performed by: PHYSICAL THERAPIST

## 2018-04-05 PROCEDURE — 97110 THERAPEUTIC EXERCISES: CPT | Mod: GP | Performed by: PHYSICAL THERAPIST

## 2018-04-09 ENCOUNTER — THERAPY VISIT (OUTPATIENT)
Dept: PHYSICAL THERAPY | Facility: CLINIC | Age: 23
End: 2018-04-09
Payer: COMMERCIAL

## 2018-04-09 DIAGNOSIS — M25.361 PATELLAR INSTABILITY OF BOTH KNEES: ICD-10-CM

## 2018-04-09 DIAGNOSIS — R60.0 LOCALIZED EDEMA: ICD-10-CM

## 2018-04-09 DIAGNOSIS — R26.9 ABNORMAL GAIT: ICD-10-CM

## 2018-04-09 DIAGNOSIS — M25.561 PAIN IN BOTH KNEES, UNSPECIFIED CHRONICITY: ICD-10-CM

## 2018-04-09 DIAGNOSIS — M25.362 PATELLAR INSTABILITY OF BOTH KNEES: ICD-10-CM

## 2018-04-09 DIAGNOSIS — Z98.890 S/P KNEE SURGERY: ICD-10-CM

## 2018-04-09 DIAGNOSIS — M25.562 PAIN IN BOTH KNEES, UNSPECIFIED CHRONICITY: ICD-10-CM

## 2018-04-09 PROCEDURE — 97112 NEUROMUSCULAR REEDUCATION: CPT | Mod: GP | Performed by: PHYSICAL THERAPIST

## 2018-04-09 PROCEDURE — 97110 THERAPEUTIC EXERCISES: CPT | Mod: GP | Performed by: PHYSICAL THERAPIST

## 2018-04-12 ENCOUNTER — PRE VISIT (OUTPATIENT)
Dept: CARDIOLOGY | Facility: CLINIC | Age: 23
End: 2018-04-12

## 2018-04-12 ENCOUNTER — OFFICE VISIT (OUTPATIENT)
Dept: CARDIOLOGY | Facility: CLINIC | Age: 23
End: 2018-04-12
Attending: INTERNAL MEDICINE
Payer: COMMERCIAL

## 2018-04-12 VITALS
OXYGEN SATURATION: 100 % | BODY MASS INDEX: 18.07 KG/M2 | SYSTOLIC BLOOD PRESSURE: 136 MMHG | WEIGHT: 122 LBS | DIASTOLIC BLOOD PRESSURE: 88 MMHG | HEIGHT: 69 IN | HEART RATE: 125 BPM

## 2018-04-12 DIAGNOSIS — G90.A POTS (POSTURAL ORTHOSTATIC TACHYCARDIA SYNDROME): ICD-10-CM

## 2018-04-12 DIAGNOSIS — Q79.60 EDS (EHLERS-DANLOS SYNDROME): ICD-10-CM

## 2018-04-12 PROCEDURE — G0463 HOSPITAL OUTPT CLINIC VISIT: HCPCS | Mod: ZF

## 2018-04-12 PROCEDURE — 99214 OFFICE O/P EST MOD 30 MIN: CPT | Mod: ZP | Performed by: INTERNAL MEDICINE

## 2018-04-12 RX ORDER — MIDODRINE HYDROCHLORIDE 5 MG/1
TABLET ORAL
Qty: 270 TABLET | Refills: 3
Start: 2018-04-12 | End: 2018-04-23

## 2018-04-12 ASSESSMENT — PAIN SCALES - GENERAL: PAINLEVEL: NO PAIN (0)

## 2018-04-12 NOTE — PROGRESS NOTES
HPI:   Clarke is a pleasant 22 year old female with POTS based on history who is here for a follow up. 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  She is currently recovering from a right tib-fib derotational osteotomy on 11/1/17 and a Left leg procedure on 12/15/17.    We saw her in January and recommended Vitassium 2 tabs BID, extra salt, pedialyte, exercise, Fludrocortisone 0.2mg (she stopped b/c she felt it wasn't helping) and then Midodrine 5mg TID towards end of January.  She feels that Midodrine has helped quite a bit although she is still symptomatic. She uses Vitassium 2 tabs BID, adds extra salt in her food, and drinks pedialyte. She also wears compression shorts.  She developed an abnormal gait and is currently using a crutch to correct it. She is also doing PT 3 times weekly to rehab from her leg surgeries late last year.  If she will stand for more than 5-10 minutes, she will gets dyspneic, blurry vision, dizzy and her HR will increase to as high as 140s. She uses her phone to check her HR.          PAST MEDICAL HISTORY:  Past Medical History:   Diagnosis Date     Restless legs syndrome        CURRENT MEDICATIONS:  Current Outpatient Prescriptions   Medication Sig Dispense Refill     midodrine (PROAMATINE) 5 MG tablet Take 1 tablet (5 mg) by mouth 3 times daily 270 tablet 3     ondansetron (ZOFRAN ODT) 4 MG ODT tab Take 1 tablet (4 mg) by mouth every 8 hours as needed for nausea 10 tablet 1     gabapentin (NEURONTIN) 300 MG capsule Take 1 capsule (300 mg) by mouth daily 15 capsule 1     acetaminophen (TYLENOL) 325 MG tablet Take 2 tablets (650 mg) by mouth every 4 hours as needed for other (mild pain) 100 tablet 0     AMITRIPTYLINE HCL PO Take 20 mg by mouth nightly as  needed for sleep       order for DME Crutches 1 Units 0     Naproxen Sodium (ALEVE PO) Take 2 tablets by mouth every 12 hours as needed for moderate pain       norelgestromin-ethinyl estradiol (XULANE) 150-35 MCG/24HR patch Place 1 patch onto the skin once a week Remove old patch and apply new patch onto the skin once a week for 3 weeks (21 days). Do not wear patch week 4 (days 22-28), then repeat.       hydrOXYzine (ATARAX) 25 MG tablet Take 1 tablet (25 mg) by mouth every 6 hours as needed for itching (and nausea) (Patient not taking: Reported on 4/12/2018) 30 tablet 0     aspirin  MG EC tablet Take 1 tablet (325 mg) by mouth daily To be taken for DVT Prophylaxis daily (Patient not taking: Reported on 1/30/2018) 28 tablet 0       PAST SURGICAL HISTORY:  Past Surgical History:   Procedure Laterality Date     ARTHROSCOPY KNEE WITH TIBIAL TUBERCLE SHIFT Left 12/15/2017    Procedure: ARTHROSCOPY KNEE WITH TIBIAL TUBERCLE SHIFT;;  Surgeon: Di Schwartz MD;  [facility]     OPEN REDUCTION INTERNAL FIXATION RODDING INTRAMEDULLARY TIBIA Left 12/15/2017    Procedure: OPEN REDUCTION INTERNAL FIXATION RODDING INTRAMEDULLARY TIBIA;  Left Knee Arthroscopy, Tibial Derotational Osteotomy with Intermedullary Rodding, Fibular Derotational Osteotmy, Distal Tibial Tubercle Osteotomy;  Surgeon: Dio Olivarez MD;  [facility]       ALLERGIES:     Allergies   Allergen Reactions     Egg [Chicken-Derived Products (Egg)] Anaphylaxis     Amoxicillin Hives     Zithromax [Azithromycin] Nausea and Vomiting     Compazine [Prochlorperazine] Other (See Comments)     delirious       FAMILY HISTORY:  No family history on file.     SOCIAL HISTORY:  Social History   Substance Use Topics     Smoking status: Never Smoker     Smokeless tobacco: Never Used     Alcohol use No       ROS:   Constitutional: No fever, chills, or sweats. Weight stable.   ENT: No visual disturbance, ear ache, epistaxis, sore throat.  "  Cardiovascular: As per HPI.   Respiratory: No cough, hemoptysis.    GI: No nausea, vomiting, hematemesis, melena, or hematochezia.   : No hematuria.   Integument: Negative.   Psychiatric: Negative.   Hematologic:  Easy bruising, no easy bleeding.  Neuro: Negative.   Endocrinology: No significant heat or cold intolerance   Musculoskeletal: Multiple ortho issues with current braces on Left leg.    Exam:  /88 (BP Location: Left arm, Patient Position: Standing, Cuff Size: Adult Regular)  Pulse 125  Ht 1.753 m (5' 9\")  Wt 55.3 kg (122 lb)  SpO2 100%  BMI 18.02 kg/m2  GENERAL APPEARANCE: Thin , pleasant, alert and no distress but lying on exam table as has brace ion Left leg  HEENT: no icterus, no xanthelasmas, normal pupil size and reaction, normal palate, mucosa moist, no central cyanosis  NECK: no adenopathy, no asymmetry, masses, or scars, thyroid normal to palpation and no bruits, JVP not elevated  RESPIRATORY: lungs clear to auscultation - no rales, rhonchi or wheezes, no use of accessory muscles, no retractions, respirations are unlabored, normal respiratory rate  CARDIOVASCULAR: regular rhythm, normal S1 with physiologic split S2, no S3 or S4 and no murmur, click or rub, precordium quiet with normal PMI.  ABDOMEN: soft, non tender,  bowel sounds normal, aorta not enlarged by palpation, no abdominal bruits  EXTREMITIES: peripheral pulses normal, no edema, no bruits  NEURO: alert and oriented to person/place/time, normal speech, gait and affect  VASC: pulses are normal in volumes and symmetric bilaterally. No bruits are heard.  SKIN: no ecchymoses, no rashes    Labs:  CBC RESULTS:   Lab Results   Component Value Date    WBC 5.9 12/19/2017    RBC 3.39 (L) 12/19/2017    HGB 10.2 (L) 12/19/2017    HCT 29.3 (L) 12/19/2017    MCV 86 12/19/2017    MCH 30.1 12/19/2017    MCHC 34.8 12/19/2017    RDW 12.3 12/19/2017     12/19/2017       BMP RESULTS:  Lab Results   Component Value Date     " 2017    POTASSIUM 3.8 2017    CHLORIDE 104 2017    CO2 29 2017    ANIONGAP 5 2017    GLC 80 2017    BUN 10 2017    CR 0.54 2017    GFRESTIMATED >90 2017    GFRESTBLACK >90 2017    CHANI 8.6 2017        Procedures:    ECHOCARDIOGRAM:   Recent Results (from the past 8760 hour(s))   Echocardiogram    Narrative    [id number]  [facility]  [id number]  [id number]^CORINE^AMANDA^           [facility],[place]  Echocardiography Laboratory  [address] [place] [address]     Name: OSIEL OSBORNE  MRN: [id number]  : 1995  Study Date: 10/30/2017 05:06 PM  Age: 22 yrs  Gender: Female  Patient [place] [facility] [place]  Reason For Study: Syncope and collapse  Ordering Physician: AMANDA POOL  Referring Physician: AMANDA POOL  Performed By: Beck Osei RDCS     BSA: 1.7 m2  Height: 67 in  Weight: 124 lb  HR: 91  BP: 122/84 mmHg  _____________________________________________________________________________  __        Procedure  Echocardiogram with two-dimensional, color and spectral Doppler performed.  _____________________________________________________________________________  __        Interpretation Summary  Global and regional left ventricular function is normal with an EF of 55-60%.  Global right ventricular function is normal.  No significant valvular abnormalities were noted.  The inferior vena cava was normal in size with preserved respiratory  variability.  No pericardial effusion is present.  Previous study not available for comparison.  _____________________________________________________________________________  __        Left Ventricle  Left ventricular size is normal. Left ventricular wall thickness is normal.  Global and regional left ventricular function is normal with an EF of 55-60%.  Normal left ventricular filling for age. No regional wall motion abnormalities  are seen.     Right Ventricle  The right  ventricle is normal size. Global right ventricular function is  normal.     Atria  Both atria appear normal.     Mitral Valve  The mitral valve is normal.        Aortic Valve  Aortic valve is normal in structure and function. The aortic valve is  tricuspid.     Tricuspid Valve  The tricuspid valve is normal. The peak velocity of the tricuspid regurgitant  jet is not obtainable.     Pulmonic Valve  The pulmonic valve is normal.     Vessels  The aorta root is normal. The inferior vena cava was normal in size with  preserved respiratory variability.     Pericardium  No pericardial effusion is present.        Compared to Previous Study  Previous study not available for comparison.     Attestation  I have personally viewed the imaging and agree with the interpretation and  report as documented by the fellow, Durga Goodman, and/or edited by me.  _____________________________________________________________________________  __     MMode/2D Measurements & Calculations  IVSd: 0.71 cm  LVIDd: 4.0 cm  LVIDs: 2.6 cm  LVPWd: 0.68 cm  FS: 34.3 %  EDV(Teich): 68.7 ml  ESV(Teich): 24.8 ml  LV mass(C)d: 76.4 grams  LV mass(C)dI: 46.3 grams/m2  asc Aorta Diam: 2.9 cm  LVOT diam: 2.0 cm  LVOT area: 3.1 cm2  LA Volume (BP): 32.4 ml  LA Volume Index (BP): 19.6 ml/m2     RWT: 0.34        Doppler Measurements & Calculations  MV E max chelly: 79.9 cm/sec  MV A max chelly: 70.6 cm/sec  MV E/A: 1.1  Ao V2 max: 112.0 cm/sec  Ao max P.0 mmHg  SANTOS(V,D): 2.6 cm2  LV V1 max PG: 3.5 mmHg  LV V1 max: 93.0 cm/sec  E/E' av.9  Lateral E/e': 4.9  Med E to E': 6.9  Medial E/e': 6.9        _____________________________________________________________________________  __        Report approved by: Yared Mtz 10/31/2017 09:18 AM            2017: EKG:    Tachycardia with ventricular rate of 137     Assessment:   1. POTS (based on clinical presentation)      Plan  -Increase Midodrine to 10,10,5 (from 5 TID)  - Consider Ivabradine in future if  needed  - Isometric leg exercise with band daily  - Continue Vitassium 2 tabs BID, extra salt in food and pedialyte. Recommend keeping pedialyte at bedside and drink as soon as waking up    RTC in 4 months or sooner if needed    Staffed with Dr. Reji Cartwright  General Cardiology Fellow, PGY5  Pager [phone]    I very much appreciated the opportunity to see and assess Ms Kaitlynn House in the clinic with CV Fellow Dr Cartwright.     I agree with the note above which summarizes my findings and current recommendations.    Please do not hesitate to contact my office if you have any questions or concerns.      Milo Mendoza MD  Cardiac Arrhythmia Service  [facility]  [phone]    CC

## 2018-04-12 NOTE — TELEPHONE ENCOUNTER
HPI:   Clarke is a pleasant 23 year old female who was initially evaluated by Jose Watkins NP on 11/9/2017. 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  She is currently recovering from a right tib-fib derotational osteotomy on 11/1/17 and more recent Left leg procedure for which she has braces in place currently.    Kaitlynn  presents for  Rx of postural tachycardia. If she is up more than 2-3 min, may faint    Denies chest pain or pressure or palpit at rest sitting/supine    Today was able to stand 3 min but HR kept climbing and eventually she felt near faint.    Currently on no meds. Uses salt liberally and also Propel or Pedialyte.    We spent approx 45 min reviewing history and overall Rx strategy      11/9/2017: EKG:    Tachycardia with ventricular rate of 137      PAST MEDICAL HISTORY:  Past Medical History:   Diagnosis Date     Restless legs syndrome        CURRENT MEDICATIONS:  Current Outpatient Prescriptions   Medication Sig Dispense Refill     midodrine (PROAMATINE) 5 MG tablet Take 1 tablet (5 mg) by mouth 3 times daily 270 tablet 3     ondansetron (ZOFRAN ODT) 4 MG ODT tab Take 1 tablet (4 mg) by mouth every 8 hours as needed for nausea 10 tablet 1     gabapentin (NEURONTIN) 300 MG capsule Take 1 capsule (300 mg) by mouth daily 15 capsule 1     acetaminophen (TYLENOL) 325 MG tablet Take 2 tablets (650 mg) by mouth every 4 hours as needed for other (mild pain) 100 tablet 0     hydrOXYzine (ATARAX) 25 MG tablet Take 1 tablet (25 mg) by mouth every 6 hours as needed for itching (and nausea) (Patient not taking: Reported on 1/30/2018) 30 tablet 0     aspirin  MG EC tablet Take 1 tablet (325 mg) by mouth daily To be taken for DVT Prophylaxis  daily (Patient not taking: Reported on 1/30/2018) 28 tablet 0     AMITRIPTYLINE HCL PO Take 20 mg by mouth nightly as needed for sleep       order for DME Crutches 1 Units 0     Naproxen Sodium (ALEVE PO) Take 2 tablets by mouth every 12 hours as needed for moderate pain       norelgestromin-ethinyl estradiol (XULANE) 150-35 MCG/24HR patch Place 1 patch onto the skin once a week Remove old patch and apply new patch onto the skin once a week for 3 weeks (21 days). Do not wear patch week 4 (days 22-28), then repeat.         PAST SURGICAL HISTORY:  Past Surgical History:   Procedure Laterality Date     ARTHROSCOPY KNEE WITH TIBIAL TUBERCLE SHIFT Left 12/15/2017    Procedure: ARTHROSCOPY KNEE WITH TIBIAL TUBERCLE SHIFT;;  Surgeon: Di Schwartz MD;  [facility]     OPEN REDUCTION INTERNAL FIXATION RODDING INTRAMEDULLARY TIBIA Left 12/15/2017    Procedure: OPEN REDUCTION INTERNAL FIXATION RODDING INTRAMEDULLARY TIBIA;  Left Knee Arthroscopy, Tibial Derotational Osteotomy with Intermedullary Rodding, Fibular Derotational Osteotmy, Distal Tibial Tubercle Osteotomy;  Surgeon: Dio Olivarez MD;  [facility] OR       ALLERGIES:     Allergies   Allergen Reactions     Egg [Chicken-Derived Products (Egg)] Anaphylaxis     Amoxicillin Hives     Zithromax [Azithromycin] Nausea and Vomiting     Compazine [Prochlorperazine] Other (See Comments)     delirious       FAMILY HISTORY:  No family history on file.     SOCIAL HISTORY:  Social History   Substance Use Topics     Smoking status: Never Smoker     Smokeless tobacco: Never Used     Alcohol use No       ROS:   Constitutional: No fever, chills, or sweats. Weight stable.   ENT: No visual disturbance, ear ache, epistaxis, sore throat.   Cardiovascular: As per HPI.   Respiratory: No cough, hemoptysis.    GI: No nausea, vomiting, hematemesis, melena, or hematochezia.   : No hematuria.   Integument: Negative.   Psychiatric: Negative.   Hematologic:  Easy bruising,  no easy bleeding.  Neuro: Negative.   Endocrinology: No significant heat or cold intolerance   Musculoskeletal: Multiple ortho issues with current braces on Left leg.    Exam:  There were no vitals taken for this visit.  GENERAL APPEARANCE: Thin , pleasant, alert and no distress but lying on exam table as has brace ion Left leg  HEENT: no icterus, no xanthelasmas, normal pupil size and reaction, normal palate, mucosa moist, no central cyanosis  NECK: no adenopathy, no asymmetry, masses, or scars, thyroid normal to palpation and no bruits, JVP not elevated  RESPIRATORY: lungs clear to auscultation - no rales, rhonchi or wheezes, no use of accessory muscles, no retractions, respirations are unlabored, normal respiratory rate  CARDIOVASCULAR: regular rhythm, normal S1 with physiologic split S2, no S3 or S4 and no murmur, click or rub, precordium quiet with normal PMI.  ABDOMEN: soft, non tender,  bowel sounds normal, aorta not enlarged by palpation, no abdominal bruits  EXTREMITIES: peripheral pulses normal, no edema, no bruits  NEURO: alert and oriented to person/place/time, normal speech, gait and affect  VASC: pulses are normal in volumes and symmetric bilaterally. No bruits are heard.  SKIN: no ecchymoses, no rashes    Labs:  CBC RESULTS:   Lab Results   Component Value Date    WBC 5.9 12/19/2017    RBC 3.39 (L) 12/19/2017    HGB 10.2 (L) 12/19/2017    HCT 29.3 (L) 12/19/2017    MCV 86 12/19/2017    MCH 30.1 12/19/2017    MCHC 34.8 12/19/2017    RDW 12.3 12/19/2017     12/19/2017       BMP RESULTS:  Lab Results   Component Value Date     12/19/2017    POTASSIUM 3.8 12/19/2017    CHLORIDE 104 12/19/2017    CO2 29 12/19/2017    ANIONGAP 5 12/19/2017    GLC 80 12/19/2017    BUN 10 12/19/2017    CR 0.54 12/19/2017    GFRESTIMATED >90 12/19/2017    GFRESTBLACK >90 12/19/2017    CHANI 8.6 12/19/2017        Procedures:    ECHOCARDIOGRAM:   Recent Results (from the past 8760 hour(s))   Echocardiogram     [facility]    [id number]  [facility]  [id number]  [id number]^CORINE^AMANDA^           [facility],[place]  Echocardiography Laboratory  [address] [place] [id number]     Name: OSIEL OSBORNE  MRN: [id number]  : 1995  Study Date: 10/30/2017 05:06 PM  Age: 22 yrs  Gender: Female  Patient Location: [facility] [place]  Reason For Study: Syncope and collapse  Ordering Physician: AMANDA POOL  Referring Physician: AMANDA POOL  Performed By: Beck Osei RDCS     BSA: 1.7 m2  Height: 67 in  Weight: 124 lb  HR: 91  BP: 122/84 mmHg  _____________________________________________________________________________  __        Procedure  Echocardiogram with two-dimensional, color and spectral Doppler performed.  _____________________________________________________________________________  __        Interpretation Summary  Global and regional left ventricular function is normal with an EF of 55-60%.  Global right ventricular function is normal.  No significant valvular abnormalities were noted.  The inferior vena cava was normal in size with preserved respiratory  variability.  No pericardial effusion is present.  Previous study not available for comparison.  _____________________________________________________________________________  __        Left Ventricle  Left ventricular size is normal. Left ventricular wall thickness is normal.  Global and regional left ventricular function is normal with an EF of 55-60%.  Normal left ventricular filling for age. No regional wall motion abnormalities  are seen.     Right Ventricle  The right ventricle is normal size. Global right ventricular function is  normal.     Atria  Both atria appear normal.     Mitral Valve  The mitral valve is normal.        Aortic Valve  Aortic valve is normal in structure and function. The aortic valve is  tricuspid.     Tricuspid Valve  The tricuspid valve is normal. The peak velocity of the tricuspid regurgitant  jet is  not obtainable.     Pulmonic Valve  The pulmonic valve is normal.     Vessels  The aorta root is normal. The inferior vena cava was normal in size with  preserved respiratory variability.     Pericardium  No pericardial effusion is present.        Compared to Previous Study  Previous study not available for comparison.     Attestation  I have personally viewed the imaging and agree with the interpretation and  report as documented by the fellow, Durga Goodman, and/or edited by me.  _____________________________________________________________________________  __     MMode/2D Measurements & Calculations  IVSd: 0.71 cm  LVIDd: 4.0 cm  LVIDs: 2.6 cm  LVPWd: 0.68 cm  FS: 34.3 %  EDV(Teich): 68.7 ml  ESV(Teich): 24.8 ml  LV mass(C)d: 76.4 grams  LV mass(C)dI: 46.3 grams/m2  asc Aorta Diam: 2.9 cm  LVOT diam: 2.0 cm  LVOT area: 3.1 cm2  LA Volume (BP): 32.4 ml  LA Volume Index (BP): 19.6 ml/m2     RWT: 0.34        Doppler Measurements & Calculations  MV E max chelly: 79.9 cm/sec  MV A max chelly: 70.6 cm/sec  MV E/A: 1.1  Ao V2 max: 112.0 cm/sec  Ao max P.0 mmHg  SANTOS(V,D): 2.6 cm2  LV V1 max PG: 3.5 mmHg  LV V1 max: 93.0 cm/sec  E/E' av.9  Lateral E/e': 4.9  Med E to E': 6.9  Medial E/e': 6.9        _____________________________________________________________________________  __        Report approved by: Yared Mtz 10/31/2017 09:18 AM             Assessment and Plan:   1. POTS assoc with prob E-D. High hRs and near syncope  2. Recent ortho surgeries >> both legs    PLAN  1. Discussed Nutritional strategy. Add Vitassium (2 tabs BID). Incorp smaller meals. K+ replenishment . Maintain dietary salt    2. Discussed Exercise strategy. Isometrics reviewed (espec exersize bands), but limirted by orth issues. Start Standing training slowly    3. Discussed meds. Will start fludrocortisone 0.2 QD . May add Midodrine later    RTC approx 2 months    I very much appreciated the opportunity to see and assess Mrs Sewellabbi Chappell  [place] in the clinic today. I spent approx 45 min with patient and spouse reviewing items noted above.    Please do not hesitate to contact my office if you have any questions or concerns.      Milo Mendoza MD  Cardiac Arrhythmia Service  [facility]  [phone]      CC

## 2018-04-12 NOTE — NURSING NOTE
Chief Complaint   Patient presents with     Follow Up For     ortho b/p, 3 MFU (1/12/18) for POTS- prob ED. on midodrine 5 mg tid, and fludro 0.2     Vitals were taken and medications were reconciled.     Julia Leung MA    1:00 PM

## 2018-04-12 NOTE — MR AVS SNAPSHOT
After Visit Summary   4/12/2018    Kaitlynn House    MRN: [id number]           Patient Information     Date Of Birth          1995        Visit Information        Provider Department      4/12/2018 1:10 PM Milo Mendoza MD [facility]        Today's Diagnoses     POTS (postural orthostatic tachycardia syndrome)        EDS (Carlos-Danlos syndrome)           Follow-ups after your visit        Your next 10 appointments already scheduled     Apr 13, 2018  7:40 AM CDT   ELSY Extremity with Didier Padilla, PT   [facility] ([facility])    [address] [place] [id number] [phone]            Apr 26, 2018  8:20 AM CDT   ELSY Extremity with [facility]av Tang[facility] PT   [facility] ([facility])    [address] [place] [id number] [phone]            Jun 19, 2018  8:20 AM CDT   ELSY Extremity with Tatiana Pereira PT   [facility] Physical Therapy ELSY ([facility])    [address] [place] [address] 5th [facility] [id number] [phone]            Jun 19, 2018  9:45 AM CDT   (Arrive by 9:30 AM)   RETURN KNEE with Di Schwartz MD   [facility] ([facility])    [address] [place] [id number] [phone]            Sep 06, 2018  2:30 PM CDT   (Arrive by 2:15 PM)   RETURN ARRHYTHMIA with Milo Mendoza MD   [facility] ([facility])    [address] [place] [address]  [address] [place] [address] [id number] [phone]              Who to contact     If you have questions or need follow up information about today's clinic visit or your schedule please contact [facility] directly at [phone].  Normal or non-critical lab and imaging results will be communicated to you by MyChart, letter or phone within 4 business days after  "the clinic has received the results. If you do not hear from us within 7 days, please contact the clinic through [employer] or phone. If you have a critical or abnormal lab result, we will notify you by phone as soon as possible.  Submit refill requests through [employer] or call your pharmacy and they will forward the refill request to us. Please allow 3 business days for your refill to be completed.          Additional Information About Your Visit        [employer] Information     [employer] gives you secure access to your electronic health record. If you see a primary care provider, you can also send messages to your care team and make appointments. If you have questions, please call your primary care clinic.  If you do not have a primary care provider, please call [phone] and they will assist you.        Care EveryWhere ID     This is your Care EveryWhere ID. This could be used by other organizations to access your [place] medical records  [id number]        Your Vitals Were     Pulse Height Pulse Oximetry BMI (Body Mass Index)          125 1.753 m (5' 9\") 100% 18.02 kg/m2         Blood Pressure from Last 3 Encounters:   04/12/18 136/88   01/12/18 118/88   12/20/17 109/74    Weight from Last 3 Encounters:   04/12/18 55.3 kg (122 lb)   01/12/18 56.5 kg (124 lb 9.6 oz)   12/15/17 54.7 kg (120 lb 9.5 oz)              We Performed the Following     Follow-Up with Electrophysiologist - 3 Months        Primary Care Provider Fax #    Physician No Ref-Primary [phone]       No address on file        Equal Access to Services     CHRISTOS RICHEY : Hadii aad ku hadasho Soomaali, waaxda luqadaha, qaybta kaalmada adeegyada, parag penaloza . So [facility] [phone].    ATENCIÓN: Si habla español, tiene a cristobal disposición servicios gratuitos de asistencia lingüística. Llame al [phone].    We comply with applicable federal civil rights laws and [place] laws. We do not discriminate on the basis of race, " color, national origin, age, disability, sex, sexual orientation, or gender identity.            Thank you!     Thank you for choosing [facility]  for your care. Our goal is always to provide you with excellent care. Hearing back from our patients is one way we can continue to improve our services. Please take a few minutes to complete the written survey that you may receive in the mail after your visit with us. Thank you!             Your Updated Medication List - Protect others around you: Learn how to safely use, store and throw away your medicines at www.disposemymeds.org.          This list is accurate as of 4/12/18  1:42 PM.  Always use your most recent med list.                   Brand Name Dispense Instructions for use Diagnosis    acetaminophen 325 MG tablet    TYLENOL    100 tablet    Take 2 tablets (650 mg) by mouth every 4 hours as needed for other (mild pain)    Status post surgery       ALEVE PO      Take 2 tablets by mouth every 12 hours as needed for moderate pain        AMITRIPTYLINE HCL PO      Take 20 mg by mouth nightly as needed for sleep        aspirin 325 MG EC tablet     28 tablet    Take 1 tablet (325 mg) by mouth daily To be taken for DVT Prophylaxis daily    Status post surgery       gabapentin 300 MG capsule    NEURONTIN    15 capsule    Take 1 capsule (300 mg) by mouth daily    Pain at surgical site       hydrOXYzine 25 MG tablet    ATARAX    30 tablet    Take 1 tablet (25 mg) by mouth every 6 hours as needed for itching (and nausea)    Status post surgery       midodrine 5 MG tablet    PROAMATINE    270 tablet    Take 1 tablet (5 mg) by mouth 3 times daily    POTS (postural orthostatic tachycardia syndrome)       ondansetron 4 MG ODT tab    ZOFRAN ODT    10 tablet    Take 1 tablet (4 mg) by mouth every 8 hours as needed for nausea        order for DME     1 Units    Crutches    Tibial torsion, right       XULANE 150-35 MCG/24HR patch   Generic drug:  norelgestromin-ethinyl  estradiol      Place 1 patch onto the skin once a week Remove old patch and apply new patch onto the skin once a week for 3 weeks (21 days). Do not wear patch week 4 (days 22-28), then repeat.

## 2018-04-12 NOTE — LETTER
4/12/2018      RE: Kaitlynn House  [address]  [place] [address]       Dear Colleague,    Thank you for the opportunity to participate in the care of your patient, Kaitlynn House, at the [facility]. Please see a copy of my visit note below.      HPI:   Clarke is a pleasant 22 year old female with POTS based on history who is here for a follow up. 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  She is currently recovering from a right tib-fib derotational osteotomy on 11/1/17 and a Left leg procedure on 12/15/17.    We saw her in January and recommended Vitassium 2 tabs BID, extra salt, pedialyte, exercise, Fludrocortisone 0.2mg (she stopped b/c she felt it wasn't helping) and then Midodrine 5mg TID towards end of January.  She feels that Midodrine has helped quite a bit although she is still symptomatic. She uses Vitassium 2 tabs BID, adds extra salt in her food, and drinks pedialyte. She also wears compression shorts.  She developed an abnormal gait and is currently using a crutch to correct it. She is also doing PT 3 times weekly to rehab from her leg surgeries late last year.  If she will stand for more than 5-10 minutes, she will gets dyspneic, blurry vision, dizzy and her HR will increase to as high as 140s. She uses her phone to check her HR.    PAST MEDICAL HISTORY:  Past Medical History:   Diagnosis Date     Restless legs syndrome        CURRENT MEDICATIONS:  Current Outpatient Prescriptions   Medication Sig Dispense Refill     midodrine (PROAMATINE) 5 MG tablet Take 1 tablet (5 mg) by mouth 3 times daily 270 tablet 3     ondansetron (ZOFRAN ODT) 4 MG ODT tab Take 1 tablet (4 mg) by mouth every 8 hours as  needed for nausea 10 tablet 1     gabapentin (NEURONTIN) 300 MG capsule Take 1 capsule (300 mg) by mouth daily 15 capsule 1     acetaminophen (TYLENOL) 325 MG tablet Take 2 tablets (650 mg) by mouth every 4 hours as needed for other (mild pain) 100 tablet 0     AMITRIPTYLINE HCL PO Take 20 mg by mouth nightly as needed for sleep       order for DME Crutches 1 Units 0     Naproxen Sodium (ALEVE PO) Take 2 tablets by mouth every 12 hours as needed for moderate pain       norelgestromin-ethinyl estradiol (XULANE) 150-35 MCG/24HR patch Place 1 patch onto the skin once a week Remove old patch and apply new patch onto the skin once a week for 3 weeks (21 days). Do not wear patch week 4 (days 22-28), then repeat.       hydrOXYzine (ATARAX) 25 MG tablet Take 1 tablet (25 mg) by mouth every 6 hours as needed for itching (and nausea) (Patient not taking: Reported on 4/12/2018) 30 tablet 0     aspirin  MG EC tablet Take 1 tablet (325 mg) by mouth daily To be taken for DVT Prophylaxis daily (Patient not taking: Reported on 1/30/2018) 28 tablet 0       PAST SURGICAL HISTORY:  Past Surgical History:   Procedure Laterality Date     ARTHROSCOPY KNEE WITH TIBIAL TUBERCLE SHIFT Left 12/15/2017    Procedure: ARTHROSCOPY KNEE WITH TIBIAL TUBERCLE SHIFT;;  Surgeon: Di Schwartz MD;  Location: [facility] OR     OPEN REDUCTION INTERNAL FIXATION RODDING INTRAMEDULLARY TIBIA Left 12/15/2017    Procedure: OPEN REDUCTION INTERNAL FIXATION RODDING INTRAMEDULLARY TIBIA;  Left Knee Arthroscopy, Tibial Derotational Osteotomy with Intermedullary Rodding, Fibular Derotational Osteotmy, Distal Tibial Tubercle Osteotomy;  Surgeon: Dio Olivarez MD;  Location: [facility] OR       ALLERGIES:     Allergies   Allergen Reactions     Egg [Chicken-Derived Products (Egg)] Anaphylaxis     Amoxicillin Hives     Zithromax [Azithromycin] Nausea and Vomiting     Compazine [Prochlorperazine] Other (See Comments)     delirious       FAMILY HISTORY:  No  "family history on file.     SOCIAL HISTORY:  Social History   Substance Use Topics     Smoking status: Never Smoker     Smokeless tobacco: Never Used     Alcohol use No       ROS:   Constitutional: No fever, chills, or sweats. Weight stable.   ENT: No visual disturbance, ear ache, epistaxis, sore throat.   Cardiovascular: As per HPI.   Respiratory: No cough, hemoptysis.    GI: No nausea, vomiting, hematemesis, melena, or hematochezia.   : No hematuria.   Integument: Negative.   Psychiatric: Negative.   Hematologic:  Easy bruising, no easy bleeding.  Neuro: Negative.   Endocrinology: No significant heat or cold intolerance   Musculoskeletal: Multiple ortho issues with current braces on Left leg.    Exam:  /88 (BP Location: Left arm, Patient Position: Standing, Cuff Size: Adult Regular)  Pulse 125  Ht 1.753 m (5' 9\")  Wt 55.3 kg (122 lb)  SpO2 100%  BMI 18.02 kg/m2  GENERAL APPEARANCE: Thin , pleasant, alert and no distress but lying on exam table as has brace ion Left leg  HEENT: no icterus, no xanthelasmas, normal pupil size and reaction, normal palate, mucosa moist, no central cyanosis  NECK: no adenopathy, no asymmetry, masses, or scars, thyroid normal to palpation and no bruits, JVP not elevated  RESPIRATORY: lungs clear to auscultation - no rales, rhonchi or wheezes, no use of accessory muscles, no retractions, respirations are unlabored, normal respiratory rate  CARDIOVASCULAR: regular rhythm, normal S1 with physiologic split S2, no S3 or S4 and no murmur, click or rub, precordium quiet with normal PMI.  ABDOMEN: soft, non tender,  bowel sounds normal, aorta not enlarged by palpation, no abdominal bruits  EXTREMITIES: peripheral pulses normal, no edema, no bruits  NEURO: alert and oriented to person/place/time, normal speech, gait and affect  VASC: pulses are normal in volumes and symmetric bilaterally. No bruits are heard.  SKIN: no ecchymoses, no rashes    Labs:  CBC RESULTS:   Lab Results "   Component Value Date    WBC 5.9 2017    RBC 3.39 (L) 2017    HGB 10.2 (L) 2017    HCT 29.3 (L) 2017    MCV 86 2017    MCH 30.1 2017    MCHC 34.8 2017    RDW 12.3 2017     2017       BMP RESULTS:  Lab Results   Component Value Date     2017    POTASSIUM 3.8 2017    CHLORIDE 104 2017    CO2 29 2017    ANIONGAP 5 2017    GLC 80 2017    BUN 10 2017    CR 0.54 2017    GFRESTIMATED >90 2017    GFRESTBLACK >90 2017    CHANI 8.6 2017        Procedures:    ECHOCARDIOGRAM:   Recent Results (from the past 8760 hour(s))   Echocardiogram    Narrative    [id number]  [id number]  [id number]  [id number]^CORINE^AMANDA^           [facility]  Echocardiography Laboratory  [address] [place] [id number]     Name: OSIEL OSBORNE  MRN: [id number]  : 1995  Study Date: 10/30/2017 05:06 PM  Age: 22 yrs  Gender: Female  Patient Location: [facility]  Reason For Study: Syncope and collapse  Ordering Physician: AMANDA POOL  Referring Physician: AMANDA POOL  Performed By: Beck Osei RDCS     BSA: 1.7 m2  Height: 67 in  Weight: 124 lb  HR: 91  BP: 122/84 mmHg  _____________________________________________________________________________  __        Procedure  Echocardiogram with two-dimensional, color and spectral Doppler performed.  _____________________________________________________________________________  __        Interpretation Summary  Global and regional left ventricular function is normal with an EF of 55-60%.  Global right ventricular function is normal.  No significant valvular abnormalities were noted.  The inferior vena cava was normal in size with preserved respiratory  variability.  No pericardial effusion is present.  Previous study not available for  comparison.  _____________________________________________________________________________  __        Left Ventricle  Left ventricular size is normal. Left ventricular wall thickness is normal.  Global and regional left ventricular function is normal with an EF of 55-60%.  Normal left ventricular filling for age. No regional wall motion abnormalities  are seen.     Right Ventricle  The right ventricle is normal size. Global right ventricular function is  normal.     Atria  Both atria appear normal.     Mitral Valve  The mitral valve is normal.        Aortic Valve  Aortic valve is normal in structure and function. The aortic valve is  tricuspid.     Tricuspid Valve  The tricuspid valve is normal. The peak velocity of the tricuspid regurgitant  jet is not obtainable.     Pulmonic Valve  The pulmonic valve is normal.     Vessels  The aorta root is normal. The inferior vena cava was normal in size with  preserved respiratory variability.     Pericardium  No pericardial effusion is present.        Compared to Previous Study  Previous study not available for comparison.     Attestation  I have personally viewed the imaging and agree with the interpretation and  report as documented by the fellow, Durga Goodman, and/or edited by me.  _____________________________________________________________________________  __     MMode/2D Measurements & Calculations  IVSd: 0.71 cm  LVIDd: 4.0 cm  LVIDs: 2.6 cm  LVPWd: 0.68 cm  FS: 34.3 %  EDV(Teich): 68.7 ml  ESV(Teich): 24.8 ml  LV mass(C)d: 76.4 grams  LV mass(C)dI: 46.3 grams/m2  asc Aorta Diam: 2.9 cm  LVOT diam: 2.0 cm  LVOT area: 3.1 cm2  LA Volume (BP): 32.4 ml  LA Volume Index (BP): 19.6 ml/m2     RWT: 0.34        Doppler Measurements & Calculations  MV E max chelly: 79.9 cm/sec  MV A max chelly: 70.6 cm/sec  MV E/A: 1.1  Ao V2 max: 112.0 cm/sec  Ao max P.0 mmHg  SANTOS(V,D): 2.6 cm2  LV V1 max PG: 3.5 mmHg  LV V1 max: 93.0 cm/sec  E/E' av.9  Lateral E/e': 4.9  Med E to E':  6.9  [facility] E/e': 6.9        _____________________________________________________________________________  __        Report approved by: Yared Mtz 10/31/2017 09:18 AM        11/9/2017: EKG:    Tachycardia with ventricular rate of 137     Assessment:   1. POTS (based on clinical presentation)    Plan  -Increase Midodrine to 10,10,5 (from 5 TID)  - Consider Ivabradine in future if needed  - Isometric leg exercise with band daily  - Continue Vitassium 2 tabs BID, extra salt in food and pedialyte. Recommend keeping pedialyte at bedside and drink as soon as waking up    RTC in 4 months or sooner if needed    Staffed with Dr. Reji Cartwright  General Cardiology Fellow, PGY5  Pager [phone]    I very much appreciated the opportunity to see and assess Ms Kaitlynn House in the clinic with CV Fellow Dr Cartwright.     I agree with the note above which summarizes my findings and current recommendations.    Please do not hesitate to contact my office if you have any questions or concerns.      Milo Mendoza MD  Cardiac Arrhythmia Service  [facility]  [phone]

## 2018-04-12 NOTE — PATIENT INSTRUCTIONS
You will be scheduled for a follow up visit: 4 months.    Medication changes: Increase midodrine to 10 mg at 8 am, 12 noon and 5 mg at 4:00 pm.    We encourage you to use My Chart as your primary form of communication if possible. If you need assistance in setting this up, please contact our office or ask at your follow up visit.     If you need a medication refill please contact your pharmacy. Please allow at least 3 business days for your refill to be completed.       Cardiology  Telephone Number    Rosa Andrade [phone]   Or send a message to your provider via my chart.   For scheduling procedures:    [facility]    Clinic appointments       [phone]   For the Device Clinic (Pacemakers and ICD's)   RN's :   Olga Pinto  During business hours: [phone]    After business hours:   [phone]- select option 4 and ask for job code [id number].          As always, Thank you for trusting us with your health care needs!

## 2018-04-13 ENCOUNTER — THERAPY VISIT (OUTPATIENT)
Dept: PHYSICAL THERAPY | Facility: CLINIC | Age: 23
End: 2018-04-13
Payer: COMMERCIAL

## 2018-04-13 DIAGNOSIS — R60.0 LOCALIZED EDEMA: ICD-10-CM

## 2018-04-13 DIAGNOSIS — M25.562 PAIN IN BOTH KNEES, UNSPECIFIED CHRONICITY: ICD-10-CM

## 2018-04-13 DIAGNOSIS — M25.361 PATELLAR INSTABILITY OF BOTH KNEES: ICD-10-CM

## 2018-04-13 DIAGNOSIS — Z98.890 S/P KNEE SURGERY: ICD-10-CM

## 2018-04-13 DIAGNOSIS — M25.561 PAIN IN BOTH KNEES, UNSPECIFIED CHRONICITY: ICD-10-CM

## 2018-04-13 DIAGNOSIS — R26.9 ABNORMAL GAIT: ICD-10-CM

## 2018-04-13 DIAGNOSIS — M25.362 PATELLAR INSTABILITY OF BOTH KNEES: ICD-10-CM

## 2018-04-13 PROCEDURE — 97112 NEUROMUSCULAR REEDUCATION: CPT | Mod: GP | Performed by: PHYSICAL THERAPIST

## 2018-04-13 PROCEDURE — 97110 THERAPEUTIC EXERCISES: CPT | Mod: GP | Performed by: PHYSICAL THERAPIST

## 2018-04-23 ENCOUNTER — MYC MEDICAL ADVICE (OUTPATIENT)
Dept: CARDIOLOGY | Facility: CLINIC | Age: 23
End: 2018-04-23

## 2018-04-23 DIAGNOSIS — G90.A POTS (POSTURAL ORTHOSTATIC TACHYCARDIA SYNDROME): ICD-10-CM

## 2018-04-23 RX ORDER — MIDODRINE HYDROCHLORIDE 5 MG/1
TABLET ORAL
Qty: 270 TABLET | Refills: 3
Start: 2018-04-23 | End: 2018-06-28

## 2018-04-26 ENCOUNTER — THERAPY VISIT (OUTPATIENT)
Dept: PHYSICAL THERAPY | Facility: CLINIC | Age: 23
End: 2018-04-26
Payer: COMMERCIAL

## 2018-04-26 DIAGNOSIS — R26.9 ABNORMAL GAIT: ICD-10-CM

## 2018-04-26 DIAGNOSIS — M25.562 PAIN IN BOTH KNEES, UNSPECIFIED CHRONICITY: ICD-10-CM

## 2018-04-26 DIAGNOSIS — R60.0 LOCALIZED EDEMA: ICD-10-CM

## 2018-04-26 DIAGNOSIS — M25.362 PATELLAR INSTABILITY OF BOTH KNEES: ICD-10-CM

## 2018-04-26 DIAGNOSIS — M25.561 PAIN IN BOTH KNEES, UNSPECIFIED CHRONICITY: ICD-10-CM

## 2018-04-26 DIAGNOSIS — M25.361 PATELLAR INSTABILITY OF BOTH KNEES: ICD-10-CM

## 2018-04-26 DIAGNOSIS — Z98.890 S/P KNEE SURGERY: ICD-10-CM

## 2018-04-26 PROCEDURE — 97530 THERAPEUTIC ACTIVITIES: CPT | Mod: GP | Performed by: PHYSICAL THERAPIST

## 2018-04-26 PROCEDURE — 97110 THERAPEUTIC EXERCISES: CPT | Mod: GP | Performed by: PHYSICAL THERAPIST

## 2018-04-26 PROCEDURE — 97112 NEUROMUSCULAR REEDUCATION: CPT | Mod: GP | Performed by: PHYSICAL THERAPIST

## 2018-04-26 NOTE — MR AVS SNAPSHOT
After Visit Summary   4/26/2018    Kaitlynn House    MRN: [id number]           Patient Information     Date Of Birth          1995        Visit Information        Provider Department      4/26/2018 8:20 AM Nirav Tang PT [facility]        Today's Diagnoses     Pain in both knees, unspecified chronicity        Localized edema        S/P knee surgery        Abnormal gait        Patellar instability of both knees           Follow-ups after your visit        Your next 10 appointments already scheduled     May 09, 2018  9:40 AM CDT   ELSY Extremity with Nirav Tang PT   [facility] ([facility])    [address] [place] [id number]   [phone]            Jun 19, 2018  8:20 AM CDT   ELSY Extremity with Tatiana Pereira PT   [facility] Physical Therapy E[facility]Y ([facility] [place])    [address] [place] [address] 5th [facility] [id number] [phone]            Jun 19, 2018  9:45 AM CDT   (Arrive by 9:30 AM)   RETURN KNEE with Di Schwartz MD   [facility] ([facility] [place])    [address] [place] [address]  4th Floor  [facility] [id number] [phone]            Sep 06, 2018  2:30 PM CDT   (Arrive by 2:15 PM)   RETURN ARRHYTHMIA with Milo Mendoza MD   [facility] Heart [facility] [place])    [address] [place] [address]  [address]  [facility] [id number] [phone]              Who to contact     If you have questions or need follow up information about today's clinic visit or your schedule please contact [facility] directly at [phone].  Normal or non-critical lab and imaging results will be communicated to you by MyChart, letter or phone within 4 business days after the clinic has received the results. If you do not hear from us within 7 days, please contact the clinic  through [employer] or phone. If you have a critical or abnormal lab result, we will notify you by phone as soon as possible.  Submit refill requests through [employer] or call your pharmacy and they will forward the refill request to us. Please allow 3 business days for your refill to be completed.          Additional Information About Your Visit        [employer] Information     [employer] gives you secure access to your electronic health record. If you see a primary care provider, you can also send messages to your care team and make appointments. If you have questions, please call your primary care clinic.  If you do not have a primary care provider, please call [phone] and they will assist you.        Care EveryWhere ID     This is your Care EveryWhere ID. This could be used by other organizations to access your [place] medical records  [id number]         Blood Pressure from Last 3 Encounters:   04/12/18 136/88   01/12/18 118/88   12/20/17 109/74    Weight from Last 3 Encounters:   04/12/18 55.3 kg (122 lb)   01/12/18 56.5 kg (124 lb 9.6 oz)   12/15/17 54.7 kg (120 lb 9.5 oz)              We Performed the Following     NEUROMUSCULAR RE-EDUCATION     THERAPEUTIC ACTIVITIES     THERAPEUTIC EXERCISES        Primary Care Provider Fax #    Physician No Ref-Primary [phone]       No address on file        Equal Access to Services     CHRISTSO RICHEY : Richelle craigo Sogiorgi, waaxda luqadaha, qaybta kaalmada adealdenyapan, parag caballero. So [facility] [phone].    ATENCIÓN: Si habla español, tiene a cristobal disposición servicios gratuitos de asistencia lingüística. Llame al [phone].    We comply with applicable federal civil rights laws and [place] laws. We do not discriminate on the basis of race, color, national origin, age, disability, sex, sexual orientation, or gender identity.            Thank you!     Thank you for choosing [facility]  for your care.  Our goal is always to provide you with excellent care. Hearing back from our patients is one way we can continue to improve our services. Please take a few minutes to complete the written survey that you may receive in the mail after your visit with us. Thank you!             Your Updated Medication List - Protect others around you: Learn how to safely use, store and throw away your medicines at www.disposemymeds.org.          This list is accurate as of 4/26/18  9:12 AM.  Always use your most recent med list.                   Brand Name Dispense Instructions for use Diagnosis    acetaminophen 325 MG tablet    TYLENOL    100 tablet    Take 2 tablets (650 mg) by mouth every 4 hours as needed for other (mild pain)    Status post surgery       ALEVE PO      Take 2 tablets by mouth every 12 hours as needed for moderate pain        AMITRIPTYLINE HCL PO      Take 20 mg by mouth nightly as needed for sleep        gabapentin 300 MG capsule    NEURONTIN    15 capsule    Take 1 capsule (300 mg) by mouth daily    Pain at surgical site       midodrine 5 MG tablet    PROAMATINE    270 tablet    Take midodrine 10 mg at 8 am. Take 5mg at noon and 4pm.    POTS (postural orthostatic tachycardia syndrome)       ondansetron 4 MG ODT tab    ZOFRAN ODT    10 tablet    Take 1 tablet (4 mg) by mouth every 8 hours as needed for nausea        order for DME     1 Units    Crutches    Tibial torsion, right       XULANE 150-35 MCG/24HR patch   Generic drug:  norelgestromin-ethinyl estradiol      Place 1 patch onto the skin once a week Remove old patch and apply new patch onto the skin once a week for 3 weeks (21 days). Do not wear patch week 4 (days 22-28), then repeat.

## 2018-05-09 ENCOUNTER — THERAPY VISIT (OUTPATIENT)
Dept: PHYSICAL THERAPY | Facility: CLINIC | Age: 23
End: 2018-05-09
Payer: COMMERCIAL

## 2018-05-09 DIAGNOSIS — M25.562 PAIN IN BOTH KNEES, UNSPECIFIED CHRONICITY: ICD-10-CM

## 2018-05-09 DIAGNOSIS — R26.9 ABNORMAL GAIT: ICD-10-CM

## 2018-05-09 DIAGNOSIS — M25.561 PAIN IN BOTH KNEES, UNSPECIFIED CHRONICITY: ICD-10-CM

## 2018-05-09 DIAGNOSIS — Z98.890 S/P KNEE SURGERY: ICD-10-CM

## 2018-05-09 DIAGNOSIS — R60.0 LOCALIZED EDEMA: ICD-10-CM

## 2018-05-09 DIAGNOSIS — M25.362 PATELLAR INSTABILITY OF BOTH KNEES: ICD-10-CM

## 2018-05-09 DIAGNOSIS — M25.361 PATELLAR INSTABILITY OF BOTH KNEES: ICD-10-CM

## 2018-05-09 PROCEDURE — 97530 THERAPEUTIC ACTIVITIES: CPT | Mod: GP | Performed by: PHYSICAL THERAPIST

## 2018-05-09 PROCEDURE — 97112 NEUROMUSCULAR REEDUCATION: CPT | Mod: GP | Performed by: PHYSICAL THERAPIST

## 2018-06-06 ENCOUNTER — THERAPY VISIT (OUTPATIENT)
Dept: PHYSICAL THERAPY | Facility: CLINIC | Age: 23
End: 2018-06-06
Payer: COMMERCIAL

## 2018-06-06 DIAGNOSIS — M25.362 PATELLAR INSTABILITY OF BOTH KNEES: ICD-10-CM

## 2018-06-06 DIAGNOSIS — R26.9 ABNORMAL GAIT: ICD-10-CM

## 2018-06-06 DIAGNOSIS — R60.0 LOCALIZED EDEMA: ICD-10-CM

## 2018-06-06 DIAGNOSIS — M25.361 PATELLAR INSTABILITY OF BOTH KNEES: ICD-10-CM

## 2018-06-06 DIAGNOSIS — M25.561 PAIN IN BOTH KNEES, UNSPECIFIED CHRONICITY: ICD-10-CM

## 2018-06-06 DIAGNOSIS — M25.562 PAIN IN BOTH KNEES, UNSPECIFIED CHRONICITY: ICD-10-CM

## 2018-06-06 DIAGNOSIS — Z98.890 S/P KNEE SURGERY: ICD-10-CM

## 2018-06-06 PROCEDURE — 97530 THERAPEUTIC ACTIVITIES: CPT | Mod: GP | Performed by: PHYSICAL THERAPIST

## 2018-06-06 PROCEDURE — 97110 THERAPEUTIC EXERCISES: CPT | Mod: GP | Performed by: PHYSICAL THERAPIST

## 2018-06-12 ENCOUNTER — CARE COORDINATION (OUTPATIENT)
Dept: CARDIOLOGY | Facility: CLINIC | Age: 23
End: 2018-06-12

## 2018-06-12 ENCOUNTER — TELEPHONE (OUTPATIENT)
Dept: ORTHOPEDICS | Facility: CLINIC | Age: 23
End: 2018-06-12

## 2018-06-12 DIAGNOSIS — G90.A POTS (POSTURAL ORTHOSTATIC TACHYCARDIA SYNDROME): Primary | ICD-10-CM

## 2018-06-12 NOTE — PROGRESS NOTES
Date: 6/12/2018    Time of Call: 9:38 AM     Diagnosis: POTS, dysautonomia     [ TORB ] Ordering provider: Dr Mendoza  Order:   Try adding  fludrocortisone 0.1 mg once daily     Order received by: Rosa Andrade RN      Follow-up/additional notes:   msg sent to pt

## 2018-06-12 NOTE — TELEPHONE ENCOUNTER
Call placed to patient regarding appointment for next week.  Due to increase in pain below the knee, Dr. Schwartz would like patient to see Dr. Olivarez instead.  Appointment on hold for 5:40pm for Dr. Olivarez.  Her appointment with Dr. Schwartz will then be cancelled.

## 2018-06-13 DIAGNOSIS — Z98.890 S/P KNEE SURGERY: Primary | ICD-10-CM

## 2018-06-14 RX ORDER — FLUDROCORTISONE ACETATE 0.1 MG/1
0.1 TABLET ORAL DAILY
Qty: 30 TABLET | Refills: 3 | Status: SHIPPED | OUTPATIENT
Start: 2018-06-14 | End: 2019-03-29

## 2018-06-19 ENCOUNTER — THERAPY VISIT (OUTPATIENT)
Dept: PHYSICAL THERAPY | Facility: CLINIC | Age: 23
End: 2018-06-19
Payer: COMMERCIAL

## 2018-06-19 ENCOUNTER — OFFICE VISIT (OUTPATIENT)
Dept: ORTHOPEDICS | Facility: CLINIC | Age: 23
End: 2018-06-19
Payer: COMMERCIAL

## 2018-06-19 DIAGNOSIS — M25.562 PAIN IN BOTH KNEES, UNSPECIFIED CHRONICITY: ICD-10-CM

## 2018-06-19 DIAGNOSIS — R26.9 ABNORMAL GAIT: ICD-10-CM

## 2018-06-19 DIAGNOSIS — M25.362 PATELLAR INSTABILITY OF BOTH KNEES: ICD-10-CM

## 2018-06-19 DIAGNOSIS — Z98.890 S/P KNEE SURGERY: ICD-10-CM

## 2018-06-19 DIAGNOSIS — M25.361 PATELLAR INSTABILITY OF BOTH KNEES: ICD-10-CM

## 2018-06-19 DIAGNOSIS — M25.561 PAIN IN BOTH KNEES, UNSPECIFIED CHRONICITY: ICD-10-CM

## 2018-06-19 DIAGNOSIS — R60.0 LOCALIZED EDEMA: ICD-10-CM

## 2018-06-19 DIAGNOSIS — Z98.890 STATUS POST OSTEOTOMY: Primary | ICD-10-CM

## 2018-06-19 PROCEDURE — 97750 PHYSICAL PERFORMANCE TEST: CPT | Mod: GP | Performed by: PHYSICAL THERAPIST

## 2018-06-19 PROCEDURE — 97110 THERAPEUTIC EXERCISES: CPT | Mod: GP | Performed by: PHYSICAL THERAPIST

## 2018-06-19 NOTE — PROGRESS NOTES
CHIEF COMPLAINT:     1.  Status post right tibia and fibula derotational osteotomy performed on 11/01/2017.   2.  Status post left tibia and fibula derotational osteotomy with tibial tubercle osteotomy performed 12/15/2017.      HISTORY OF PRESENT ILLNESS:  Ms. House presents today for further followup.  Overall, she reports to be doing well.  The patient has had a physical therapy session right prior to our visit.  Apparently continues having some strengthening issues.      The patient has had an evaluation by [place] which apparently she was told to have a permanent jace on the right leg as well as to have some possibility of a nonunion across the osteotomy sites.  Plain x-rays were obtained for that diagnosis.      The patient also has had an evaluation with bone density and she has been told to be borderline osteopenia secondary to some deficit in her parathyroid.  No formal treatment has been started at this point.      PHYSICAL EXAMINATION:  On today's visit, she presents with full range of motion of the ankles and knees.  Presents with well-healed surgical incisions.  There is no pain with palpation of the osteotomy site.      RADIOGRAPHIC EVALUATION:  Plain x-rays from a previous visit approximately a month ago were reviewed today which were significant for showing partial union across all osteotomies including tibia and the fibula, being the most mature healing in the right lower leg.  There is no prominent hardware.  Hardware is intact and in place.      ASSESSMENT:  Status post bilateral tibia-fibula derotational osteotomies with residual pain.      PLAN:  I discussed with the patient that the fact that why she is having pain along the osteotomy sites is because she has not accomplished complete healing of the bones.  I strongly encouraged her to have an evaluation by Dr. Fabian, our bone metabolism expert.        The patient otherwise will proceed with activity as tolerated without any restrictions.       All questions were answered.  The patient was pleased with the discussion.  The patient will follow up on a p.r.n. basis.  On today's visit, she filled out a KOOS form.        TT 15 minutes, CT 10 minutes.

## 2018-06-19 NOTE — MR AVS SNAPSHOT
After Visit Summary   6/19/2018    Kaitlynn House    MRN: [id number]           Patient Information     Date Of Birth          1995        Visit Information        Provider Department      6/19/2018 5:40 PM Dio [facility]        Today's Diagnoses     Status post osteotomy    -  1       Follow-ups after your visit        Additional Services     ORTHO  REFERRAL       [facility] is referring you to the Orthopedic  Services at [facility].       The  Representative will assist you in the coordination of your Orthopedic and Musculoskeletal Care as prescribed by your physician.    The  Representative will call you within 1 business day to help schedule your appointment, or you may contact the  Representative at:    All areas ~ [phone]     Type of Referral : Non Surgical     Dr. Fabian for delayed bone healing      Timeframe requested: 3 - 5 days    Coverage of these services is subject to the terms and limitations of your health insurance plan.  Please call member services at your health plan with any benefit or coverage questions.      If X-rays, CT or MRI's have been performed, please contact the facility where they were done to arrange for , prior to your scheduled appointment.  Please bring this referral request to your appointment and present it to your specialist.                  Your next 10 appointments already scheduled     Aug 17, 2018  8:00 AM CDT   (Arrive by 7:45 AM)   New Patient Visit with Dora Fabian MD   [facility] ([facility])    [address] [place] [address]  [address] [facility] [address]   [phone]            Sep 06, 2018  2:30 PM CDT   (Arrive by 2:15 PM)   RETURN ARRHYTHMIA with Milo Mendoza MD   [facility] ([facility])    [address] [place] [address]    [address]  [facility] [id number] [phone]              Who to contact     Please call your clinic at [phone] to:    Ask questions about your health    Make or cancel appointments    Discuss your medicines    Learn about your test results    Speak to your doctor            Additional Information About Your Visit        MyChart Information     [employer] gives you secure access to your electronic health record. If you see a primary care provider, you can also send messages to your care team and make appointments. If you have questions, please call your primary care clinic.  If you do not have a primary care provider, please call [phone] and they will assist you.      [employer] is an electronic gateway that provides easy, online access to your medical records. With [employer], you can request a clinic appointment, read your test results, renew a prescription or communicate with your care team.     To access your existing account, please contact your [facility] or call [phone] for assistance.        Care EveryWhere ID     This is your Care EveryWhere ID. This could be used by other organizations to access your [facility] medical records  [id number]         Blood Pressure from Last 3 Encounters:   04/12/18 136/88   01/12/18 118/88   12/20/17 109/74    Weight from Last 3 Encounters:   04/12/18 55.3 kg (122 lb)   01/12/18 56.5 kg (124 lb 9.6 oz)   12/15/17 54.7 kg (120 lb 9.5 oz)              We Performed the Following     ORTHO  REFERRAL          Today's Medication Changes          These changes are accurate as of 6/19/18  6:48 PM.  If you have any questions, ask your nurse or doctor.               Stop taking these medicines if you haven't already. Please contact your care team if you have questions.     ondansetron 4 MG ODT tab   Commonly known as:  ZOFRAN ODT   Stopped by:  Dio Olivarez MD           order for DME   Stopped by:  Dio Olivarez  MD Jerardo                    Primary Care Provider Fax #    Physician No Ref-Primary [phone]       No address on file        Equal Access to Services     CHRISTOS RICHEY : Hadii aad ku hadsaulmanuel Jacquelin, ranjanapan elizaldeboniha, wilbur mishachristine dipakdallin, parag melendezarlene ada. So [facility] [phone].    ATENCIÓN: Si habla español, tiene a cristobal disposición servicios gratuitos de asistencia lingüística. Llame al [phone].    We comply with applicable federal civil rights laws and [place] laws. We do not discriminate on the basis of race, color, national origin, age, disability, sex, sexual orientation, or gender identity.            Thank you!     Thank you for choosing [facility]  for your care. Our goal is always to provide you with excellent care. Hearing back from our patients is one way we can continue to improve our services. Please take a few minutes to complete the written survey that you may receive in the mail after your visit with us. Thank you!             Your Updated Medication List - Protect others around you: Learn how to safely use, store and throw away your medicines at www.disposemymeds.org.          This list is accurate as of 6/19/18  6:48 PM.  Always use your most recent med list.                   Brand Name Dispense Instructions for use Diagnosis    acetaminophen 325 MG tablet    TYLENOL    100 tablet    Take 2 tablets (650 mg) by mouth every 4 hours as needed for other (mild pain)    Status post surgery       ALEVE PO      Take 2 tablets by mouth every 12 hours as needed for moderate pain        AMITRIPTYLINE HCL PO      Take 20 mg by mouth nightly as needed for sleep        fludrocortisone 0.1 MG tablet    FLORINEF    30 tablet    Take 1 tablet (0.1 mg) by mouth daily    POTS (postural orthostatic tachycardia syndrome)       gabapentin 300 MG capsule    NEURONTIN    15 capsule    Take 1 capsule (300 mg) by mouth daily    Pain at surgical site       midodrine 5  MG tablet    PROAMATINE    270 tablet    Take midodrine 10 mg at 8 am. Take 5mg at noon and 4pm.    POTS (postural orthostatic tachycardia syndrome)       XULANE 150-35 MCG/24HR patch   Generic drug:  norelgestromin-ethinyl estradiol      Place 1 patch onto the skin once a week Remove old patch and apply new patch onto the skin once a week for 3 weeks (21 days). Do not wear patch week 4 (days 22-28), then repeat.

## 2018-06-19 NOTE — MR AVS SNAPSHOT
After Visit Summary   6/19/2018    Kaitlynn House    MRN: [id number]           Patient Information     Date Of Birth          1995        Visit Information        Provider Department      6/19/2018 4:10 PM Di Ahumada PT [facility] Physical Therapy [facility]        Today's Diagnoses     Pain in both knees, unspecified chronicity        Localized edema        S/P knee surgery        Abnormal gait        Patellar instability of both knees           Follow-ups after your visit        Your next 10 appointments already scheduled     Jun 19, 2018  5:40 PM CDT   (Arrive by 5:25 PM)   RETURN FOOT/ANKLE with Dio Olivarez MD   [facility] ([facility])    [address]  4th Floor  [facility] [address] [phone]            Sep 06, 2018  2:30 PM CDT   (Arrive by 2:15 PM)   RETURN ARRHYTHMIA with Milo Mendoza MD   [facility] ([facility])    [address] [place] [address]  [address]  [facility] [address] [phone]              Who to contact     If you have questions or need follow up information about today's clinic visit or your schedule please contact [facility] directly at [phone].  Normal or non-critical lab and imaging results will be communicated to you by MyChart, letter or phone within 4 business days after the clinic has received the results. If you do not hear from us within 7 days, please contact the clinic through MyChart or phone. If you have a critical or abnormal lab result, we will notify you by phone as soon as possible.  Submit refill requests through [employer] or call your pharmacy and they will forward the refill request to us. Please allow 3 business days for your refill to be completed.          Additional Information About Your Visit        MyChart Information     [employer] gives you secure access to your electronic health record. If you see a primary care  provider, you can also send messages to your care team and make appointments. If you have questions, please call your primary care clinic.  If you do not have a primary care provider, please call [phone] and they will assist you.        Care EveryWhere ID     This is your Care EveryWhere ID. This could be used by other organizations to access your [facility] medical records  [id number]         Blood Pressure from Last 3 Encounters:   04/12/18 136/88   01/12/18 118/88   12/20/17 109/74    Weight from Last 3 Encounters:   04/12/18 55.3 kg (122 lb)   01/12/18 56.5 kg (124 lb 9.6 oz)   12/15/17 54.7 kg (120 lb 9.5 oz)              We Performed the Following     PHYSICAL PERFORMANCE TEST     THERAPEUTIC EXERCISES          Today's Medication Changes          These changes are accurate as of 6/19/18  5:32 PM.  If you have any questions, ask your nurse or doctor.               Stop taking these medicines if you haven't already. Please contact your care team if you have questions.     ondansetron 4 MG ODT tab   Commonly known as:  ZOFRAN ODT   Stopped by:  Dio Olivarez MD           order for DME   Stopped by:  Dio Olivarez MD                    Primary Care Provider Fax #    Physician No Ref-Primary [phone]       No address on file        Equal Access to Services     CHRISTOS RICHEY : Richelle Roper, waaxda luqadaha, qaybta kaalmapan cantu, parag penaloza . So [facility] [phone].    ATENCIÓN: Si habla español, tiene a cristobal disposición servicios gratuitos de asistencia lingüística. Llame al [phone].    We comply with applicable federal civil rights laws and [place] laws. We do not discriminate on the basis of race, color, national origin, age, disability, sex, sexual orientation, or gender identity.            Thank you!     Thank you for choosing [facility]  for your care. Our goal is always to provide you with excellent care.  Hearing back from our patients is one way we can continue to improve our services. Please take a few minutes to complete the written survey that you may receive in the mail after your visit with us. Thank you!             Your Updated Medication List - Protect others around you: Learn how to safely use, store and throw away your medicines at www.disposemymeds.org.          This list is accurate as of 6/19/18  5:32 PM.  Always use your most recent med list.                   Brand Name Dispense Instructions for use Diagnosis    acetaminophen 325 MG tablet    TYLENOL    100 tablet    Take 2 tablets (650 mg) by mouth every 4 hours as needed for other (mild pain)    Status post surgery       ALEVE PO      Take 2 tablets by mouth every 12 hours as needed for moderate pain        AMITRIPTYLINE HCL PO      Take 20 mg by mouth nightly as needed for sleep        fludrocortisone 0.1 MG tablet    FLORINEF    30 tablet    Take 1 tablet (0.1 mg) by mouth daily    POTS (postural orthostatic tachycardia syndrome)       gabapentin 300 MG capsule    NEURONTIN    15 capsule    Take 1 capsule (300 mg) by mouth daily    Pain at surgical site       midodrine 5 MG tablet    PROAMATINE    270 tablet    Take midodrine 10 mg at 8 am. Take 5mg at noon and 4pm.    POTS (postural orthostatic tachycardia syndrome)       XULANE 150-35 MCG/24HR patch   Generic drug:  norelgestromin-ethinyl estradiol      Place 1 patch onto the skin once a week Remove old patch and apply new patch onto the skin once a week for 3 weeks (21 days). Do not wear patch week 4 (days 22-28), then repeat.

## 2018-06-19 NOTE — NURSING NOTE
Reason For Visit:   Chief Complaint   Patient presents with     RECHECK     Bilateral tib-fib osteotomies.            Pain Assessment  Patient Currently in Pain: Yes  0-10 Pain Scale: 5  Primary Pain Location: Leg  Pain Orientation: Right, Left, Lower  Pain Descriptors: Sharp  Alleviating Factors: Rest  Aggravating Factors: Exercise, Standing, Squatting, Stairs

## 2018-06-19 NOTE — LETTER
6/19/2018       RE: Kaitlynn House  [address]  [place] [address]     Dear Colleague,    Thank you for referring your patient, Kaitlynn House, to the [facility] at [facility]. Please see a copy of my visit note below.    CHIEF COMPLAINT:     1.  Status post right tibia and fibula derotational osteotomy performed on 11/01/2017.   2.  Status post left tibia and fibula derotational osteotomy with tibial tubercle osteotomy performed 12/15/2017.      HISTORY OF PRESENT ILLNESS:  Ms. House presents today for further followup.  Overall, she reports to be doing well.  The patient has had a physical therapy session right prior to our visit.  Apparently continues having some strengthening issues.      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  Plain x-rays were obtained for that diagnosis.      The patient also has had an evaluation with bone density and she has been told to be borderline osteopenia secondary to some deficit in her parathyroid.  No formal treatment has been started at this point.      PHYSICAL EXAMINATION:  On today's visit, she presents with full range of motion of the ankles and knees.  Presents with well-healed surgical incisions.  There is no pain with palpation of the osteotomy site.      RADIOGRAPHIC EVALUATION:  Plain x-rays from a previous visit approximately a month ago were reviewed today which were significant for showing partial union across all osteotomies including tibia and the fibula, being the most mature healing in the right lower leg.  There is no prominent hardware.  Hardware is intact and in place.      ASSESSMENT:  Status post bilateral tibia-fibula derotational osteotomies with residual pain.      PLAN:  I discussed with the patient that the fact that why she is having  pain along the osteotomy sites is because she has not accomplished complete healing of the bones.  I strongly encouraged her to have an evaluation by Dr. Fabian, our bone metabolism expert.        The patient otherwise will proceed with activity as tolerated without any restrictions.      All questions were answered.  The patient was pleased with the discussion.  The patient will follow up on a p.r.n. basis.  On today's visit, she filled out a KOOS form.        TT 15 minutes, CT 10 minutes.       Again, thank you for allowing me to participate in the care of your patient.      Sincerely,    Dio Olivarez MD

## 2018-06-19 NOTE — PROGRESS NOTES
"[facility] Initial Evaluation  Subjective:  HPI                  Lower Extremity Physical Performance Testing    Surgery/Injury: s/p R derotation osteotomy tibia and fibula; s/p L derotation osteotomy tibia and fibula, TTO      Involved Extremity: right, left   Date of Surgery/Injury: 2017, 2017    Surgeon/MD: Dr. Olivarez, Dr. Schwartz  Therapist performing test: Roxane Ahumada, DPT, OCS     Primary Treating Therapist: Nirav Tang    Patient subjective symptom/function report: Patient states that she was found to have \"malunion\" at her osteotomy sites and that the jace is \"poking through\" her tibia on the R.  She is experiencing pain at her osteotomy sites, which she believes is limiting her function and progression with strengthening    Gait observation: increased transverse plane rotation, however, this is markedly improved compared to preoperatively    LSI% =Limb Symmetry Index (score comparison between involved/uninvolved extremity)    Anthropomorphic Measures      Range of Motion Jt. Line Circum. Measurement 15 cm Prox Circum. Measurement   Uninvolved 5-0-145 degrees 34 cm 39.5 cm   Involved 5-0-145 degrees 34 cm 39 cm   Difference  0 cm 0.5 cm       Return to Function (Level I): Balance, Muscle Strength   Testing Protocol: Recorded values = best effort of 3 attempts (after 2 practice attempts).    Single Leg Stand and Reach Anterior/Medial Anterior/Lateral   Right Extremity 36 cm 36 cm   Left Extremity 40 cm 35 cm   LSI% 111% 97 %     Single Leg Balance Max = 60 seconds   Right Extremity 8 seconds   Left Extremity 14 seconds   LSI% 175%     Single Leg Squat    Right Extremity 62 degrees   Left Extremity 50 degrees   LSI% 80%     Retro Step    Right Extremity 8 in.   Left Extremity 6 in.     LSI% 75%       Return to Function (Level I): Core Stability   Perceived Exertion Ratin to 5 point scale, (0) Very Easy << >> (5) Maximal Exertion.  1 verbal cuing episode for alignment " correction allowed before testing terminated.  Progress patient from basic to advanced versions of core poses as strength/endurance/control improves.    Prone Plank Hold: Pose: advanced X/60 Seconds Perceived Exertion   Hold Time 30/60 seconds 3   LSI% 50%            Assessment/Plan:  Patient continues to have pain at her osteotomy sites.  Questions regarding this pain will be deferred to Dr. Olivarez.  She will benefit from continued strengthening of bilateral lower extremities as well as her core.  Patient has been limited in progressing her weightbearing exercise due to pain.    Exercises Instructed per Test Findings:  Focus on mat based exercises for now until pain at osteotomy sites improved.  Do squats as tolerated.      Objective:  System    Physical Exam    General     ROS    Assessment/Plan:    ASSESSMENT/PLAN  Updated problem list and treatment plan: Diagnosis 1:  S/p derotation osteotomies of tibia and fibula    Pain -  hot/cold therapy, manual therapy, splint/taping/bracing/orthotics, self management, education and home program  Decreased strength - therapeutic exercise and therapeutic activities  Impaired balance - neuro re-education and therapeutic activities  Decreased proprioception - neuro re-education and therapeutic activities  Impaired gait - gait training  Impaired muscle performance - neuro re-education  Decreased function - therapeutic activities  STG/LTGs have been met:  Yes (See Goal flow sheet completed today.)  Progress toward STG/LTGs have been made:  Yes (See Goal flow sheet completed today.)  Assessment of Progress: The patient's progress has plateaued.  Self Management Plans:  Patient has been instructed in a home treatment program.  I have re-evaluated this patient and find that the nature, scope, duration and intensity of the therapy is appropriate for the medical condition of the patient.  Kaitlynn continues to require the following intervention to meet STG and LT's:   PT    Recommendations:  This patient would benefit from continued therapy.     Frequency:  2 X a month, once daily  Duration:  for 2-3 months      This patient would benefit from further evaluation.    Please refer to the daily flowsheet for treatment today, total treatment time and time spent performing 1:1 timed codes.

## 2018-07-23 ENCOUNTER — THERAPY VISIT (OUTPATIENT)
Dept: PHYSICAL THERAPY | Facility: CLINIC | Age: 23
End: 2018-07-23
Payer: COMMERCIAL

## 2018-07-23 DIAGNOSIS — M25.562 PAIN IN BOTH KNEES, UNSPECIFIED CHRONICITY: ICD-10-CM

## 2018-07-23 DIAGNOSIS — R26.9 ABNORMAL GAIT: ICD-10-CM

## 2018-07-23 DIAGNOSIS — M25.362 PATELLAR INSTABILITY OF BOTH KNEES: ICD-10-CM

## 2018-07-23 DIAGNOSIS — R60.0 LOCALIZED EDEMA: ICD-10-CM

## 2018-07-23 DIAGNOSIS — Z98.890 S/P KNEE SURGERY: ICD-10-CM

## 2018-07-23 DIAGNOSIS — M25.561 PAIN IN BOTH KNEES, UNSPECIFIED CHRONICITY: ICD-10-CM

## 2018-07-23 DIAGNOSIS — M25.361 PATELLAR INSTABILITY OF BOTH KNEES: ICD-10-CM

## 2018-07-23 PROCEDURE — 97112 NEUROMUSCULAR REEDUCATION: CPT | Mod: GP | Performed by: PHYSICAL THERAPIST

## 2018-07-23 PROCEDURE — 97110 THERAPEUTIC EXERCISES: CPT | Mod: GP | Performed by: PHYSICAL THERAPIST

## 2018-08-07 ENCOUNTER — THERAPY VISIT (OUTPATIENT)
Dept: PHYSICAL THERAPY | Facility: CLINIC | Age: 23
End: 2018-08-07
Payer: COMMERCIAL

## 2018-08-07 DIAGNOSIS — R60.0 LOCALIZED EDEMA: ICD-10-CM

## 2018-08-07 DIAGNOSIS — M25.362 PATELLAR INSTABILITY OF BOTH KNEES: Primary | ICD-10-CM

## 2018-08-07 DIAGNOSIS — M25.562 PAIN IN BOTH KNEES, UNSPECIFIED CHRONICITY: ICD-10-CM

## 2018-08-07 DIAGNOSIS — M25.561 PAIN IN BOTH KNEES, UNSPECIFIED CHRONICITY: ICD-10-CM

## 2018-08-07 DIAGNOSIS — R26.9 ABNORMAL GAIT: ICD-10-CM

## 2018-08-07 DIAGNOSIS — Z98.890 S/P KNEE SURGERY: ICD-10-CM

## 2018-08-07 DIAGNOSIS — M25.361 PATELLAR INSTABILITY OF BOTH KNEES: Primary | ICD-10-CM

## 2018-08-07 PROCEDURE — 97110 THERAPEUTIC EXERCISES: CPT | Mod: GP | Performed by: PHYSICAL THERAPY ASSISTANT

## 2018-08-07 PROCEDURE — 97112 NEUROMUSCULAR REEDUCATION: CPT | Mod: GP | Performed by: PHYSICAL THERAPY ASSISTANT

## 2018-08-14 NOTE — TELEPHONE ENCOUNTER
FUTURE VISIT INFORMATION      FUTURE VISIT INFORMATION:    Date: 8/17/18    Time:     [place] [facility]  [place]FORMATION:    Referring provider:  Sher    Referring providers clinic:      Reason for visit/diagnosis  delayed bone healing, appt per pt with [facility], records and referral internal    RECORDS REQUESTED FROM:       Clinic name Comments Records Status Imaging Status    Olivarez referring internal internal                                   RECORDS STATUS

## 2018-08-17 ENCOUNTER — PRE VISIT (OUTPATIENT)
Dept: ORTHOPEDICS | Facility: CLINIC | Age: 23
End: 2018-08-17

## 2018-08-17 ENCOUNTER — OFFICE VISIT (OUTPATIENT)
Dept: ORTHOPEDICS | Facility: CLINIC | Age: 23
End: 2018-08-17
Payer: COMMERCIAL

## 2018-08-17 VITALS — HEIGHT: 68 IN | WEIGHT: 134.5 LBS | BODY MASS INDEX: 20.38 KG/M2

## 2018-08-17 DIAGNOSIS — M25.562 PAIN IN BOTH KNEES, UNSPECIFIED CHRONICITY: ICD-10-CM

## 2018-08-17 DIAGNOSIS — M89.8X9 BONE PAIN: ICD-10-CM

## 2018-08-17 DIAGNOSIS — M25.561 PAIN IN BOTH KNEES, UNSPECIFIED CHRONICITY: ICD-10-CM

## 2018-08-17 DIAGNOSIS — Z98.890 S/P KNEE SURGERY: Primary | ICD-10-CM

## 2018-08-17 NOTE — MR AVS SNAPSHOT
After Visit Summary   8/17/2018    Kaitlynn House    MRN: [id number]           Patient Information     Date Of Birth          1995        Visit Information        Provider Department      8/17/2018 8:00 AM Dora Fabian MD [facility]        Today's Diagnoses     S/P knee surgery    -  1    Pain in both knees, unspecified chronicity        Bone pain          Care Instructions    1. Use the IOF calculator ([employer] ioAdverseEvents calcium calculator) goal is 1200 for you each day.     2. For your vitamin d, I think you should probably have 2000 a day. (take three of the 5000 once a week)     3. Keep doing the strong nutritional changes, maintaining this will allow you to heal    4. Get the dxa scan here. No labs needed    5. Come back to see Dr. Fabian or Dr. Hernandez in 2 months (I'll mychart the results)           Follow-ups after your visit        Your next 10 appointments already scheduled     Aug 21, 2018  8:30 AM CDT   DX HIP/PELVIS/SPINE with UCDX1   [facility] ([facility])    [address] [place], [place] [id number] [phone]           Please do not take any of the following 24 hours prior to the day of your exam: vitamins, calcium tablets, antacids.  If possible, please wear clothes without metal (snaps, zippers). A sweatsuit works well.            Aug 22, 2018  8:20 AM CDT   ELSY Extremity with Olga Wood PTA   [facility] ([facility])    [address] [place] [id number] [phone]            Sep 05, 2018  8:20 AM CDT   ELSY Extremity with Di Ahumada PT   [facility] ([facility])    [address] [place] [id number] [phone]            Sep 06, 2018  2:30 PM CDT   (Arrive by 2:15 PM)   RETURN ARRHYTHMIA with Milo Mendoza MD     "[facility] ([facility])    [address]  [address]  [facility] [address] [phone]              Future tests that were ordered for you today     Open Future Orders        Priority Expected Expires Ordered    Dexa hip/pelvis/spine* Routine  8/17/2019 8/17/2018            Who to contact     Please call your clinic at [phone] to:    Ask questions about your health    Make or cancel appointments    Discuss your medicines    Learn about your test results    Speak to your doctor            Additional Information About Your Visit        [employer] Information     Sape gives you secure access to your electronic health record. If you see a primary care provider, you can also send messages to your care team and make appointments. If you have questions, please call your primary care clinic.  If you do not have a primary care provider, please call [phone] and they will assist you.      Sape is an electronic gateway that provides easy, online access to your medical records. With Sape, you can request a clinic appointment, read your test results, renew a prescription or communicate with your care team.     To access your existing account, please contact your [facility] or call [phone] for assistance.        Care EveryWhere ID     This is your Care EveryWhere ID. This could be used by other organizations to access your [place] medical records  [id number]        Your Vitals Were     Height BMI (Body Mass Index)                5' 8\" (1.727 m) 20.45 kg/m2           Blood Pressure from Last 3 Encounters:   04/12/18 136/88   01/12/18 118/88   12/20/17 109/74    Weight from Last 3 Encounters:   08/17/18 134 lb 8 oz (61 kg)   04/12/18 122 lb (55.3 kg)   01/12/18 124 lb 9.6 oz (56.5 kg)               Primary Care Provider Fax #    Physician No Ref-Primary [phone]       No address on file        Equal Access to Services     CHRISTOS " GAAR : Hadii aad ku divine Roper, waaxda luqadaha, qaybta kaleannda alondra, parag nickie giovanaarlene campos eribertolizz penaloza . So [facility] [phone].    ATENCIÓN: Si habla nelson, tiene a cristobal disposición servicios gratuitos de asistencia lingüística. Llame al [phone].    We comply with applicable federal civil rights laws and [place] laws. We do not discriminate on the basis of race, color, national origin, age, disability, sex, sexual orientation, or gender identity.            Thank you!     Thank you for choosing [facility]  for your care. Our goal is always to provide you with excellent care. Hearing back from our patients is one way we can continue to improve our services. Please take a few minutes to complete the written survey that you may receive in the mail after your visit with us. Thank you!             Your Updated Medication List - Protect others around you: Learn how to safely use, store and throw away your medicines at www.disposemymeds.org.          This list is accurate as of 8/17/18  9:12 AM.  Always use your most recent med list.                   Brand Name Dispense Instructions for use Diagnosis    acetaminophen 325 MG tablet    TYLENOL    100 tablet    Take 2 tablets (650 mg) by mouth every 4 hours as needed for other (mild pain)    Status post surgery       ALEVE PO      Take 2 tablets by mouth every 12 hours as needed for moderate pain        AMITRIPTYLINE HCL PO      Take 20 mg by mouth nightly as needed for sleep        fludrocortisone 0.1 MG tablet    FLORINEF    30 tablet    Take 1 tablet (0.1 mg) by mouth daily    POTS (postural orthostatic tachycardia syndrome)       gabapentin 300 MG capsule    NEURONTIN    15 capsule    Take 1 capsule (300 mg) by mouth daily    Pain at surgical site       midodrine 5 MG tablet    PROAMATINE    450 tablet    Take midodrine 10 mg at 8 am. Take 10mg at noon and take 5 mg at 4pm.    POTS (postural orthostatic tachycardia syndrome)        XULANE 150-35 MCG/24HR patch   Generic drug:  norelgestromin-ethinyl estradiol      Place 1 patch onto the skin once a week Remove old patch and apply new patch onto the skin once a week for 3 weeks (21 days). Do not wear patch week 4 (days 22-28), then repeat.

## 2018-08-17 NOTE — PATIENT INSTRUCTIONS
1. Use the IOF calculator ([employer] iof calcium calculator) goal is 1200 for you each day.     2. For your vitamin d, I think you should probably have 2000 a day. (take three of the 5000 once a week)     3. Keep doing the strong nutritional changes, maintaining this will allow you to heal    4. Get the dxa scan here. No labs needed    5. Come back to see Dr. Fabian or Dr. Hernandez in 2 months (I'll mychart the results)

## 2018-08-17 NOTE — PROGRESS NOTES
SUBJECTIVE:    Kaitlynn House is a 23 year old female here today to discuss the delayed healing of the bones.     Risk factors for osteroporosis include  or , thin habitus and chronic steroid use.    She has been full weight bearing since April. (off the left leg for 12 weeks).      Sees [place] ortho team on Monday (8/20), she gets different opinions from here and [place]. Has lots of femur pain. Saw bone demineralization and femoral anteversion.      Was told she maybe had endometriosis, irregular periods, heavy bleeding in her teens, has been on birth control and regular periods since then.     She was down to 114 after surgery, gained back with exercise and eating. Has poor appetite and feels full after eating not much.     cc: bone health fu  - Vitamin D Intake/Level: takes 5000 international units  Weekly + 400 daily   - Calcium: 600 daily, eats cheese.   - Hx Stress Fx's: none   - Current Meds: gabapentin, xulane patch, amitriptyline, midodrine, not taking fludrocortisone (takes aleve 1xdaily, some ibuprofen)   - tob use: never  - etoh use: rare  - Caffeine Use: none  - Exercise: walking, hx of swimmer  - Hx Kidney Stones: have had before (1 a year) last one she knew about was early 2017  - Hx of Chemo, Skeletal Radiation, or Hormone Therapy: none  - Referral:  Dr. Olivarez  - Hx of GERD: yes, took ranitidine (uses occasionally)  - Prior bisphosphonate: never  - Labs (Cr, Vit D, Ca, PTH):  - Prior DEXA scan: never had one. Bone team wanted to wait a few months.       6 point review of systems is otherwise negative      Family hx: mother's is removed (lump on thyroid)  Mother has osteoporosis. Mom has gastoparesis      Past Medical History:   Diagnosis Date     Restless legs syndrome        Past Surgical History:   Procedure Laterality Date     ARTHROSCOPY KNEE WITH TIBIAL TUBERCLE SHIFT Left 12/15/2017    Procedure: ARTHROSCOPY KNEE WITH TIBIAL TUBERCLE SHIFT;;  Surgeon: Di Schwartz MD;   "[facility]: [facility]     OPEN REDUCTION INTERNAL FIXATION RODDING INTRAMEDULLARY TIBIA Left 12/15/2017    Procedure: OPEN REDUCTION INTERNAL FIXATION RODDING INTRAMEDULLARY TIBIA;  Left Knee Arthroscopy, Tibial Derotational Osteotomy with Intermedullary Rodding, Fibular Derotational Osteotmy, Distal Tibial Tubercle Osteotomy;  Surgeon: Dio Olivarez MD;  [facility] OR       Current Outpatient Prescriptions   Medication Sig Dispense Refill     acetaminophen (TYLENOL) 325 MG tablet Take 2 tablets (650 mg) by mouth every 4 hours as needed for other (mild pain) 100 tablet 0     AMITRIPTYLINE HCL PO Take 20 mg by mouth nightly as needed for sleep       fludrocortisone (FLORINEF) 0.1 MG tablet Take 1 tablet (0.1 mg) by mouth daily 30 tablet 3     gabapentin (NEURONTIN) 300 MG capsule Take 1 capsule (300 mg) by mouth daily 15 capsule 1     midodrine (PROAMATINE) 5 MG tablet Take midodrine 10 mg at 8 am. Take 10mg at noon and take 5 mg at 4pm. 450 tablet 3     Naproxen Sodium (ALEVE PO) Take 2 tablets by mouth every 12 hours as needed for moderate pain       norelgestromin-ethinyl estradiol (XULANE) 150-35 MCG/24HR patch Place 1 patch onto the skin once a week Remove old patch and apply new patch onto the skin once a week for 3 weeks (21 days). Do not wear patch week 4 (days 22-28), then repeat.         Social History     Social History     Marital status: Single     Spouse name: N/A     Number of children: N/A     Years of education: N/A     Occupational History     Not on file.     Social History Main Topics     Smoking status: Never Smoker     Smokeless tobacco: Never Used     Alcohol use No     Drug use: No     Sexual activity: Not on file     Other Topics Concern     Not on file     Social History Narrative       OBJECTIVE:  Ht 5' 8\" (1.727 m)  Wt 134 lb 8 oz (61 kg)  BMI 20.45 kg/m2  There has not been a change in patients height.  Gen: normal appearance, in no obvious distress, thin  Pulm: normal respiratory " pattern, non-labored, ctab, no wheeze  CV: rrr, no murmur, good radial pulses  Neuro: no sensory or motor deficit, grossly normal coordination,  Abd: normal bowel sounds, nontender  Skin: no ecchymosis, erythema, warmth, or induration, no obvious rash, surgical scars over bilateral shins present  MSK: tender to palpation over right tibia (mid shaft and near tubercle)   Psych: interactive, appropriate, normal mood and affect    Total joni d lab 36 (outside lab)    ASSESSMENT:    ICD-10-CM    1. S/P knee surgery Z98.890    2. Pain in both knees, unspecified chronicity M25.561     M25.562    3. Bone pain M89.8X9 Dexa hip/pelvis/spine*        PLAN:  Patient has several risk factors including steroid use, thin body habitus, ?disordered eating in past, reduced joni d/calcium intake, and surgery. She was a swimmer which reduced her normal bony exercise stress.     She has had full lab eval in the last year through care everywhere ([place] and [facility]) that is reassuring.     I have strongly encouraged that her now current weight should be the lowest her weight gets to allow her body adequate healing from regular injury and her bone healing.     She should get DXA as below to eval her bone health generally.     Patient Instructions   1. Use the IOF calculator ([employer] iof calcium calculator) goal is 1200 for you each day.     2. For your vitamin d, I think you should probably have 2000 a day. (take three of the 5000 once a week)     3. Keep doing the strong nutritional changes, maintaining this will allow you to heal    4. Get the dxa scan here. No labs needed today    5. Come back to see Dr. Fabian or Dr. Hernandez in 2 months (I'll mychart the results of the DXA)        Patient seen and discussed with Dr. Dora Fabian.      Napoleon Hernandez MD  Sports Medicine Fellow  8/17/2018 8:46 AM

## 2018-08-17 NOTE — PROGRESS NOTES
Attending Note:   I have personally examined this patient and have reviewed the clinical presentation and progress note with the fellow. I agree with the treatment plan as outlined. The plan was formulated with the fellow on the day of the patient's visit. I have reviewed all imaging and labs from [facility], [place] and [facility] with the fellow and agree with the findings in the documentation.     Dora Fabian MD, CAQ, CCD  [facility]  Sports Medicine and Bone Health

## 2018-08-17 NOTE — LETTER
8/17/2018      RE: Kaitlynn House  [address]  [place] [address]       SUBJECTIVE:    Kaitlynn House is a 23 year old female here today to discuss the delayed healing of the bones.     Risk factors for osteroporosis include  or , thin habitus and chronic steroid use.    She has been full weight bearing since April. (off the left leg for 12 weeks).      Sees [place] ortho team on Monday (8/20), she gets different opinions from here and [place]. Has lots of femur pain. Saw bone demineralization and femoral anteversion.      Was told she maybe had endometriosis, irregular periods, heavy bleeding in her teens, has been on birth control and regular periods since then.     She was down to 114 after surgery, gained back with exercise and eating. Has poor appetite and feels full after eating not much.     cc: bone health fu  - Vitamin D Intake/Level: takes 5000 international units  Weekly + 400 daily   - Calcium: 600 daily, eats cheese.   - Hx Stress Fx's: none   - Current Meds: gabapentin, xulane patch, amitriptyline, midodrine, not taking fludrocortisone (takes aleve 1xdaily, some ibuprofen)   - tob use: never  - etoh use: rare  - Caffeine Use: none  - Exercise: walking, hx of swimmer  - Hx Kidney Stones: have had before (1 a year) last one she knew about was early 2017  - Hx of Chemo, Skeletal Radiation, or Hormone Therapy: none  - Referral:  Dr. Olivarez  - Hx of GERD: yes, took ranitidine (uses occasionally)  - Prior bisphosphonate: never  - Labs (Cr, Vit D, Ca, PTH):  - Prior DEXA scan: never had one. Bone team wanted to wait a few months.       6 point review of systems is otherwise negative      Family hx: mother's is removed (lump on thyroid)  Mother has osteoporosis. Mom has gastoparesis      Past Medical History:   Diagnosis Date     Restless legs syndrome        Past Surgical History:   Procedure Laterality Date     ARTHROSCOPY KNEE WITH TIBIAL TUBERCLE SHIFT Left 12/15/2017     "Procedure: ARTHROSCOPY KNEE WITH TIBIAL TUBERCLE SHIFT;;  Surgeon: Di Schwartz MD;  [facility]     OPEN REDUCTION INTERNAL FIXATION RODDING INTRAMEDULLARY TIBIA Left 12/15/2017    Procedure: OPEN REDUCTION INTERNAL FIXATION RODDING INTRAMEDULLARY TIBIA;  Left Knee Arthroscopy, Tibial Derotational Osteotomy with Intermedullary Rodding, Fibular Derotational Osteotmy, Distal Tibial Tubercle Osteotomy;  Surgeon: Dio Olivarez MD;  [facility]       Current Outpatient Prescriptions   Medication Sig Dispense Refill     acetaminophen (TYLENOL) 325 MG tablet Take 2 tablets (650 mg) by mouth every 4 hours as needed for other (mild pain) 100 tablet 0     AMITRIPTYLINE HCL PO Take 20 mg by mouth nightly as needed for sleep       fludrocortisone (FLORINEF) 0.1 MG tablet Take 1 tablet (0.1 mg) by mouth daily 30 tablet 3     gabapentin (NEURONTIN) 300 MG capsule Take 1 capsule (300 mg) by mouth daily 15 capsule 1     midodrine (PROAMATINE) 5 MG tablet Take midodrine 10 mg at 8 am. Take 10mg at noon and take 5 mg at 4pm. 450 tablet 3     Naproxen Sodium (ALEVE PO) Take 2 tablets by mouth every 12 hours as needed for moderate pain       norelgestromin-ethinyl estradiol (XULANE) 150-35 MCG/24HR patch Place 1 patch onto the skin once a week Remove old patch and apply new patch onto the skin once a week for 3 weeks (21 days). Do not wear patch week 4 (days 22-28), then repeat.         Social History     Social History     Marital status: Single     Spouse name: N/A     Number of children: N/A     Years of education: N/A     Occupational History     Not on file.     Social History Main Topics     Smoking status: Never Smoker     Smokeless tobacco: Never Used     Alcohol use No     Drug use: No     Sexual activity: Not on file     Other Topics Concern     Not on file     Social History Narrative       OBJECTIVE:  Ht 5' 8\" (1.727 m)  Wt 134 lb 8 oz (61 kg)  BMI 20.45 kg/m2  There has not been a change in " patients height.  Gen: normal appearance, in no obvious distress, thin  Pulm: normal respiratory pattern, non-labored, ctab, no wheeze  CV: rrr, no murmur, good radial pulses  Neuro: no sensory or motor deficit, grossly normal coordination,  Abd: normal bowel sounds, nontender  Skin: no ecchymosis, erythema, warmth, or induration, no obvious rash, surgical scars over bilateral shins present  MSK: tender to palpation over right tibia (mid shaft and near tubercle)   Psych: interactive, appropriate, normal mood and affect    Total joni d lab 36 (outside lab)    ASSESSMENT:    ICD-10-CM    1. S/P knee surgery Z98.890    2. Pain in both knees, unspecified chronicity M25.561     M25.562    3. Bone pain M89.8X9 Dexa hip/pelvis/spine*        PLAN:  Patient has several risk factors including steroid use, thin body habitus, ?disordered eating in past, reduced joni d/calcium intake, and surgery. She was a swimmer which reduced her normal bony exercise stress.     She has had full lab eval in the last year through care everywhere ([place] and [facility]) that is reassuring.     I have strongly encouraged that her now current weight should be the lowest her weight gets to allow her body adequate healing from regular injury and her bone healing.     She should get DXA as below to eval her bone health generally.     Patient Instructions   1. Use the IOF calculator ([employer] iof calcium calculator) goal is 1200 for you each day.     2. For your vitamin d, I think you should probably have 2000 a day. (take three of the 5000 once a week)     3. Keep doing the strong nutritional changes, maintaining this will allow you to heal    4. Get the dxa scan here. No labs needed today    5. Come back to see Dr. Fabian or Dr. Hernandez in 2 months (I'll mychart the results of the DXA)        Patient seen and discussed with Dr. Dora Fabian.      Napoleon Hernandez MD  Sports Medicine Fellow  8/17/2018 8:46 AM         Attending Note:   I have personally  examined this patient and have reviewed the clinical presentation and progress note with the fellow. I agree with the treatment plan as outlined. The plan was formulated with the fellow on the day of the patient's visit. I have reviewed all imaging and labs from [facility] [place] and [facility] with the fellow and agree with the findings in the documentation.     Dora Fabian MD, CAQ, CCD  [facility]  Sports Medicine and Bone Health    Dora Fabian MD

## 2018-08-22 ENCOUNTER — THERAPY VISIT (OUTPATIENT)
Dept: PHYSICAL THERAPY | Facility: CLINIC | Age: 23
End: 2018-08-22
Payer: COMMERCIAL

## 2018-08-22 ENCOUNTER — RADIANT APPOINTMENT (OUTPATIENT)
Dept: BONE DENSITY | Facility: CLINIC | Age: 23
End: 2018-08-22
Attending: FAMILY MEDICINE
Payer: COMMERCIAL

## 2018-08-22 DIAGNOSIS — R26.9 ABNORMAL GAIT: ICD-10-CM

## 2018-08-22 DIAGNOSIS — M25.561 PAIN IN BOTH KNEES, UNSPECIFIED CHRONICITY: Primary | ICD-10-CM

## 2018-08-22 DIAGNOSIS — M25.562 PAIN IN BOTH KNEES, UNSPECIFIED CHRONICITY: Primary | ICD-10-CM

## 2018-08-22 DIAGNOSIS — Z98.890 S/P KNEE SURGERY: ICD-10-CM

## 2018-08-22 DIAGNOSIS — M25.362 PATELLAR INSTABILITY OF BOTH KNEES: ICD-10-CM

## 2018-08-22 DIAGNOSIS — M89.8X9 BONE PAIN: ICD-10-CM

## 2018-08-22 DIAGNOSIS — R60.0 LOCALIZED EDEMA: ICD-10-CM

## 2018-08-22 DIAGNOSIS — M25.361 PATELLAR INSTABILITY OF BOTH KNEES: ICD-10-CM

## 2018-08-22 PROCEDURE — 97110 THERAPEUTIC EXERCISES: CPT | Mod: GP | Performed by: PHYSICAL THERAPY ASSISTANT

## 2018-08-22 PROCEDURE — 97140 MANUAL THERAPY 1/> REGIONS: CPT | Mod: GP | Performed by: PHYSICAL THERAPY ASSISTANT

## 2018-09-06 ENCOUNTER — OFFICE VISIT (OUTPATIENT)
Dept: CARDIOLOGY | Facility: CLINIC | Age: 23
End: 2018-09-06
Attending: INTERNAL MEDICINE
Payer: COMMERCIAL

## 2018-09-06 VITALS
HEIGHT: 68 IN | SYSTOLIC BLOOD PRESSURE: 141 MMHG | BODY MASS INDEX: 20.31 KG/M2 | WEIGHT: 134 LBS | DIASTOLIC BLOOD PRESSURE: 91 MMHG | OXYGEN SATURATION: 100 % | HEART RATE: 121 BPM

## 2018-09-06 DIAGNOSIS — G90.A POTS (POSTURAL ORTHOSTATIC TACHYCARDIA SYNDROME): Primary | ICD-10-CM

## 2018-09-06 PROCEDURE — G0463 HOSPITAL OUTPT CLINIC VISIT: HCPCS | Mod: ZF

## 2018-09-06 PROCEDURE — 99214 OFFICE O/P EST MOD 30 MIN: CPT | Mod: GC | Performed by: INTERNAL MEDICINE

## 2018-09-06 RX ORDER — FLUDROCORTISONE ACETATE 0.1 MG/1
0.2 TABLET ORAL DAILY
Qty: 190 TABLET | Refills: 3 | Status: SHIPPED | OUTPATIENT
Start: 2018-09-06 | End: 2019-03-29

## 2018-09-06 RX ORDER — GABAPENTIN 600 MG/1
TABLET ORAL
Refills: 1 | COMMUNITY
Start: 2018-07-23 | End: 2020-11-25

## 2018-09-06 RX ORDER — IVABRADINE 5 MG/1
5 TABLET, FILM COATED ORAL 2 TIMES DAILY WITH MEALS
Qty: 180 TABLET | Refills: 3 | Status: SHIPPED | OUTPATIENT
Start: 2018-09-06 | End: 2019-01-24

## 2018-09-06 ASSESSMENT — PAIN SCALES - GENERAL: PAINLEVEL: MILD PAIN (3)

## 2018-09-06 NOTE — LETTER
9/6/2018      RE: Kaitlynn House  [address]  [place] [address]       Dear Colleague,    Thank you for the opportunity to participate in the care of your patient, Kaitlynn House, at the [facility]. Please see a copy of my visit note below.    HPI: Kaitlynn House is a pleasant 22 year old female with POTS based on history who is here for a follow up. 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 This requires her walking and consequaently she has problem doing her work. She states that from her last visit, she has improved somewhat. 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 She had a right tib-fib derotational osteotomy on 11/1/17 and a  left leg procedure on 12/15/17.     In January she was on Vitassium 2 tabs BID, extra salt, pedialyte, exercise, Fludrocortisone 0.2mg (she stopped b/c she felt it wasn't helping) and then Midodrine 5mg TID. During last visit, we increased Midodrine to 10,5,5 mg. She feels that Midodrine has helped quite a bit although she is still symptomatic. She uses Vitassium 2 tabs BID, adds extra salt in her food, and drinks G2 - has gained 20 lbs. She also wears compression shorts.     If she will stand for more than 5-10 minutes, she will gets dyspneic, blurry vision, dizzy and her HR will increase to as high as 140s. She uses her phone to check her HR. Her HR today in clinic went from 86 sitting to 124 standing, and blood pressure went from 125/77 while supine to 137/79 while standing.  She had very mild dizziness at 3 minutes of standing but had no dizziness at 1 minute.    PAST MEDICAL HISTORY:  Past Medical History:   Diagnosis Date     Restless legs syndrome        CURRENT MEDICATIONS:  Current Outpatient Prescriptions   Medication Sig Dispense Refill     acetaminophen (TYLENOL) 325 MG tablet Take 2 tablets  (650 mg) by mouth every 4 hours as needed for other (mild pain) 100 tablet 0     AMITRIPTYLINE HCL PO Take 20 mg by mouth nightly as needed for sleep       fludrocortisone (FLORINEF) 0.1 MG tablet Take 1 tablet (0.1 mg) by mouth daily 30 tablet 3     gabapentin (NEURONTIN) 300 MG capsule Take 1 capsule (300 mg) by mouth daily 15 capsule 1     gabapentin (NEURONTIN) 600 MG tablet TK 1 T PO BID  1     midodrine (PROAMATINE) 5 MG tablet Take midodrine 10 mg at 8 am. Take 10mg at noon and take 5 mg at 4pm. 450 tablet 3     Naproxen Sodium (ALEVE PO) Take 2 tablets by mouth every 12 hours as needed for moderate pain       norelgestromin-ethinyl estradiol (XULANE) 150-35 MCG/24HR patch Place 1 patch onto the skin once a week Remove old patch and apply new patch onto the skin once a week for 3 weeks (21 days). Do not wear patch week 4 (days 22-28), then repeat.         PAST SURGICAL HISTORY:  Past Surgical History:   Procedure Laterality Date     ARTHROSCOPY KNEE WITH TIBIAL TUBERCLE SHIFT Left 12/15/2017    Procedure: ARTHROSCOPY KNEE WITH TIBIAL TUBERCLE SHIFT;;  Surgeon: Di Schwartz MD;  [facility]     OPEN REDUCTION INTERNAL FIXATION RODDING INTRAMEDULLARY TIBIA Left 12/15/2017    Procedure: OPEN REDUCTION INTERNAL FIXATION RODDING INTRAMEDULLARY TIBIA;  Left Knee Arthroscopy, Tibial Derotational Osteotomy with Intermedullary Rodding, Fibular Derotational Osteotmy, Distal Tibial Tubercle Osteotomy;  Surgeon: Dio Olivarez MD;  [facility] OR       ALLERGIES:     Allergies   Allergen Reactions     Egg [Chicken-Derived Products (Egg)] Anaphylaxis     Amoxicillin Hives     Zithromax [Azithromycin] Nausea and Vomiting     Compazine [Prochlorperazine] Other (See Comments)     delirious       FAMILY HISTORY:  Family History   Problem Relation Age of Onset     Osteoporosis Mother      GASTROINTESTINAL DISEASE Mother      gastroparesis     - Premature coronary artery disease  - Atrial fibrillation  -  "Sudden cardiac death     SOCIAL HISTORY:  Social History   Substance Use Topics     Smoking status: Never Smoker     Smokeless tobacco: Never Used     Alcohol use No       ROS:   Constitutional: No fever, chills, or sweats. Weight stable.   ENT: No visual disturbance, ear ache, epistaxis, sore throat.   Cardiovascular: As per HPI.   Respiratory: No cough, hemoptysis.    GI: No nausea, vomiting, hematemesis, melena, or hematochezia.   : No hematuria.   Integument: Negative.   Psychiatric: Negative.   Hematologic:  Easy bruising, no easy bleeding.  Neuro: Negative.   Endocrinology: No significant heat or cold intolerance   Musculoskeletal: No myalgia.    Exam:  BP (!) 141/91 (BP Location: Right arm, Patient Position: Standing, Cuff Size: Adult Regular)  Pulse 121  Ht 1.727 m (5' 8\")  Wt 60.8 kg (134 lb)  SpO2 100%  BMI 20.37 kg/m2  GENERAL APPEARANCE: healthy, alert and no distress  HEENT: no icterus, no xanthelasmas, normal pupil size and reaction, normal palate, mucosa moist, no central cyanosis  NECK: no adenopathy, no asymmetry, masses, or scars, thyroid normal to palpation and no bruits, JVP not elevated  RESPIRATORY: lungs clear to auscultation - no rales, rhonchi or wheezes, no use of accessory muscles, no retractions, respirations are unlabored, normal respiratory rate  CARDIOVASCULAR: regular rhythm, normal S1 with physiologic split S2, no S3 or S4 and no murmur, click or rub, precordium quiet with normal PMI.  ABDOMEN: soft, non tender, without hepatosplenomegaly, no masses palpable, bowel sounds normal, aorta not enlarged by palpation, no abdominal bruits  EXTREMITIES: peripheral pulses normal, no edema, no bruits  NEURO: alert and oriented to person/place/time, normal speech, gait and affect  VASC: Radial, femoral, dorsalis pedis and posterior tibialis pulses are normal in volumes and symmetric bilaterally. No bruits are heard.  SKIN: no ecchymoses, no rashes    Labs:  CBC RESULTS:   Lab Results "   Component Value Date    WBC 5.9 2017    RBC 3.39 (L) 2017    HGB 10.2 (L) 2017    HCT 29.3 (L) 2017    MCV 86 2017    MCH 30.1 2017    MCHC 34.8 2017    RDW 12.3 2017     2017       BMP RESULTS:  Lab Results   Component Value Date     2017    POTASSIUM 3.8 2017    CHLORIDE 104 2017    CO2 29 2017    ANIONGAP 5 2017    GLC 80 2017    BUN 10 2017    CR 0.54 2017    GFRESTIMATED >90 2017    GFRESTBLACK >90 2017    CHANI 8.6 2017        INR RESULTS:  No results found for: INR    Procedures:  PULMONARY FUNCTION TESTS:   No flowsheet data found.      ECHOCARDIOGRAM:   Recent Results (from the past 8760 hour(s))   Echocardiogram    Narrative    [id number]  [facility]  [id number]  [id number]^CORINE^AMANDA^           [facility]  Echocardiography Laboratory  [address] [place] [id number]     Name: OSIEL OSBORNE  MRN: [id number]  : 1995  Study Date: 10/30/2017 05:06 PM  Age: 22 yrs  Gender: Female  [place] [facility] [place]  Reason For Study: Syncope and collapse  Ordering Physician: AMANDA POOL  Referring Physician: AMANDA POOL  Performed By: Beck Osei RDCS     BSA: 1.7 m2  Height: 67 in  Weight: 124 lb  HR: 91  BP: 122/84 mmHg  _____________________________________________________________________________  __        Procedure  Echocardiogram with two-dimensional, color and spectral Doppler performed.  _____________________________________________________________________________  __        Interpretation Summary  Global and regional left ventricular function is normal with an EF of 55-60%.  Global right ventricular function is normal.  No significant valvular abnormalities were noted.  The inferior vena cava was normal in size with preserved respiratory  variability.  No pericardial effusion is present.  Previous study not available  for comparison.  _____________________________________________________________________________  __        Left Ventricle  Left ventricular size is normal. Left ventricular wall thickness is normal.  Global and regional left ventricular function is normal with an EF of 55-60%.  Normal left ventricular filling for age. No regional wall motion abnormalities  are seen.     Right Ventricle  The right ventricle is normal size. Global right ventricular function is  normal.     Atria  Both atria appear normal.     Mitral Valve  The mitral valve is normal.        Aortic Valve  Aortic valve is normal in structure and function. The aortic valve is  tricuspid.     Tricuspid Valve  The tricuspid valve is normal. The peak velocity of the tricuspid regurgitant  jet is not obtainable.     Pulmonic Valve  The pulmonic valve is normal.     Vessels  The aorta root is normal. The inferior vena cava was normal in size with  preserved respiratory variability.     Pericardium  No pericardial effusion is present.        Compared to Previous Study  Previous study not available for comparison.     Attestation  I have personally viewed the imaging and agree with the interpretation and  report as documented by the fellow, Durga Goodman, and/or edited by me.  _____________________________________________________________________________  __     MMode/2D Measurements & Calculations  IVSd: 0.71 cm  LVIDd: 4.0 cm  LVIDs: 2.6 cm  LVPWd: 0.68 cm  FS: 34.3 %  EDV(Teich): 68.7 ml  ESV(Teich): 24.8 ml  LV mass(C)d: 76.4 grams  LV mass(C)dI: 46.3 grams/m2  asc Aorta Diam: 2.9 cm  LVOT diam: 2.0 cm  LVOT area: 3.1 cm2  LA Volume (BP): 32.4 ml  LA Volume Index (BP): 19.6 ml/m2     RWT: 0.34        Doppler Measurements & Calculations  MV E max chelly: 79.9 cm/sec  MV A max chelly: 70.6 cm/sec  MV E/A: 1.1  Ao V2 max: 112.0 cm/sec  Ao max P.0 mmHg  SANTOS(V,D): 2.6 cm2  LV V1 max PG: 3.5 mmHg  LV V1 max: 93.0 cm/sec  E/E' av.9  Lateral E/e': 4.9  Med E to E':  6.9  [facility] E/e': 6.9        _____________________________________________________________________________  __        Report approved by: Yared Mtz 10/31/2017 09:18 AM            Assessment and Plan: Kaitlynn House is a pleasant 22 year old female with POTS based on history who is here for a follow up.    Plan  - Cont Midodrine at 10,5,5 mg.  - Start fludrocortisone at 0.2 mg daily.  Patient currently has 0.1 mg tabs at home, but if needed we will provide her with a prescription.  - Start Ivabradine 5 mg BID.  We will work on prior authorization for insurance approval of this drug, but currently at this time we will give her a prescription for which she can go to [place] and taking her medication that way.  - Isometric leg exercise with band daily  - Continue Vitassium 2 tabs BID, extra salt in food and pedialyte. Recommend keeping pedialyte at bedside and drink as soon as waking up.  As for her weight gain, we instructed her not to consume too many high caloric fatty foods and also to watch her Gatorade intake as this can have a high number of carbohydrates and sugars.  We instructed her that we wanted her the extra volume and consult from these therapies, not the calories.     RTC in 6 months or sooner if needed    Thank you for allowing me to care for this patient. This patient was seen and discussed with Dr. Milo Mendoza, who agrees with the plan above. If you have any questions, please do not hesitate to contact me.    Thank you,    Deloris Cruz  Cardiology Fellow, PGY-5     I very much appreciated the opportunity to see and assess Kaitlynn House in the clinic with CV Fellow and resident.     I agree with the note above which summarizes my findings and current recommendations    Please do not hesitate to contact my office if you have any questions or concerns.      Milo Mendoza MD  Cardiac Arrhythmia Service  [facility]  [phone]    CC  SELF, REFERRED

## 2018-09-06 NOTE — PATIENT INSTRUCTIONS
Two new medications started today per Dr. Mendoza.  Please let us know if you have any problems or concerns once you start these medications.    1) Fludrocortisone 0.2 mg once daily  2) Ivabradine 5 mg twice daily (paper prescription given)    See you back in six months    Call me if you have any questions or concerns.    Kelli Ramirez RN  Cardiology Nurse Clinician  [phone]

## 2018-09-06 NOTE — PROGRESS NOTES
HPI: Kaitlynn House is a pleasant 22 year old female with POTS based on history who is here for a follow up. 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 This requires her walking and consequaently she has problem doing her work. She states that from her last visit, she has improved somewhat. 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 She had a right tib-fib derotational osteotomy on 11/1/17 and a left leg procedure on 12/15/17.     In January she was on Vitassium 2 tabs BID, extra salt, pedialyte, exercise, Fludrocortisone 0.2mg (she stopped b/c she felt it wasn't helping) and then Midodrine 5mg TID. During last visit, we increased Midodrine to 10,5,5 mg. She feels that Midodrine has helped quite a bit although she is still symptomatic. She uses Vitassium 2 tabs BID, adds extra salt in her food, and drinks G2 - has gained 20 lbs. She also wears compression shorts.     If she will stand for more than 5-10 minutes, she will gets dyspneic, blurry vision, dizzy and her HR will increase to as high as 140s. She uses her phone to check her HR. Her HR today in clinic went from 86 sitting to 124 standing, and blood pressure went from 125/77 while supine to 137/79 while standing.  She had very mild dizziness at 3 minutes of standing but had no dizziness at 1 minute.    PAST MEDICAL HISTORY:  Past Medical History:   Diagnosis Date     Restless legs syndrome        CURRENT MEDICATIONS:  Current Outpatient Prescriptions   Medication Sig Dispense Refill     acetaminophen (TYLENOL) 325 MG tablet Take 2 tablets (650 mg) by mouth every 4 hours as needed for other (mild pain) 100 tablet 0     AMITRIPTYLINE HCL PO Take 20 mg by mouth nightly as needed for sleep       fludrocortisone (FLORINEF) 0.1 MG tablet Take 1 tablet (0.1 mg) by mouth daily 30 tablet 3     gabapentin (NEURONTIN) 300 MG capsule Take 1 capsule (300 mg) by mouth  daily 15 capsule 1     gabapentin (NEURONTIN) 600 MG tablet TK 1 T PO BID  1     midodrine (PROAMATINE) 5 MG tablet Take midodrine 10 mg at 8 am. Take 10mg at noon and take 5 mg at 4pm. 450 tablet 3     Naproxen Sodium (ALEVE PO) Take 2 tablets by mouth every 12 hours as needed for moderate pain       norelgestromin-ethinyl estradiol (XULANE) 150-35 MCG/24HR patch Place 1 patch onto the skin once a week Remove old patch and apply new patch onto the skin once a week for 3 weeks (21 days). Do not wear patch week 4 (days 22-28), then repeat.         PAST SURGICAL HISTORY:  Past Surgical History:   Procedure Laterality Date     ARTHROSCOPY KNEE WITH TIBIAL TUBERCLE SHIFT Left 12/15/2017    Procedure: ARTHROSCOPY KNEE WITH TIBIAL TUBERCLE SHIFT;;  Surgeon: Di Schwartz MD;  [facility]     OPEN REDUCTION INTERNAL FIXATION RODDING INTRAMEDULLARY TIBIA Left 12/15/2017    Procedure: OPEN REDUCTION INTERNAL FIXATION RODDING INTRAMEDULLARY TIBIA;  Left Knee Arthroscopy, Tibial Derotational Osteotomy with Intermedullary Rodding, Fibular Derotational Osteotmy, Distal Tibial Tubercle Osteotomy;  Surgeon: Dio Olivarez MD;  [facility] OR       ALLERGIES:     Allergies   Allergen Reactions     Egg [Chicken-Derived Products (Egg)] Anaphylaxis     Amoxicillin Hives     Zithromax [Azithromycin] Nausea and Vomiting     Compazine [Prochlorperazine] Other (See Comments)     delirious       FAMILY HISTORY:  Family History   Problem Relation Age of Onset     Osteoporosis Mother      GASTROINTESTINAL DISEASE Mother      gastroparesis     - Premature coronary artery disease  - Atrial fibrillation  - Sudden cardiac death     SOCIAL HISTORY:  Social History   Substance Use Topics     Smoking status: Never Smoker     Smokeless tobacco: Never Used     Alcohol use No       ROS:   Constitutional: No fever, chills, or sweats. Weight stable.   ENT: No visual disturbance, ear ache, epistaxis, sore throat.   Cardiovascular: As  "per HPI.   Respiratory: No cough, hemoptysis.    GI: No nausea, vomiting, hematemesis, melena, or hematochezia.   : No hematuria.   Integument: Negative.   Psychiatric: Negative.   Hematologic:  Easy bruising, no easy bleeding.  Neuro: Negative.   Endocrinology: No significant heat or cold intolerance   Musculoskeletal: No myalgia.    Exam:  BP (!) 141/91 (BP Location: Right arm, Patient Position: Standing, Cuff Size: Adult Regular)  Pulse 121  Ht 1.727 m (5' 8\")  Wt 60.8 kg (134 lb)  SpO2 100%  BMI 20.37 kg/m2  GENERAL APPEARANCE: healthy, alert and no distress  HEENT: no icterus, no xanthelasmas, normal pupil size and reaction, normal palate, mucosa moist, no central cyanosis  NECK: no adenopathy, no asymmetry, masses, or scars, thyroid normal to palpation and no bruits, JVP not elevated  RESPIRATORY: lungs clear to auscultation - no rales, rhonchi or wheezes, no use of accessory muscles, no retractions, respirations are unlabored, normal respiratory rate  CARDIOVASCULAR: regular rhythm, normal S1 with physiologic split S2, no S3 or S4 and no murmur, click or rub, precordium quiet with normal PMI.  ABDOMEN: soft, non tender, without hepatosplenomegaly, no masses palpable, bowel sounds normal, aorta not enlarged by palpation, no abdominal bruits  EXTREMITIES: peripheral pulses normal, no edema, no bruits  NEURO: alert and oriented to person/place/time, normal speech, gait and affect  VASC: Radial, femoral, dorsalis pedis and posterior tibialis pulses are normal in volumes and symmetric bilaterally. No bruits are heard.  SKIN: no ecchymoses, no rashes    Labs:  CBC RESULTS:   Lab Results   Component Value Date    WBC 5.9 12/19/2017    RBC 3.39 (L) 12/19/2017    HGB 10.2 (L) 12/19/2017    HCT 29.3 (L) 12/19/2017    MCV 86 12/19/2017    MCH 30.1 12/19/2017    MCHC 34.8 12/19/2017    RDW 12.3 12/19/2017     12/19/2017       BMP RESULTS:  Lab Results   Component Value Date     12/19/2017    POTASSIUM " 3.8 2017    CHLORIDE 104 2017    CO2 29 2017    ANIONGAP 5 2017    GLC 80 2017    BUN 10 2017    CR 0.54 2017    GFRESTIMATED >90 2017    GFRESTBLACK >90 2017    CHANI 8.6 2017        INR RESULTS:  No results found for: INR    Procedures:  PULMONARY FUNCTION TESTS:   No flowsheet data found.      ECHOCARDIOGRAM:   Recent Results (from the past 8760 hour(s))   Echocardiogram    Narrative    [id number]  ECH19  [id number]  [id number]^CORINE^AMANDA^           [facility]  Echocardiography Laboratory  [address] [place] [address]     Name: OSIEL OSBORNE  MRN: [id number]  : 1995  Study Date: 10/30/2017 05:06 PM  Age: 22 yrs  Gender: Female  Patient Location: [facility] [place]  Reason For Study: Syncope and collapse  Ordering Physician: AMANDA POOL  Referring Physician: AMANDA POOL  Performed By: Beck Osei RDCS     BSA: 1.7 m2  Height: 67 in  Weight: 124 lb  HR: 91  BP: 122/84 mmHg  _____________________________________________________________________________  __        Procedure  Echocardiogram with two-dimensional, color and spectral Doppler performed.  _____________________________________________________________________________  __        Interpretation Summary  Global and regional left ventricular function is normal with an EF of 55-60%.  Global right ventricular function is normal.  No significant valvular abnormalities were noted.  The inferior vena cava was normal in size with preserved respiratory  variability.  No pericardial effusion is present.  Previous study not available for comparison.  _____________________________________________________________________________  __        Left Ventricle  Left ventricular size is normal. Left ventricular wall thickness is normal.  Global and regional left ventricular function is normal with an EF of 55-60%.  Normal left ventricular filling for age. No  regional wall motion abnormalities  are seen.     Right Ventricle  The right ventricle is normal size. Global right ventricular function is  normal.     Atria  Both atria appear normal.     Mitral Valve  The mitral valve is normal.        Aortic Valve  Aortic valve is normal in structure and function. The aortic valve is  tricuspid.     Tricuspid Valve  The tricuspid valve is normal. The peak velocity of the tricuspid regurgitant  jet is not obtainable.     Pulmonic Valve  The pulmonic valve is normal.     Vessels  The aorta root is normal. The inferior vena cava was normal in size with  preserved respiratory variability.     Pericardium  No pericardial effusion is present.        Compared to Previous Study  Previous study not available for comparison.     Attestation  I have personally viewed the imaging and agree with the interpretation and  report as documented by the fellow, Durga Goodman, and/or edited by me.  _____________________________________________________________________________  __     MMode/2D Measurements & Calculations  IVSd: 0.71 cm  LVIDd: 4.0 cm  LVIDs: 2.6 cm  LVPWd: 0.68 cm  FS: 34.3 %  EDV(Teich): 68.7 ml  ESV(Teich): 24.8 ml  LV mass(C)d: 76.4 grams  LV mass(C)dI: 46.3 grams/m2  asc Aorta Diam: 2.9 cm  LVOT diam: 2.0 cm  LVOT area: 3.1 cm2  LA Volume (BP): 32.4 ml  LA Volume Index (BP): 19.6 ml/m2     RWT: 0.34        Doppler Measurements & Calculations  MV E max chelly: 79.9 cm/sec  MV A max chelly: 70.6 cm/sec  MV E/A: 1.1  Ao V2 max: 112.0 cm/sec  Ao max P.0 mmHg  SANTOS(V,D): 2.6 cm2  LV V1 max PG: 3.5 mmHg  LV V1 max: 93.0 cm/sec  E/E' av.9  Lateral E/e': 4.9  Med E to E': 6.9  Medial E/e': 6.9        _____________________________________________________________________________  __        Report approved by: Yared Mtz 10/31/2017 09:18 AM            Assessment and Plan: Kaitlynn House is a pleasant 22 year old female with POTS based on history who is here for a follow up.    Plan  -  Cont Midodrine at 10,5,5 mg.  - Start fludrocortisone at 0.2 mg daily.  Patient currently has 0.1 mg tabs at home, but if needed we will provide her with a prescription.  - Start Ivabradine 5 mg BID.  We will work on prior authorization for insurance approval of this drug, but currently at this time we will give her a prescription for which she can go to [place] and taking her medication that way.  - Isometric leg exercise with band daily  - Continue Vitassium 2 tabs BID, extra salt in food and pedialyte. Recommend keeping pedialyte at bedside and drink as soon as waking up.  As for her weight gain, we instructed her not to consume too many high caloric fatty foods and also to watch her Gatorade intake as this can have a high number of carbohydrates and sugars.  We instructed her that we wanted her the extra volume and consult from these therapies, not the calories.     RTC in 6 months or sooner if needed    Thank you for allowing me to care for this patient. This patient was seen and discussed with Dr. Milo Mendoza, who agrees with the plan above. If you have any questions, please do not hesitate to contact me.    Thank you,    Deloris Cruz  Cardiology Fellow, PGY-5     I very much appreciated the opportunity to see and assess Kaitlynn House in the clinic with CV Fellow and resident.     I agree with the note above which summarizes my findings and current recommendations    Please do not hesitate to contact my office if you have any questions or concerns.      Milo Mendoza MD  Cardiac Arrhythmia Service  [facility]  [phone]    CC  SELF, REFERRED

## 2018-09-06 NOTE — NURSING NOTE
Chief Complaint   Patient presents with     Follow Up For     6 MFU (4/12/18)     Medications reviewed and vitals and ortho BP performed.  Demetrice Mason CMA

## 2018-09-06 NOTE — MR AVS SNAPSHOT
After Visit Summary   9/6/2018    Kaitlynn House    MRN: [id number]           Patient Information     Date Of Birth          1995        Visit Information        Provider Department      9/6/2018 2:30 PM Milo Mendoza MD [facility]        Today's Diagnoses     POTS (postural orthostatic tachycardia syndrome)    -  1      Care Instructions    Two new medications started today per Dr. Mendoza.  Please let us know if you have any problems or concerns once you start these medications.    1) Fludrocortisone 0.2 mg once daily  2) Ivabradine 5 mg twice daily (paper prescription given)    See you back in six months    Call me if you have any questions or concerns.    Kelli Ramirez, RN  Cardiology Nurse Clinician  [phone]          Follow-ups after your visit        Your next 10 appointments already scheduled     Sep 17, 2018  9:00 AM CDT   MyChart Physical Therapy Extremity Follow Up Tx with Di Ahumada PT   [facility] ([facility])    [address] [place] [id number] [phone]           If you have your physician's order in hand, please bring it with you to your appointment            Mar 14, 2019 10:30 AM CDT   (Arrive by 10:15 AM)   RETURN ARRHYTHMIA with Milo Mendoza MD   [facility] ([facility])    [address] [place] [id number] [phone]              Who to contact     If you have questions or need follow up information about today's clinic visit or your schedule please contact [facility] directly at [phone].  Normal or non-critical lab and imaging results will be communicated to you by MyChart, letter or phone within 4 business days after the clinic has received the results. If you do not hear from us within 7 days, please contact the clinic through MyChart or phone. If you have a critical or  "abnormal lab result, we will notify you by phone as soon as possible.  Submit refill requests through [facility] [employer] or call your pharmacy and they will forward the refill request to us. Please allow 3 business days for your refill to be completed.          Additional Information About Your Visit        [employer] Information     [employer] gives you secure access to your electronic health record. If you see a primary care provider, you can also send messages to your care team and make appointments. If you have questions, please call your primary care clinic.  If you do not have a primary care provider, please call [phone] and they will assist you.        Care EveryWhere ID     This is your Care EveryWhere ID. This could be used by other organizations to access your [facility] medical records  [id number]        Your Vitals Were     Pulse Height Pulse Oximetry BMI (Body Mass Index)          121 1.727 m (5' 8\") 100% 20.37 kg/m2         Blood Pressure from Last 3 Encounters:   09/06/18 (!) 141/91   04/12/18 136/88   01/12/18 118/88    Weight from Last 3 Encounters:   09/06/18 60.8 kg (134 lb)   08/17/18 61 kg (134 lb 8 oz)   04/12/18 55.3 kg (122 lb)              Today, you had the following     No orders found for display         Today's Medication Changes          These changes are accurate as of 9/6/18  3:50 PM.  If you have any questions, ask your nurse or doctor.               Start taking these medicines.        Dose/Directions    ivabradine 5 MG tablet   Commonly known as:  CORLANOR   Used for:  POTS (postural orthostatic tachycardia syndrome)   Started by:  Milo Mendoza MD        Dose:  5 mg   Take 1 tablet (5 mg) by mouth 2 times daily (with meals)   Quantity:  180 tablet   Refills:  3         These medicines have changed or have updated prescriptions.        Dose/Directions    * fludrocortisone 0.1 MG tablet   Commonly known as:  FLORINEF   This may have changed:  Another medication with the same name was " added. Make sure you understand how and when to take each.   Used for:  POTS (postural orthostatic tachycardia syndrome)   Changed by:  Milo Mendoza MD        Dose:  0.1 mg   Take 1 tablet (0.1 mg) by mouth daily   Quantity:  30 tablet   Refills:  3       * fludrocortisone 0.1 MG tablet   Commonly known as:  FLORINEF   This may have changed:  You were already taking a medication with the same name, and this prescription was added. Make sure you understand how and when to take each.   Used for:  POTS (postural orthostatic tachycardia syndrome)   Changed by:  Milo Mendoza MD        Dose:  0.2 mg   Take 2 tablets (0.2 mg) by mouth daily   Quantity:  190 tablet   Refills:  3       * Notice:  This list has 2 medication(s) that are the same as other medications prescribed for you. Read the directions carefully, and ask your doctor or other care provider to review them with you.         Where to get your medicines      These medications were sent to [id number] - [place], [place] - [address]  AT [facility] [address]  [address] [place] [address] [address] , [place] [address]     Phone:  [phone]     fludrocortisone 0.1 MG tablet         Some of these will need a paper prescription and others can be bought over the counter.  Ask your nurse if you have questions.     Bring a paper prescription for each of these medications     ivabradine 5 MG tablet                Primary Care Provider Fax #    Physician No Ref-Primary [phone]       No address on file        Equal Access to Services     CHRISTOS RICHEY : Richelle Roper, waaxda luqadaha, qaybta kaalmada alondra, parag caballero. So [facility] [phone].    ATENCIÓN: Si habla español, tiene a cristobal disposición servicios gratuitos de asistencia lingüística. Llame al [phone].    We comply with applicable federal civil rights laws and [place] laws. We do not discriminate on the basis of race, color,  national origin, age, disability, sex, sexual orientation, or gender identity.            Thank you!     Thank you for choosing [facility]  for your care. Our goal is always to provide you with excellent care. Hearing back from our patients is one way we can continue to improve our services. Please take a few minutes to complete the written survey that you may receive in the mail after your visit with us. Thank you!             Your Updated Medication List - Protect others around you: Learn how to safely use, store and throw away your medicines at www.disposemymeds.org.          This list is accurate as of 9/6/18  3:50 PM.  Always use your most recent med list.                   Brand Name Dispense Instructions for use Diagnosis    acetaminophen 325 MG tablet    TYLENOL    100 tablet    Take 2 tablets (650 mg) by mouth every 4 hours as needed for other (mild pain)    Status post surgery       ALEVE PO      Take 2 tablets by mouth every 12 hours as needed for moderate pain        AMITRIPTYLINE HCL PO      Take 20 mg by mouth nightly as needed for sleep        * fludrocortisone 0.1 MG tablet    FLORINEF    30 tablet    Take 1 tablet (0.1 mg) by mouth daily    POTS (postural orthostatic tachycardia syndrome)       * fludrocortisone 0.1 MG tablet    FLORINEF    190 tablet    Take 2 tablets (0.2 mg) by mouth daily    POTS (postural orthostatic tachycardia syndrome)       * gabapentin 300 MG capsule    NEURONTIN    15 capsule    Take 1 capsule (300 mg) by mouth daily    Pain at surgical site       * gabapentin 600 MG tablet    NEURONTIN     TK 1 T PO BID        ivabradine 5 MG tablet    CORLANOR    180 tablet    Take 1 tablet (5 mg) by mouth 2 times daily (with meals)    POTS (postural orthostatic tachycardia syndrome)       midodrine 5 MG tablet    PROAMATINE    450 tablet    Take midodrine 10 mg at 8 am. Take 10mg at noon and take 5 mg at 4pm.    POTS (postural orthostatic tachycardia syndrome)       XULANE  150-35 MCG/24HR patch   Generic drug:  norelgestromin-ethinyl estradiol      Place 1 patch onto the skin once a week Remove old patch and apply new patch onto the skin once a week for 3 weeks (21 days). Do not wear patch week 4 (days 22-28), then repeat.        * Notice:  This list has 4 medication(s) that are the same as other medications prescribed for you. Read the directions carefully, and ask your doctor or other care provider to review them with you.

## 2018-12-02 DIAGNOSIS — R55 SYNCOPE AND COLLAPSE: Primary | ICD-10-CM

## 2019-01-24 ENCOUNTER — TELEPHONE (OUTPATIENT)
Dept: CARDIOLOGY | Facility: CLINIC | Age: 24
End: 2019-01-24

## 2019-01-24 DIAGNOSIS — G90.A POTS (POSTURAL ORTHOSTATIC TACHYCARDIA SYNDROME): ICD-10-CM

## 2019-01-24 RX ORDER — IVABRADINE 5 MG/1
5 TABLET, FILM COATED ORAL 2 TIMES DAILY WITH MEALS
Qty: 180 TABLET | Refills: 3 | Status: SHIPPED | OUTPATIENT
Start: 2019-01-24 | End: 2019-06-11

## 2019-01-24 NOTE — TELEPHONE ENCOUNTER
Prior Authorization Retail Medication Request    Medication/Dose: Ivabradine   ICD code (if different than what is on RX):    Previously Tried and Failed:  Fludrocortisone 0.2 mg, midodrine, vitassium,     Rationale:    Pt is presently taking medication, paid out of pocket and obtained from [facility] pharmacy. Is helping symptoms.     Insurance Name:  [employer]  Patient: Kaitlynn Mendes (name change)  Insurance ID:  [id number]  Group: [id number]  RxBIN [id number] RxPCN [id number]      Pharmacy Information (if different than what is on RX)  Name:  [facility] [id number]  Phone:  [phone]

## 2019-01-28 NOTE — TELEPHONE ENCOUNTER
Central Prior Authorization Team   Phone: [phone]      PA Initiation    Medication: Ivabradine: If no PA necessary would like tier exception for better co pay  Insurance Company: [employer] - Phone [phone] Fax [phone]  Pharmacy Filling the Rx: [id number] - [place], [place] - [address]  Filling Pharmacy Phone: [phone]  Filling Pharmacy Fax:    Start Date: 1/28/2019

## 2019-02-18 NOTE — TELEPHONE ENCOUNTER
PA Initiation    Medication: Ivabradine: TIER exeption - INITIATED  Insurance Company: [employer] - Phone [phone] Fax [phone]  Pharmacy Filling the Rx: [id number] - [place], [place] - [address]  Filling Pharmacy Phone: [phone]  Filling Pharmacy Fax:    Start Date: 2/18/2019    Called INS and initiated the Tier exception - they stated request may take upo to 5 days for decision will follow up in a few days.   Patient: Kaitlynn Mendes (name change)  Insurance ID:  [id number]

## 2019-02-20 NOTE — TELEPHONE ENCOUNTER
Called to check status of this tier exceptionm- they stated it is in the final steps and we should receive a fax determination by today

## 2019-02-20 NOTE — TELEPHONE ENCOUNTER
Medication is covered by INS - is on benefits but-  TIER EXCEPTION DENIED    Medication: Ivabradine: TIER exeption - denied    Denial Date: 2/20/2019    Denial Rational: does not meet 1,2 and 4      Appeal Information:

## 2019-03-05 ENCOUNTER — ANCILLARY PROCEDURE (OUTPATIENT)
Dept: GENERAL RADIOLOGY | Facility: CLINIC | Age: 24
End: 2019-03-05
Attending: ORTHOPAEDIC SURGERY
Payer: COMMERCIAL

## 2019-03-05 ENCOUNTER — OFFICE VISIT (OUTPATIENT)
Dept: ORTHOPEDICS | Facility: CLINIC | Age: 24
End: 2019-03-05
Payer: COMMERCIAL

## 2019-03-05 DIAGNOSIS — Z98.890 S/P KNEE SURGERY: Primary | ICD-10-CM

## 2019-03-05 DIAGNOSIS — Z98.890 S/P KNEE SURGERY: ICD-10-CM

## 2019-03-05 ASSESSMENT — ENCOUNTER SYMPTOMS
DYSURIA: 1
EXERCISE INTOLERANCE: 1
MYALGIAS: 1
NECK PAIN: 0
SYNCOPE: 0
HYPERTENSION: 1
ORTHOPNEA: 0
JOINT SWELLING: 1
PALPITATIONS: 1
LEG PAIN: 1
SLEEP DISTURBANCES DUE TO BREATHING: 0
LIGHT-HEADEDNESS: 1
MUSCLE CRAMPS: 1
HYPOTENSION: 1
DIFFICULTY URINATING: 1
ARTHRALGIAS: 1
HEMATURIA: 1
STIFFNESS: 1
BACK PAIN: 1

## 2019-03-05 NOTE — NURSING NOTE
Teaching Flowsheet   Relevant Diagnosis: S/p bilateral tib/fib osteotomy.  Teaching Topic: Right knee arthroscopy and proximal screw removal; left knee distal locking screw removal.     This is a co-surgery between Dr. Schwartz and Dr. Olivarez.    Patient has support. She has history of hyper-POTs and Elhers-Danlos. Father with history of Lupus anticoagulant.       Person(s) involved in teaching:   Patient and significant other.     Motivation Level:  Asks Questions: Yes  Eager to Learn: Yes  Cooperative: Yes  Receptive (willing/able to accept information): Yes  Any cultural factors/Rastafarian beliefs that may influence understanding or compliance? No     Patient demonstrates understanding of the following:  Reason for the appointment, diagnosis and treatment plan: Yes  Knowledge of proper use of medications and conditions for which they are ordered (with special attention to potential side effects or drug interactions): Yes  Which situations necessitate calling provider and whom to contact: Yes     Teaching Concerns Addressed: Patient will have her preoperative H&P done by her cardiologist.     Proper use and care of assistive devices. (medical equip, care aids, etc.): Yes  Nutritional needs and diet plan: NA  Pain management techniques: Yes  Wound Care: Yes  How and/when to access community resources: Yes     Instructional Materials Used/Given: Preoperative teaching packet, surgical soap.

## 2019-03-05 NOTE — NURSING NOTE
Reason For Visit:   Chief Complaint   Patient presents with     RECHECK     Patient having right knee pain. Follow up Left Tib/Fib Derotational osteotomy and Distalization of the tibial tubercle. DOS: 12/15/2017. Right Tib/Fib Derotationl osteotomy. DOS:11/1/2017       Primary MD: No Ref-Primary, Physician    ?  No  Occupation: Neuro diagnostic tech.  Currently working? Yes.  Work status?  Full time.  Date of surgery: 11/1/17, 12/15/19  Type of surgery: Left Tib/Fib Derotational osteotomy and Distalization of the tibial tubercle. DOS: 12/15/2017. Right Tib/Fib Derotationl osteotomy. DOS:11/1/2017.  Smoker: No  Request smoking cessation information: No    There were no vitals taken for this visit.    Pain Assessment  Patient Currently in Pain: Yes  0-10 Pain Scale: 3(Pain worsens with activity)  Primary Pain Location: Knee(Right)  Pain Descriptors: Sharp, Shooting  Alleviating Factors: Rest(Feels better when she wakes up, pain gets progressively worse as day goes on.)  Aggravating Factors: (Everything besides laying down)                  Johanne Flores, ATC

## 2019-03-05 NOTE — LETTER
RE: Kaitlynn Mendes  [address]  [place] [address]     Dear Colleague,    Thank you for referring your patient, Kaitlynn Mendes, to the [facility]. Please see a copy of my visit note below.    HISTORY OF PRESENT ILLNESS:  Kaitlynn House) is a 23-year-old female who is status post derotational osteotomy of the tibia and femur as well as treatment of patella Angelica on her left knee 12/15/2017.  This was done by myself and Dr. Olivarez.    She had a derotation osteotomy done on the right knee on 11/16/2017.  She returns now with complaints of her right knee only.  She has 2 complaints.      The first complaint is pain in and around the fat pad region of the knee.  This seems to be somewhat helped by tape unloading technique.      The second is pain on the anterolateral aspect of her ankle. This is intermittent, getting a little better, but when it occurs it is sharp and disabling.     Her bones are healed.  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     PHYSICAL EXAMINATION:  Continues to reveal a lean female.  Her examination of her right knee reveals no knee swelling, good straight leg raising effort without a lag.  There is some prominence of the fat pad in this region, particularly prominent on the anterolateral aspect of the knee joint just lateral to the patellar tendon.      She does have mild lateral joint line w/ palpable pain and pain with circumduction maneuvers.      I cannot feel the screw palpable in the proximal aspect of the knee, but she does have some palpable pain anterolateral.      Distally the patient does have pain over the anterior tib tendon and lateral ankle proximal to the fibula at the distal ankle level.     After discussion with Dr. Olivarez, we believe it is of value to remove the distal screw.  Proximally it is possible that the screw is irritating her fat pad region.  She feels that her pain is  similar to when she had a meniscus tear.  I think it would be of value at the same time that we remove the screws and we do an arthroscopy to verify the status of her meniscus to make sure that there is no fat pad impingement in this region.      She is comfortable with this approach.  This will be set up at a mutually convenient time.      All questions were answered.      Room time 15 minutes, consultation time 12.       Patient having right knee pain.   Left Tib/Fib Derotational osteotomy and Distalization of the tibial tubercle. DOS: 12/15/2017.  Right Tib/Fib Derotationl osteotomy. DOS:11/1/2017     Again, thank you for allowing me to participate in the care of your patient.      Sincerely,    Di Schwartz MD

## 2019-03-12 ENCOUNTER — TELEPHONE (OUTPATIENT)
Dept: ORTHOPEDICS | Facility: CLINIC | Age: 24
End: 2019-03-12

## 2019-03-12 NOTE — TELEPHONE ENCOUNTER
Patient is scheduled for surgery with Dr. Olivarez    Spoke or left message with: Patient    Date of Surgery: 3/29/19    Location: [facility]    Post ops: 2 weeks & 6 weeks    Pre-op with surgeon (if applicable): Complete    H&P: Will be seen by Cardiologist 3/14/19    Additional imaging/appointments: N/A    Surgery packet: Received in clinic     Additional comments: N/A

## 2019-03-14 ENCOUNTER — OFFICE VISIT (OUTPATIENT)
Dept: CARDIOLOGY | Facility: CLINIC | Age: 24
End: 2019-03-14
Attending: INTERNAL MEDICINE

## 2019-03-14 VITALS
OXYGEN SATURATION: 100 % | WEIGHT: 136 LBS | HEIGHT: 69 IN | HEART RATE: 75 BPM | DIASTOLIC BLOOD PRESSURE: 83 MMHG | BODY MASS INDEX: 20.14 KG/M2 | SYSTOLIC BLOOD PRESSURE: 125 MMHG

## 2019-03-14 DIAGNOSIS — G90.A POTS (POSTURAL ORTHOSTATIC TACHYCARDIA SYNDROME): Primary | ICD-10-CM

## 2019-03-14 PROCEDURE — 99214 OFFICE O/P EST MOD 30 MIN: CPT | Mod: GC | Performed by: INTERNAL MEDICINE

## 2019-03-14 PROCEDURE — G0463 HOSPITAL OUTPT CLINIC VISIT: HCPCS | Mod: ZF

## 2019-03-14 ASSESSMENT — PAIN SCALES - GENERAL: PAINLEVEL: NO PAIN (0)

## 2019-03-14 ASSESSMENT — MIFFLIN-ST. JEOR: SCORE: 1436.27

## 2019-03-14 NOTE — NURSING NOTE
Chief Complaint   Patient presents with     Follow Up      reason for visit: 6 MFU      Vitals were taken and medications were reconciled.     MILLICENT Nova  10:12 AM

## 2019-03-14 NOTE — PROGRESS NOTES
HPI: Kaitlynn House is a pleasant 22 year old female with POTS based on history who is here for a follow up.     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 This requires her walking and consequaently she has problem doing her work.     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    Since her last visit Teo has done well from a pots perspective.  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  As a result she has a more than usual busy schedule.  She seems to be handling this very well with her heart rate under reasonable control.  She uses an apple watch to monitor her rate.    Randy will be undergoing some orthopedic surgery at the end of this month.  Consequently today we are doing a preop physical as well as following up her pots status.    Suzan has had no significant interim illnesses since our last visit.  She did have a urinary tract infection which was treated and she is no longer taking antibiotics.  She has no evident allergies although she may have had a childhood allergy to penicillin group of drugs.  This is not well documented.    Patient is without any current cardiac or respiratory complaints.  She does note that occasionally she will feel less short of breath of having to give a presentation or if having to walk quickly to a presentation.  The story sounds as though anxiety may be playing a role.  She indicates that after the presentation things settle down quickly.  Prior     PAST MEDICAL HISTORY:  Past Medical History:   Diagnosis Date     Restless legs syndrome    Orthopedic surgery that requires revision due to problems with with the right knee and nearby metal prostheses.  Previous lower extremity venous issues requiring stripping.    CURRENT MEDICATIONS:  Current Outpatient Medications   Medication Sig Dispense Refill     acetaminophen (TYLENOL)  325 MG tablet Take 2 tablets (650 mg) by mouth every 4 hours as needed for other (mild pain) 100 tablet 0     AMITRIPTYLINE HCL PO Take 20 mg by mouth nightly as needed for sleep       fludrocortisone (FLORINEF) 0.1 MG tablet Take 2 tablets (0.2 mg) by mouth daily 190 tablet 3     fludrocortisone (FLORINEF) 0.1 MG tablet Take 1 tablet (0.1 mg) by mouth daily 30 tablet 3     gabapentin (NEURONTIN) 300 MG capsule Take 1 capsule (300 mg) by mouth daily 15 capsule 1     gabapentin (NEURONTIN) 600 MG tablet TK 1 T PO BID  1     ivabradine (CORLANOR) 5 MG tablet Take 1 tablet (5 mg) by mouth 2 times daily (with meals) 180 tablet 3     midodrine (PROAMATINE) 5 MG tablet Take midodrine 10 mg at 8 am. Take 10mg at noon and take 5 mg at 4pm. 450 tablet 3     Naproxen Sodium (ALEVE PO) Take 2 tablets by mouth every 12 hours as needed for moderate pain       norelgestromin-ethinyl estradiol (XULANE) 150-35 MCG/24HR patch Place 1 patch onto the skin once a week Remove old patch and apply new patch onto the skin once a week for 3 weeks (21 days). Do not wear patch week 4 (days 22-28), then repeat.         PAST SURGICAL HISTORY:  Past Surgical History:   Procedure Laterality Date     ARTHROSCOPY KNEE WITH TIBIAL TUBERCLE SHIFT Left 12/15/2017    Procedure: ARTHROSCOPY KNEE WITH TIBIAL TUBERCLE SHIFT;;  Surgeon: Di Schwartz MD;  [facility]     OPEN REDUCTION INTERNAL FIXATION RODDING INTRAMEDULLARY TIBIA Left 12/15/2017    Procedure: OPEN REDUCTION INTERNAL FIXATION RODDING INTRAMEDULLARY TIBIA;  Left Knee Arthroscopy, Tibial Derotational Osteotomy with Intermedullary Rodding, Fibular Derotational Osteotmy, Distal Tibial Tubercle Osteotomy;  Surgeon: Dio Olivarez MD;  Location[facility]       ALLERGIES:     Allergies   Allergen Reactions     Egg [Chicken-Derived Products (Egg)] Anaphylaxis     Amoxicillin Hives     Zithromax [Azithromycin] Nausea and Vomiting     Compazine [Prochlorperazine] Other (See  "Comments)     delirious       FAMILY HISTORY:  Family History   Problem Relation Age of Onset     Osteoporosis Mother      Gastrointestinal Disease Mother         gastroparesis     - Premature coronary artery disease  - Atrial fibrillation  - Sudden cardiac death     SOCIAL HISTORY:  Social History     Tobacco Use     Smoking status: Never Smoker     Smokeless tobacco: Never Used   Substance Use Topics     Alcohol use: No     Drug use: No       ROS:   Constitutional: No fever, chills, or sweats. Weight stable.   ENT: No visual disturbance, ear ache, epistaxis, sore throat.   Cardiovascular: As per HPI.   Respiratory: No cough, hemoptysis.    GI: No nausea, vomiting, hematemesis, melena, or hematochezia.   : No hematuria.   Integument: Negative.   Psychiatric: Negative.   Hematologic:  Easy bruising, no easy bleeding.  Neuro: Negative.   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    Exam:  /83 (BP Location: Right arm, Patient Position: Chair, Cuff Size: Adult Regular)   Pulse 75   Ht 1.753 m (5' 9\")   Wt 61.7 kg (136 lb)   SpO2 100%   BMI 20.08 kg/m    GENERAL APPEARANCE: healthy, alert and no distress  HEENT: no icterus, no xanthelasmas, normal pupil size and reaction, normal palate, mucosa moist, no central cyanosis  NECK: no adenopathy, no asymmetry, masses, or scars, thyroid normal to palpation and no bruits, JVP not elevated  RESPIRATORY: lungs clear to auscultation - no rales, rhonchi or wheezes, no use of accessory muscles, no retractions, respirations are unlabored, normal respiratory rate  CARDIOVASCULAR: regular rhythm, normal S1 with physiologic split S2, no S3 or S4 and no murmur, click or rub, precordium quiet with normal PMI.  ABDOMEN: soft, non tender, without hepatosplenomegaly, no masses palpable, bowel sounds normal, " aorta not enlarged by palpation, no abdominal bruits  EXTREMITIES: peripheral pulses normal, no edema, no bruits  NEURO: alert and oriented to person/place/time, normal speech, gait and affect  VASC: pulses are normal in volumes and symmetric bilaterally.  SKIN: no ecchymoses, no rashes    Labs:  CBC RESULTS:   Lab Results   Component Value Date    WBC 5.9 12/19/2017    RBC 3.39 (L) 12/19/2017    HGB 10.2 (L) 12/19/2017    HCT 29.3 (L) 12/19/2017    MCV 86 12/19/2017    MCH 30.1 12/19/2017    MCHC 34.8 12/19/2017    RDW 12.3 12/19/2017     12/19/2017       BMP RESULTS:  Lab Results   Component Value Date     12/19/2017    POTASSIUM 3.8 12/19/2017    CHLORIDE 104 12/19/2017    CO2 29 12/19/2017    ANIONGAP 5 12/19/2017    GLC 80 12/19/2017    BUN 10 12/19/2017    CR 0.54 12/19/2017    GFRESTIMATED >90 12/19/2017    GFRESTBLACK >90 12/19/2017    CHANI 8.6 12/19/2017        INR RESULTS:  No results found for: INR    Procedures:  PULMONARY FUNCTION TESTS:   No flowsheet data found.      ECHOCARDIOGRAM:   No results found for this or any previous visit (from the past 8760 hour(s)).  No indication for echocardiogram at this time given absence of past history of structural heart disease.    Assessment and Plan: Kaitlynn House is a pleasant 22 year old female with POTS based on history who is here for a follow up and for preop physical assessment prior to planned orthopedic surgery later this month    1 Pots syndrome under reasonable control in an active individual who has potential Carlos Danlos issues but seems to be tolerating her circumstances very well and is completing her course and will be working full-time    2.  Planned orthopedic surgery.  Physical exam and assessment of medical history finds no contraindication to the planned surgery..    Plan  - Cont Midodrine at 10,5,5 mg.  Patient may make day-to-day alterations based on her clinical status  -Fludrocortisone has been discontinued  -  Ivabradine 5  mg BID has proved to be quite effective.  Patient is working on insurance coverage for ongoing supply using co-pay credits from the pharmaceutical company..    - Continue Vitassium 2 tabs BID, extra salt in food and pedialyte.  Made and has been using the potassium short acting with to good effect and also has been adding target Pedialyte for hydration  -May proceed with planned orthopedic procedure    RTC in 6 months or sooner if needed    Thank you for allowing me to care for this patient. This patient was seen and discussed with Dr. Milo Mendoza, who agrees with the plan above. If you have any questions, please do not hesitate to contact me.    Thank you,    Deloris Cruz  Cardiology Fellow, PGY-5     I very much appreciated the opportunity to see and assess Kaitlynn House in the clinic  today  I agree with the note above which summarizes my findings and current recommendations    Please do not hesitate to contact my office if you have any questions or concerns.      Milo Mendoza MD  Cardiac Arrhythmia Service  [facility]  [phone]    CC  SELF, REFERRED

## 2019-03-14 NOTE — PATIENT INSTRUCTIONS
You will be scheduled for a follow up visit: Follow up with Dr Mendoza in 6 months    Medication changes: None    New Medications: None    Testing Scheduled: None    We encourage you to use My Chart as your primary form of communication if possible. If you need assistance in setting this up, please contact our office or ask at your follow up visit.     If you need a medication refill please contact your pharmacy. Please allow at least 3 business days for your refill to be completed.       Cardiology  Telephone Number    Rosa Andrade -[phone]   Or send a message to your provider via my chart.   For scheduling procedures:    [facility]    Clinic appointments       [phone]   For the Device Clinic (Pacemakers and ICD's)   RN's :   Olga Pinto  During business hours: [phone]    After business hours:   [phone]- select option 4 and ask for job code [id number].          As always, Thank you for trusting us with your health care needs!

## 2019-03-14 NOTE — LETTER
3/14/2019      RE: Kaitlynn Mendes  [address]  [place] [address]       Dear Colleague,    Thank you for the opportunity to participate in the care of your patient, Kaitlynn Mendes, at the [facility]. Please see a copy of my visit note below.    HPI: Kaitlynn House is a pleasant 22 year old female with POTS based on history who is here for a follow up.     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 This requires her walking and consequaently she has problem doing her work.     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    Since her last visit Teo has done well from a pots perspective.  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  As a result she has a more than usual busy schedule.  She seems to be handling this very well with her heart rate under reasonable control.  She uses an apple watch to monitor her rate.    Randy will be undergoing some orthopedic surgery at the end of this month.  Consequently today we are doing a preop physical as well as following up her pots status.    Suzan has had no significant interim illnesses since our last visit.  She did have a urinary tract infection which was treated and she is no longer taking antibiotics.  She has no evident allergies although she may have had a childhood allergy to penicillin group of drugs.  This is not well documented.    Patient is without any current cardiac or respiratory complaints.  She does note that occasionally she will feel less short of breath of having to give a presentation or if having to walk quickly to a presentation.  The story sounds as though anxiety may be playing a role.  She indicates that after the presentation things settle down quickly.  Prior     PAST MEDICAL HISTORY:  Past Medical History:   Diagnosis Date      Restless legs syndrome    Orthopedic surgery that requires revision due to problems with with the right knee and nearby metal prostheses.  Previous lower extremity venous issues requiring stripping.    CURRENT MEDICATIONS:  Current Outpatient Medications   Medication Sig Dispense Refill     acetaminophen (TYLENOL) 325 MG tablet Take 2 tablets (650 mg) by mouth every 4 hours as needed for other (mild pain) 100 tablet 0     AMITRIPTYLINE HCL PO Take 20 mg by mouth nightly as needed for sleep       fludrocortisone (FLORINEF) 0.1 MG tablet Take 2 tablets (0.2 mg) by mouth daily 190 tablet 3     fludrocortisone (FLORINEF) 0.1 MG tablet Take 1 tablet (0.1 mg) by mouth daily 30 tablet 3     gabapentin (NEURONTIN) 300 MG capsule Take 1 capsule (300 mg) by mouth daily 15 capsule 1     gabapentin (NEURONTIN) 600 MG tablet TK 1 T PO BID  1     ivabradine (CORLANOR) 5 MG tablet Take 1 tablet (5 mg) by mouth 2 times daily (with meals) 180 tablet 3     midodrine (PROAMATINE) 5 MG tablet Take midodrine 10 mg at 8 am. Take 10mg at noon and take 5 mg at 4pm. 450 tablet 3     Naproxen Sodium (ALEVE PO) Take 2 tablets by mouth every 12 hours as needed for moderate pain       norelgestromin-ethinyl estradiol (XULANE) 150-35 MCG/24HR patch Place 1 patch onto the skin once a week Remove old patch and apply new patch onto the skin once a week for 3 weeks (21 days). Do not wear patch week 4 (days 22-28), then repeat.         PAST SURGICAL HISTORY:  Past Surgical History:   Procedure Laterality Date     ARTHROSCOPY KNEE WITH TIBIAL TUBERCLE SHIFT Left 12/15/2017    Procedure: ARTHROSCOPY KNEE WITH TIBIAL TUBERCLE SHIFT;;  Surgeon: Di Schwartz MD;  [facility]     OPEN REDUCTION INTERNAL FIXATION RODDING INTRAMEDULLARY TIBIA Left 12/15/2017    Procedure: OPEN REDUCTION INTERNAL FIXATION RODDING INTRAMEDULLARY TIBIA;  Left Knee Arthroscopy, Tibial Derotational Osteotomy with Intermedullary Rodding, Fibular Derotational  "Osteotmy, Distal Tibial Tubercle Osteotomy;  Surgeon: Dio Olivarez MD;  [facility]       ALLERGIES:     Allergies   Allergen Reactions     Egg [Chicken-Derived Products (Egg)] Anaphylaxis     Amoxicillin Hives     Zithromax [Azithromycin] Nausea and Vomiting     Compazine [Prochlorperazine] Other (See Comments)     delirious       FAMILY HISTORY:  Family History   Problem Relation Age of Onset     Osteoporosis Mother      Gastrointestinal Disease Mother         gastroparesis     - Premature coronary artery disease  - Atrial fibrillation  - Sudden cardiac death     SOCIAL HISTORY:  Social History     Tobacco Use     Smoking status: Never Smoker     Smokeless tobacco: Never Used   Substance Use Topics     Alcohol use: No     Drug use: No       ROS:   Constitutional: No fever, chills, or sweats. Weight stable.   ENT: No visual disturbance, ear ache, epistaxis, sore throat.   Cardiovascular: As per HPI.   Respiratory: No cough, hemoptysis.    GI: No nausea, vomiting, hematemesis, melena, or hematochezia.   : No hematuria.   Integument: Negative.   Psychiatric: Negative.   Hematologic:  Easy bruising, no easy bleeding.  Neuro: Negative.   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    Exam:  /83 (BP Location: Right arm, Patient Position: Chair, Cuff Size: Adult Regular)   Pulse 75   Ht 1.753 m (5' 9\")   Wt 61.7 kg (136 lb)   SpO2 100%   BMI 20.08 kg/m     GENERAL APPEARANCE: healthy, alert and no distress  HEENT: no icterus, no xanthelasmas, normal pupil size and reaction, normal palate, mucosa moist, no central cyanosis  NECK: no adenopathy, no asymmetry, masses, or scars, thyroid normal to palpation and no bruits, JVP not elevated  RESPIRATORY: lungs clear to auscultation - no rales, rhonchi or wheezes, no use of accessory " muscles, no retractions, respirations are unlabored, normal respiratory rate  CARDIOVASCULAR: regular rhythm, normal S1 with physiologic split S2, no S3 or S4 and no murmur, click or rub, precordium quiet with normal PMI.  ABDOMEN: soft, non tender, without hepatosplenomegaly, no masses palpable, bowel sounds normal, aorta not enlarged by palpation, no abdominal bruits  EXTREMITIES: peripheral pulses normal, no edema, no bruits  NEURO: alert and oriented to person/place/time, normal speech, gait and affect  VASC: pulses are normal in volumes and symmetric bilaterally.  SKIN: no ecchymoses, no rashes    Labs:  CBC RESULTS:   Lab Results   Component Value Date    WBC 5.9 12/19/2017    RBC 3.39 (L) 12/19/2017    HGB 10.2 (L) 12/19/2017    HCT 29.3 (L) 12/19/2017    MCV 86 12/19/2017    MCH 30.1 12/19/2017    MCHC 34.8 12/19/2017    RDW 12.3 12/19/2017     12/19/2017       BMP RESULTS:  Lab Results   Component Value Date     12/19/2017    POTASSIUM 3.8 12/19/2017    CHLORIDE 104 12/19/2017    CO2 29 12/19/2017    ANIONGAP 5 12/19/2017    GLC 80 12/19/2017    BUN 10 12/19/2017    CR 0.54 12/19/2017    GFRESTIMATED >90 12/19/2017    GFRESTBLACK >90 12/19/2017    CHANI 8.6 12/19/2017        INR RESULTS:  No results found for: INR    Procedures:  PULMONARY FUNCTION TESTS:   No flowsheet data found.      ECHOCARDIOGRAM:   No results found for this or any previous visit (from the past 8760 hour(s)).  No indication for echocardiogram at this time given absence of past history of structural heart disease.    Assessment and Plan: Kaitlynn House is a pleasant 22 year old female with POTS based on history who is here for a follow up and for preop physical assessment prior to planned orthopedic surgery later this month    1 Pots syndrome under reasonable control in an active individual who has potential Carlos Danlos issues but seems to be tolerating her circumstances very well and is completing her course and will be  working full-time    2.  Planned orthopedic surgery.  Physical exam and assessment of medical history finds no contraindication to the planned surgery..    Plan  - Cont Midodrine at 10,5,5 mg.  Patient may make day-to-day alterations based on her clinical status  -Fludrocortisone has been discontinued  -  Ivabradine 5 mg BID has proved to be quite effective.  Patient is working on insurance coverage for ongoing supply using co-pay credits from the pharmaceutical company..    - Continue Vitassium 2 tabs BID, extra salt in food and pedialyte.  Made and has been using the potassium short acting with to good effect and also has been adding target Pedialyte for hydration  -May proceed with planned orthopedic procedure    RTC in 6 months or sooner if needed    Thank you for allowing me to care for this patient. This patient was seen and discussed with Dr. Milo Mendoza, who agrees with the plan above. If you have any questions, please do not hesitate to contact me.    Thank you,    Deloris Cruz  Cardiology Fellow, PGY-5     I very much appreciated the opportunity to see and assess Kaitlynn House in the clinic  today  I agree with the note above which summarizes my findings and current recommendations    Please do not hesitate to contact my office if you have any questions or concerns.      Milo Mendoza MD  Cardiac Arrhythmia Service  [facility]  [phone]    CC  SELF, REFERRED

## 2019-03-14 NOTE — NURSING NOTE
Med Reconcile: Reviewed and verified all current medications with the patient. The updated medication list was printed and given to the patient.    Return Appointment: Follow up with Dr Mendoza in 6 mo. Patient given instructions regarding scheduling next clinic visit. Patient demonstrated understanding of this information and agreed to call with further questions or concerns.    Patient stated she understood all health information given and agreed to call with further questions or concerns.    Michel Irvin RN  RN Care Coordinator  [facility]  [phone]

## 2019-03-19 NOTE — PROGRESS NOTES
HISTORY OF PRESENT ILLNESS:  Kaitlynn House) is a 23-year-old female who is status post derotational osteotomy of the tibia and femur as well as treatment of patella Angelica on her left knee 12/15/2017.  This was done by myself and Dr. Olivarez.    She had a derotation osteotomy done on the right knee on 11/16/2017.  She returns now with complaints of her right knee only.  She has 2 complaints.      The first complaint is pain in and around the fat pad region of the knee.  This seems to be somewhat helped by tape unloading technique.      The second is pain on the anterolateral aspect of her ankle. This is intermittent, getting a little better, but when it occurs it is sharp and disabling.     Her bones are healed.  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     PHYSICAL EXAMINATION:  Continues to reveal a lean female.  Her examination of her right knee reveals no knee swelling, good straight leg raising effort without a lag.  There is some prominence of the fat pad in this region, particularly prominent on the anterolateral aspect of the knee joint just lateral to the patellar tendon.      She does have mild lateral joint line w/ palpable pain and pain with circumduction maneuvers.      I cannot feel the screw palpable in the proximal aspect of the knee, but she does have some palpable pain anterolateral.      Distally the patient does have pain over the anterior tib tendon and lateral ankle proximal to the fibula at the distal ankle level.     After discussion with Dr. Olivarez, we believe it is of value to remove the distal screw.  Proximally it is possible that the screw is irritating her fat pad region.  She feels that her pain is similar to when she had a meniscus tear.  I think it would be of value at the same time that we remove the screws and we do an arthroscopy to verify the status of her meniscus to make sure that there is no fat pad impingement in this region.      She is comfortable with this  approach.  This will be set up at a mutually convenient time.      All questions were answered.      Room time 15 minutes, consultation time 12.       Patient having right knee pain.   Left Tib/Fib Derotational osteotomy and Distalization of the tibial tubercle. DOS: 12/15/2017.  Right Tib/Fib Derotationl osteotomy. DOS:11/1/2017     Di cShwartz MD  Professor Orthopedic Surgery  [facility]

## 2019-03-28 ENCOUNTER — ANESTHESIA EVENT (OUTPATIENT)
Dept: SURGERY | Facility: AMBULATORY SURGERY CENTER | Age: 24
End: 2019-03-28

## 2019-03-29 ENCOUNTER — HOSPITAL ENCOUNTER (OUTPATIENT)
Facility: AMBULATORY SURGERY CENTER | Age: 24
End: 2019-03-29
Attending: ORTHOPAEDIC SURGERY
Payer: COMMERCIAL

## 2019-03-29 ENCOUNTER — ANESTHESIA (OUTPATIENT)
Dept: SURGERY | Facility: AMBULATORY SURGERY CENTER | Age: 24
End: 2019-03-29

## 2019-03-29 ENCOUNTER — ANCILLARY PROCEDURE (OUTPATIENT)
Dept: RADIOLOGY | Facility: AMBULATORY SURGERY CENTER | Age: 24
End: 2019-03-29
Attending: ORTHOPAEDIC SURGERY
Payer: COMMERCIAL

## 2019-03-29 VITALS
HEIGHT: 69 IN | HEART RATE: 64 BPM | WEIGHT: 135 LBS | SYSTOLIC BLOOD PRESSURE: 110 MMHG | TEMPERATURE: 98 F | DIASTOLIC BLOOD PRESSURE: 86 MMHG | RESPIRATION RATE: 12 BRPM | OXYGEN SATURATION: 100 % | BODY MASS INDEX: 19.99 KG/M2

## 2019-03-29 DIAGNOSIS — Z98.890 S/P KNEE SURGERY: Primary | ICD-10-CM

## 2019-03-29 DIAGNOSIS — R52 PAIN: ICD-10-CM

## 2019-03-29 LAB
HCG UR QL: NEGATIVE
INTERNAL QC OK POCT: YES

## 2019-03-29 RX ORDER — FENTANYL CITRATE 50 UG/ML
INJECTION, SOLUTION INTRAMUSCULAR; INTRAVENOUS PRN
Status: DISCONTINUED | OUTPATIENT
Start: 2019-03-29 | End: 2019-03-29

## 2019-03-29 RX ORDER — MEPERIDINE HYDROCHLORIDE 25 MG/ML
12.5 INJECTION INTRAMUSCULAR; INTRAVENOUS; SUBCUTANEOUS
Status: DISCONTINUED | OUTPATIENT
Start: 2019-03-29 | End: 2019-03-30 | Stop reason: HOSPADM

## 2019-03-29 RX ORDER — LIDOCAINE HYDROCHLORIDE 20 MG/ML
INJECTION, SOLUTION INFILTRATION; PERINEURAL PRN
Status: DISCONTINUED | OUTPATIENT
Start: 2019-03-29 | End: 2019-03-29

## 2019-03-29 RX ORDER — OXYCODONE HYDROCHLORIDE 5 MG/1
5-10 TABLET ORAL EVERY 4 HOURS PRN
Qty: 16 TABLET | Refills: 0 | Status: SHIPPED | OUTPATIENT
Start: 2019-03-29 | End: 2020-11-25

## 2019-03-29 RX ORDER — ONDANSETRON 4 MG/1
4 TABLET, ORALLY DISINTEGRATING ORAL
Status: DISCONTINUED | OUTPATIENT
Start: 2019-03-29 | End: 2019-03-30 | Stop reason: HOSPADM

## 2019-03-29 RX ORDER — PROPOFOL 10 MG/ML
INJECTION, EMULSION INTRAVENOUS PRN
Status: DISCONTINUED | OUTPATIENT
Start: 2019-03-29 | End: 2019-03-29

## 2019-03-29 RX ORDER — CLINDAMYCIN PHOSPHATE 600 MG/50ML
600 INJECTION, SOLUTION INTRAVENOUS
Status: COMPLETED | OUTPATIENT
Start: 2019-03-29 | End: 2019-03-29

## 2019-03-29 RX ORDER — ACETAMINOPHEN 325 MG/1
975 TABLET ORAL ONCE
Status: COMPLETED | OUTPATIENT
Start: 2019-03-29 | End: 2019-03-29

## 2019-03-29 RX ORDER — AMOXICILLIN 250 MG
1-2 CAPSULE ORAL 2 TIMES DAILY
Qty: 30 TABLET | Refills: 0 | Status: SHIPPED | OUTPATIENT
Start: 2019-03-29 | End: 2022-01-26

## 2019-03-29 RX ORDER — SCOLOPAMINE TRANSDERMAL SYSTEM 1 MG/1
1 PATCH, EXTENDED RELEASE TRANSDERMAL
Status: DISCONTINUED | OUTPATIENT
Start: 2019-03-29 | End: 2019-03-30 | Stop reason: HOSPADM

## 2019-03-29 RX ORDER — BUPIVACAINE HYDROCHLORIDE 2.5 MG/ML
INJECTION, SOLUTION INFILTRATION; PERINEURAL PRN
Status: DISCONTINUED | OUTPATIENT
Start: 2019-03-29 | End: 2019-03-29 | Stop reason: HOSPADM

## 2019-03-29 RX ORDER — DEXAMETHASONE SODIUM PHOSPHATE 4 MG/ML
INJECTION, SOLUTION INTRA-ARTICULAR; INTRALESIONAL; INTRAMUSCULAR; INTRAVENOUS; SOFT TISSUE PRN
Status: DISCONTINUED | OUTPATIENT
Start: 2019-03-29 | End: 2019-03-29

## 2019-03-29 RX ORDER — OXYCODONE HYDROCHLORIDE 5 MG/1
5 TABLET ORAL EVERY 4 HOURS PRN
Status: DISCONTINUED | OUTPATIENT
Start: 2019-03-29 | End: 2019-03-30 | Stop reason: HOSPADM

## 2019-03-29 RX ORDER — ONDANSETRON 2 MG/ML
INJECTION INTRAMUSCULAR; INTRAVENOUS PRN
Status: DISCONTINUED | OUTPATIENT
Start: 2019-03-29 | End: 2019-03-29

## 2019-03-29 RX ORDER — SODIUM CHLORIDE, SODIUM LACTATE, POTASSIUM CHLORIDE, CALCIUM CHLORIDE 600; 310; 30; 20 MG/100ML; MG/100ML; MG/100ML; MG/100ML
INJECTION, SOLUTION INTRAVENOUS CONTINUOUS
Status: DISCONTINUED | OUTPATIENT
Start: 2019-03-29 | End: 2019-03-29 | Stop reason: HOSPADM

## 2019-03-29 RX ORDER — LIDOCAINE 40 MG/G
CREAM TOPICAL
Status: DISCONTINUED | OUTPATIENT
Start: 2019-03-29 | End: 2019-03-29 | Stop reason: HOSPADM

## 2019-03-29 RX ORDER — GABAPENTIN 300 MG/1
300 CAPSULE ORAL ONCE
Status: DISCONTINUED | OUTPATIENT
Start: 2019-03-29 | End: 2019-03-29 | Stop reason: HOSPADM

## 2019-03-29 RX ORDER — FENTANYL CITRATE 50 UG/ML
25-50 INJECTION, SOLUTION INTRAMUSCULAR; INTRAVENOUS
Status: DISCONTINUED | OUTPATIENT
Start: 2019-03-29 | End: 2019-03-29 | Stop reason: HOSPADM

## 2019-03-29 RX ORDER — ONDANSETRON 2 MG/ML
4 INJECTION INTRAMUSCULAR; INTRAVENOUS EVERY 30 MIN PRN
Status: DISCONTINUED | OUTPATIENT
Start: 2019-03-29 | End: 2019-03-30 | Stop reason: HOSPADM

## 2019-03-29 RX ORDER — KETOROLAC TROMETHAMINE 30 MG/ML
INJECTION, SOLUTION INTRAMUSCULAR; INTRAVENOUS PRN
Status: DISCONTINUED | OUTPATIENT
Start: 2019-03-29 | End: 2019-03-29

## 2019-03-29 RX ORDER — PROPOFOL 10 MG/ML
INJECTION, EMULSION INTRAVENOUS CONTINUOUS PRN
Status: DISCONTINUED | OUTPATIENT
Start: 2019-03-29 | End: 2019-03-29

## 2019-03-29 RX ORDER — SODIUM CHLORIDE, SODIUM LACTATE, POTASSIUM CHLORIDE, CALCIUM CHLORIDE 600; 310; 30; 20 MG/100ML; MG/100ML; MG/100ML; MG/100ML
INJECTION, SOLUTION INTRAVENOUS CONTINUOUS
Status: DISCONTINUED | OUTPATIENT
Start: 2019-03-29 | End: 2019-03-30 | Stop reason: HOSPADM

## 2019-03-29 RX ORDER — ONDANSETRON 4 MG/1
4 TABLET, ORALLY DISINTEGRATING ORAL EVERY 30 MIN PRN
Status: DISCONTINUED | OUTPATIENT
Start: 2019-03-29 | End: 2019-03-30 | Stop reason: HOSPADM

## 2019-03-29 RX ORDER — NALOXONE HYDROCHLORIDE 0.4 MG/ML
.1-.4 INJECTION, SOLUTION INTRAMUSCULAR; INTRAVENOUS; SUBCUTANEOUS
Status: DISCONTINUED | OUTPATIENT
Start: 2019-03-29 | End: 2019-03-30 | Stop reason: HOSPADM

## 2019-03-29 RX ORDER — CLINDAMYCIN PHOSPHATE 600 MG/50ML
600 INJECTION, SOLUTION INTRAVENOUS SEE ADMIN INSTRUCTIONS
Status: DISCONTINUED | OUTPATIENT
Start: 2019-03-29 | End: 2019-03-29 | Stop reason: HOSPADM

## 2019-03-29 RX ADMIN — CLINDAMYCIN PHOSPHATE 600 MG: 600 INJECTION, SOLUTION INTRAVENOUS at 13:12

## 2019-03-29 RX ADMIN — SCOLOPAMINE TRANSDERMAL SYSTEM 1 PATCH: 1 PATCH, EXTENDED RELEASE TRANSDERMAL at 11:08

## 2019-03-29 RX ADMIN — FENTANYL CITRATE 50 MCG: 50 INJECTION, SOLUTION INTRAMUSCULAR; INTRAVENOUS at 15:04

## 2019-03-29 RX ADMIN — ACETAMINOPHEN 975 MG: 325 TABLET ORAL at 10:41

## 2019-03-29 RX ADMIN — LIDOCAINE HYDROCHLORIDE 65 MG: 20 INJECTION, SOLUTION INFILTRATION; PERINEURAL at 13:05

## 2019-03-29 RX ADMIN — KETOROLAC TROMETHAMINE 30 MG: 30 INJECTION, SOLUTION INTRAMUSCULAR; INTRAVENOUS at 14:24

## 2019-03-29 RX ADMIN — ONDANSETRON 4 MG: 2 INJECTION INTRAMUSCULAR; INTRAVENOUS at 14:24

## 2019-03-29 RX ADMIN — FENTANYL CITRATE 50 MCG: 50 INJECTION, SOLUTION INTRAMUSCULAR; INTRAVENOUS at 15:08

## 2019-03-29 RX ADMIN — FENTANYL CITRATE 50 MCG: 50 INJECTION, SOLUTION INTRAMUSCULAR; INTRAVENOUS at 13:04

## 2019-03-29 RX ADMIN — DEXAMETHASONE SODIUM PHOSPHATE 4 MG: 4 INJECTION, SOLUTION INTRA-ARTICULAR; INTRALESIONAL; INTRAMUSCULAR; INTRAVENOUS; SOFT TISSUE at 13:20

## 2019-03-29 RX ADMIN — SODIUM CHLORIDE, SODIUM LACTATE, POTASSIUM CHLORIDE, CALCIUM CHLORIDE: 600; 310; 30; 20 INJECTION, SOLUTION INTRAVENOUS at 10:45

## 2019-03-29 RX ADMIN — OXYCODONE HYDROCHLORIDE 5 MG: 5 TABLET ORAL at 15:09

## 2019-03-29 RX ADMIN — PROPOFOL 150 MCG/KG/MIN: 10 INJECTION, EMULSION INTRAVENOUS at 13:03

## 2019-03-29 RX ADMIN — ONDANSETRON 4 MG: 2 INJECTION INTRAMUSCULAR; INTRAVENOUS at 15:08

## 2019-03-29 RX ADMIN — PROPOFOL 150 MG: 10 INJECTION, EMULSION INTRAVENOUS at 13:06

## 2019-03-29 ASSESSMENT — MIFFLIN-ST. JEOR: SCORE: 1426.74

## 2019-03-29 NOTE — DISCHARGE INSTRUCTIONS
[facility]  Home Care Following Anesthesia  For 24 hours after surgery:  1. Get plenty of rest.  A responsible adult must stay with you for at least 24 hours after you leave the surgery center.  2. Do not drive or use heavy equipment.  If you have weakness or tingling, don't drive or use heavy equipment until this feeling goes away.   3. Do not drink alcohol.   4. Avoid strenuous or risky activities.  Ask for help when climbing stairs.  5. You may feel lightheaded.  IF so, sit for a few minutes before standing.  Have someone help you get up.   6. If you have nausea (feel sick to your stomach): Drink only clear liquids such as apple juice, ginger ale, broth or 7-Up.  Rest may also help.  Be sure to drink enough fluids.  Move to a regular diet as you feel able.   7. You may have a slight fever.  Call the doctor if your fever is over 100 F (37.7 C) (taken under the tongue) or lasts longer than 24 hours.  8. You may have a dry mouth, a sore throat, muscle aches or trouble sleeping. These should go away after 24 hours.  9. Do not make important or legal decisions.            Tips for taking pain medications  To get the best pain relief possible, remember these points:    Take pain medications as directed, before pain becomes severe.    Pain medication can upset your stomach: taking it with food may help.    Constipation is a common side effect of pain medication. Drink plenty of  fluids.    Eat foods high in fiber. Take a stool softener if recommended by your doctor or pharmacist.    Do not drink alcohol, drive or operate machinery while taking pain medications.    Ask about other ways to control pain, such as with heat, ice or relaxation.    Tylenol/Acetaminophen Consumption  To help encourage the safe use of acetaminophen, the makers of TYLENOL  have lowered the maximum daily dose for single-ingredient Extra Strength TYLENOL  (acetaminophen) products sold in the [place] from 8 pills  per day (4,000 mg) to 6 pills per day (3,000 mg). The dosing interval has also changed from 2 pills every 4-6 hours to 2 pills every 6 hours.    If you feel your pain relief is insufficient, you may take Tylenol/Acetaminophen in addition to your narcotic pain medication.     Be careful not to exceed 3,000 mg of Tylenol/Acetaminophen in a 24 hour period from all sources.    If you are taking extra strength Tylenol/acetaminophen (500 mg), the maximum dose is 6 tablets in 24 hours.    If you are taking regular strength acetaminophen (325 mg), the maximum dose is 9 tablets in 24 hours.    Call a doctor for any of the followin. Signs of infection (fever, growing tenderness at the surgery site, a large amount of drainage or bleeding, severe pain, foul-smelling drainage, redness, swelling).  2. It has been over 8 to 10 hours since surgery and you are still not able to urinate (pass water).  3. Headache for over 24 hours.  4. Numbness, tingling or weakness the day after surgery (if you had spinal anesthesia).  Your doctor is:  Dr. Di Schwartz, Orthopaedics: [phone]                    Or dial [phone] and ask for the resident on call for:  Orthopaedics  For emergency care, call the:  [facility] Emergency Department: [phone] (TTY for hearing impaired: [phone])            Scopolamine Patch- (Absorbed through the skin)    This medicine prevents nausea and vomiting caused by motion sickness or anesthesia.  The medicine is in a patch worn behind the ear.      Do NOT use the Scopolamine Patch if you have glaucoma or are allergic to scopolamine.    How to Use This Medicine:    The patch is applied behind the ear.    Keep the patch dry to prevent it from falling off.  Limit contact with water (no bathing or swimming).      If the patch is loose or falls off throw it away.  You do not need to apply a new patch.    After you take off the patch or if it falls off, wash your hands and the area behind your ear  with soap and water.      You can remove the patch tomorrow, or leave on for up to 3 days.    Only one patch should be used at any time.    How to Dispose of This Medicine:    Fold the used patch in half with the sticky sides together. Throw any used patch away so that children or pets cannot get to it. You will also need to throw away old patches after the expiration date has passed.    Keep all medicine away from children and never share your medicine with anyone.    Warnings While Using This Medicine:    This medicine can make you sleepy.  Avoid taking sleeping pills and other medicines that can make you sleepy while the patch is on.    Do not drink alcohol while the patch is on.    This medicine can cause temporary blurring and other vision problems if it comes in contact with the eyes.  This is not serious unless accompanied by eye pain and redness.     This medicine may cause problems with urination. If you have problems with urinating, remove the patch.  If you are unable to urinate, call your doctor.      This medicine may make you dizzy or drowsy. Avoid driving, using machines, or doing anything else that could be dangerous if the patch is on.    This medicine may make you sweat less and cause your body to get too hot. Be careful in hot weather or if you are exercising.    Make sure any doctor or dentist who treats you knows that you have the patch on. This medicine may affect the results of certain medical tests.    Skin burns have been reported at the patch site in several patients wearing an aluminized transdermal system during a magnetic resonance imaging scan (MRI).  Since this patch contains aluminum, it is recommended to remove the patch if you are having an MRI.    Possible Side Effects While Using This Medicine:    Dry mouth    Drowsiness    Temporary blurring of vision and widening of the pupils    Call your doctor right away if you notice any of these side effects:    Allergic reaction: Itching  or hives, swelling in your face or hands, swelling or tingling in your mouth or throat, chest tightness, trouble breathing.    Blurred vision that does not go away after the patch is removed    Confusion or memory loss    Fast,slow, or uneven heartbeat    Lightheadedness, dizziness, drowsiness, or fainting    Seeing, hearing, or feeling things that are not there    Restlessness    Severe eye pain    Trouble urinating    If you notice other side effects that you think are caused by this medicine, call your doctor immediately.

## 2019-03-29 NOTE — ANESTHESIA PREPROCEDURE EVALUATION
Anesthesia Pre-Procedure Evaluation    Patient: Kaitlynn Mendes   MRN:     [id number] Gender:   female   Age:    24 year old :      1995        Preoperative Diagnosis: Painful Hardware, Bilateral Lower Extremity   Procedure(s):  Right Knee Arthroscopy  Bilateral Lower Leg Hardware Removal     Past Medical History:   Diagnosis Date     Motion sickness      POTS (postural orthostatic tachycardia syndrome)      Restless legs syndrome       Past Surgical History:   Procedure Laterality Date     ARTHROSCOPY KNEE WITH TIBIAL TUBERCLE SHIFT Left 12/15/2017    Procedure: ARTHROSCOPY KNEE WITH TIBIAL TUBERCLE SHIFT;;  Surgeon: Di Schwartz MD;  Location: [facility] OR     OPEN REDUCTION INTERNAL FIXATION RODDING INTRAMEDULLARY TIBIA Left 12/15/2017    Procedure: OPEN REDUCTION INTERNAL FIXATION RODDING INTRAMEDULLARY TIBIA;  Left Knee Arthroscopy, Tibial Derotational Osteotomy with Intermedullary Rodding, Fibular Derotational Osteotmy, Distal Tibial Tubercle Osteotomy;  Surgeon: Dio Olivarez MD;  Location: [facility] OR          Anesthesia Evaluation     .             ROS/MED HX    ENT/Pulmonary:  - neg pulmonary ROS     Neurologic:  - neg neurologic ROS     Cardiovascular: Comment: POTS syndrome - neg cardiovascular ROS       METS/Exercise Tolerance:     Hematologic:  - neg hematologic  ROS       Musculoskeletal:  - neg musculoskeletal ROS       GI/Hepatic:  - neg GI/hepatic ROS       Renal/Genitourinary:  - ROS Renal section negative       Endo:  - neg endo ROS       Psychiatric:  - neg psychiatric ROS       Infectious Disease:  - neg infectious disease ROS       Malignancy:      - no malignancy   Other:    - neg other ROS                     PHYSICAL EXAM:   Mental Status/Neuro: A/A/O   Airway: Facies: Feasible  Mallampati: I  Mouth/Opening: Full  TM distance: > 6 cm  Neck ROM: Full   Respiratory: Auscultation: CTAB     Resp. Rate: Normal     Resp. Effort: Normal      CV: Rhythm: Regular  Rate: Age  "appropriate  Heart: Normal Sounds   Comments:      Dental: Normal                  Lab Results   Component Value Date    WBC 5.9 12/19/2017    HGB 10.2 (L) 12/19/2017    HCT 29.3 (L) 12/19/2017     12/19/2017     12/19/2017    POTASSIUM 3.8 12/19/2017    CHLORIDE 104 12/19/2017    CO2 29 12/19/2017    BUN 10 12/19/2017    CR 0.54 12/19/2017    GLC 80 12/19/2017    CHANI 8.6 12/19/2017    HCG Negative 03/29/2019       Preop Vitals  BP Readings from Last 3 Encounters:   03/29/19 114/70   03/14/19 125/83   09/06/18 (!) 141/91    Pulse Readings from Last 3 Encounters:   03/29/19 80   03/14/19 75   09/06/18 121      Resp Readings from Last 3 Encounters:   03/29/19 16   12/19/17 18   12/16/17 14    SpO2 Readings from Last 3 Encounters:   03/29/19 100%   03/14/19 100%   09/06/18 100%      Temp Readings from Last 1 Encounters:   03/29/19 36.8  C (98.3  F) (Oral)    Ht Readings from Last 1 Encounters:   03/29/19 1.753 m (5' 9\")      Wt Readings from Last 1 Encounters:   03/29/19 61.2 kg (135 lb)    Estimated body mass index is 19.94 kg/m  as calculated from the following:    Height as of this encounter: 1.753 m (5' 9\").    Weight as of this encounter: 61.2 kg (135 lb).     LDA:  Peripheral IV 03/29/19 Left Hand (Active)   Site Assessment WDL 3/29/2019 10:58 AM   Line Status Infusing 3/29/2019 10:58 AM   Phlebitis Scale 0-->no symptoms 3/29/2019 10:58 AM   Infiltration Scale 0 3/29/2019 10:58 AM   Extravasation? No 3/29/2019 10:58 AM   Dressing Intervention New dressing  3/29/2019 10:58 AM   Number of days: 0       Airway - Adult/Peds laryngeal mask airway (Active)   Number of days: 0            Assessment:   ASA SCORE: 1    NPO Status: > 6 hours since completed Solid Foods   Documentation: H&P complete; Preop Testing complete; Consents complete   Proceeding: Proceed without further delay  Tobacco Use:  NO Active use of Tobacco/UNKNOWN Tobacco use status     Plan:   Anes. Type:  General   Pre-Induction: Midazolam " IV; Acetaminophen PO   Induction:  IV (Standard)   Airway: LMA   Access/Monitoring: PIV   Maintenance: Balanced   Emergence: Procedure Site   Logistics: Same Day Surgery     Postop Pain/Sedation Strategy:  Standard-Options: Opioids PRN     PONV Management:  Adult Risk Factors: Female, Non-Smoker, Postop Opioids  Prevention: Ondansetron; Dexamethasone     CONSENT: Direct conversation   Plan and risks discussed with: Patient   Blood Products: Consent Deferred (Minimal Blood Loss)                         Fer Workman MD

## 2019-03-29 NOTE — BRIEF OP NOTE
[facility] Brief Operative Note      Brief Operative Note  March 29, 2019  Kaitlynn Mendes  1995  [id number]      Pre-operative diagnosis: Painful Hardware, Bilateral Lower Extremity   Post-operative diagnosis Same   Procedure: Procedure(s):  Right Knee Arthroscopy  Bilateral Lower Leg Hardware Removal   Surgeon(s): Surgeon(s) and Role:     * Di Schwartz MD - Primary   Estimated blood loss: 10 mL    Specimens: * No specimens in log *   Findings: Preop     Assistants: Luis Fernando Hunt MD Fritz, Aidan ARREGUIN    EBL: 10  Drains: none  Specimen: none  Findings: Please see detailed OP Note  Complications: None  Implants: See operative report    Post-Op Plan:   Activity: Ad corrie  Weight bearing status: WBAT BLE  Diet: Begin with clear fluids and progress diet as tolerated.   DVT prophylaxis: activity as tolerated  Follow-up: Clinic at 2 weeks with Nursing for wound check and 6 week postop visit w/ Dr. Olivarez as scheduled    Aidan Marley MD  Orthopaedic Surgery, PGY1  Pager: [phone]

## 2019-03-29 NOTE — OR NURSING
1 screw from left distal tibia, 1 screw from right distal tibia, 1 screw from right proximal tibia removed. [facility]

## 2019-03-29 NOTE — ANESTHESIA CARE TRANSFER NOTE
Patient: Kaitlynn Mendes    Procedure(s):  Right Knee Arthroscopy  Bilateral Lower Leg Hardware Removal    Diagnosis: Painful Hardware, Bilateral Lower Extremity  Diagnosis Additional Information: No value filed.    Anesthesia Type:   General, LMA     Note:  Airway :Nasal Cannula  Patient transferred to:PACU  Comments: VSS and WNL, comfortable, no PONV, report to Julia LOPEZHandoff Report: Identifed the Patient, Identified the Reponsible Provider, Reviewed the pertinent medical history, Discussed the surgical course, Reviewed Intra-OP anesthesia mangement and issues during anesthesia, Set expectations for post-procedure period and Allowed opportunity for questions and acknowledgement of understanding      Vitals: (Last set prior to Anesthesia Care Transfer)    CRNA VITALS  3/29/2019 1422 - 3/29/2019 1457      3/29/2019             Pulse:  68    SpO2:  100 %    Resp Rate (observed):  3  (Abnormal)                 Electronically Signed By: JUANY Wills CRNA  March 29, 2019  2:57 PM

## 2019-03-29 NOTE — ANESTHESIA POSTPROCEDURE EVALUATION
Anesthesia POST Procedure Evaluation    Patient: Kaitlynn Mendes   MRN:     [id number] Gender:   female   Age:    24 year old :      1995        Preoperative Diagnosis: Painful Hardware, Bilateral Lower Extremity   Procedure(s):  Right Knee Arthroscopy  Bilateral Lower Leg Hardware Removal   Postop Comments: No value filed.       Anesthesia Type:  General    Reportable Event: NO     PAIN: Uncomplicated   Sign Out status: Comfortable, Well controlled pain     PONV: No PONV   Sign Out status:  No Nausea or Vomiting     Neuro/Psych: Uneventful perioperative course   Sign Out Status: Preoperative baseline; Age appropriate mentation     Airway/Resp.: Uneventful perioperative course   Sign Out Status: Non labored breathing, age appropriate RR; Resp. Status within EXPECTED Parameters     CV: Uneventful perioperative course   Sign Out status: Appropriate BP and perfusion indices; Appropriate HR/Rhythm     Disposition:   Sign Out in:  PACU  Disposition:  Phase II; Home  Recovery Course: Uneventful  Follow-Up: Not required           Last Anesthesia Record Vitals:  CRNA VITALS  3/29/2019 1422 - 3/29/2019 1522      3/29/2019             Pulse:  68    SpO2:  100 %    Resp Rate (observed):  3  (Abnormal)           Last PACU/Preop Vitals:  Vitals:    19 1500 19 1515 19 1538   BP: 109/74 100/61 110/86   Pulse: 65 64    Resp: 18 12 12   Temp:  36.7  C (98  F) 36.7  C (98  F)   SpO2: 100% 99% 100%         Electronically Signed By: Fer Workman MD, 2019, 6:05 PM

## 2019-04-04 NOTE — OP NOTE
Procedure Date: 03/29/2019      PREOPERATIVE DIAGNOSES:   1.  Painful hardware, bilateral lower extremities.   2.  Status post previous left distal tibial tubercle osteotomy combined with derotation tibia/ fibula osteotomy.   3.  Status post right tibia/fibula  derotation osteotomy.   4.  Rule out right knee medial meniscus tear.      POSTOPERATIVE DIAGNOSES:   1.  No intraarticular pathology, right knee, in  particular, satisfactory cartilage surfaces and normal meniscus.   2.  Painful hardware, proximal Right tibia and bilateral distal tibia screws, left and right tibia.      PROCEDURE:    1.  Right knee arthroscopy.   2.  Removal of locking screw, right proximal tibia.   3.  Removal of locking screw, right distal tibia.   4.  Removal of locking screw, left distal tibia.      OPERATORS:  Di Schwartz MD.      ASSISTANT:  Luis Fernando Hunt MD and Aidan Marley MD       ANESTHESIA:  General supplemented with local 0.25% bupivacaine:        Exam under anesthesia revealed excellent range of motion with 0-150 degrees of flexion, both knees.      The distal hardware screw head was palpable.  However, it was the exit of the distal screw laterally in the soft tissues that was the main irritating foci.      DESCRIPTION OF PROCEDURE:  Under general anesthesia, the patient was positioned supine on the operating table.  The patient's legs were prepped and draped in the usual sterile fashion.  A pause was performed identifying the correct leg and the administration of antibiotics.      We began the procedure with a right knee arthroscopy.  We used an anterolateral portal for visualization.  Findings as above.  No operative arthroscopy was performed.      The fluid was then removed with the instruments.      We then brought C-arm in the room.  We used the previous incisions.  We identified the screw head and removed the screw without incident in all 3 locations.      All 3 incisions were irrigated.  Subcutaneous tissue was  closed with 2-0 Vicryl.  The skin was closed with a running Monocryl.  We then used a small dressing on each of the screw removal sites.      The patient tolerated the procedure well and was taken to the recovery room in satisfactory condition.  She will be maintained weightbearing as tolerated using crutches as needed.         SHAHRZAD BUTLER MD             D: 2019   T: 2019   MT: YOEL      Name:     OSIEL CROFT   MRN:      [id number]        Account:        [id number]   :      1995           Procedure Date: 2019      Document: [id number]

## 2019-04-05 ENCOUNTER — THERAPY VISIT (OUTPATIENT)
Dept: PHYSICAL THERAPY | Facility: CLINIC | Age: 24
End: 2019-04-05
Attending: ORTHOPAEDIC SURGERY
Payer: COMMERCIAL

## 2019-04-05 DIAGNOSIS — Z98.890 S/P KNEE SURGERY: Primary | ICD-10-CM

## 2019-04-05 PROCEDURE — 97016 VASOPNEUMATIC DEVICE THERAPY: CPT | Mod: GP | Performed by: PHYSICAL THERAPIST

## 2019-04-05 PROCEDURE — 97161 PT EVAL LOW COMPLEX 20 MIN: CPT | Mod: GP | Performed by: PHYSICAL THERAPIST

## 2019-04-05 PROCEDURE — 97110 THERAPEUTIC EXERCISES: CPT | Mod: GP | Performed by: PHYSICAL THERAPIST

## 2019-04-05 ASSESSMENT — ACTIVITIES OF DAILY LIVING (ADL)
KNEE_ACTIVITY_OF_DAILY_LIVING_SCORE: 50
STAND: ACTIVITY IS FAIRLY DIFFICULT
PAIN: THE SYMPTOM AFFECTS MY ACTIVITY MODERATELY
GO UP STAIRS: ACTIVITY IS FAIRLY DIFFICULT
SQUAT: ACTIVITY IS SOMEWHAT DIFFICULT
RISE FROM A CHAIR: ACTIVITY IS FAIRLY DIFFICULT
AS_A_RESULT_OF_YOUR_KNEE_INJURY,_HOW_WOULD_YOU_RATE_YOUR_CURRENT_LEVEL_OF_DAILY_ACTIVITY?: ABNORMAL
KNEE_ACTIVITY_OF_DAILY_LIVING_SUM: 35
GO DOWN STAIRS: ACTIVITY IS FAIRLY DIFFICULT
KNEEL ON THE FRONT OF YOUR KNEE: ACTIVITY IS FAIRLY DIFFICULT
WALK: ACTIVITY IS SOMEWHAT DIFFICULT
STIFFNESS: THE SYMPTOM AFFECTS MY ACTIVITY MODERATELY
GIVING WAY, BUCKLING OR SHIFTING OF KNEE: THE SYMPTOM AFFECTS MY ACTIVITY MODERATELY
SIT WITH YOUR KNEE BENT: ACTIVITY IS MINIMALLY DIFFICULT
RAW_SCORE: 35
HOW_WOULD_YOU_RATE_THE_OVERALL_FUNCTION_OF_YOUR_KNEE_DURING_YOUR_USUAL_DAILY_ACTIVITIES?: ABNORMAL
WEAKNESS: THE SYMPTOM AFFECTS MY ACTIVITY SLIGHTLY
SWELLING: THE SYMPTOM AFFECTS MY ACTIVITY SLIGHTLY
HOW_WOULD_YOU_RATE_THE_CURRENT_FUNCTION_OF_YOUR_KNEE_DURING_YOUR_USUAL_DAILY_ACTIVITIES_ON_A_SCALE_FROM_0_TO_100_WITH_100_BEING_YOUR_LEVEL_OF_KNEE_FUNCTION_PRIOR_TO_YOUR_INJURY_AND_0_BEING_THE_INABILITY_TO_PERFORM_ANY_OF_YOUR_USUAL_DAILY_ACTIVITIES?: 70
LIMPING: THE SYMPTOM AFFECTS MY ACTIVITY SLIGHTLY

## 2019-04-05 NOTE — PROGRESS NOTES
"[facility] Initial Evaluation  Subjective:    Kaitlynn Mendes is a 24 year old female with a bilateral knees condition.      This is a new condition       Radiates to:  Ankle.    and reported as 4/10.     Symptoms are exacerbated by walking (walking > 20 minutes or in a grocery store)   Since onset symptoms are gradually improving.    Previous treatment includes physical therapy.  There was significant improvement following previous treatment.  General health as reported by patient is good.  Pertinent medical history includes:  Heart problems, migraines/headaches and history of fractures (concussion/dizziness, cold/hot extremity, pain at night/rest, ).                                            Objective:  System    Ankle/Foot Evaluation  ROM:    AROM:    Dorsiflexion:  Left:   (5)  Right:   10  Plantarflexion:  Left:  65    Right:  75  Inversion:  Left:  50     Right:  65  Eversion:  12     Right:  13      PROM:    Dorsiflexion: Left:    5     Right:                   Strength:    Dorsiflexion:  Left: 5/5     Pain:                                 EDEMA:   Left ankle edema present at: medial  Right ankle edema present at:  medial          FUNCTIONAL TESTS: Functional test ankle: SL balance: sensation of ankle \"locking up\" during SL stance.                                                        Knee Evaluation:  ROM:    AROM    Hyperextension:  Left:  2    Right: 2  Extension:  Left: 0    Right:  0  Flexion: Left: 150    Right: 124        Strength:         Quad Set Left: Good    Pain:   Quad Set Right: Good and fair    Pain:        Edema:  Edema of the knee: 1.5 cm across mid-patella joint line.            General     ROS    Assessment/Plan:    Patient is a 24 year old female with right side knee and both sides ankle complaints.    Patient has the following significant findings with corresponding treatment plan.                Diagnosis 1:  S/p R and L hardware removal  Pain -  hot/cold " therapy  Decreased ROM/flexibility - manual therapy and therapeutic exercise  Decreased joint mobility - manual therapy and therapeutic exercise  Decreased strength - therapeutic exercise and therapeutic activities  Impaired balance - neuro re-education and therapeutic activities  Decreased proprioception - neuro re-education and therapeutic activities  Inflammation - cold therapy  Edema - vasopneumatics  Impaired gait - gait training  Impaired muscle performance - neuro re-education  Decreased function - therapeutic activities    Therapy Evaluation Codes:   1) History comprised of:   Personal factors that impact the plan of care:      None.    Comorbidity factors that impact the plan of care are:      None.     Medications impacting care: None.  2) Examination of Body Systems comprised of:   Body structures and functions that impact the plan of care:      Ankle and Knee.   Activity limitations that impact the plan of care are:      Walking.  3) Clinical presentation characteristics are:   Stable/Uncomplicated.  4) Decision-Making    Low complexity using standardized patient assessment instrument and/or measureable assessment of functional outcome.  Cumulative Therapy Evaluation is: Low complexity.    Previous and current functional limitations:  (See Goal Flow Sheet for this information)    Short term and Long term goals: (See Goal Flow Sheet for this information)     Communication ability:  Patient appears to be able to clearly communicate and understand verbal and written communication and follow directions correctly.  Treatment Explanation - The following has been discussed with the patient:   RX ordered/plan of care  Anticipated outcomes  Possible risks and side effects  This patient would benefit from PT intervention to resume normal activities.   Rehab potential is good.    Frequency:  1 X week, once daily  Duration:  for 12 weeks  Discharge Plan:  Achieve all LTG.  Independent in home treatment  program.  Reach maximal therapeutic benefit.    Please refer to the daily flowsheet for treatment today, total treatment time and time spent performing 1:1 timed codes.

## 2019-04-11 ENCOUNTER — THERAPY VISIT (OUTPATIENT)
Dept: PHYSICAL THERAPY | Facility: CLINIC | Age: 24
End: 2019-04-11
Payer: COMMERCIAL

## 2019-04-11 ENCOUNTER — CARE COORDINATION (OUTPATIENT)
Dept: CARDIOLOGY | Facility: CLINIC | Age: 24
End: 2019-04-11

## 2019-04-11 DIAGNOSIS — M25.572 BILATERAL ANKLE PAIN: ICD-10-CM

## 2019-04-11 DIAGNOSIS — M25.561 RIGHT KNEE PAIN: ICD-10-CM

## 2019-04-11 DIAGNOSIS — Z47.89 AFTERCARE FOLLOWING SURGERY OF THE MUSCULOSKELETAL SYSTEM: ICD-10-CM

## 2019-04-11 DIAGNOSIS — G90.A POTS (POSTURAL ORTHOSTATIC TACHYCARDIA SYNDROME): Primary | ICD-10-CM

## 2019-04-11 DIAGNOSIS — M25.571 BILATERAL ANKLE PAIN: ICD-10-CM

## 2019-04-11 PROCEDURE — 97016 VASOPNEUMATIC DEVICE THERAPY: CPT | Mod: GP | Performed by: PHYSICAL THERAPY ASSISTANT

## 2019-04-11 PROCEDURE — 97110 THERAPEUTIC EXERCISES: CPT | Mod: GP | Performed by: PHYSICAL THERAPY ASSISTANT

## 2019-04-11 NOTE — PROGRESS NOTES
Date: 4/11/2019    Time of Call: 11:20 AM     Diagnosis:  POTS     [ TORB ] Ordering provider: Dr Milo Mendoza  Order: Ok to take Propranolol 20 mg as needed daily prior to presentations.     Order received by: Michel Irvin RN     Follow-up/additional notes: Patient sent My Chart message with recommendations from Dr Mendoza.

## 2019-04-15 RX ORDER — PROPRANOLOL HYDROCHLORIDE 20 MG/1
20 TABLET ORAL DAILY PRN
Qty: 30 TABLET | Refills: 3 | Status: SHIPPED | OUTPATIENT
Start: 2019-04-15 | End: 2019-06-11

## 2019-05-10 ENCOUNTER — TELEPHONE (OUTPATIENT)
Dept: ORTHOPEDICS | Facility: CLINIC | Age: 24
End: 2019-05-10

## 2019-05-10 NOTE — TELEPHONE ENCOUNTER
Called Kaitlynn to make a follow up appointment for her with Dr. Schwartz. If she calls back, either May 28th at 12:45 or June 11th at 11:30.

## 2019-06-04 NOTE — TELEPHONE ENCOUNTER
Called patient to make appointment. No answer. Make appointment for 6/18/19 at 8:15 am or 3:00 pm if she calls back.

## 2019-06-11 DIAGNOSIS — G90.A POTS (POSTURAL ORTHOSTATIC TACHYCARDIA SYNDROME): Primary | ICD-10-CM

## 2019-06-11 RX ORDER — IVABRADINE 5 MG/1
5 TABLET, FILM COATED ORAL 2 TIMES DAILY WITH MEALS
Qty: 180 TABLET | Refills: 1 | Status: SHIPPED | OUTPATIENT
Start: 2019-06-11 | End: 2019-12-16

## 2019-06-11 RX ORDER — PROPRANOLOL HYDROCHLORIDE 20 MG/1
20 TABLET ORAL DAILY PRN
Qty: 30 TABLET | Refills: 2 | Status: SHIPPED | OUTPATIENT
Start: 2019-06-11 | End: 2019-10-17

## 2019-06-11 NOTE — TELEPHONE ENCOUNTER
Health Call Center    Phone Message    May a detailed message be left on voicemail: no    Reason for Call: Medication Refill Request    Has the patient contacted the pharmacy for the refill? Yes   Name of medication being requested: ivabradine (CORLANOR) 5 MG tablet  midodrine (PROAMATINE) 5 MG tablet  propranolol (INDERAL) 20 MG tablet  Provider who prescribed the medication: Dr. Mendoza  Pharmacy: [facility], fax # [phone]  Date medication is needed: Asap, Pt just signed up for this service and needs new scripts sent to her new pharmacy      Action Taken: Message routed to:  [facility] ([facility] MED REFILLS TEAM

## 2019-06-11 NOTE — TELEPHONE ENCOUNTER
midodrine      Last Written Prescription Date:  6-28-18  Last Fill Quantity: 450,   # refills: 3  Last Office Visit : 3-14-19  Future Office visit:  none    Routing refill request to provider for review/approval because:  Not on protocol.      Kathleen M Doege RN

## 2019-06-17 RX ORDER — MIDODRINE HYDROCHLORIDE 5 MG/1
TABLET ORAL
Qty: 450 TABLET | Refills: 3 | Status: SHIPPED | OUTPATIENT
Start: 2019-06-17 | End: 2022-01-26

## 2019-06-27 ENCOUNTER — TRANSFERRED RECORDS (OUTPATIENT)
Dept: HEALTH INFORMATION MANAGEMENT | Facility: CLINIC | Age: 24
End: 2019-06-27

## 2019-07-23 NOTE — MR AVS SNAPSHOT
After Visit Summary   11/9/2017    Kaitlynn House    MRN: [id number]           Patient Information     Date Of Birth          1995        Visit Information        Provider Department      11/9/2017 5:30 PM Shell Watkins APRN CNP [facility]        Today's Diagnoses     Dizziness          Care Instructions    Cardiology Provider you saw in clinic today: JUANY Hinton CNP      Haynes salt diet; add salt to multiple foods throughout the day  50% Propel (or G2) and 50% water for a total of % per day.    Please wear biker shorts daily  No hot showers  Exercise as you can.       You will receive all normal lab and testing results via Affinity Systemshart or mail if not reviewed in clinic today. Please contact our office if you need assistance with setting up MyChart.    If you need a medication refill please contact your pharmacy. Please allow 3 business days for your refill to be completed.     As always, thank you for trusting us with your health care needs!    If you have any questions regarding your visit please contact your care team:   Cardiology  Telephone Number    EP RN  Electrophysiology Nurse Coordinator [phone]     Call for EP procedure scheduling concerns  ALICE Hebert  [phone]           Device Clinic (Pacemakers, ICDs, Loop)   RN's : Regine Espinoza Connie, Dawn During business hours: [phone]    After business hours:   [phone]- select option 4 and ask for job code [id number].                    Follow-ups after your visit        Follow-up notes from your care team     Return in about 3 months (around 2/9/2018) for Shell Mendoza NP.      Your next 10 appointments already scheduled     Nov 15, 2017  1:30 PM CST   (Arrive by 1:15 PM)   Post-Op with [facility] Nurse   [facility] ([facility])    [address] [address] [phone]            Jan 18,  "2018  4:30 PM CST   (Arrive by 4:15 PM)   RETURN ARRHYTHMIA with JUANY Henning CNP   [facility] ([facility])    [address] [facility] [address] [phone]            Feb 22, 2018  6:00 PM CST   (Arrive by 5:45 PM)   RETURN ARRHYTHMIA with Milo Mendoza MD   [facility] ([facility])    [address]  [address] [facility] [address] [phone]              Who to contact     If you have questions or need follow up information about today's clinic visit or your schedule please contact [facility] directly at [phone].  Normal or non-critical lab and imaging results will be communicated to you by MyChart, letter or phone within 4 business days after the clinic has received the results. If you do not hear from us within 7 days, please contact the clinic through Centaur[employer] or phone. If you have a critical or abnormal lab result, we will notify you by phone as soon as possible.  Submit refill requests through Drywave or call your pharmacy and they will forward the refill request to us. Please allow 3 business days for your refill to be completed.          Additional Information About Your Visit        Plannifyt Information     Drywave gives you secure access to your electronic health record. If you see a primary care provider, you can also send messages to your care team and make appointments. If you have questions, please call your primary care clinic.  If you do not have a primary care provider, please call [phone] and they will assist you.        Care EveryWhere ID     This is your Care EveryWhere ID. This could be used by other organizations to access your [place] [facility] medical records  [id number]        Your Vitals Were     Pulse Height Pulse Oximetry             180 1.753 m (5' 9\") 100%          Blood Pressure from Last 3 Encounters:   11/09/17 124/79   10/11/17 122/84    " Weight from Last 3 Encounters:   10/24/17 56.2 kg (124 lb)   10/11/17 56.7 kg (125 lb)   10/03/17 56.8 kg (125 lb 4.8 oz)              We Performed the Following     EKG 12-lead, tracing only (Future)        Primary Care Provider    Physician No Ref-Primary       NO REF-PRIMARY PHYSICIAN        Equal Access to Services     LIBANKYLER ADAMEJOSE C : Hadii aad ku hadasho Soomaali, waaxda luqadaha, qaybta kaalmada andresyada, waxay idiin hayisabellarlene blisslalylizz penaloza . So [facility] [phone].    ATENCIÓN: Si habla español, tiene a cristobal disposición servicios gratuitos de asistencia lingüística. Llame al [phone].    We comply with applicable federal civil rights laws and [place] laws. We do not discriminate on the basis of race, color, national origin, age, disability, sex, sexual orientation, or gender identity.            Thank you!     Thank you for choosing [facility]  for your care. Our goal is always to provide you with excellent care. Hearing back from our patients is one way we can continue to improve our services. Please take a few minutes to complete the written survey that you may receive in the mail after your visit with us. Thank you!             Your Updated Medication List - Protect others around you: Learn how to safely use, store and throw away your medicines at www.disposemymeds.org.          This list is accurate as of: 11/9/17  6:38 PM.  Always use your most recent med list.                   Brand Name Dispense Instructions for use Diagnosis    ALEVE PO      Take 2 tablets by mouth every 12 hours as needed for moderate pain        GABAPENTIN PO      Take 600 mg by mouth 2 times daily        hydrOXYzine 25 MG capsule    VISTARIL    30 capsule    Take 1-2 capsules (25-50 mg) by mouth 4 times daily as needed for itching    Surgical follow-up care       morphine 15 MG 12 hr tablet    MS CONTIN    14 tablet    Take 1 tablet (15 mg) by mouth every 12 hours    CRPS (complex regional pain syndrome), lower  limb       order for DME     1 Units    Crutches    Tibial torsion, right       oxyCODONE IR 5 MG tablet    ROXICODONE    30 tablet    Take 1-2 tablets (5-10 mg) by mouth every 4 hours as needed for moderate to severe pain    Surgical follow-up care       XULANE 150-35 MCG/24HR patch   Generic drug:  norelgestromin-ethinyl estradiol      Place 1 patch onto the skin once a week Remove old patch and apply new patch onto the skin once a week for 3 weeks (21 days). Do not wear patch week 4 (days 22-28), then repeat.           knee contusion/strain

## 2019-08-08 PROBLEM — Z47.89 AFTERCARE FOLLOWING SURGERY OF THE MUSCULOSKELETAL SYSTEM: Status: RESOLVED | Noted: 2019-04-11 | Resolved: 2019-08-08

## 2019-08-08 PROBLEM — M25.561 RIGHT KNEE PAIN: Status: RESOLVED | Noted: 2019-04-11 | Resolved: 2019-08-08

## 2019-08-08 PROBLEM — M25.572 BILATERAL ANKLE PAIN: Status: RESOLVED | Noted: 2019-04-11 | Resolved: 2019-08-08

## 2019-08-08 PROBLEM — M25.571 BILATERAL ANKLE PAIN: Status: RESOLVED | Noted: 2019-04-11 | Resolved: 2019-08-08

## 2019-09-29 ENCOUNTER — HEALTH MAINTENANCE LETTER (OUTPATIENT)
Age: 24
End: 2019-09-29

## 2019-10-17 ENCOUNTER — MEDICAL CORRESPONDENCE (OUTPATIENT)
Dept: HEALTH INFORMATION MANAGEMENT | Facility: CLINIC | Age: 24
End: 2019-10-17

## 2019-10-17 DIAGNOSIS — G90.A POTS (POSTURAL ORTHOSTATIC TACHYCARDIA SYNDROME): Primary | ICD-10-CM

## 2019-10-18 DIAGNOSIS — R23.8 OTHER SKIN CHANGES: ICD-10-CM

## 2019-10-18 DIAGNOSIS — M26.609 UNSPECIFIED TEMPOROMANDIBULAR JOINT DISORDER, UNSPECIFIED SIDE: ICD-10-CM

## 2019-10-18 DIAGNOSIS — K21.9 GASTROESOPHAGEAL REFLUX DISEASE WITHOUT ESOPHAGITIS: ICD-10-CM

## 2019-10-18 DIAGNOSIS — M79.604 PAIN OF RIGHT LEG: ICD-10-CM

## 2019-10-18 DIAGNOSIS — I95.1 ORTHOSTATIC HYPOTENSION: ICD-10-CM

## 2019-10-18 DIAGNOSIS — Q79.62 EHLERS-DANLOS SYNDROME, BENIGN HYPERMOBILE FORM: Primary | ICD-10-CM

## 2019-10-18 DIAGNOSIS — Z87.442 PERSONAL HISTORY OF URINARY CALCULI: ICD-10-CM

## 2019-10-18 DIAGNOSIS — L50.3 DERMATOGRAPHIC URTICARIA: ICD-10-CM

## 2019-10-18 DIAGNOSIS — M79.605 PAIN OF LEFT LEG: ICD-10-CM

## 2019-10-18 DIAGNOSIS — R23.3 EASY BRUISING: ICD-10-CM

## 2019-10-18 DIAGNOSIS — I83.93 ASYMPTOMATIC VARICOSE VEINS OF BILATERAL LOWER EXTREMITIES: ICD-10-CM

## 2019-10-20 RX ORDER — PROPRANOLOL HYDROCHLORIDE 20 MG/1
20 TABLET ORAL DAILY PRN
Qty: 30 TABLET | Refills: 2 | Status: SHIPPED | OUTPATIENT
Start: 2019-10-20 | End: 2022-02-23

## 2019-11-21 ENCOUNTER — OFFICE VISIT (OUTPATIENT)
Dept: CARDIOLOGY | Facility: CLINIC | Age: 24
End: 2019-11-21
Attending: INTERNAL MEDICINE
Payer: COMMERCIAL

## 2019-11-21 VITALS
WEIGHT: 138 LBS | BODY MASS INDEX: 20.44 KG/M2 | DIASTOLIC BLOOD PRESSURE: 76 MMHG | HEART RATE: 90 BPM | SYSTOLIC BLOOD PRESSURE: 129 MMHG | HEIGHT: 69 IN | OXYGEN SATURATION: 97 %

## 2019-11-21 DIAGNOSIS — R23.3 EASY BRUISING: ICD-10-CM

## 2019-11-21 DIAGNOSIS — M79.604 PAIN OF RIGHT LEG: ICD-10-CM

## 2019-11-21 DIAGNOSIS — G90.A POTS (POSTURAL ORTHOSTATIC TACHYCARDIA SYNDROME): Primary | ICD-10-CM

## 2019-11-21 DIAGNOSIS — I95.1 ORTHOSTATIC HYPOTENSION: ICD-10-CM

## 2019-11-21 DIAGNOSIS — K21.9 GASTROESOPHAGEAL REFLUX DISEASE WITHOUT ESOPHAGITIS: ICD-10-CM

## 2019-11-21 DIAGNOSIS — Z87.442 PERSONAL HISTORY OF URINARY CALCULI: ICD-10-CM

## 2019-11-21 DIAGNOSIS — M79.605 PAIN OF LEFT LEG: ICD-10-CM

## 2019-11-21 DIAGNOSIS — L50.3 DERMATOGRAPHIC URTICARIA: ICD-10-CM

## 2019-11-21 DIAGNOSIS — I83.93 ASYMPTOMATIC VARICOSE VEINS OF BILATERAL LOWER EXTREMITIES: ICD-10-CM

## 2019-11-21 DIAGNOSIS — M26.609 UNSPECIFIED TEMPOROMANDIBULAR JOINT DISORDER, UNSPECIFIED SIDE: ICD-10-CM

## 2019-11-21 DIAGNOSIS — Q79.62 EHLERS-DANLOS SYNDROME, BENIGN HYPERMOBILE FORM: ICD-10-CM

## 2019-11-21 DIAGNOSIS — R23.8 OTHER SKIN CHANGES: ICD-10-CM

## 2019-11-21 LAB
ALBUMIN SERPL-MCNC: 4.2 G/DL (ref 3.4–5)
ALP SERPL-CCNC: 78 U/L (ref 40–150)
ALT SERPL W P-5'-P-CCNC: 20 U/L (ref 0–50)
ANION GAP SERPL CALCULATED.3IONS-SCNC: 5 MMOL/L (ref 3–14)
APTT PPP: 30 SEC (ref 22–37)
AST SERPL W P-5'-P-CCNC: 8 U/L (ref 0–45)
BASOPHILS # BLD AUTO: 0.1 10E9/L (ref 0–0.2)
BASOPHILS NFR BLD AUTO: 0.7 %
BILIRUB SERPL-MCNC: 0.6 MG/DL (ref 0.2–1.3)
BUN SERPL-MCNC: 11 MG/DL (ref 7–30)
CALCIUM SERPL-MCNC: 9.3 MG/DL (ref 8.5–10.1)
CHLORIDE SERPL-SCNC: 105 MMOL/L (ref 94–109)
CLOSURE TME COLL+EPINEP BLD: 118 SEC
CO2 SERPL-SCNC: 28 MMOL/L (ref 20–32)
CREAT SERPL-MCNC: 0.69 MG/DL (ref 0.52–1.04)
DIFFERENTIAL METHOD BLD: NORMAL
EOSINOPHIL # BLD AUTO: 0.2 10E9/L (ref 0–0.7)
EOSINOPHIL NFR BLD AUTO: 2.4 %
ERYTHROCYTE [DISTWIDTH] IN BLOOD BY AUTOMATED COUNT: 12.5 % (ref 10–15)
ESTRADIOL SERPL-MCNC: 40 PG/ML
FERRITIN SERPL-MCNC: 42 NG/ML (ref 12–150)
FSH SERPL-ACNC: 4.6 IU/L
GFR SERPL CREATININE-BSD FRML MDRD: >90 ML/MIN/{1.73_M2}
GLUCOSE SERPL-MCNC: 87 MG/DL (ref 70–99)
HCT VFR BLD AUTO: 39.4 % (ref 35–47)
HGB BLD-MCNC: 13.7 G/DL (ref 11.7–15.7)
IMM GRANULOCYTES # BLD: 0 10E9/L (ref 0–0.4)
IMM GRANULOCYTES NFR BLD: 0.4 %
INR PPP: 1.1 (ref 0.86–1.14)
IRON SATN MFR SERPL: 22 % (ref 15–46)
IRON SERPL-MCNC: 96 UG/DL (ref 35–180)
LH SERPL-ACNC: 3.2 IU/L
LMWH PPP CHRO-ACNC: <0.1 IU/ML
LYMPHOCYTES # BLD AUTO: 3.1 10E9/L (ref 0.8–5.3)
LYMPHOCYTES NFR BLD AUTO: 36.7 %
MAGNESIUM SERPL-MCNC: 2.4 MG/DL (ref 1.6–2.3)
MCH RBC QN AUTO: 30.2 PG (ref 26.5–33)
MCHC RBC AUTO-ENTMCNC: 34.8 G/DL (ref 31.5–36.5)
MCV RBC AUTO: 87 FL (ref 78–100)
MONOCYTES # BLD AUTO: 0.7 10E9/L (ref 0–1.3)
MONOCYTES NFR BLD AUTO: 7.7 %
NEUTROPHILS # BLD AUTO: 4.4 10E9/L (ref 1.6–8.3)
NEUTROPHILS NFR BLD AUTO: 52.1 %
NRBC # BLD AUTO: 0 10*3/UL
NRBC BLD AUTO-RTO: 0 /100
PLATELET # BLD AUTO: 361 10E9/L (ref 150–450)
POTASSIUM SERPL-SCNC: 3.2 MMOL/L (ref 3.4–5.3)
PROGEST SERPL-MCNC: 1.1 NG/ML
PROT SERPL-MCNC: 8.2 G/DL (ref 6.8–8.8)
RBC # BLD AUTO: 4.53 10E12/L (ref 3.8–5.2)
SODIUM SERPL-SCNC: 138 MMOL/L (ref 133–144)
T3FREE SERPL-MCNC: 2.8 PG/ML (ref 2.3–4.2)
T4 FREE SERPL-MCNC: 1.07 NG/DL (ref 0.76–1.46)
TIBC SERPL-MCNC: 432 UG/DL (ref 240–430)
TSH SERPL DL<=0.005 MIU/L-ACNC: 0.93 MU/L (ref 0.4–4)
VIT B12 SERPL-MCNC: 551 PG/ML (ref 193–986)
WBC # BLD AUTO: 8.4 10E9/L (ref 4–11)

## 2019-11-21 PROCEDURE — 99214 OFFICE O/P EST MOD 30 MIN: CPT | Mod: ZP | Performed by: INTERNAL MEDICINE

## 2019-11-21 PROCEDURE — G0463 HOSPITAL OUTPT CLINIC VISIT: HCPCS | Mod: ZF

## 2019-11-21 ASSESSMENT — MIFFLIN-ST. JEOR: SCORE: 1440.34

## 2019-11-21 ASSESSMENT — PAIN SCALES - GENERAL: PAINLEVEL: NO PAIN (0)

## 2019-11-21 NOTE — NURSING NOTE
Chief Complaint   Patient presents with     Follow Up     24yoF w/ postural tach, possible keyana dahos, & POTS-like sx (midodrine + ivabradine + propanolol) presents for 6 month follow-up.     Vitals were taken and medications were reconciled.    Claudia Barrera CMA    2:44 PM

## 2019-11-21 NOTE — PATIENT INSTRUCTIONS
You were seen in the Electrophysiology Clinic today by: Dr. Mendoza    Plan:     Follow up visit: 1 year w/ Dr. Mendoza        Your Care Team:  EP Cardiology   Telephone Number     Shama Magana RN [phone]     For scheduling appts or procedures:    Aimee Gomes   [phone]   For the Device Clinic (Pacemakers, ICDs, Loop Recorders)    During business hours: [phone]  After business hours:   [phone]- select option 4 and ask for job code [id number].       Cardiovascular Clinic:   [address] [place] [address]. [place] [address]      As always, Thank you for trusting us with your health care needs!

## 2019-11-21 NOTE — PROGRESS NOTES
HPI: Kaitlynn House is a pleasant 22 year old female with POTS based on history who is here for a follow up.   Since her last clinic visit she is undergone orthopedic surgery without any difficulty.  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  Her only significant complaint is some swelling in the lower extremities which is almost certainly related to pots therapy.  I advocated the use of simple pantyhose rather than teds stockings to try to provide some relief.    Suzan is considering pregnancy and we did discuss the need that she eliminate ivabradine and midodrine from her treatment regimen as soon as pregnancy is identified or even before possible.    Patient is without any current cardiac or respiratory complaints.        PAST MEDICAL HISTORY:  Past Medical History:   Diagnosis Date     Motion sickness      POTS (postural orthostatic tachycardia syndrome)      Restless legs syndrome    Orthopedic surgery that requires revision due to problems with with the right knee and nearby metal prostheses.  Previous lower extremity venous issues requiring stripping.    CURRENT MEDICATIONS:  Current Outpatient Medications   Medication Sig Dispense Refill     acetaminophen (TYLENOL) 325 MG tablet Take 2 tablets (650 mg) by mouth every 4 hours as needed for other (mild pain) 100 tablet 0     AMITRIPTYLINE HCL PO Take 20 mg by mouth nightly as needed for sleep       gabapentin (NEURONTIN) 300 MG capsule Take 1 capsule (300 mg) by mouth daily 15 capsule 1     gabapentin (NEURONTIN) 600 MG tablet TK 1 T PO BID  1     ivabradine (CORLANOR) 5 MG tablet Take 1 tablet (5 mg) by mouth 2 times daily (with meals) 180 tablet 1     midodrine (PROAMATINE) 5 MG tablet Take midodrine 10 mg at 8 am. Take 10mg at noon and take 5 mg at 4pm. 450 tablet 3     Naproxen Sodium (ALEVE PO) Take 2 tablets by mouth every 12 hours as needed for moderate pain       norelgestromin-ethinyl  estradiol (XULANE) 150-35 MCG/24HR patch Place 1 patch onto the skin once a week Remove old patch and apply new patch onto the skin once a week for 3 weeks (21 days). Do not wear patch week 4 (days 22-28), then repeat.       propranolol (INDERAL) 20 MG tablet Take 1 tablet (20 mg) by mouth daily as needed (30-45 minutes prior to presentations) 30 tablet 2     oxyCODONE (ROXICODONE) 5 MG tablet Take 1-2 tablets (5-10 mg) by mouth every 4 hours as needed for moderate to severe pain (Patient not taking: Reported on 11/21/2019) 16 tablet 0     senna-docusate (SENOKOT-S/PERICOLACE) 8.6-50 MG tablet Take 1-2 tablets by mouth 2 times daily (Patient not taking: Reported on 11/21/2019) 30 tablet 0       PAST SURGICAL HISTORY:  Past Surgical History:   Procedure Laterality Date     ARTHROSCOPY KNEE Right 3/29/2019    Procedure: Right Knee Arthroscopy;  Surgeon: Di Schwartz MD;  [facility]: [facility]     ARTHROSCOPY KNEE WITH TIBIAL TUBERCLE SHIFT Left 12/15/2017    Procedure: ARTHROSCOPY KNEE WITH TIBIAL TUBERCLE SHIFT;;  Surgeon: Di Schwartz MD;  Location: [facility] OR     OPEN REDUCTION INTERNAL FIXATION RODDING INTRAMEDULLARY TIBIA Left 12/15/2017    Procedure: OPEN REDUCTION INTERNAL FIXATION RODDING INTRAMEDULLARY TIBIA;  Left Knee Arthroscopy, Tibial Derotational Osteotomy with Intermedullary Rodding, Fibular Derotational Osteotmy, Distal Tibial Tubercle Osteotomy;  Surgeon: Dio Olivarez MD;  Location: [facility] OR     REMOVE HARDWARE LOWER EXTREMITY BILATERAL Bilateral 3/29/2019    Procedure: Bilateral Lower Leg Hardware Removal;  Surgeon: Di Schwartz MD;  Location: [facility] OR       ALLERGIES:     Allergies   Allergen Reactions     Egg [Chicken-Derived Products (Egg)] Anaphylaxis     Amoxicillin Hives     Zithromax [Azithromycin] Nausea and Vomiting     Compazine [Prochlorperazine] Other (See Comments)     delirious       FAMILY HISTORY:  Family History   Problem Relation Age of Onset     Osteoporosis  "Mother      Gastrointestinal Disease Mother         gastroparesis     - Premature coronary artery disease  - Atrial fibrillation  - Sudden cardiac death     SOCIAL HISTORY:  Social History     Tobacco Use     Smoking status: Never Smoker     Smokeless tobacco: Never Used   Substance Use Topics     Alcohol use: No     Drug use: No       ROS:   Constitutional: No fever, chills, or sweats. Weight stable.   ENT: No visual disturbance, ear ache, epistaxis, sore throat.   Cardiovascular: As per HPI.   Respiratory: No cough, hemoptysis.    GI: No nausea, vomiting, hematemesis, melena, or hematochezia.   : No hematuria.   Integument: Negative.   Psychiatric: Negative.   Hematologic:  Easy bruising, no easy bleeding.  Neuro: Negative.   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    Exam:  /76 (BP Location: Right arm, Patient Position: Chair, Cuff Size: Adult Regular)   Pulse 90   Ht 1.753 m (5' 9\")   Wt 62.6 kg (138 lb)   SpO2 97%   BMI 20.38 kg/m    GENERAL APPEARANCE: healthy, alert and no distress  HEENT: no icterus, no xanthelasmas, normal pupil size and reaction, normal palate, mucosa moist, no central cyanosis  NECK: no adenopathy, no asymmetry, masses, or scars, thyroid normal to palpation and no bruits, JVP not elevated  RESPIRATORY: lungs clear to auscultation - no rales, rhonchi or wheezes, no use of accessory muscles, no retractions, respirations are unlabored, normal respiratory rate  CARDIOVASCULAR: regular rhythm, normal S1 with physiologic split S2, no S3 or S4 and no murmur, click or rub, precordium quiet with normal PMI.  ABDOMEN: soft, non tender, without hepatosplenomegaly, no masses palpable, bowel sounds normal, aorta not enlarged by palpation, no abdominal bruits  EXTREMITIES: peripheral pulses normal, no edema, no " bruits  NEURO: alert and oriented to person/place/time, normal speech, gait and affect  VASC: pulses are normal in volumes and symmetric bilaterally.  SKIN: no ecchymoses, no rashes    Labs:  CBC RESULTS:   Lab Results   Component Value Date    WBC 5.9 2017    RBC 3.39 (L) 2017    HGB 10.2 (L) 2017    HCT 29.3 (L) 2017    MCV 86 2017    MCH 30.1 2017    MCHC 34.8 2017    RDW 12.3 2017     2017       BMP RESULTS:  Lab Results   Component Value Date     2017    POTASSIUM 3.8 2017    CHLORIDE 104 2017    CO2 29 2017    ANIONGAP 5 2017    GLC 80 2017    BUN 10 2017    CR 0.54 2017    GFRESTIMATED >90 2017    GFRESTBLACK >90 2017    CHANI 8.6 2017        INR RESULTS:  No results found for: INR    Procedures:  PULMONARY FUNCTION TESTS:   No flowsheet data found.      ECHOCARDIOGRAM:   No results found for this or any previous visit (from the past 8760 hour(s)).  No indication for echocardiogram at this time given absence of past history of structural heart disease.    Assessment and Plan: Kaitlynn House is a pleasant 24 year old female with POTS based on history    We will plan the followin.  In the event for planned pregnancy she is to hold ivabradine and midodrine.  We did discuss the potential that her symptoms could become aggravated early in pregnancy but that fluid and salt retention should ultimately allow her to have a successful pregnancy.    2.  In the absence of any significant worsening of her condition we would plan to reassess in 1 years time.  They should feel comfortable calling at earlier date if needed.    I very much appreciated the opportunity to see and assess Kaitlynn in the clinic  today  I agree with the note above which summarizes my findings and current recommendations    Please do not hesitate to contact my office if you have any questions or concerns.       Milo Mendoza MD  Cardiac Arrhythmia Service  [facility]  [phone]    CC  SELF, REFERRED

## 2019-11-21 NOTE — LETTER
11/21/2019      RE: Kaitlynn Mendes  [address]  [place] [address]       Dear Colleague,    Thank you for the opportunity to participate in the care of your patient, Kaitlynn Mendes, at the [facility]. Please see a copy of my visit note below.    HPI: Kaitlynn House is a pleasant 22 year old female with POTS based on history who is here for a follow up.   Since her last clinic visit she is undergone orthopedic surgery without any difficulty.  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  Her only significant complaint is some swelling in the lower extremities which is almost certainly related to pots therapy.  I advocated the use of simple pantyhose rather than teds stockings to try to provide some relief.    Suzan is considering pregnancy and we did discuss the need that she eliminate ivabradine and midodrine from her treatment regimen as soon as pregnancy is identified or even before possible.    Patient is without any current cardiac or respiratory complaints.        PAST MEDICAL HISTORY:  Past Medical History:   Diagnosis Date     Motion sickness      POTS (postural orthostatic tachycardia syndrome)      Restless legs syndrome    Orthopedic surgery that requires revision due to problems with with the right knee and nearby metal prostheses.  Previous lower extremity venous issues requiring stripping.    CURRENT MEDICATIONS:  Current Outpatient Medications   Medication Sig Dispense Refill     acetaminophen (TYLENOL) 325 MG tablet Take 2 tablets (650 mg) by mouth every 4 hours as needed for other (mild pain) 100 tablet 0     AMITRIPTYLINE HCL PO Take 20 mg by mouth nightly as needed for sleep       gabapentin (NEURONTIN) 300 MG capsule Take 1 capsule (300 mg) by mouth daily 15 capsule 1     gabapentin (NEURONTIN) 600 MG tablet TK 1 T PO BID  1     ivabradine (CORLANOR) 5 MG  tablet Take 1 tablet (5 mg) by mouth 2 times daily (with meals) 180 tablet 1     midodrine (PROAMATINE) 5 MG tablet Take midodrine 10 mg at 8 am. Take 10mg at noon and take 5 mg at 4pm. 450 tablet 3     Naproxen Sodium (ALEVE PO) Take 2 tablets by mouth every 12 hours as needed for moderate pain       norelgestromin-ethinyl estradiol (XULANE) 150-35 MCG/24HR patch Place 1 patch onto the skin once a week Remove old patch and apply new patch onto the skin once a week for 3 weeks (21 days). Do not wear patch week 4 (days 22-28), then repeat.       propranolol (INDERAL) 20 MG tablet Take 1 tablet (20 mg) by mouth daily as needed (30-45 minutes prior to presentations) 30 tablet 2     oxyCODONE (ROXICODONE) 5 MG tablet Take 1-2 tablets (5-10 mg) by mouth every 4 hours as needed for moderate to severe pain (Patient not taking: Reported on 11/21/2019) 16 tablet 0     senna-docusate (SENOKOT-S/PERICOLACE) 8.6-50 MG tablet Take 1-2 tablets by mouth 2 times daily (Patient not taking: Reported on 11/21/2019) 30 tablet 0       PAST SURGICAL HISTORY:  Past Surgical History:   Procedure Laterality Date     ARTHROSCOPY KNEE Right 3/29/2019    Procedure: Right Knee Arthroscopy;  Surgeon: Di Schwartz MD;  Location:  OR     ARTHROSCOPY KNEE WITH TIBIAL TUBERCLE SHIFT Left 12/15/2017    Procedure: ARTHROSCOPY KNEE WITH TIBIAL TUBERCLE SHIFT;;  Surgeon: Di Schwartz MD;  [facility]     OPEN REDUCTION INTERNAL FIXATION RODDING INTRAMEDULLARY TIBIA Left 12/15/2017    Procedure: OPEN REDUCTION INTERNAL FIXATION RODDING INTRAMEDULLARY TIBIA;  Left Knee Arthroscopy, Tibial Derotational Osteotomy with Intermedullary Rodding, Fibular Derotational Osteotmy, Distal Tibial Tubercle Osteotomy;  Surgeon: Dio Olivarez MD;  [facility]     REMOVE HARDWARE LOWER EXTREMITY BILATERAL Bilateral 3/29/2019    Procedure: Bilateral Lower Leg Hardware Removal;  Surgeon: Di Schwartz MD;  Location:  OR  "      ALLERGIES:     Allergies   Allergen Reactions     Egg [Chicken-Derived Products (Egg)] Anaphylaxis     Amoxicillin Hives     Zithromax [Azithromycin] Nausea and Vomiting     Compazine [Prochlorperazine] Other (See Comments)     delirious       FAMILY HISTORY:  Family History   Problem Relation Age of Onset     Osteoporosis Mother      Gastrointestinal Disease Mother         gastroparesis     - Premature coronary artery disease  - Atrial fibrillation  - Sudden cardiac death     SOCIAL HISTORY:  Social History     Tobacco Use     Smoking status: Never Smoker     Smokeless tobacco: Never Used   Substance Use Topics     Alcohol use: No     Drug use: No       ROS:   Constitutional: No fever, chills, or sweats. Weight stable.   ENT: No visual disturbance, ear ache, epistaxis, sore throat.   Cardiovascular: As per HPI.   Respiratory: No cough, hemoptysis.    GI: No nausea, vomiting, hematemesis, melena, or hematochezia.   : No hematuria.   Integument: Negative.   Psychiatric: Negative.   Hematologic:  Easy bruising, no easy bleeding.  Neuro: Negative.   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    Exam:  /76 (BP Location: Right arm, Patient Position: Chair, Cuff Size: Adult Regular)   Pulse 90   Ht 1.753 m (5' 9\")   Wt 62.6 kg (138 lb)   SpO2 97%   BMI 20.38 kg/m     GENERAL APPEARANCE: healthy, alert and no distress  HEENT: no icterus, no xanthelasmas, normal pupil size and reaction, normal palate, mucosa moist, no central cyanosis  NECK: no adenopathy, no asymmetry, masses, or scars, thyroid normal to palpation and no bruits, JVP not elevated  RESPIRATORY: lungs clear to auscultation - no rales, rhonchi or wheezes, no use of accessory muscles, no retractions, respirations are unlabored, normal respiratory rate  CARDIOVASCULAR: " regular rhythm, normal S1 with physiologic split S2, no S3 or S4 and no murmur, click or rub, precordium quiet with normal PMI.  ABDOMEN: soft, non tender, without hepatosplenomegaly, no masses palpable, bowel sounds normal, aorta not enlarged by palpation, no abdominal bruits  EXTREMITIES: peripheral pulses normal, no edema, no bruits  NEURO: alert and oriented to person/place/time, normal speech, gait and affect  VASC: pulses are normal in volumes and symmetric bilaterally.  SKIN: no ecchymoses, no rashes    Labs:  CBC RESULTS:   Lab Results   Component Value Date    WBC 5.9 2017    RBC 3.39 (L) 2017    HGB 10.2 (L) 2017    HCT 29.3 (L) 2017    MCV 86 2017    MCH 30.1 2017    MCHC 34.8 2017    RDW 12.3 2017     2017       BMP RESULTS:  Lab Results   Component Value Date     2017    POTASSIUM 3.8 2017    CHLORIDE 104 2017    CO2 29 2017    ANIONGAP 5 2017    GLC 80 2017    BUN 10 2017    CR 0.54 2017    GFRESTIMATED >90 2017    GFRESTBLACK >90 2017    CHANI 8.6 2017        INR RESULTS:  No results found for: INR    Procedures:  PULMONARY FUNCTION TESTS:   No flowsheet data found.      ECHOCARDIOGRAM:   No results found for this or any previous visit (from the past 8760 hour(s)).  No indication for echocardiogram at this time given absence of past history of structural heart disease.    Assessment and Plan: Kaitlynn House is a pleasant 24 year old female with POTS based on history    We will plan the followin.  In the event for planned pregnancy she is to hold ivabradine and midodrine.  We did discuss the potential that her symptoms could become aggravated early in pregnancy but that fluid and salt retention should ultimately allow her to have a successful pregnancy.    2.  In the absence of any significant worsening of her condition we would plan to reassess in 1 years time.   They should feel comfortable calling at earlier date if needed.    I very much appreciated the opportunity to see and assess Kaitlynn in the clinic  today  I agree with the note above which summarizes my findings and current recommendations    Please do not hesitate to contact my office if you have any questions or concerns.      Milo Mendoza MD  Cardiac Arrhythmia Service  [facility]  [phone]    CC  SELF, REFERRED

## 2019-11-22 LAB
ANA SER QL IF: NEGATIVE
DSDNA AB SER-ACNC: 1 IU/ML
ENA SS-A IGG SER IA-ACNC: <0.2 AI (ref 0–0.9)
ENA SS-B IGG SER IA-ACNC: <0.2 AI (ref 0–0.9)
FACT VIII ACT/NOR PPP: 108 % (ref 55–200)
GLIADIN IGA SER-ACNC: 1 U/ML
GLIADIN IGG SER-ACNC: 1 U/ML
IGE SERPL-ACNC: 27 KIU/L (ref 0–114)
RHEUMATOID FACT SER NEPH-ACNC: <20 IU/ML (ref 0–20)
THYROGLOB AB SERPL IA-ACNC: <20 IU/ML (ref 0–40)
THYROPEROXIDASE AB SERPL-ACNC: 16 IU/ML
TRYPTASE SERPL-MCNC: 4.1 UG/L
VWF CBA/VWF AG PPP IA-RTO: 108 % (ref 50–200)
VWF:AC ACT/NOR PPP IA: 93 % (ref 50–180)

## 2019-11-23 LAB
COLLECT DURATION TIME SPEC: NORMAL H
CREAT UR-MCNC: 305 MG/DL
HISTAMINE 24H UR-MRATE: NORMAL UG/D (ref 0–60)
HISTAMINE UR-SCNC: 597 NMOL/L
HISTAMINE/CREAT 24H UR-SRTO: 196 NMOL/G CRT (ref 0–450)
SPECIMEN VOL ?TM UR: NORMAL ML

## 2019-11-24 LAB — CGA SERPL-MCNC: 49 NG/ML (ref 0–160)

## 2019-11-25 LAB
B BURGDOR DNA SPEC QL NAA+PROBE: NOT DETECTED
B LOCUS: NORMAL
B27TEST METHOD: NORMAL
HISTAMINE BLD-SCNC: 951 NMOL/L (ref 180–1800)
SHBG SERPL-SCNC: 117 NMOL/L (ref 30–135)
SPECIMEN SOURCE: NORMAL
TESTOST FREE SERPL-MCNC: 0.12 NG/DL (ref 0.08–0.74)
TESTOST SERPL-MCNC: 21 NG/DL (ref 8–60)
URTICARIA INDUCED BASOPHIL ACTIVATION: 7 %

## 2019-11-27 LAB — LEUKOTRIENE E4 URINE: 38 PG/MG CR

## 2019-11-29 LAB — ANTI IGE ANTIBODY: ABNORMAL

## 2019-11-30 LAB — 2,3 11B PROSTAGLANDIN F2A UR: 1318 PG/MG CR

## 2019-12-04 LAB
COLLECT DURATION TIME UR: NORMAL H
CREAT UR-MCNC: 356 MG/DL
ME-HISTAMINE/CREAT 24H UR: 81 MCG/G CR (ref 30–200)
SPECIMEN VOL 24H UR: NORMAL ML

## 2019-12-14 LAB
PROSTAGLANDIN D2 SERPL-MCNC: NORMAL PG/ML
PROSTAGLANDIN D2 URINE: NORMAL

## 2019-12-16 DIAGNOSIS — Z98.890 STATUS POST OSTEOTOMY: Primary | ICD-10-CM

## 2019-12-16 DIAGNOSIS — G90.A POTS (POSTURAL ORTHOSTATIC TACHYCARDIA SYNDROME): ICD-10-CM

## 2019-12-17 ENCOUNTER — ANCILLARY PROCEDURE (OUTPATIENT)
Dept: GENERAL RADIOLOGY | Facility: CLINIC | Age: 24
End: 2019-12-17
Attending: ORTHOPAEDIC SURGERY
Payer: COMMERCIAL

## 2019-12-17 ENCOUNTER — OFFICE VISIT (OUTPATIENT)
Dept: ORTHOPEDICS | Facility: CLINIC | Age: 24
End: 2019-12-17
Payer: COMMERCIAL

## 2019-12-17 DIAGNOSIS — T84.84XA PAINFUL ORTHOPAEDIC HARDWARE (H): ICD-10-CM

## 2019-12-17 DIAGNOSIS — M25.562 ARTHRALGIA OF BOTH KNEES: Primary | ICD-10-CM

## 2019-12-17 DIAGNOSIS — M25.561 ARTHRALGIA OF BOTH KNEES: Primary | ICD-10-CM

## 2019-12-17 DIAGNOSIS — Z98.890 STATUS POST OSTEOTOMY: ICD-10-CM

## 2019-12-17 NOTE — NURSING NOTE
Reason For Visit:   Chief Complaint   Patient presents with     RECHECK     bilateral ankle pain, swelling and discoloration for the last few months       There were no vitals taken for this visit.    Pain Assessment  Patient Currently in Pain: Yes  0-10 Pain Scale: 2  Primary Pain Location: Ankle    Ronda Ahuja ATC

## 2019-12-17 NOTE — PROGRESS NOTES
CHIEF COMPLAINT:     1.  Status post right tibia and fibula derotational osteotomy performed on 11/01/2017.   2.  Status post left tibia and fibula derotational osteotomy with tibial tubercle osteotomy performed 12/15/2017.       HISTORY OF PRESENT ILLNESS:  Ms. Mendes presents today for further followup.  Reports to be doing well.  Reports to have some discomfort along the distal half of both of her tibias.  She also reports to be struggling with some swelling.  The patient has not had any treatment for this.  The patient has not used her compression stockings since prior to the surgery.      The patient reports to have very mild anterior knee pain on the right, but none on the left.      PHYSICAL EXAMINATION:  On today's visit, she presents with absence of any significant edema to bilateral lower legs.  Presents with full range of motion of the knees and the ankles with an intact neurovascular exam.      RADIOGRAPHIC EVALUATION:  Plain x-rays of the bilateral tibia and fibula were obtained today which were significant for showing healed osteotomies with a jace without locking screws on the right and a jace with proximal locking screws on the tibial tubercle screws on the left.      ASSESSMENT:     1.  Status post bilateral tibia and fibula derotational osteotomies.   2.  Painful retained hardware.      PLAN:  I discussed with the patient that it is reasonable to proceed with removal of the nails from her lower legs.  However, I think we should start with the right one because it would be easier to do and because I would discourage her from doing both legs in the same surgical setting.      I discussed with her the most likely postoperative course and complications from undergoing hardware removal from the right tibia.  Complications include but are not limited to infection, bleeding, nerve damage, residual pain, stiffness and fracture.      All questions were answered.  The patient was pleased with the discussion.   The patient will schedule surgery at the best of her convenience, which will consist of a right tibia hardware removal.      TT: 15 minutes.  CT: 10 minutes.

## 2019-12-17 NOTE — LETTER
12/17/2019       RE: Kaitlynn Mendes  [address]  [place] [address]     Dear Colleague,    Thank you for referring your patient, Kaitlynn Mendes, to the [facility] at [facility]. Please see a copy of my visit note below.    CHIEF COMPLAINT:     1.  Status post right tibia and fibula derotational osteotomy performed on 11/01/2017.   2.  Status post left tibia and fibula derotational osteotomy with tibial tubercle osteotomy performed 12/15/2017.       HISTORY OF PRESENT ILLNESS:  Ms. Mendes presents today for further followup.  Reports to be doing well.  Reports to have some discomfort along the distal half of both of her tibias.  She also reports to be struggling with some swelling.  The patient has not had any treatment for this.  The patient has not used her compression stockings since prior to the surgery.      The patient reports to have very mild anterior knee pain on the right, but none on the left.      PHYSICAL EXAMINATION:  On today's visit, she presents with absence of any significant edema to bilateral lower legs.  Presents with full range of motion of the knees and the ankles with an intact neurovascular exam.      RADIOGRAPHIC EVALUATION:  Plain x-rays of the bilateral tibia and fibula were obtained today which were significant for showing healed osteotomies with a jace without locking screws on the right and a jace with proximal locking screws on the tibial tubercle screws on the left.      ASSESSMENT:     1.  Status post bilateral tibia and fibula derotational osteotomies.   2.  Painful retained hardware.      PLAN:  I discussed with the patient that it is reasonable to proceed with removal of the nails from her lower legs.  However, I think we should start with the right one because it would be easier to do and because I would discourage her from doing both legs in the same surgical setting.      I discussed with her the most likely  postoperative course and complications from undergoing hardware removal from the right tibia.  Complications include but are not limited to infection, bleeding, nerve damage, residual pain, stiffness and fracture.      All questions were answered.  The patient was pleased with the discussion.  The patient will schedule surgery at the best of her convenience, which will consist of a right tibia hardware removal.      TT: 15 minutes.  CT: 10 minutes.       Dio Olivarez MD

## 2019-12-17 NOTE — NURSING NOTE
Teaching Flowsheet   Relevant Diagnosis: Painful hardware right leg  Teaching Topic: preop Right leg hardware removal    Kaitlynn lives in [place] with spouse, planning surgery 1/29/20 at [facility]     Person(s) involved in teaching:   Patient and      Motivation Level:  Asks Questions: Yes  Eager to Learn: Yes  Cooperative: Yes  Receptive (willing/able to accept information): Yes  Any cultural factors/Jehovah's witness beliefs that may influence understanding or compliance? No  Comments:      Patient and Family demonstrates understanding of the following:  Reason for the appointment, diagnosis and treatment plan: Yes  Knowledge of proper use of medications and conditions for which they are ordered (with special attention to potential side effects or drug interactions): Yes  Which situations necessitate calling provider and whom to contact: Yes    Teaching Concerns Addressed:   Comments:      Proper use and care of crutches (medical equip, care aids, etc.): Yes  Nutritional needs and diet plan: Yes  Pain management techniques: Yes  Wound Care: Yes  How and/when to access community resources: NA     Instructional Materials Used/Given: preop TRIA pkt, antiseptic soap     Time spent with patient: 15 minutes.

## 2019-12-18 RX ORDER — IVABRADINE 5 MG/1
5 TABLET, FILM COATED ORAL 2 TIMES DAILY WITH MEALS
Qty: 180 TABLET | Refills: 3 | Status: SHIPPED | OUTPATIENT
Start: 2019-12-18 | End: 2020-12-13

## 2019-12-18 NOTE — TELEPHONE ENCOUNTER
[employer] CORLANOR 5 MG TAB (Bottle, 60.0 Tablets) Take 1 tablet (5 mg) by mouth 2 times daily (with meals)  Last Written Prescription Date:  6/11/2019  Last Fill Quantity: 180,   # refills: 1  Last Office Visit : 11/21/2019  Future Office visit:  None/ rec. One year    Routing refill request to provider for review/approval because:   AMGEN not on the CARDS [facility] or [facility] refill protocol

## 2019-12-25 ENCOUNTER — MYC MEDICAL ADVICE (OUTPATIENT)
Dept: CARDIOLOGY | Facility: CLINIC | Age: 24
End: 2019-12-25

## 2020-01-31 DIAGNOSIS — T84.84XA PAINFUL ORTHOPAEDIC HARDWARE (H): Primary | ICD-10-CM

## 2020-01-31 NOTE — PROGRESS NOTES
Reason for visit:    Kaitlynn Mendes came in to the clinic for a two week post op check.    Her surgery was done 1/29/20 by Dr Olivarez.  She had right lower leg hardware removal     Assessment:    Kaitlynn came into the clinic in  WBAT    The Surgical wounds were exposed and found to be well-healed; so the sutures were removed.     Plan:     She was told to remain WBAT. An order for physical therapy was given for ROM of the right knee and strengthening program. She was also discouraged from deep squating     She has an appointment to see Dr. Olivarez but has the possibility of skipping this appointment assuming that the knee is working up to her expectations.    She has our phone number and will call with questions or problems.      Ronda Ahuja, ATC

## 2020-02-12 ENCOUNTER — OFFICE VISIT (OUTPATIENT)
Dept: ORTHOPEDICS | Facility: CLINIC | Age: 25
End: 2020-02-12
Payer: COMMERCIAL

## 2020-02-12 DIAGNOSIS — Z48.02 VISIT FOR SUTURE REMOVAL: Primary | ICD-10-CM

## 2020-03-31 ENCOUNTER — VIRTUAL VISIT (OUTPATIENT)
Dept: FAMILY MEDICINE | Facility: OTHER | Age: 25
End: 2020-03-31

## 2020-03-31 NOTE — PROGRESS NOTES
"Date: 2020 11:12:01  Clinician: Diana Espana  Clinician NPI: [id number]  Patient: Kaitlynn Mendes  Patient : 1995  Patient Address: [address] [place] [address]  Patient Phone: [phone]  Visit Protocol: URI  Patient Summary:  Kaitlynn is a 25 year old ( : 1995 ) female who initiated a Visit for COVID-19 (Coronavirus) evaluation and screening. When asked the question \"Please sign me up to receive news, health information and promotions from [employer].\", Kaitlynn responded \"No\".    Kaitlynn states her symptoms started gradually 3-6 days ago.   Her symptoms consist of a headache, myalgia, chills, wheezing, a sore throat, a cough, and malaise. Kaitlynn also feels feverish.   Symptom details     Cough: Kaitlynn coughs every 5-10 minutes and her cough is more bothersome at night. Phlegm comes into her throat when she coughs. She does not believe her cough is caused by post-nasal drip. The color of the phlegm is yellow, clear, and green.     Sore throat: Kaitlynn reports having mild throat pain (1-3 on a 10 point pain scale), does not have exudate on her tonsils, and can swallow liquids. The lymph nodes in her neck are not enlarged. A rash has not appeared on the skin since the sore throat started.     Temperature: Her current temperature is 101.8 degrees Fahrenheit. Kaitlynn has had a temperature over 100 degrees Fahrenheit for 1-2 days.     Wheezing: Kaitlynn has been diagnosed with asthma. The wheezing does not interfere with her normal daily activities.    Headache: She states the headache is moderate (4-6 on a 10 point pain scale).      Kaitlynn denies having rhinitis, teeth pain, facial pain or pressure, nasal congestion, enlarged lymph nodes, and ear pain. She also denies taking antibiotic medication for the symptoms, having recent facial or sinus surgery in the past 60 days, and double sickening (worsening symptoms after initial improvement).   Precipitating events  Kaitlynn is not sure if she has " been exposed to someone with strep throat. She has not recently been exposed to someone with influenza. Kaitlynn has been in close contact with the following high risk individuals: children under the age of 5, people with asthma, heart disease or diabetes, pregnant women, adults 65 or older, and immunocompromised people.   Pertinent COVID-19 (Coronavirus) information  Kaitlynn has not traveled internationally or to the areas where COVID-19 (Coronavirus) is widespread, including cruise ship travel in the last 14 days before the start of her symptoms.   Kaitlynn has not had a close contact with a laboratory-confirmed COVID-19 patient within 14 days of symptom onset. She has had a close contact with a suspected COVID-19 patient within 14 days of symptom onset. 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 We have had a lot of possible patients with Covid.   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 She provides direct patient care. She lives with a healthcare worker.   Triage Point(s) temporarily suspended for COVID-19 (Coronavirus) screening  Kaitlynn reported the following symptoms which were previously protocol referral points. These protocol referral points have temporarily been removed for purposes of COVID-19 (Coronavirus) screening.   Difficulty breathing even when resting and can only speak in phrase(s)   Pertinent medical history  Kaitlynn had 1 sinus infection within the past year.   Kaitlynn does not get yeast infections when she takes antibiotics.   Kaitlynn needs a return to work/school note.   Weight: 130 lbs   Kaitlynn does not smoke or use smokeless tobacco.   She denies pregnancy and denies breastfeeding. She has menstruated in the past month.   Weight: 130 lbs    MEDICATIONS: gabapentin oral,  propranolol-hydrochlorothiazide oral, midodrine oral, ivabradine oral, ALLERGIES: amoxicillin, azithromycin, egg  Clinician Response:  Dear Kaitlynn,    Based on the information you have provided, you do have symptoms that are consistent with Coronavirus (COVID-19).  The coronavirus causes mild to severe respiratory illness with the most common symptoms including fever, cough and difficulty breathing. Unfortunately, many viruses cause similar symptoms and it can be difficult to distinguish between viruses, especially in mild cases, so we are presuming that anyone with cough or fever has coronavirus at this time.  Coronavirus/COVID-19 has reached the point of community spread in [place], meaning that we are finding the virus in people with no known exposure risk for kortney the virus. Given the increasing commonness of coronavirus in the community we are no longer testing patients who are not critically ill.  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  For everyone else who has cough or fever, you should assume you are infected with coronavirus. Since you will not be tested but have symptoms that may be consistent with coronavirus, the CDC recommends you stay in self-isolation until these three things have happened:    You have had no fever for at least 72 hours (that is three full days of no fever without the use of medicine that reduces fevers)    AND   Other symptoms have improved (for example, when your cough or shortness of breath have improved)   AND   At least 7 days have passed since your symptoms first appeared.   How to Isolate:   Isolate yourself at home.  Do Not allow any visitors  Do Not go to work or school  Do Not go to [employer],  centers, shopping, or other public places.  Do Not shake hands.  Avoid close contact with others (hugging, kissing).   Protect Others:   Cover Your Mouth and Nose with a  mask, disposable tissue or wash cloth to avoid spreading germs to others.  Wash your hands and face frequently with soap and water.   We know it can be scary to hear that you might have COVID-19. Our team can help track your symptoms and make sure you are doing ok over the next two weeks using a program called [employer] to keep in touch. When you receive an email from [employer], please consider enrolling in our monitoring program. There is no cost to you for monitoring. Here is a URL where you can learn more: [email or web address]  Managing Symptoms:   At this time, we primarily recommend Tylenol (Acetaminophen) for fever or pain. If you have liver or kidney problems, contact your primary care provider for instructions on use of tylenol. Adults can take 650 mg (two 325 mg pills) by mouth every 4-6 hours as needed OR 1,000 mg (two 500 mg pills) every 8 hours as needed. MAXIMUM DAILY DOSE: 3,000mg. For children, refer to dosing on bottle based on age or weight.   If you develop significant shortness of breath that prevents you from doing normal activities, please call 911 or proceed to the nearest emergency room and alert them immediately that you have been in self-isolation for possible coronavirus.  If you have a higher risk medical condition such as cancer, heart failure, end stage renal disease on dialysis or have a transplant, please reach out to your specialist's clinic to advise them of your [facility] visit should you not improve within the next two days.   For more information about COVID19 and options for caring for yourself at home, please visit the CDC website at https://www.cdc.gov/coronavirus/2019-ncov/about/steps-when-sick.htmlFor more options for care at [facility], please visit our website at [email or web address]        Diagnosis: Cough  Diagnosis ICD: R05  Addendum created: March 31 18:21:11, 2020 created by: Preet Price body: I reviewed the e-visit  and discussed the patient with the resident and agree with the resident's assessment and plan of care as documented.

## 2020-10-07 DIAGNOSIS — M79.605 LEFT LEG PAIN: Primary | ICD-10-CM

## 2020-11-10 ENCOUNTER — OFFICE VISIT (OUTPATIENT)
Dept: ORTHOPEDICS | Facility: CLINIC | Age: 25
End: 2020-11-10
Payer: COMMERCIAL

## 2020-11-10 ENCOUNTER — ANCILLARY PROCEDURE (OUTPATIENT)
Dept: GENERAL RADIOLOGY | Facility: CLINIC | Age: 25
End: 2020-11-10
Attending: ORTHOPAEDIC SURGERY
Payer: COMMERCIAL

## 2020-11-10 DIAGNOSIS — M79.605 LEFT LEG PAIN: ICD-10-CM

## 2020-11-10 DIAGNOSIS — M25.572 PAIN IN JOINT, ANKLE AND FOOT, LEFT: Primary | ICD-10-CM

## 2020-11-10 PROCEDURE — 99213 OFFICE O/P EST LOW 20 MIN: CPT | Performed by: ORTHOPAEDIC SURGERY

## 2020-11-10 PROCEDURE — 73590 X-RAY EXAM OF LOWER LEG: CPT | Mod: LT | Performed by: RADIOLOGY

## 2020-11-10 NOTE — LETTER
11/10/2020         RE: Kaitlynn Mendes  [address]  [place] [address]        Dear Colleague,    Thank you for referring your patient, Kaitlynn Mendes, to the [facility] [place]. Please see a copy of my visit note below.    CHIEF COMPLAINT:  Status post left tibia and fibula derotational osteotomy with tibial tubercle osteotomy performed 12/15/2017.  Status post right lower leg hardware removal performed on 03/29/2020.      HISTORY OF PRESENT ILLNESS:  Ms. Mendes presents today for further followup.  She reports to be doing extremely well with regards to the right lower leg.  Denies to have any problems.  Reports to have what she describes as a normal leg.      However, in the left leg, she reports to have some instability of the patella as she seems to need to relocate the patella when she is in bed.  She recently came back from [place] trip [place] the mountains and reports to have been doing okay with the left lower leg, but when she does prolonged activities, which could be as simple as cleaning her house, she reports to have some achiness through the mid shaft distal into the ankle area.      The patient presents today with an interest in undergoing hardware removal from the left tibia.      PHYSICAL EXAMINATION:  On today's exam, she presents with full range of motion of the left ankle, hindfoot and midfoot joints.  Presents with full range of motion of the left knee.  There is no effusion in the joint.  Otherwise, exam is fairly unremarkable for the lower leg.      ASSESSMENT:  Painful retained hardware, left tibia.      PLAN:  I discussed with the patient that at this point I think it is reasonable to undergo a left tibia intramedullary nail removal.  This will include also the proximal locking screw and most likely we will be able to leave the tibial tubercle screws in place.      I discussed with her the most likely postoperative course and complications from  undergoing such intervention, which include but are not limited to infection, bleeding, nerve damage, residual pain and stiffness.      I would like to wait to schedule the procedure until she has had an evaluation by Dr. Schwartz just in case we need to combine a procedure by doing some sort of stabilization procedure to the patella at the time we remove the nail.      All questions were answered.  The patient was pleased with the discussion.  The patient will follow up accordingly.  In the meantime, she has no activity restrictions.      TT 15 minutes, CT 10 minutes.     Dio Olivarez MD

## 2020-11-10 NOTE — PROGRESS NOTES
CHIEF COMPLAINT:  Status post left tibia and fibula derotational osteotomy with tibial tubercle osteotomy performed 12/15/2017.  Status post right lower leg hardware removal performed on 03/29/2020.      HISTORY OF PRESENT ILLNESS:  Ms. Mendes presents today for further followup.  She reports to be doing extremely well with regards to the right lower leg.  Denies to have any problems.  Reports to have what she describes as a normal leg.      However, in the left leg, she reports to have some instability of the patella as she seems to need to relocate the patella when she is in bed.  She [place] and reports to have been doing okay with the left lower leg, but when she does prolonged activities, which could be as simple as cleaning her house, she reports to have some achiness through the mid shaft distal into the ankle area.      The patient presents today with an interest in undergoing hardware removal from the left tibia.      PHYSICAL EXAMINATION:  On today's exam, she presents with full range of motion of the left ankle, hindfoot and midfoot joints.  Presents with full range of motion of the left knee.  There is no effusion in the joint.  Otherwise, exam is fairly unremarkable for the lower leg.      ASSESSMENT:  Painful retained hardware, left tibia.      PLAN:  I discussed with the patient that at this point I think it is reasonable to undergo a left tibia intramedullary nail removal.  This will include also the proximal locking screw and most likely we will be able to leave the tibial tubercle screws in place.      I discussed with her the most likely postoperative course and complications from undergoing such intervention, which include but are not limited to infection, bleeding, nerve damage, residual pain and stiffness.      I would like to wait to schedule the procedure until she has had an evaluation by Dr. Schwartz just in case we need to combine a procedure  by doing some sort of stabilization procedure to the patella at the time we remove the nail.      All questions were answered.  The patient was pleased with the discussion.  The patient will follow up accordingly.  In the meantime, she has no activity restrictions.      TT 15 minutes, CT 10 minutes.

## 2020-11-10 NOTE — NURSING NOTE
Reason For Visit:   Chief Complaint   Patient presents with     RECHECK     Left lower leg pain S/p left tibia and fibula derotational osteotomy with tibial tubercle osteotomy DOS: 12/15/17 and lower leg HW removal DOS: 3/29/20       There were no vitals taken for this visit.    Pain Assessment  Patient Currently in Pain: Yes  0-10 Pain Scale: 3  Primary Pain Location: Leg    Ronda Ahuja, ATC

## 2020-11-17 DIAGNOSIS — M25.362 PATELLAR INSTABILITY OF LEFT KNEE: Primary | ICD-10-CM

## 2020-11-17 NOTE — PROGRESS NOTES
Patient notified of placement of PT order for tape technique with [facility] or [facility]. [facility]LS[facility] will reach out to schedule her; however, patient will call in a couple of days if she has not heard from them.

## 2020-11-25 ENCOUNTER — OFFICE VISIT (OUTPATIENT)
Dept: UROLOGY | Facility: CLINIC | Age: 25
End: 2020-11-25
Payer: COMMERCIAL

## 2020-11-25 VITALS
BODY MASS INDEX: 19.34 KG/M2 | DIASTOLIC BLOOD PRESSURE: 86 MMHG | SYSTOLIC BLOOD PRESSURE: 139 MMHG | HEIGHT: 69 IN | HEART RATE: 98 BPM | WEIGHT: 130.6 LBS

## 2020-11-25 DIAGNOSIS — R10.2 PELVIC PAIN IN FEMALE: ICD-10-CM

## 2020-11-25 DIAGNOSIS — R39.82 CHRONIC BLADDER PAIN: ICD-10-CM

## 2020-11-25 DIAGNOSIS — N39.46 MIXED INCONTINENCE: Primary | ICD-10-CM

## 2020-11-25 LAB
ALBUMIN UR-MCNC: NEGATIVE MG/DL
APPEARANCE UR: CLEAR
BILIRUB UR QL STRIP: NEGATIVE
COLOR UR AUTO: YELLOW
GLUCOSE UR STRIP-MCNC: NEGATIVE MG/DL
HGB UR QL STRIP: ABNORMAL
KETONES UR STRIP-MCNC: NEGATIVE MG/DL
LEUKOCYTE ESTERASE UR QL STRIP: NEGATIVE
NITRATE UR QL: NEGATIVE
PH UR STRIP: 5.5 PH (ref 5–7)
SP GR UR STRIP: <1.005 (ref 1–1.03)
UROBILINOGEN UR STRIP-ACNC: 0.2 EU/DL (ref 0.2–1)

## 2020-11-25 PROCEDURE — 81003 URINALYSIS AUTO W/O SCOPE: CPT

## 2020-11-25 PROCEDURE — G0463 HOSPITAL OUTPT CLINIC VISIT: HCPCS

## 2020-11-25 PROCEDURE — 87086 URINE CULTURE/COLONY COUNT: CPT | Performed by: OBSTETRICS & GYNECOLOGY

## 2020-11-25 PROCEDURE — 99204 OFFICE O/P NEW MOD 45 MIN: CPT | Performed by: OBSTETRICS & GYNECOLOGY

## 2020-11-25 ASSESSMENT — MIFFLIN-ST. JEOR: SCORE: 1402.03

## 2020-11-25 ASSESSMENT — PAIN SCALES - GENERAL: PAINLEVEL: SEVERE PAIN (6)

## 2020-11-25 NOTE — PROGRESS NOTES
November 25, 2020    Referring Provider: Nirav Alicia MD  [address] [place] [address]    Primary Care Provider: No Ref-Primary, Physician    CC: painful bladder    HPI:  Kaitlynn Mendes is a 25 year old female who presents for evaluation of her pelvic floor symptoms.  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 Steven Villasenor. Pt had a positive potassium challenge test . She underwent bladder instillations with Elmiron that improved her condition. She stopped the bladder instillations when she became pregnant earlier this year. She had a SAB at 9 weeks. She did not resume the bladder instillations, and has noted that her symptoms have gradually returned to their pre-treatment state. She will void up 20/day on her worst days. C/O increasing pain as her bladder fills that is temporarily relieved with voiding. She is on and IC/PBS diet with minimal improvement in her symptoms. She has not tried Elmiron orally. She has not tried PFPT.     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    Prolapse:  Do you feel a vaginal bulge? no                                      Pressure? no   Do you have to place your fingers in the vagina or in the rectum to have a bowel movement? no  Impact to quality of life? -     Stress Incontinence:  Do you leak urine with cough, sneeze, exercise? yes  How often do you leak with cough, sneeze, exercise?  weekly  How much do you usually leak? drops   Do you wear a pad? yes If so; drops  Impact to quality of life? moderate    Urge Incontinence:  Do you often get sudden urges to urinate? yes  How often do have urges? daily  If so, do you leak with these urges? yes  How much do you usually leak? drops  Impact to quality of life? moderate    Voids/day:20  Nocturia: 2-3  Fluid intake: 6-8 glasses  Caffeine: 0    Urinating:  Difficulty starting urination or strain to void?  no  Weak or intermittent stream? no  Incomplete emptying or dribbling? yes  Pain or burning with urination? yes  Any blood in your urine? yes    GI:  Constipation? no  Frequency stools 1/week    Straining for stools no  Stool consistency normal     Ever leak stool (Accidental Bowel Leakage)? no      If so, how often?               -      If so, do you leak?                   -      Soiling without sensation? -  History of irritable bowel or Crohn's? -    Sexual/Pain:  Are you currently having sex?. yes  Pain with sex?   Yes, secondary symphyseal pain   Sexual Partner: male  Do any of these symptoms interfere with sex? yes  Impact to quality of life? moderate    Prior therapy:  Ever done pelvic floor physical therapy? yes  Trial of medication? no  Have you ever tried a pessary? no    Medical History:  Do you have?   High Cholesterol? no     Diabetes? no  High Blood pressure? yes     Recurrent UTIs? no  Sleep Apnea? no  Other medical problems: auto immune disorder    Surgical History:    Hysterectomy? no   Bladder Surgery? no   Other? Multiple ortho procedures    OB/Gyn History:  Pregnancies? 0  Deliveries? 0  Vaginal -  Section -  Current birth control? -  Periods? regular  When was the first day of your last period? -  Last Pap smear? - Any abnormal? -  Last mammogram? -  Last colonoscopy? -    Medications/Vitamins/Supplements: reviewed    Drug Allergies: reviewed    Latex Allergy: no  Iodine Allergy no    Family History: (list relationship and age at diagnosis)  Breast cancer? no  Ovarian cancer? no   Colon cancer? no  Other? no    Social History:  Marital status:   Do you/ have you ever smoke(d)  cigarettes? no  Drink more than 1 alcoholic beverage a day?  no  Occupation? -    In the past 3 months have you regularly experienced:  Chest pain w/ walking/exercise? no                   Unusual headaches? no  Leg pain w/ walking/exercise? no                       Easy bruising? no  Difficulty breathing  w/ walking/exercise? no  Problems with vision? no  Dizziness, falls, or fainting? no  Excessive bleeding from cuts, gums, surgery? no  Other: no    Past Medical History:   Diagnosis Date     Carlos-Danlos syndrome      Motion sickness      POTS (postural orthostatic tachycardia syndrome)      Restless legs syndrome      Skeletal dysplasia        Past Surgical History:   Procedure Laterality Date     ARTHROSCOPY KNEE Right 3/29/2019    Procedure: Right Knee Arthroscopy;  Surgeon: Di Schwartz MD;  Location: [facility]     ARTHROSCOPY KNEE WITH TIBIAL TUBERCLE SHIFT Left 12/15/2017    Procedure: ARTHROSCOPY KNEE WITH TIBIAL TUBERCLE SHIFT;;  Surgeon: Di Schwartz MD;  Location: [facility]     OPEN REDUCTION INTERNAL FIXATION RODDING INTRAMEDULLARY TIBIA Left 12/15/2017    Procedure: OPEN REDUCTION INTERNAL FIXATION RODDING INTRAMEDULLARY TIBIA;  Left Knee Arthroscopy, Tibial Derotational Osteotomy with Intermedullary Rodding, Fibular Derotational Osteotmy, Distal Tibial Tubercle Osteotomy;  Surgeon: Dio Olivarez MD;  Location: [facility]     REMOVE HARDWARE LOWER EXTREMITY BILATERAL Bilateral 3/29/2019    Procedure: Bilateral Lower Leg Hardware Removal;  Surgeon: Di Schwartz MD;  Location: [facility]       Social History     Socioeconomic History     Marital status: Single     Spouse name: Not on file     Number of children: Not on file     Years of education: Not on file     Highest education level: Not on file   Occupational History     Not on file   Social Needs     Financial resource strain: Not on file     Food insecurity     Worry: Not on file     Inability: Not on file     Transportation needs     Medical: Not on file     Non-medical: Not on file   Tobacco Use     Smoking status: Never Smoker     Smokeless tobacco: Never Used   Substance and Sexual Activity     Alcohol use: No     Drug use: No     Sexual activity: Yes     Partners: Male   Lifestyle     Physical activity     Days per week: Not on  "file     Minutes per session: Not on file     Stress: Not on file   Relationships     Social connections     Talks on phone: Not on file     Gets together: Not on file     Attends Restorationist service: Not on file     Active member of club or organization: Not on file     Attends meetings of clubs or organizations: Not on file     Relationship status: Not on file     Intimate partner violence     Fear of current or ex partner: Not on file     Emotionally abused: Not on file     Physically abused: Not on file     Forced sexual activity: Not on file   Other Topics Concern     Not on file   Social History Narrative     Not on file       Family History   Problem Relation Age of Onset     Osteoporosis Mother      Gastrointestinal Disease Mother         gastroparesis       ROS    Allergies   Allergen Reactions     Egg [Chicken-Derived Products (Egg)] Anaphylaxis     Amoxicillin Hives     Zithromax [Azithromycin] Nausea and Vomiting     Compazine [Prochlorperazine] Other (See Comments)     delirious       Current Outpatient Medications   Medication     acetaminophen (TYLENOL) 325 MG tablet     AMITRIPTYLINE HCL PO     gabapentin (NEURONTIN) 300 MG capsule     ivabradine (CORLANOR) 5 MG tablet     midodrine (PROAMATINE) 5 MG tablet     Naproxen Sodium (ALEVE PO)     propranolol (INDERAL) 20 MG tablet     senna-docusate (SENOKOT-S/PERICOLACE) 8.6-50 MG tablet     medical cannabis (Patient's own supply)     No current facility-administered medications for this visit.        /86   Pulse 98   Ht 1.753 m (5' 9.02\")   Wt 59.2 kg (130 lb 9.6 oz)   LMP 10/30/2020   BMI 19.28 kg/m   Patient's last menstrual period was 10/30/2020. Body mass index is 19.28 kg/m .  Ms. Mendes is alert, comfortable in no acute distress, non-labored breathing.   Abdomen is soft, non-tender, non-distended, no CVAT.    Normal external female genitalia. The urethra was normal appearing without masses, NT.    She has good support on supine " strain.  Speculum and bimanual exam are remarkable for tender symphysis.      3/5 kegels.      neg SST    Urine dip sm blood    A/P: Kaitlynn Mendes is a 25 year old F with PBS    Will refer to PFPT. Consider SNS for her urinary urgency/frequency    A total of 45 minutes were spent with the patient today, > 50% in counseling and coordination of care    Herman Redman MD  Professor, OB/GYN  Urogynecologist  CC  Patient Care Team:  No Ref-Primary, Physician as PCP - General  Didier Torres MD as MD (Orthopedics)  Di Schwartz MD as MD (Orthopedics)  Cindy Corea MD as MD (Dermatology)  Shama Magana, RN as Specialty Care Coordinator (Clinical Cardiac Electrophysiology)  Milo Mendoza MD as MD (Clinical Cardiac Electrophysiology)  Dio Olivarez MD as Assigned Musculoskeletal Provider  Milo Mendoza MD as Assigned Heart and Vascular Provider  HERMAN REDMAN

## 2020-11-25 NOTE — NURSING NOTE
Patient tested negative for urinary stress incontinence  Voided 550 ml  Bladder scan showed 0 ml residual urine

## 2020-11-25 NOTE — NURSING NOTE
Simple catheter was performed - patient tolerated procedure well  80 ml of urine was obtained.  uc  Showed small amount of blood-  uc sent to lab,.

## 2020-11-26 LAB
BACTERIA SPEC CULT: NORMAL
Lab: NORMAL
SPECIMEN SOURCE: NORMAL

## 2020-11-27 ENCOUNTER — PRE VISIT (OUTPATIENT)
Dept: UROLOGY | Facility: CLINIC | Age: 25
End: 2020-11-27

## 2020-11-27 NOTE — TELEPHONE ENCOUNTER
Reason for Visit: Cystoscopy    Diagnosis: painful bladder    Orders/Procedures/Records: in system    Contact Patient: n/a    Rooming Requirements: UA dip prior to getting ready for cystoscopy. If positive for Leuks and/or Nitrites, will not do cystoscopy. If positive, send urine for official UA / UC.      Shama Umana LPN  11/27/20  10:06 AM

## 2020-12-11 ENCOUNTER — OFFICE VISIT (OUTPATIENT)
Dept: UROLOGY | Facility: CLINIC | Age: 25
End: 2020-12-11
Payer: COMMERCIAL

## 2020-12-11 VITALS
HEART RATE: 95 BPM | HEIGHT: 69 IN | SYSTOLIC BLOOD PRESSURE: 105 MMHG | WEIGHT: 130 LBS | BODY MASS INDEX: 19.26 KG/M2 | DIASTOLIC BLOOD PRESSURE: 79 MMHG

## 2020-12-11 DIAGNOSIS — R39.89 BLADDER PAIN: ICD-10-CM

## 2020-12-11 DIAGNOSIS — R39.82 CHRONIC BLADDER PAIN: Primary | ICD-10-CM

## 2020-12-11 PROCEDURE — 52000 CYSTOURETHROSCOPY: CPT | Performed by: OBSTETRICS & GYNECOLOGY

## 2020-12-11 PROCEDURE — 81003 URINALYSIS AUTO W/O SCOPE: CPT | Mod: 90 | Performed by: PATHOLOGY

## 2020-12-11 RX ORDER — LIDOCAINE HYDROCHLORIDE 20 MG/ML
JELLY TOPICAL ONCE
Status: COMPLETED | OUTPATIENT
Start: 2020-12-11 | End: 2020-12-11

## 2020-12-11 RX ADMIN — LIDOCAINE HYDROCHLORIDE: 20 JELLY TOPICAL at 15:34

## 2020-12-11 ASSESSMENT — PAIN SCALES - GENERAL: PAINLEVEL: MILD PAIN (3)

## 2020-12-11 ASSESSMENT — MIFFLIN-ST. JEOR: SCORE: 1399.06

## 2020-12-11 NOTE — NURSING NOTE
"Chief Complaint   Patient presents with     Cystoscopy     bladder pain       Blood pressure 105/79, pulse 95, height 1.753 m (5' 9\"), weight 59 kg (130 lb), not currently breastfeeding. Body mass index is 19.2 kg/m .    Patient Active Problem List   Diagnosis     Patellar instability     Bilateral knee pain     S/P knee surgery     Edema     Abnormal gait       Allergies   Allergen Reactions     Egg [Chicken-Derived Products (Egg)] Anaphylaxis     Amoxicillin Hives     Zithromax [Azithromycin] Nausea and Vomiting     Compazine [Prochlorperazine] Other (See Comments)     delirious       Current Outpatient Medications   Medication Sig Dispense Refill     acetaminophen (TYLENOL) 325 MG tablet Take 2 tablets (650 mg) by mouth every 4 hours as needed for other (mild pain) 100 tablet 0     AMITRIPTYLINE HCL PO Take 20 mg by mouth nightly as needed for sleep       gabapentin (NEURONTIN) 300 MG capsule Take 1 capsule (300 mg) by mouth daily 15 capsule 1     ivabradine (CORLANOR) 5 MG tablet Take 1 tablet (5 mg) by mouth 2 times daily (with meals) 180 tablet 3     medical cannabis (Patient's own supply) 1 Dose every 24 hours       midodrine (PROAMATINE) 5 MG tablet Take midodrine 10 mg at 8 am. Take 10mg at noon and take 5 mg at 4pm. 450 tablet 3     Naproxen Sodium (ALEVE PO) Take 2 tablets by mouth every 12 hours as needed for moderate pain       propranolol (INDERAL) 20 MG tablet Take 1 tablet (20 mg) by mouth daily as needed (30-45 minutes prior to presentations) 30 tablet 2     senna-docusate (SENOKOT-S/PERICOLACE) 8.6-50 MG tablet Take 1-2 tablets by mouth 2 times daily 30 tablet 0       Social History     Tobacco Use     Smoking status: Never Smoker     Smokeless tobacco: Never Used   Substance Use Topics     Alcohol use: No     Drug use: No       Invasive Procedure Safety Checklist:    Procedure: Cystoscopy    Action: Complete sections and checkboxes as appropriate.    Pre-procedure:  1. Patient ID Verified with 2 " identifiers (Olive and  or MRN) : YES    2. Procedure and site verified with patient/designee (when able) : YES    3. Accurate consent documentation in medical record : YES    4. H&P (or appropriate assessment) documented in medical record : N/A  H&P must be up to 30 days prior to procedure an updated within 24 hours of                 Procedure as applicable.     5. Relevant diagnostic and radiology test results appropriately labeled and displayed as applicable : YES    6. Blood products, implants, devices, and/or special equipment available for the procedure as applicable : YES    7. Procedure site(s) marked with provider initials [Exclusions: none] : NO    8. Marking not required. Reason : Yes  Procedure does not require site marking    Time Out:     Time-Out performed immediately prior to starting procedure, including verbal and active participation of all team members addressing: YES    1. Correct patient identity.  2. Confirmed that the correct side and site are marked.  3. An accurate procedure to be done.  4. Agreement on the procedure to be done.  5. Correct patient position.  6. Relevant images and results are properly labeled and appropriately displayed.  7. The need to administer antibiotics or fluids for irrigation purposes during the procedure as applicable.  8. Safety precautions based on patient history or medication use.    During Procedure: Verification of correct person, site, and procedure occurs any time the responsibility for care of the patient is transferred to another member of the care team.    The following medication was given:     MEDICATION:  Lidocaine without epinephrine 2% jelly  ROUTE: urethral   SITE: urethral   DOSE: 10 mL  LOT #: [id number]  : [employer]  EXPIRATION DATE:   NDC#: [address]-4  Was there drug waste? No    Prior to med admin, verified patient identity using patient's name and date of birth.  Due to med administration,  patient instructed to remain in clinic for 15 minutes  afterwards, and to report any adverse reaction to me immediately.    Drug Amount Wasted:  None.  Vial/Syringe: JAMEEL Bah, EMT  12/11/2020  3:08 PM

## 2020-12-11 NOTE — NURSING NOTE
A user error has taken place: charting done on wrong patient and has been corrected.   erroneous note

## 2020-12-11 NOTE — LETTER
12/11/2020       RE: Kaitlynn Mendes  [address]  [place] [address]     Dear Colleague,    Thank you for referring your patient, Kaitlynn Mendes, to the [facility] [place] at [facility]. Please see a copy of my visit note below.    Reason for Visit:  Cystoscopy    Clinical Data: Ms. Kaitlynn Mendes is a 25 year old female with a hx of painful bladder syndrome    Cystoscopy procedure:  Pt. Was consented and placed in the lithotomy position.  She was cleaned and preparred in the usual fashion.  Lidocain gel was inserted into the urethra and given time to take effect.  A 16 fr flexible cystoscope was then inserted through the urethra and into the bladder.  The urethra was wnl.  The bladder was normal.  No tumors, diverticulae, or stones.  Bilateral u/o's were effluxing clear urine.  The cystoscope was then withdrawn.  The pt. Tolerated the procedure well.    A/P:  25 year old female with painful bladder syndrome    Will set up for bladder instillations    Thank you for allowing me to participate in the care of  Ms. Kaitlynn Mendes and I will keep you updated on her progress.    Nirav Alicia MD

## 2020-12-11 NOTE — PROGRESS NOTES
Reason for Visit:  Cystoscopy    Clinical Data: Ms. Kaitlynn Mendes is a 25 year old female with a hx of painful bladder syndrome    Cystoscopy procedure:  Pt. Was consented and placed in the lithotomy position.  She was cleaned and preparred in the usual fashion.  Lidocain gel was inserted into the urethra and given time to take effect.  A 16 fr flexible cystoscope was then inserted through the urethra and into the bladder.  The urethra was wnl.  The bladder mucosa appeared to have multiple diffuse erythematous patches consistent with glomerulations and Hunner's ulcers.   No tumors, diverticulae, or stones.  Bilateral u/o's were effluxing clear urine.  The cystoscope was then withdrawn.  The pt. Tolerated the procedure well.    A/P:  25 year old female with painful bladder syndrome    Will set up for bladder instillations    Thank you for allowing me to participate in the care of  Ms. Kaitlynn Mendes and I will keep you updated on her progress.    Nirav Alicia MD

## 2020-12-15 ENCOUNTER — THERAPY VISIT (OUTPATIENT)
Dept: PHYSICAL THERAPY | Facility: CLINIC | Age: 25
End: 2020-12-15
Attending: ORTHOPAEDIC SURGERY
Payer: COMMERCIAL

## 2020-12-15 DIAGNOSIS — M25.362 PATELLAR INSTABILITY OF LEFT KNEE: ICD-10-CM

## 2020-12-15 PROBLEM — Z98.890 S/P KNEE SURGERY: Status: RESOLVED | Noted: 2017-12-21 | Resolved: 2020-12-15

## 2020-12-15 PROBLEM — M25.369 PATELLAR INSTABILITY: Status: RESOLVED | Noted: 2017-12-15 | Resolved: 2020-12-15

## 2020-12-15 PROBLEM — M25.562 BILATERAL KNEE PAIN: Status: RESOLVED | Noted: 2017-12-21 | Resolved: 2020-12-15

## 2020-12-15 PROBLEM — R26.9 ABNORMAL GAIT: Status: RESOLVED | Noted: 2017-12-21 | Resolved: 2020-12-15

## 2020-12-15 PROBLEM — M25.561 BILATERAL KNEE PAIN: Status: RESOLVED | Noted: 2017-12-21 | Resolved: 2020-12-15

## 2020-12-15 PROBLEM — R60.9 EDEMA: Status: RESOLVED | Noted: 2017-12-21 | Resolved: 2020-12-15

## 2020-12-15 PROCEDURE — 97530 THERAPEUTIC ACTIVITIES: CPT | Mod: GP | Performed by: PHYSICAL THERAPIST

## 2020-12-15 PROCEDURE — 97110 THERAPEUTIC EXERCISES: CPT | Mod: GP | Performed by: PHYSICAL THERAPIST

## 2020-12-15 PROCEDURE — 97161 PT EVAL LOW COMPLEX 20 MIN: CPT | Mod: GP | Performed by: PHYSICAL THERAPIST

## 2020-12-15 ASSESSMENT — ACTIVITIES OF DAILY LIVING (ADL)
STAND: ACTIVITY IS MINIMALLY DIFFICULT
RISE FROM A CHAIR: ACTIVITY IS MINIMALLY DIFFICULT
STIFFNESS: THE SYMPTOM AFFECTS MY ACTIVITY SLIGHTLY
GIVING WAY, BUCKLING OR SHIFTING OF KNEE: THE SYMPTOM AFFECTS MY ACTIVITY MODERATELY
HOW_WOULD_YOU_RATE_THE_OVERALL_FUNCTION_OF_YOUR_KNEE_DURING_YOUR_USUAL_DAILY_ACTIVITIES?: ABNORMAL
KNEEL ON THE FRONT OF YOUR KNEE: ACTIVITY IS SOMEWHAT DIFFICULT
WEAKNESS: I HAVE THE SYMPTOM BUT IT DOES NOT AFFECT MY ACTIVITY
WALK: ACTIVITY IS MINIMALLY DIFFICULT
HOW_WOULD_YOU_RATE_THE_CURRENT_FUNCTION_OF_YOUR_KNEE_DURING_YOUR_USUAL_DAILY_ACTIVITIES_ON_A_SCALE_FROM_0_TO_100_WITH_100_BEING_YOUR_LEVEL_OF_KNEE_FUNCTION_PRIOR_TO_YOUR_INJURY_AND_0_BEING_THE_INABILITY_TO_PERFORM_ANY_OF_YOUR_USUAL_DAILY_ACTIVITIES?: 70
PAIN: THE SYMPTOM AFFECTS MY ACTIVITY SLIGHTLY
GO DOWN STAIRS: ACTIVITY IS MINIMALLY DIFFICULT
KNEE_ACTIVITY_OF_DAILY_LIVING_SCORE: 68.57
KNEE_ACTIVITY_OF_DAILY_LIVING_SUM: 48
AS_A_RESULT_OF_YOUR_KNEE_INJURY,_HOW_WOULD_YOU_RATE_YOUR_CURRENT_LEVEL_OF_DAILY_ACTIVITY?: NEARLY NORMAL
LIMPING: THE SYMPTOM AFFECTS MY ACTIVITY SLIGHTLY
SIT WITH YOUR KNEE BENT: ACTIVITY IS MINIMALLY DIFFICULT
RAW_SCORE: 48
SQUAT: ACTIVITY IS MINIMALLY DIFFICULT
SWELLING: THE SYMPTOM AFFECTS MY ACTIVITY MODERATELY
GO UP STAIRS: ACTIVITY IS MINIMALLY DIFFICULT

## 2020-12-15 NOTE — PROGRESS NOTES
PHYSICAL THERAPIST IMPRESSION: Kaitlynn presents to physical therapy with complaints of left knee pain/shifting. This pain occurs when she is laying in bed, either flat on her back or on her left side. There are no other instances of this sensation during day to day life. She has maintained good proximal hip strength and core strength. She has normal tracking of the patella with questionable lateral translation of the patella at rest. She had reproduction of her symptoms with lateral patellar translation. The tape provided a decrease in her symptoms with supine resting and left sidelying. The MPFL + MPTL lines provided the greatest relief. She will wear the tape for the rest of the day/night. She was instructed in hip and squat exercises to continue to advance her strengthening.     RESPONSIVENESS TO VIERA TAPE:  [place] Taping Trial    Pre/posttest activity Laying supine + left sidelying   Taping technique(s) trialed L>M [place]  MPFL + MPTL   Numerical Pain Scale 4/10 to 1-2/10   Improvement in movement pattern with tape on Less of that sensation present (most relieved with MPFL + MPTL line)        PT MODIFIABLE FACTORS PRESENT NOT PRESENT   Proximal LE/Trunk mm weakness X    Quadriceps muscle dysfunction/weakness  X   Poor postural stability  X   Poor dynamic movement patterns X    Restricted ankle DF  X   Previous PF PT Interventions  X (none recently)     Physical Therapy Initial Evaluation: Subjective History     Injury/Condition Details:  Presenting Complaint Left anterior knee (shifting, pain)   Onset Timing/Date A year or so   Mechanism Had a fall earlier in 2020 and she fractured a few ribs + a concussion. In that fall she dislocated she thinks and it swelled up. This was not as bad as her ribs so she let it go.     Rods on the right lower leg were removed in January of 2020. The right leg feels normal. She is back to climbing mountains.      Symptom Behavior Details    Primary Symptoms Sporadic  symptoms; Activity/position dependent, pain (Location: Anterior knee, feels that there is a shifting between the tibia and the femur, Quality: shifting), stiffness, swelling after intense hiking   Worst Pain 6-7/10 (with laying in bed - does get so uncomfortable that she has to change positions) Left side is the worst.    Symptom Provocators Laying still in bed. Happens both when on her back and on her left side. Right side is better. Stomach is best.   No issues with stairs, walking, hiking.   No issues with sitting   Best Pain 0/10    Symptom Relievers Not laying down   Time of day dependent? No   Recent symptom change? no change in symptoms     Prior Testing/Intervention for current condition:  Prior Tests  x-ray   Prior Treatment PT      Lifestyle & General Medical History:  Employment Working from home, clinic management   Usual physical activities  (within past year) Hiking, exercises    Orthopaedic history See Epic Chart   Notable medical history See Epic Chart   Patient Reported Health good       Lower Extremity Exam    Dynamic Movement Screen:  2 leg stance: Continued toe in stance, equal weight bearing  2 leg squat:Biased towards slight anterior translation of the knees, able to correct with cueing    1 leg stance:   Right: proprioceptive challenge  Left: proprioceptive challenge    Gait: Toe in gait pattern, non antalgic    Patellofemoral Joint Examination:  Test Right Left   Patellar Orientation:   90 deg flexion Mild lateral translation Moderate lateral translation   OKC Patellar Tracking Normal Normal   Patellar Orientation:  0 deg flexion Mild lateral translation Moderate lateral translation   Quadrant Mobility (Med:Lat) 2:2  2:3 (reproduces symptoms)   Effusion 0 0   Quad Activation Normal Normal     Knee Joint AROM: symmetrical    Palpation:   Tender to palpation at the following structures: lateral patellar border and superior patellar border    Lower Extremity Muscle Strength (x/5)   Right Left    Hip ER 4/5 4/5   Hip IR 5/5 5/5   Hip ABD 5/5 5/5   Hip ADD 5/5 5/5   Hip Ext 5/5 5/5   Knee Flex 5/5 5/5     Assessment/Plan:  Patient is a 25 year old female with left side knee complaints.    Patient has the following significant findings with corresponding treatment plan.                Diagnosis 1:  Left patellar pain/shifting  Pain -  splint/taping/bracing/orthotics, self management and education  Decreased strength - therapeutic exercise, therapeutic activities and home program  Decreased function - therapeutic activities and home program    Therapy Evaluation Codes:   1) History comprised of:   Personal factors that impact the plan of care:      None.    Comorbidity factors that impact the plan of care are:      None.     Medications impacting care: None.  2) Examination of Body Systems comprised of:   Body structures and functions that impact the plan of care:      Knee.   Activity limitations that impact the plan of care are:      Sleeping and Laying down.  3) Clinical presentation characteristics are:   Stable/Uncomplicated.  4) Decision-Making    Low complexity using standardized patient assessment instrument and/or measureable assessment of functional outcome.  Cumulative Therapy Evaluation is: Low complexity.    Previous and current functional limitations:  (See Goal Flow Sheet for this information)    Short term and Long term goals: (See Goal Flow Sheet for this information)     Communication ability:  Patient appears to be able to clearly communicate and understand verbal and written communication and follow directions correctly.  Treatment Explanation - The following has been discussed with the patient:   RX ordered/plan of care  Anticipated outcomes  Possible risks and side effects  This patient would benefit from PT intervention to resume normal activities.   Rehab potential is good.    Frequency:  1 X week, once daily  Duration:  for 1 weeks  Discharge Plan:  Achieve all LTG.  Independent in  home treatment program.  Reach maximal therapeutic benefit.    Please refer to the daily flowsheet for treatment today, total treatment time and time spent performing 1:1 timed codes.

## 2020-12-21 LAB
ALBUMIN UR-MCNC: ABNORMAL MG/DL
APPEARANCE UR: CLEAR
BILIRUB UR QL STRIP: ABNORMAL
COLOR UR AUTO: YELLOW
GLUCOSE UR STRIP-MCNC: NEGATIVE MG/DL
HGB UR QL STRIP: ABNORMAL
KETONES UR STRIP-MCNC: ABNORMAL MG/DL
LEUKOCYTE ESTERASE UR QL STRIP: ABNORMAL
NITRATE UR QL: NEGATIVE
PH UR STRIP: 6 PH (ref 5–7)
SP GR UR STRIP: >1.03 (ref 1–1.03)
UROBILINOGEN UR STRIP-ACNC: 0.2 EU/DL (ref 0.2–1)

## 2020-12-22 ENCOUNTER — MYC MEDICAL ADVICE (OUTPATIENT)
Dept: UROLOGY | Facility: CLINIC | Age: 25
End: 2020-12-22

## 2020-12-23 NOTE — TELEPHONE ENCOUNTER
Left message with patient to please call so we can set her up for return visits in clinic with Dr. Alicia for bladder installations-  If she can  She could come into clinic today.-  Otherwise first pweek of Mili

## 2021-01-06 ENCOUNTER — OFFICE VISIT (OUTPATIENT)
Dept: UROLOGY | Facility: CLINIC | Age: 26
End: 2021-01-06
Attending: OBSTETRICS & GYNECOLOGY
Payer: COMMERCIAL

## 2021-01-06 VITALS
BODY MASS INDEX: 19.49 KG/M2 | HEART RATE: 76 BPM | DIASTOLIC BLOOD PRESSURE: 89 MMHG | WEIGHT: 132 LBS | SYSTOLIC BLOOD PRESSURE: 128 MMHG

## 2021-01-06 DIAGNOSIS — R39.82 CHRONIC BLADDER PAIN: Primary | ICD-10-CM

## 2021-01-06 PROCEDURE — 51700 IRRIGATION OF BLADDER: CPT

## 2021-01-06 PROCEDURE — 51700 IRRIGATION OF BLADDER: CPT | Performed by: OBSTETRICS & GYNECOLOGY

## 2021-01-06 PROCEDURE — 250N000011 HC RX IP 250 OP 636

## 2021-01-06 ASSESSMENT — PAIN SCALES - GENERAL: PAINLEVEL: MODERATE PAIN (5)

## 2021-01-06 NOTE — PROGRESS NOTES
January 6, 2021    Referring Provider: No referring provider defined for this encounter.    Primary Care Provider: No Ref-Primary, Physician    Pre-procedure diagnosis: Overactive bladder     Post-procedure diagnosis: Same   Procedure: Intravesical instillation of:  0.25% bupivacaine (Marcaine, 40 mL)  10,000 IU heparin (10 mL)  100 mg hydrocortisone (5 mL of normal saline)  40 mL sodium bicarbonate 48 mmol     Surgeon: Maral   Assistant(s): NA      Anesthesia: NA       Estimated blood loss: NA     Complications: None   Condition: Stable       Indications: Kaitlynn Mendes is a 25 year old  female with hx of painful bladder syndrome/interstitial cystitis. Cystoscopy showed areas of glomerulation and hypervascularity. She understands the risks and benefits of the various options for treatment of her painful bladder syndrome and elects to proceed with intravescial instillation of bupivacaine, heparin, hydrocortisone and sodium bicarbonate. She is understanding the risks for infection, pain, and need for future procedures.    Procedure: Kaitlynn Mendes was taken to the operating room in her usual state of health.   She was positioned in modified dorsal lithotomy.  Her genitalia were prepped and draped in the standard fashion. A time-out was performed to ensure proper patient, procedure and positioning.       A red rubber catheter was placed in the patient's bladder under sterile conditions and the bladder was drained. We instilled bupivacaine, heparin, hydrocortisone, and sodium bicarbonate. The patient retained the instillation for 30 minutes at which point she spontaneously emptied her bladder. She tolerated the procedure well. She will follow up for weekly bladder instillations.     Nirav Alicia MD  Professor, OB/GYN  Urogynecologist  CC  Patient Care Team:  No Ref-Primary, Physician as PCP - Didier Delgado MD as MD (Orthopedics)  Di Schwartz MD as MD (Orthopedics)  Anmol  Cindy Barroso MD as MD (Dermatology)  Shama Magana RN as Specialty Care Coordinator (Clinical Cardiac Electrophysiology)  Milo Mendoza MD as MD (Clinical Cardiac Electrophysiology)  Dio Olivarez MD as Assigned Musculoskeletal Provider  Nirav Alicia MD as Assigned OBGYN Provider

## 2021-01-06 NOTE — LETTER
1/6/2021       RE: Kaitlynn Mendes  [address]  [place] [address]     Dear Colleague,    Thank you for referring your patient, Kaitlynn Mendes, to the [facility] at [facility]. Please see a copy of my visit note below.    January 6, 2021    Referring Provider: No referring provider defined for this encounter.    Primary Care Provider: No Ref-Primary, Physician    Pre-procedure diagnosis: Overactive bladder     Post-procedure diagnosis: Same   Procedure: Intravesical instillation of:  0.25% bupivacaine (Marcaine, 40 mL)  10,000 IU heparin (10 mL)  100 mg hydrocortisone (5 mL of normal saline)  40 mL sodium bicarbonate 48 mmol     Surgeon: Maral   Assistant(s): FORREST      Anesthesia: NA       Estimated blood loss: NA     Complications: None   Condition: Stable       Indications: Kaitlynn Mendes is a 25 year old  female with hx of painful bladder syndrome/interstitial cystitis. Cystoscopy showed areas of glomerulation and hypervascularity. She understands the risks and benefits of the various options for treatment of her painful bladder syndrome and elects to proceed with intravescial instillation of bupivacaine, heparin, hydrocortisone and sodium bicarbonate. She is understanding the risks for infection, pain, and need for future procedures.    Procedure: Kaitlynn Mendes was taken to the operating room in her usual state of health.   She was positioned in modified dorsal lithotomy.  Her genitalia were prepped and draped in the standard fashion. A time-out was performed to ensure proper patient, procedure and positioning.       A red rubber catheter was placed in the patient's bladder under sterile conditions and the bladder was drained. We instilled bupivacaine, heparin, hydrocortisone, and sodium bicarbonate. The patient retained the instillation for 30 minutes at which point she spontaneously emptied her bladder. She  tolerated the procedure well. She will follow up for weekly bladder instillations.     Nirav Alicia MD  Professor, OB/GYN  Urogynecologist  CC  Patient Care Team:  No Ref-Primary, Physician as PCP - General  Didier Torres MD as MD (Orthopedics)  Di Schwartz MD as MD (Orthopedics)  Cindy Corea MD as MD (Dermatology)  Shama Magana, RN as Specialty Care Coordinator (Clinical Cardiac Electrophysiology)  Milo Mendoza MD as MD (Clinical Cardiac Electrophysiology)  Dio Olivarez MD as Assigned Musculoskeletal Provider  Nirav Alicia MD as Assigned OBGYN Provider

## 2021-01-13 ENCOUNTER — OFFICE VISIT (OUTPATIENT)
Dept: UROLOGY | Facility: CLINIC | Age: 26
End: 2021-01-13
Attending: OBSTETRICS & GYNECOLOGY
Payer: COMMERCIAL

## 2021-01-13 VITALS — DIASTOLIC BLOOD PRESSURE: 79 MMHG | SYSTOLIC BLOOD PRESSURE: 114 MMHG | HEART RATE: 117 BPM

## 2021-01-13 DIAGNOSIS — R39.82 CHRONIC BLADDER PAIN: Primary | ICD-10-CM

## 2021-01-13 PROCEDURE — G0463 HOSPITAL OUTPT CLINIC VISIT: HCPCS | Mod: 25

## 2021-01-13 PROCEDURE — 51700 IRRIGATION OF BLADDER: CPT | Performed by: OBSTETRICS & GYNECOLOGY

## 2021-01-13 PROCEDURE — 250N000011 HC RX IP 250 OP 636

## 2021-01-13 RX ORDER — BUPIVACAINE HYDROCHLORIDE 2.5 MG/ML
40 INJECTION, SOLUTION EPIDURAL; INFILTRATION; INTRACAUDAL WEEKLY
Status: SHIPPED | OUTPATIENT
Start: 2021-01-13 | End: 2021-02-17

## 2021-01-13 RX ORDER — HEPARIN SODIUM 1000 [USP'U]/ML
10 INJECTION, SOLUTION INTRAVENOUS; SUBCUTANEOUS WEEKLY
Status: SHIPPED | OUTPATIENT
Start: 2021-01-13 | End: 2021-02-17

## 2021-01-13 ASSESSMENT — PAIN SCALES - GENERAL: PAINLEVEL: NO PAIN (0)

## 2021-01-13 NOTE — NURSING NOTE
PATIENT CAME INTO CLINIC FOR 2ND BLADDER INSTALLATION- TOLERATED PROCEDURE WELL.  SEE MAR FOR MED LIST FOR INSTALLATION.    Clinic Administered Medication Documentation      Injectable Medication Documentation    Patient was given 40 ML SODIUM BICARBONATE,  10 ML HAPARIN, 40 ML OF 0.05 % BUPIVACAINE, 100MG SOLUCarlosCORTEL INRECONSTITUED IN SALINE.. Prior to medication administration, verified patients identity using patient s name and date of birth. Please see MAR and medication order for additional information. Patient instructed to report any adverse reaction to staff immediately .      Was entire vial of medication used? Yes  Vial/Syringe: Multi dose vial  Expiration Date:  *  Was this medication supplied by the patient? No    Name of provider who requested the medication administration: dR Brien Nieto  Name of provider on site (faculty or community preceptor) at the time of performing the medication administration: womens health specialist    Date of next administration: next wednesday  Dice (should be listed in the overview of the Problem List), please find a preceptor, preferably a , to assist you}

## 2021-01-13 NOTE — LETTER
1/13/2021       RE: Kaitlynn Mendes  [address]  [place] [address]     Dear Colleague,    Thank you for referring your patient, Kaitlynn Mendes, to the [facility] at [facility]. Please see a copy of my visit note below.    Pt doing well. Here for 2nd instillation. Felt that her symptoms were improved after last week.    Sincerely,    Nirav Alicia MD

## 2021-01-14 ENCOUNTER — HEALTH MAINTENANCE LETTER (OUTPATIENT)
Age: 26
End: 2021-01-14

## 2021-01-20 ENCOUNTER — OFFICE VISIT (OUTPATIENT)
Dept: UROLOGY | Facility: CLINIC | Age: 26
End: 2021-01-20
Attending: OBSTETRICS & GYNECOLOGY
Payer: COMMERCIAL

## 2021-01-20 VITALS — DIASTOLIC BLOOD PRESSURE: 80 MMHG | HEART RATE: 93 BPM | SYSTOLIC BLOOD PRESSURE: 135 MMHG

## 2021-01-20 DIAGNOSIS — R39.82 CHRONIC BLADDER PAIN: Primary | ICD-10-CM

## 2021-01-20 PROCEDURE — 51700 IRRIGATION OF BLADDER: CPT | Performed by: OBSTETRICS & GYNECOLOGY

## 2021-01-20 PROCEDURE — G0463 HOSPITAL OUTPT CLINIC VISIT: HCPCS | Mod: 25

## 2021-01-20 RX ORDER — BUPIVACAINE HYDROCHLORIDE 5 MG/ML
20 INJECTION, SOLUTION EPIDURAL; INTRACAUDAL WEEKLY
Status: COMPLETED | OUTPATIENT
Start: 2021-01-20 | End: 2021-02-17

## 2021-01-20 RX ADMIN — HYDROCORTISONE SODIUM SUCCINATE 100 MG: 100 INJECTION, POWDER, FOR SOLUTION INTRAMUSCULAR; INTRAVENOUS at 09:04

## 2021-01-20 RX ADMIN — BUPIVACAINE HYDROCHLORIDE 100 MG: 5 INJECTION, SOLUTION EPIDURAL; INTRACAUDAL; PERINEURAL at 09:08

## 2021-01-20 RX ADMIN — HEPARIN SODIUM 10000 UNITS: 1000 INJECTION, SOLUTION INTRAVENOUS; SUBCUTANEOUS at 09:03

## 2021-01-20 ASSESSMENT — PAIN SCALES - GENERAL: PAINLEVEL: MILD PAIN (3)

## 2021-01-20 NOTE — PROGRESS NOTES
"January 20, 2021    Return visit    Patient returns today for bladder instillation. Since her last visit she had a \"bad\" week last week with increased pain and discomfort.    /80   Pulse 93   LMP 01/01/2021   She is comfortable, in no distress, non-labored breathing.     February 12, 2021    Referring Provider: No referring provider defined for this encounter.    Primary Care Provider: No Ref-Primary, Physician    Pre-procedure diagnosis: Overactive bladder     Post-procedure diagnosis: Same   Procedure: Intravesical instillation of:  0.25% bupivacaine (Marcaine, 40 mL)  10,000 IU heparin (10 mL)  100 mg hydrocortisone (5 mL of normal saline)  40 mL sodium bicarbonate 48 mmol     Surgeon: Maral   Assistant(s):       Anesthesia: NA       Estimated blood loss: NA     Complications: None   Condition: Stable       Indications: Kaitlynn Mendes is a 25 year old  female with hx of painful bladder syndrome/interstitial cystitis. Cystoscopy showed areas of glomerulation and hypervascularity. She understands the risks and benefits of the various options for treatment of her painful bladder syndrome and elects to proceed with intravescial instillation of bupivacaine, heparin, hydrocortisone and sodium bicarbonate. She is understanding the risks for infection, pain, and need for future procedures.    Procedure: Kaitlynn Mendes was taken to the operating room in her usual state of health.   She was positioned in modified dorsal lithotomy.  Her genitalia were prepped and draped in the standard fashion. A time-out was performed to ensure proper patient, procedure and positioning.       A red rubber catheter was placed in the patient's bladder under sterile conditions and the bladder was drained. We instilled bupivacaine, heparin, hydrocortisone, and sodium bicarbonate. The patient retained the instillation for 30 minutes at which point she spontaneously emptied her bladder. She tolerated the procedure well. " She will follow up for weekly bladder instillations.     Nirav Alicia MD  Professor, OB/GYN  Urogynecologist  CC  Patient Care Team:  No Ref-Primary, Physician as PCP - General  Didier Torres MD as MD (Orthopedics)  Di Schwartz MD as MD (Orthopedics)  Cindy Corea MD as MD (Dermatology)  Shama Magana RN as Specialty Care Coordinator (Clinical Cardiac Electrophysiology)  Milo Mendoza MD as MD (Clinical Cardiac Electrophysiology)  Dio Olivarez MD as Assigned Musculoskeletal Provider  Milo Mendoza MD as Assigned Heart and Vascular Provider  Nirav Alicia MD as Assigned OBGYN Provider        A/P: 25 year old F with interstitial cystitis    Continue weekly bladder instillations. F/U next week    A total of 20 minutes were spent with the patient today, > 50% in counseling and coordination of care    Nirav Alicia MD  Professor, OB/GYN  Urogynecologist    CC  Patient Care Team:  No Ref-Primary, Physician as PCP - General  Didier Torres MD as MD (Orthopedics)  Di Schwartz MD as MD (Orthopedics)  Cindy Corea MD as MD (Dermatology)  Shama Magana RN as Specialty Care Coordinator (Clinical Cardiac Electrophysiology)  Milo Mendoza MD as MD (Clinical Cardiac Electrophysiology)  Dio Olivarez MD as Assigned Musculoskeletal Provider  Nirav Alicia MD as Assigned OBGYN Provider

## 2021-01-20 NOTE — NURSING NOTE
Patient came into clinic for bladder installation-  Tolerated the procedure well.  See mar for medications

## 2021-01-20 NOTE — LETTER
"1/20/2021       RE: Kaitlynn Mendes  [address]  [place] [address]     Dear Colleague,    Thank you for referring your patient, Kaitlynn Mendes, to the [facility] [place] at [facility]. Please see a copy of my visit note below.    January 20, 2021    Return visit    Patient returns today for bladder instillation. Since her last visit she had a \"bad\" week last week with increased pain and discomfort.    /80   Pulse 93   LMP 01/01/2021   She is comfortable, in no distress, non-labored breathing.     A/P: 25 year old F with interstitial cystitis    Continue weekly bladder instillations. F/U next week    A total of 20 minutes were spent with the patient today, > 50% in counseling and coordination of care    Nirav Alicia MD  Professor, OB/GYN  Urogynecologist    CC  Patient Care Team:  No Ref-Primary, Physician as PCP - General  Didier Torres MD as MD (Orthopedics)  Di Schwartz MD as MD (Orthopedics)  Cindy Corea MD as MD (Dermatology)  Shama Magana, RN as Specialty Care Coordinator (Clinical Cardiac Electrophysiology)  Milo Mendoza MD as MD (Clinical Cardiac Electrophysiology)  Dio Olivarez MD as Assigned Musculoskeletal Provider  Nirav Alicia MD as Assigned OBGYN Provider      "

## 2021-01-20 NOTE — PROGRESS NOTES
Pt doing well. Here for 2nd instillation. Felt that her symptoms were improved after last week.    February 12, 2021    Referring Provider: No referring provider defined for this encounter.    Primary Care Provider: Darby Ref-Primary, Physician    Pre-procedure diagnosis: Overactive bladder     Post-procedure diagnosis: Same   Procedure: Intravesical instillation of:  0.25% bupivacaine (Marcaine, 40 mL)  10,000 IU heparin (10 mL)  100 mg hydrocortisone (5 mL of normal saline)  40 mL sodium bicarbonate 48 mmol     Surgeon: Maral   Assistant(s):       Anesthesia: NA       Estimated blood loss: NA     Complications: None   Condition: Stable       Indications: Kaitlynn Mendes is a 25 year old  female with hx of painful bladder syndrome/interstitial cystitis. Cystoscopy showed areas of glomerulation and hypervascularity. She understands the risks and benefits of the various options for treatment of her painful bladder syndrome and elects to proceed with intravescial instillation of bupivacaine, heparin, hydrocortisone and sodium bicarbonate. She is understanding the risks for infection, pain, and need for future procedures.    Procedure: Kaitlynn Mendes was taken to the operating room in her usual state of health.   She was positioned in modified dorsal lithotomy.  Her genitalia were prepped and draped in the standard fashion. A time-out was performed to ensure proper patient, procedure and positioning.       A red rubber catheter was placed in the patient's bladder under sterile conditions and the bladder was drained. We instilled bupivacaine, heparin, hydrocortisone, and sodium bicarbonate. The patient retained the instillation for 30 minutes at which point she spontaneously emptied her bladder. She tolerated the procedure well. She will follow up for weekly bladder instillations.     Nirav Alicia MD  Professor, OB/GYN  Urogynecologist  CC  Patient Care Team:  No Ref-Primary, Physician as PCP -  Didier Delgado MD as MD (Orthopedics)  Di Schwartz MD as MD (Orthopedics)  Cindy Corea MD as MD (Dermatology)  Shama Magana RN as Specialty Care Coordinator (Clinical Cardiac Electrophysiology)  Milo Mendoza MD as MD (Clinical Cardiac Electrophysiology)  Dio Olivarez MD as Assigned Musculoskeletal Provider  Milo Mendoza MD as Assigned Heart and Vascular Provider  Nirav Alicia MD as Assigned OBGYN Provider

## 2021-02-03 ENCOUNTER — OFFICE VISIT (OUTPATIENT)
Dept: UROLOGY | Facility: CLINIC | Age: 26
End: 2021-02-03
Attending: OBSTETRICS & GYNECOLOGY
Payer: COMMERCIAL

## 2021-02-03 VITALS — SYSTOLIC BLOOD PRESSURE: 110 MMHG | DIASTOLIC BLOOD PRESSURE: 74 MMHG | HEART RATE: 81 BPM

## 2021-02-03 DIAGNOSIS — R39.82 CHRONIC BLADDER PAIN: Primary | ICD-10-CM

## 2021-02-03 DIAGNOSIS — N39.46 MIXED INCONTINENCE: ICD-10-CM

## 2021-02-03 PROCEDURE — G0463 HOSPITAL OUTPT CLINIC VISIT: HCPCS | Mod: 25

## 2021-02-03 PROCEDURE — 51700 IRRIGATION OF BLADDER: CPT | Performed by: OBSTETRICS & GYNECOLOGY

## 2021-02-03 PROCEDURE — 250N000009 HC RX 250

## 2021-02-03 PROCEDURE — 250N000011 HC RX IP 250 OP 636: Performed by: OBSTETRICS & GYNECOLOGY

## 2021-02-03 PROCEDURE — 250N000009 HC RX 250: Performed by: OBSTETRICS & GYNECOLOGY

## 2021-02-03 RX ADMIN — BUPIVACAINE HYDROCHLORIDE 100 MG: 5 INJECTION, SOLUTION EPIDURAL; INTRACAUDAL; PERINEURAL at 10:01

## 2021-02-03 RX ADMIN — HYDROCORTISONE SODIUM SUCCINATE 100 MG: 100 INJECTION, POWDER, FOR SOLUTION INTRAMUSCULAR; INTRAVENOUS at 09:59

## 2021-02-03 RX ADMIN — HEPARIN SODIUM 10000 UNITS: 1000 INJECTION, SOLUTION INTRAVENOUS; SUBCUTANEOUS at 09:59

## 2021-02-03 ASSESSMENT — PAIN SCALES - GENERAL: PAINLEVEL: MILD PAIN (3)

## 2021-02-03 NOTE — LETTER
2/3/2021       RE: Kaitlynn Mendes  [address]  [place] [address]     Dear Colleague,    Thank you for referring your patient, Kaitlynn Mendes, to the [facility] WOMEN'[facility] at [facility]. Please see a copy of my visit note below.    February 3, 2021    Referring Provider: No referring provider defined for this encounter.    Primary Care Provider: No Ref-Primary, Physician    Pre-procedure diagnosis: Overactive bladder     Post-procedure diagnosis: Same   Procedure: Intravesical instillation of:  0.25% bupivacaine (Marcaine, 40 mL)  10,000 IU heparin (10 mL)  100 mg hydrocortisone (5 mL of normal saline)  40 mL sodium bicarbonate 48 mmol     Surgeon: Maral   Assistant(s): Smith      Anesthesia: NA       Estimated blood loss: NA     Complications: None   Condition: Stable       Indications: Kaitlynn Mendes is a 25 year old  female with hx of painful bladder syndrome/interstitial cystitis. Cystoscopy showed areas of glomerulation and hypervascularity. She understands the risks and benefits of the various options for treatment of her painful bladder syndrome and elects to proceed with intravescial instillation of bupivacaine, heparin, hydrocortisone and sodium bicarbonate. She is understanding the risks for infection, pain, and need for future procedures.    Procedure: Kaitlynn Mendes was taken to the proceure room in her usual state of health.   She was positioned in modified dorsal lithotomy.  Her genitalia were prepped and draped in the standard fashion. A time-out was performed to ensure proper patient, procedure and positioning.       A red rubber catheter was placed in the patient's bladder under sterile conditions and the bladder was drained. We instilled bupivacaine, heparin, hydrocortisone, and sodium bicarbonate. The patient retained the instillation for 30 minutes at which point she spontaneously emptied her bladder.  She tolerated the procedure well. She will follow up for weekly bladder instillations.     Nirav Alicia MD  Professor, OB/GYN  Urogynecologist  CC  Patient Care Team:  No Ref-Primary, Physician as PCP - General  Didier Torres MD as MD (Orthopedics)  Di Schwartz MD as MD (Orthopedics)  Cindy Corea MD as MD (Dermatology)  Shama Magana RN as Specialty Care Coordinator (Clinical Cardiac Electrophysiology)  Milo Mendoza MD as MD (Clinical Cardiac Electrophysiology)  Dio Olivarez MD as Assigned Musculoskeletal Provider  Milo Mendoza MD as Assigned Heart and Vascular Provider  Nirav Alicia MD as Assigned OBGYN Provider

## 2021-02-03 NOTE — PROGRESS NOTES
February 3, 2021    Referring Provider: No referring provider defined for this encounter.    Primary Care Provider: No Ref-Primary, Physician    Pre-procedure diagnosis: Overactive bladder     Post-procedure diagnosis: Same   Procedure: Intravesical instillation of:  0.25% bupivacaine (Marcaine, 40 mL)  10,000 IU heparin (10 mL)  100 mg hydrocortisone (5 mL of normal saline)  40 mL sodium bicarbonate 48 mmol     Surgeon: Maral   Assistant(s): Smith      Anesthesia: NA       Estimated blood loss: NA     Complications: None   Condition: Stable       Indications: Kaitlynn Mendes is a 25 year old  female with hx of painful bladder syndrome/interstitial cystitis. Cystoscopy showed areas of glomerulation and hypervascularity. She understands the risks and benefits of the various options for treatment of her painful bladder syndrome and elects to proceed with intravescial instillation of bupivacaine, heparin, hydrocortisone and sodium bicarbonate. She is understanding the risks for infection, pain, and need for future procedures.    Procedure: Kaitlynn Mendes was taken to the proceure room in her usual state of health.   She was positioned in modified dorsal lithotomy.  Her genitalia were prepped and draped in the standard fashion. A time-out was performed to ensure proper patient, procedure and positioning.       A red rubber catheter was placed in the patient's bladder under sterile conditions and the bladder was drained. We instilled bupivacaine, heparin, hydrocortisone, and sodium bicarbonate. The patient retained the instillation for 30 minutes at which point she spontaneously emptied her bladder. She tolerated the procedure well. She will follow up for weekly bladder instillations.     Nirav Alicia MD  Professor, OB/GYN  Urogynecologist  CC  Patient Care Team:  No Ref-Primary, Physician as PCP - Didier Delgado MD as MD (Orthopedics)  Di Schwartz MD as MD  (Orthopedics)  Cindy Corea MD as MD (Dermatology)  Shama Magana RN as Specialty Care Coordinator (Clinical Cardiac Electrophysiology)  Milo Mendoza MD as MD (Clinical Cardiac Electrophysiology)  Dio Olivarez MD as Assigned Musculoskeletal Provider  Milo Mendoza MD as Assigned Heart and Vascular Provider  Nirav Alicia MD as Assigned OBGYN Provider

## 2021-02-10 ENCOUNTER — OFFICE VISIT (OUTPATIENT)
Dept: UROLOGY | Facility: CLINIC | Age: 26
End: 2021-02-10
Attending: OBSTETRICS & GYNECOLOGY
Payer: COMMERCIAL

## 2021-02-10 VITALS — HEART RATE: 93 BPM | SYSTOLIC BLOOD PRESSURE: 114 MMHG | DIASTOLIC BLOOD PRESSURE: 81 MMHG

## 2021-02-10 DIAGNOSIS — R39.82 CHRONIC BLADDER PAIN: Primary | ICD-10-CM

## 2021-02-10 PROCEDURE — 250N000009 HC RX 250: Performed by: OBSTETRICS & GYNECOLOGY

## 2021-02-10 PROCEDURE — 51700 IRRIGATION OF BLADDER: CPT | Performed by: OBSTETRICS & GYNECOLOGY

## 2021-02-10 PROCEDURE — 250N000009 HC RX 250

## 2021-02-10 PROCEDURE — G0463 HOSPITAL OUTPT CLINIC VISIT: HCPCS | Mod: 25

## 2021-02-10 PROCEDURE — 250N000011 HC RX IP 250 OP 636: Performed by: OBSTETRICS & GYNECOLOGY

## 2021-02-10 RX ADMIN — BUPIVACAINE HYDROCHLORIDE 100 MG: 5 INJECTION, SOLUTION EPIDURAL; INTRACAUDAL; PERINEURAL at 08:59

## 2021-02-10 RX ADMIN — HYDROCORTISONE SODIUM SUCCINATE 100 MG: 100 INJECTION, POWDER, FOR SOLUTION INTRAMUSCULAR; INTRAVENOUS at 08:58

## 2021-02-10 RX ADMIN — HEPARIN SODIUM 10000 UNITS: 1000 INJECTION, SOLUTION INTRAVENOUS; SUBCUTANEOUS at 08:57

## 2021-02-10 ASSESSMENT — PAIN SCALES - GENERAL: PAINLEVEL: NO PAIN (0)

## 2021-02-10 NOTE — LETTER
2/10/2021       RE: Kaitlynn Meneds  [address]  [place] [address]     Dear Colleague,    Thank you for referring your patient, Kaitlynn Mendes, to the [facility] CLINIC [place] at [facility]. Please see a copy of my visit note below.    February 10, 2021    Referring Provider: No referring provider defined for this encounter.    Primary Care Provider: No Ref-Primary, Physician    Pre-procedure diagnosis: Overactive bladder     Post-procedure diagnosis: Same   Procedure: Intravesical instillation of:  0.25% bupivacaine (Marcaine, 40 mL)  10,000 IU heparin (10 mL)  100 mg hydrocortisone (5 mL of normal saline)  40 mL sodium bicarbonate 48 mmol     Surgeon: Maral   Assistant(s):       Anesthesia: NA       Estimated blood loss: NA     Complications: None   Condition: Stable       Indications: Kaitlynn Mendes is a 25 year old  female with hx of painful bladder syndrome/interstitial cystitis. Cystoscopy showed areas of glomerulation and hypervascularity. She understands the risks and benefits of the various options for treatment of her painful bladder syndrome and elects to proceed with intravescial instillation of bupivacaine, heparin, hydrocortisone and sodium bicarbonate. She is understanding the risks for infection, pain, and need for future procedures.    Procedure: Kaitlynn Mendes was taken to the operating room in her usual state of health. She was positioned in modified dorsal lithotomy.  Her genitalia were prepped and draped in the standard fashion. A time-out was performed to ensure proper patient, procedure and positioning.       A red rubber catheter was placed in the patient's bladder under sterile conditions and the bladder was drained. We instilled bupivacaine, heparin, hydrocortisone, and sodium bicarbonate. The patient retained the instillation for 30 minutes at which point she spontaneously emptied her  bladder. She tolerated the procedure well. She will follow up for weekly bladder instillations.     Nirav Alicia MD  Professor, OB/GYN  Urogynecologist  CC  Patient Care Team:  No Ref-Primary, Physician as PCP - General  Didier Torres MD as MD (Orthopedics)  Di Schwartz MD as MD (Orthopedics)  Cindy Corea MD as MD (Dermatology)  Shama Magana RN as Specialty Care Coordinator (Clinical Cardiac Electrophysiology)  Milo Mendoza MD as MD (Clinical Cardiac Electrophysiology)  Dio Olivarez MD as Assigned Musculoskeletal Provider  Milo Mendoza MD as Assigned Heart and Vascular Provider  Nirav Alicia MD as Assigned OBGYN Provider

## 2021-02-10 NOTE — PROGRESS NOTES
February 10, 2021    Referring Provider: No referring provider defined for this encounter.    Primary Care Provider: No Ref-Primary, Physician    Pre-procedure diagnosis: Overactive bladder     Post-procedure diagnosis: Same   Procedure: Intravesical instillation of:  0.25% bupivacaine (Marcaine, 40 mL)  10,000 IU heparin (10 mL)  100 mg hydrocortisone (5 mL of normal saline)  40 mL sodium bicarbonate 48 mmol     Surgeon: Maral   Assistant(s):       Anesthesia: NA       Estimated blood loss: NA     Complications: None   Condition: Stable       Indications: Kaitlynn Mendes is a 25 year old  female with hx of painful bladder syndrome/interstitial cystitis. Cystoscopy showed areas of glomerulation and hypervascularity. She understands the risks and benefits of the various options for treatment of her painful bladder syndrome and elects to proceed with intravescial instillation of bupivacaine, heparin, hydrocortisone and sodium bicarbonate. She is understanding the risks for infection, pain, and need for future procedures.    Procedure: Kaitlynn Mendes was taken to the operating room in her usual state of health. She was positioned in modified dorsal lithotomy.  Her genitalia were prepped and draped in the standard fashion. A time-out was performed to ensure proper patient, procedure and positioning.       A red rubber catheter was placed in the patient's bladder under sterile conditions and the bladder was drained. We instilled bupivacaine, heparin, hydrocortisone, and sodium bicarbonate. The patient retained the instillation for 30 minutes at which point she spontaneously emptied her bladder. She tolerated the procedure well. She will follow up for weekly bladder instillations.     Nirav Alicia MD  Professor, OB/GYN  Urogynecologist  CC  Patient Care Team:  No Ref-Primary, Physician as PCP - Didier Delgado MD as MD (Orthopedics)  Di Schwartz MD as MD (Orthopedics)  Anmol  Cindy Barroso MD as MD (Dermatology)  Shama Magana RN as Specialty Care Coordinator (Clinical Cardiac Electrophysiology)  Milo Mendoza MD as MD (Clinical Cardiac Electrophysiology)  Dio Olivarez MD as Assigned Musculoskeletal Provider  Milo Mendoza MD as Assigned Heart and Vascular Provider  Nirav Alicia MD as Assigned OBGYN Provider

## 2021-02-17 ENCOUNTER — OFFICE VISIT (OUTPATIENT)
Dept: UROLOGY | Facility: CLINIC | Age: 26
End: 2021-02-17
Attending: OBSTETRICS & GYNECOLOGY
Payer: COMMERCIAL

## 2021-02-17 VITALS — HEART RATE: 114 BPM | SYSTOLIC BLOOD PRESSURE: 132 MMHG | DIASTOLIC BLOOD PRESSURE: 87 MMHG

## 2021-02-17 DIAGNOSIS — N39.46 MIXED INCONTINENCE: ICD-10-CM

## 2021-02-17 DIAGNOSIS — R39.82 CHRONIC BLADDER PAIN: Primary | ICD-10-CM

## 2021-02-17 PROCEDURE — 51700 IRRIGATION OF BLADDER: CPT | Performed by: OBSTETRICS & GYNECOLOGY

## 2021-02-17 PROCEDURE — 250N000011 HC RX IP 250 OP 636: Performed by: OBSTETRICS & GYNECOLOGY

## 2021-02-17 PROCEDURE — 250N000009 HC RX 250: Performed by: OBSTETRICS & GYNECOLOGY

## 2021-02-17 PROCEDURE — 250N000009 HC RX 250

## 2021-02-17 PROCEDURE — G0463 HOSPITAL OUTPT CLINIC VISIT: HCPCS | Mod: 25

## 2021-02-17 RX ADMIN — BUPIVACAINE HYDROCHLORIDE 100 MG: 5 INJECTION, SOLUTION EPIDURAL; INTRACAUDAL; PERINEURAL at 09:00

## 2021-02-17 RX ADMIN — HYDROCORTISONE SODIUM SUCCINATE 100 MG: 100 INJECTION, POWDER, FOR SOLUTION INTRAMUSCULAR; INTRAVENOUS at 10:56

## 2021-02-17 RX ADMIN — HEPARIN SODIUM 10000 UNITS: 1000 INJECTION, SOLUTION INTRAVENOUS; SUBCUTANEOUS at 10:55

## 2021-02-17 ASSESSMENT — PAIN SCALES - GENERAL: PAINLEVEL: NO PAIN (0)

## 2021-02-17 NOTE — LETTER
2/17/2021       RE: Kaitlynn Mendes  [address]  [place] [address]     Dear Colleague,    Thank you for referring your patient, Kaitlynn Mendes, to the [facility] [place] at [facility]. Please see a copy of my visit note below.    February 17, 2021    Referring Provider: No referring provider defined for this encounter.    Primary Care Provider: No Ref-Primary, Physician    Pre-procedure diagnosis: Overactive bladder     Post-procedure diagnosis: Same   Procedure: Intravesical instillation of:  0.25% bupivacaine (Marcaine, 40 mL)  10,000 IU heparin (10 mL)  100 mg hydrocortisone (5 mL of normal saline)  40 mL sodium bicarbonate 48 mmol     Surgeon: Maral   Assistant(s):       Anesthesia: NA       Estimated blood loss: NA     Complications: None   Condition: Stable       Indications: Kaitlynn Mendes is a 25 year old  female with hx of painful bladder syndrome/interstitial cystitis. Cystoscopy showed areas of glomerulation and hypervascularity. She understands the risks and benefits of the various options for treatment of her painful bladder syndrome and elects to proceed with intravescial instillation of bupivacaine, heparin, hydrocortisone and sodium bicarbonate. She is understanding the risks for infection, pain, and need for future procedures.    Procedure: Kaitlynn Mendes was taken to the operating room in her usual state of health.   She was positioned in modified dorsal lithotomy.  Her genitalia were prepped and draped in the standard fashion. A time-out was performed to ensure proper patient, procedure and positioning.       A red rubber catheter was placed in the patient's bladder under sterile conditions and the bladder was drained. We instilled bupivacaine, heparin, hydrocortisone, and sodium bicarbonate. The patient retained the instillation for 30 minutes at which point she spontaneously emptied her  bladder. She tolerated the procedure well. She will follow up for weekly bladder instillations.     Nirav Alicia MD  Professor, OB/GYN  Urogynecologist  CC  Patient Care Team:  No Ref-Primary, Physician as PCP - General  Didier Torres MD as MD (Orthopedics)  Di Schwartz MD as MD (Orthopedics)  Cindy Corea MD as MD (Dermatology)  Shama Magana RN as Specialty Care Coordinator (Clinical Cardiac Electrophysiology)  Milo Mendoza MD as MD (Clinical Cardiac Electrophysiology)  Dio Olivarez MD as Assigned Musculoskeletal Provider  Milo Mendoza MD as Assigned Heart and Vascular Provider  Nirav Aliica MD as Assigned OBGYN Provider

## 2021-02-17 NOTE — PROGRESS NOTES
February 17, 2021    Referring Provider: No referring provider defined for this encounter.    Primary Care Provider: No Ref-Primary, Physician    Pre-procedure diagnosis: Overactive bladder     Post-procedure diagnosis: Same   Procedure: Intravesical instillation of:  0.25% bupivacaine (Marcaine, 40 mL)  10,000 IU heparin (10 mL)  100 mg hydrocortisone (5 mL of normal saline)  40 mL sodium bicarbonate 48 mmol     Surgeon: Maral   Assistant(s):       Anesthesia: NA       Estimated blood loss: NA     Complications: None   Condition: Stable       Indications: Kaitlynn Mendes is a 25 year old  female with hx of painful bladder syndrome/interstitial cystitis. Cystoscopy showed areas of glomerulation and hypervascularity. She understands the risks and benefits of the various options for treatment of her painful bladder syndrome and elects to proceed with intravescial instillation of bupivacaine, heparin, hydrocortisone and sodium bicarbonate. She is understanding the risks for infection, pain, and need for future procedures.    Procedure: Kaitlynn Mendes was taken to the operating room in her usual state of health.   She was positioned in modified dorsal lithotomy.  Her genitalia were prepped and draped in the standard fashion. A time-out was performed to ensure proper patient, procedure and positioning.       A red rubber catheter was placed in the patient's bladder under sterile conditions and the bladder was drained. We instilled bupivacaine, heparin, hydrocortisone, and sodium bicarbonate. The patient retained the instillation for 30 minutes at which point she spontaneously emptied her bladder. She tolerated the procedure well. She will follow up for weekly bladder instillations.     Nirav Alicia MD  Professor, OB/GYN  Urogynecologist  CC  Patient Care Team:  No Ref-Primary, Physician as PCP - Didier Delgado MD as MD (Orthopedics)  Di Schwartz MD as MD (Orthopedics)  Anmol  Cindy Barroso MD as MD (Dermatology)  Shama Magana RN as Specialty Care Coordinator (Clinical Cardiac Electrophysiology)  Milo Mendoza MD as MD (Clinical Cardiac Electrophysiology)  Dio Olivarez MD as Assigned Musculoskeletal Provider  Milo Mendoza MD as Assigned Heart and Vascular Provider  Nirav Alicia MD as Assigned OBGYN Provider

## 2021-02-17 NOTE — NURSING NOTE
6th bladder installation given- patient tolerated proceedure well. scheduled telephone visit in one month for follow up.

## 2021-02-24 RX ORDER — HEPARIN SODIUM 1000 [USP'U]/ML
10 INJECTION, SOLUTION INTRAVENOUS; SUBCUTANEOUS ONCE
Status: COMPLETED | OUTPATIENT
Start: 2021-02-17 | End: 2021-02-17

## 2021-05-10 ENCOUNTER — TELEPHONE (OUTPATIENT)
Dept: ORTHOPEDICS | Facility: CLINIC | Age: 26
End: 2021-05-10

## 2021-05-10 NOTE — TELEPHONE ENCOUNTER
Kaitlynn underwent hardware removal with Dr Olivarez last year.  We have her hardware, and voicemail was left for pt to call back if she would like the hardware mailed to her home.  Laurita Greene RN

## 2021-10-24 ENCOUNTER — HEALTH MAINTENANCE LETTER (OUTPATIENT)
Age: 26
End: 2021-10-24

## 2022-01-26 ENCOUNTER — VIRTUAL VISIT (OUTPATIENT)
Dept: FAMILY MEDICINE | Facility: CLINIC | Age: 27
End: 2022-01-26
Payer: COMMERCIAL

## 2022-01-26 DIAGNOSIS — G63 POLYNEUROPATHY ASSOCIATED WITH UNDERLYING DISEASE (H): Primary | ICD-10-CM

## 2022-01-26 DIAGNOSIS — N97.9 FEMALE INFERTILITY: ICD-10-CM

## 2022-01-26 PROBLEM — Q79.60 EHLERS-DANLOS SYNDROME: Status: ACTIVE | Noted: 2017-11-02

## 2022-01-26 PROBLEM — G90.A POSTURAL ORTHOSTATIC TACHYCARDIA SYNDROME: Status: ACTIVE | Noted: 2022-01-26

## 2022-01-26 PROBLEM — R26.9 UNSPECIFIED ABNORMALITIES OF GAIT AND MOBILITY: Status: ACTIVE | Noted: 2017-12-21

## 2022-01-26 PROBLEM — G43.909 MIGRAINES: Status: ACTIVE | Noted: 2017-11-02

## 2022-01-26 PROCEDURE — 99203 OFFICE O/P NEW LOW 30 MIN: CPT | Mod: GT | Performed by: FAMILY MEDICINE

## 2022-01-26 RX ORDER — PRENATAL VIT/IRON FUM/FOLIC AC 27MG-0.8MG
1 TABLET ORAL EVERY MORNING
COMMUNITY

## 2022-01-26 RX ORDER — GABAPENTIN 300 MG/1
300 CAPSULE ORAL 2 TIMES DAILY
Qty: 60 CAPSULE | Refills: 1 | Status: SHIPPED | OUTPATIENT
Start: 2022-01-26 | End: 2022-04-12

## 2022-01-26 NOTE — PROGRESS NOTES
Kaitlynn is a 26 year old who is being evaluated via a billable video visit.      How would you like to obtain your AVS? MyChart  If the video visit is dropped, the invitation should be resent by: Text to cell phone: [phone]  Will anyone else be joining your video visit? 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  Has been doing well on gabapentin which was refilled today.  - gabapentin (NEURONTIN) 300 MG capsule  Dispense: 60 capsule; Refill: 1    Female infertility  Patient with irregular menses who has been trying to conceive for nearly 2 years now. She does have history of one SAB at 8 weeks gestation.  Family history notable for infertility in her mother with 2 successful pregnancies using Clomid.  After discussion, will refer to OB/GYN for her infertility testing/therapeutic options.  - Ob/Gyn Referral    Evie Heredia [facility]    Subjective   Kaitlynn is a 26 year old who presents for the following health issues  Chief Complaint   Patient presents with     Recheck Medication     gabapentine, trying to concieve- discuss clomid, has been off birth control for 2 years       HPI     Nerve pain :  history of Carlos-Danlos with improper bone/cartilage growth.  She has undergone multiple lower extremity surgeries due to this including bilateral tib/fib osteotomy.  She has residual nerve pain particularly on the left foot due to the surgeries.  She has been taking gabapentin for years.  She tried to wean down to once per day but due to pain has required twice daily dosing.  She is requesting refill of her gabapentin.  She denies any adverse reactions to the medication.    Infertility:  Patient went off of OCPs 2 years ago.  4 months later she got pregnant, however, 8 weeks into her pregnancy she had a  miscarriage.  She did not require any medications or surgical intervention for this.  Her periods quickly returned to normal.  Since that time her and her partner have been doing LH testing and tracking ovulation.  Her periods are slightly irregular and can range from 24 to 38-day cycles.  Occasionally they can last up to 10 days in duration, with the first 5 days being light and then the 5-remaining days being more heavy.  Her mother required Clomid to get pregnant with both her and her sister so she is interested in fertility medications.  She does not recall any fertility work-up thus far and her partner has not had sperm testing performed.      Objective           Vitals:  No vitals were obtained today due to virtual visit.    Physical Exam   GENERAL: Healthy, alert and no distress  EYES: Eyes grossly normal to inspection.  No discharge or erythema, or obvious scleral/conjunctival abnormalities.  RESP: No audible wheeze, cough, or visible cyanosis.  No visible retractions or increased work of breathing.    SKIN: Visible skin clear. No significant rash, abnormal pigmentation or lesions.  NEURO: Cranial nerves grossly intact.  Mentation and speech appropriate for age.  PSYCH: Mentation appears normal, affect normal/bright, judgement and insight intact, normal speech and appearance well-groomed.      Video-Visit Details    Type of service:  Video Visit    Video End Time:Zaynab    Originating Location (pt. Location): Home    Distant Location (provider location):  [facility]     Platform used for Video Visit: Zaynab

## 2022-02-13 ENCOUNTER — HEALTH MAINTENANCE LETTER (OUTPATIENT)
Age: 27
End: 2022-02-13

## 2022-02-23 ENCOUNTER — DOCUMENTATION ONLY (OUTPATIENT)
Dept: CARDIOLOGY | Facility: CLINIC | Age: 27
End: 2022-02-23

## 2022-02-23 ENCOUNTER — OFFICE VISIT (OUTPATIENT)
Dept: CARDIOLOGY | Facility: CLINIC | Age: 27
End: 2022-02-23
Attending: INTERNAL MEDICINE
Payer: COMMERCIAL

## 2022-02-23 VITALS
HEIGHT: 69 IN | HEART RATE: 92 BPM | OXYGEN SATURATION: 98 % | DIASTOLIC BLOOD PRESSURE: 83 MMHG | WEIGHT: 123.9 LBS | SYSTOLIC BLOOD PRESSURE: 126 MMHG | BODY MASS INDEX: 18.35 KG/M2

## 2022-02-23 DIAGNOSIS — G90.A POTS (POSTURAL ORTHOSTATIC TACHYCARDIA SYNDROME): Primary | ICD-10-CM

## 2022-02-23 PROCEDURE — 99215 OFFICE O/P EST HI 40 MIN: CPT | Performed by: INTERNAL MEDICINE

## 2022-02-23 PROCEDURE — G0463 HOSPITAL OUTPT CLINIC VISIT: HCPCS

## 2022-02-23 ASSESSMENT — PAIN SCALES - GENERAL: PAINLEVEL: NO PAIN (0)

## 2022-02-23 NOTE — PROGRESS NOTES
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  In the past she has carried a diagnosis of postural orthostatic tachycardia syndrome which previously was treated with a combination of ivabradine and low-dose beta-blocker.  However she is trying to become pregnant and has stopped these medications for the time being.  In addition, she started to have problems with propranolol.  Apparently ivabradine was withdrawn from her care due to the plans for pregnancy and the dosing of propranolol was increased from an \"as needed\" dosing to daily.  After that she started to have sleep disturbance and wheezing and had to stop the beta-blocker.    She queries whether the recurrence of her high heart rates (which she says are often at 190/min based on her apple watch) is adversely affecting her ability to become pregnant.  It would be reasonable to better document her actual heart rate and we will provide a CardioNet/bio tell monitor for that purpose.  She indicates that her average resting heart rate is now around 100 since stopping ivabradine and propranolol.    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  Nonetheless she has been doing her best to increase her electrolyte and fluid intake and dietary salt.  She does use constrictive undergarments from toes to waist daily.    Given the plans for pregnancy I recommended no pharmacologic intervention at this time.  She appears to be able to tolerate the high resting rate currently and if she becomes pregnant and her resting rate be benefit from increased circulating volume.  I suggested to her that she should focus on nonpharmacologic approaches that we have discussed in the past.  However if this becomes intolerable then the addition of low-dose metoprolol could be a consideration.  I indicated to her that cardio selectivity of " beta-blockers is incomplete and that she may once again have aggravation of her asthma if we add metoprolol.  She voiced understanding and agreement.    PAST MEDICAL HISTORY:  Past Medical History:   Diagnosis Date     Carlos-Danlos syndrome      Carlos-Danlos syndrome      Motion sickness      POTS (postural orthostatic tachycardia syndrome)      POTS (postural orthostatic tachycardia syndrome)      Restless legs syndrome      Skeletal dysplasia        CURRENT MEDICATIONS:  Current Outpatient Medications   Medication Sig Dispense Refill     acetaminophen (TYLENOL) 325 MG tablet Take 2 tablets (650 mg) by mouth every 4 hours as needed for other (mild pain) 100 tablet 0     gabapentin (NEURONTIN) 300 MG capsule Take 1 capsule (300 mg) by mouth 2 times daily 60 capsule 1     Naproxen Sodium (ALEVE PO) Take 2 tablets by mouth every 12 hours as needed for moderate pain       omalizumab (XOLAIR) 150 MG injection Inject 150 mg Subcutaneous every 2 months       Prenatal Vit-Fe Fumarate-FA (PRENATAL MULTIVITAMIN W/IRON) 27-0.8 MG tablet Take 1 tablet by mouth daily       propranolol (INDERAL) 20 MG tablet Take 1 tablet (20 mg) by mouth daily as needed (30-45 minutes prior to presentations) (Patient not taking: Reported on 2/23/2022) 30 tablet 2       PAST SURGICAL HISTORY:  Past Surgical History:   Procedure Laterality Date     ARTHROSCOPY KNEE Right 3/29/2019    Procedure: Right Knee Arthroscopy;  Surgeon: Di Schwartz MD;  Location:  OR     ARTHROSCOPY KNEE WITH TIBIAL TUBERCLE SHIFT Left 12/15/2017    Procedure: ARTHROSCOPY KNEE WITH TIBIAL TUBERCLE SHIFT;;  Surgeon: iD Schwartz MD;  Location:  OR     OPEN REDUCTION INTERNAL FIXATION RODDING INTRAMEDULLARY TIBIA Left 12/15/2017    Procedure: OPEN REDUCTION INTERNAL FIXATION RODDING INTRAMEDULLARY TIBIA;  Left Knee Arthroscopy, Tibial Derotational Osteotomy with Intermedullary Rodding, Fibular Derotational Osteotmy, Distal Tibial Tubercle Osteotomy;   "Surgeon: Dio Olivarez MD;  Location: [facility] OR     REMOVE HARDWARE LOWER EXTREMITY BILATERAL Bilateral 3/29/2019    Procedure: Bilateral Lower Leg Hardware Removal;  Surgeon: Di Schwartz MD;  Location: [facility] OR       ALLERGIES:     Allergies   Allergen Reactions     Egg [Chicken-Derived Products (Egg)] Anaphylaxis     Amoxicillin Hives     Zithromax [Azithromycin] Nausea and Vomiting     Compazine [Prochlorperazine] Other (See Comments)     delirious       FAMILY HISTORY:  Family History   Problem Relation Age of Onset     Osteoporosis Mother      Gastrointestinal Disease Mother         gastroparesis         SOCIAL HISTORY:  Social History     Tobacco Use     Smoking status: Never Smoker     Smokeless tobacco: Never Used   Substance Use Topics     Alcohol use: No     Drug use: No       ROS:   Constitutional: No fever, chills, or sweats. Weight stable.   ENT: No visual disturbance, ear ache, epistaxis, sore throat.   Cardiovascular: As per HPI.   Respiratory: No cough, hemoptysis, but susceptible to bronchospasm with beta-blocker  GI: No nausea, vomiting, .   : No hematuria.   Integument: Negative.   Psychiatric: Negative.   Hematologic: no easy bleeding.  Neuro: Negative.   Endocrinology: No significant heat or cold intolerance   Musculoskeletal: No myalgia.    Exam:  /83 (BP Location: Right arm, Patient Position: Chair, Cuff Size: Adult Regular)   Pulse 92   Ht 1.753 m (5' 9\")   Wt 56.2 kg (123 lb 14.4 oz)   SpO2 98%   BMI 18.30 kg/m    GENERAL APPEARANCE: healthy, alert and no distress  HEENT: no icterus, no xanthelasmas, normal pupil size and reaction, normal palate, mucosa moist, no central cyanosis  NECK: no adenopathy,JVP not elevated  RESPIRATORY: lungs clear to auscultation - no rales, rhonchi or wheezes, no use of accessory muscles, no retractions, respirations are unlabored, normal respiratory rate  CARDIOVASCULAR: regular rhythm, normal S1 with physiologic split S2, no S3 or S4 " and no murmur, click or rub, precordium quiet with normal PMI.  ABDOMEN: soft, non tender, without hepatosplenomegaly, no masses palpable, bowel sounds normal,  EXTREMITIES: peripheral pulses normal, no edema, no bruits  NEURO: alert and oriented to person/place/time, normal speech, gait and affect  SKIN: no ecchymoses, no rashes    Labs:  CBC RESULTS:   Lab Results   Component Value Date    WBC 8.2 03/10/2020    WBC 8.4 11/21/2019    RBC 4.38 03/10/2020    RBC 4.53 11/21/2019    HGB 13.1 03/10/2020    HGB 13.7 11/21/2019    HCT 38.2 03/10/2020    HCT 39.4 11/21/2019    MCV 87 03/10/2020    MCV 87 11/21/2019    MCH 29.9 03/10/2020    MCH 30.2 11/21/2019    MCHC 34.3 03/10/2020    MCHC 34.8 11/21/2019    RDW 12.5 03/10/2020    RDW 12.5 11/21/2019     03/10/2020     11/21/2019       BMP RESULTS:  Lab Results   Component Value Date     03/10/2020     11/21/2019    POTASSIUM 3.5 03/10/2020    POTASSIUM 3.2 (L) 11/21/2019    CHLORIDE 107 03/10/2020    CHLORIDE 105 11/21/2019    CO2 23 03/10/2020    CO2 28 11/21/2019    ANIONGAP 9 03/10/2020    ANIONGAP 5 11/21/2019    GLC 84 03/10/2020    GLC 87 11/21/2019    BUN 8 03/10/2020    BUN 11 11/21/2019    CR 0.70 03/10/2020    CR 0.69 11/21/2019    GFRESTIMATED >60 03/10/2020    GFRESTIMATED >90 11/21/2019    GFRESTBLACK >60 03/10/2020    GFRESTBLACK >90 11/21/2019    CHANI 9.3 03/10/2020    CHANI 9.3 11/21/2019        INR RESULTS:  Lab Results   Component Value Date    INR 1.10 11/21/2019       Procedures:  PULMONARY FUNCTION TESTS:   No flowsheet data found.      ECHOCARDIOGRAM:   No results found for this or any previous visit (from the past 8760 hour(s)).      Assessment and Plan:   1.  Postural orthostatic tachycardia syndrome-no longer taking medications due to plans for pregnancy  2.  Carlos-Danlos syndrome    Plan  1.  Withhold pharmacological approaches to POTS for the time being-reconsider if symptoms are intolerable  2.  Patient to contact us  if she would like low-dose metoprolol trial  3 Follow-up after pregnancy in approximately 1 year-unless #2 needed    Total elapsed time today with chart review, clinic visit and documentation 40 minutes    I very much appreciated the opportunity to see and assess Kaitlynn Mendes in the clinic today. Please do not hesitate to contact my office if you have any questions or concerns.      Milo Mendoza MD  Cardiac Arrhythmia Service  [facility]  [phone]  CC  SELF, REFERRED

## 2022-02-23 NOTE — PATIENT INSTRUCTIONS
You were seen in the Electrophysiology Clinic today by: Dr Mendoza    Plan:       Labs/Tests Needed:   Biotel heart monitor for 7 days- mail out      Follow up visit:    1 year with Dr Mendoza        Your Care Team:  [facility] Cardiology   Telephone Number     Nurse Line  Guadalupe Rao RN  [phone]     For scheduling appts or procedures:    Aimee Gomes   [phone]   For the Device Clinic (Pacemakers, ICDs, Loop Recorders)    During business hours: [phone]  After business hours:   [phone]- select option 4 and ask for job code [id number].     On-call cardiologist for after hours or on weekends: [phone], option #4, and ask to speak to the on-call cardiologist.     Cardiovascular Clinic:   [address] [place] [address]. [place] [address]      As always, Thank you for trusting us with your health care needs!

## 2022-02-23 NOTE — LETTER
"2/23/2022      RE: Kaitlynn Mendes  [address]  [place] [address]       Dear Colleague,    Thank you for the opportunity to participate in the care of your patient, Kaitlynn Mendes, at the [facility] [place] at [facility]. Please see a copy of my visit note below.    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  In the past she has carried a diagnosis of postural orthostatic tachycardia syndrome which previously was treated with a combination of ivabradine and low-dose beta-blocker.  However she is trying to become pregnant and has stopped these medications for the time being.  In addition, she started to have problems with propranolol.  Apparently ivabradine was withdrawn from her care due to the plans for pregnancy and the dosing of propranolol was increased from an \"as needed\" dosing to daily.  After that she started to have sleep disturbance and wheezing and had to stop the beta-blocker.    She queries whether the recurrence of her high heart rates (which she says are often at 190/min based on her apple watch) is adversely affecting her ability to become pregnant.  It would be reasonable to better document her actual heart rate and we will provide a CardioNet/bio tell monitor for that purpose.  She indicates that her average resting heart rate is now around 100 since stopping ivabradine and propranolol.    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  Nonetheless she has been doing her best to increase her electrolyte and fluid intake and dietary salt.  She does use constrictive undergarments from toes to waist daily.    Given the plans for pregnancy I recommended no pharmacologic intervention at this time.  She appears to be able to tolerate the high resting rate currently and " if she becomes pregnant and her resting rate be benefit from increased circulating volume.  I suggested to her that she should focus on nonpharmacologic approaches that we have discussed in the past.  However if this becomes intolerable then the addition of low-dose metoprolol could be a consideration.  I indicated to her that cardio selectivity of beta-blockers is incomplete and that she may once again have aggravation of her asthma if we add metoprolol.  She voiced understanding and agreement.    PAST MEDICAL HISTORY:  Past Medical History:   Diagnosis Date     Carlos-Danlos syndrome      Carlos-Danlos syndrome      Motion sickness      POTS (postural orthostatic tachycardia syndrome)      POTS (postural orthostatic tachycardia syndrome)      Restless legs syndrome      Skeletal dysplasia        CURRENT MEDICATIONS:  Current Outpatient Medications   Medication Sig Dispense Refill     acetaminophen (TYLENOL) 325 MG tablet Take 2 tablets (650 mg) by mouth every 4 hours as needed for other (mild pain) 100 tablet 0     gabapentin (NEURONTIN) 300 MG capsule Take 1 capsule (300 mg) by mouth 2 times daily 60 capsule 1     Naproxen Sodium (ALEVE PO) Take 2 tablets by mouth every 12 hours as needed for moderate pain       omalizumab (XOLAIR) 150 MG injection Inject 150 mg Subcutaneous every 2 months       Prenatal Vit-Fe Fumarate-FA (PRENATAL MULTIVITAMIN W/IRON) 27-0.8 MG tablet Take 1 tablet by mouth daily       propranolol (INDERAL) 20 MG tablet Take 1 tablet (20 mg) by mouth daily as needed (30-45 minutes prior to presentations) (Patient not taking: Reported on 2/23/2022) 30 tablet 2       PAST SURGICAL HISTORY:  Past Surgical History:   Procedure Laterality Date     ARTHROSCOPY KNEE Right 3/29/2019    Procedure: Right Knee Arthroscopy;  Surgeon: Di Schwartz MD;  [facility]: [facility]     ARTHROSCOPY KNEE WITH TIBIAL TUBERCLE SHIFT Left 12/15/2017    Procedure: ARTHROSCOPY KNEE WITH TIBIAL TUBERCLE SHIFT;;  Surgeon:  "Di Schwartz MD;  Location: [facility] OR     OPEN REDUCTION INTERNAL FIXATION RODDING INTRAMEDULLARY TIBIA Left 12/15/2017    Procedure: OPEN REDUCTION INTERNAL FIXATION RODDING INTRAMEDULLARY TIBIA;  Left Knee Arthroscopy, Tibial Derotational Osteotomy with Intermedullary Rodding, Fibular Derotational Osteotmy, Distal Tibial Tubercle Osteotomy;  Surgeon: Dio Olivarez MD;  Location: [facility] OR     REMOVE HARDWARE LOWER EXTREMITY BILATERAL Bilateral 3/29/2019    Procedure: Bilateral Lower Leg Hardware Removal;  Surgeon: Di Schwartz MD;  [facility]       ALLERGIES:     Allergies   Allergen Reactions     Egg [Chicken-Derived Products (Egg)] Anaphylaxis     Amoxicillin Hives     Zithromax [Azithromycin] Nausea and Vomiting     Compazine [Prochlorperazine] Other (See Comments)     delirious       FAMILY HISTORY:  Family History   Problem Relation Age of Onset     Osteoporosis Mother      Gastrointestinal Disease Mother         gastroparesis         SOCIAL HISTORY:  Social History     Tobacco Use     Smoking status: Never Smoker     Smokeless tobacco: Never Used   Substance Use Topics     Alcohol use: No     Drug use: No       ROS:   Constitutional: No fever, chills, or sweats. Weight stable.   ENT: No visual disturbance, ear ache, epistaxis, sore throat.   Cardiovascular: As per HPI.   Respiratory: No cough, hemoptysis, but susceptible to bronchospasm with beta-blocker  GI: No nausea, vomiting, .   : No hematuria.   Integument: Negative.   Psychiatric: Negative.   Hematologic: no easy bleeding.  Neuro: Negative.   Endocrinology: No significant heat or cold intolerance   Musculoskeletal: No myalgia.    Exam:  /83 (BP Location: Right arm, Patient Position: Chair, Cuff Size: Adult Regular)   Pulse 92   Ht 1.753 m (5' 9\")   Wt 56.2 kg (123 lb 14.4 oz)   SpO2 98%   BMI 18.30 kg/m    GENERAL APPEARANCE: healthy, alert and no distress  HEENT: no icterus, no xanthelasmas, normal pupil size and " reaction, normal palate, mucosa moist, no central cyanosis  NECK: no adenopathy,JVP not elevated  RESPIRATORY: lungs clear to auscultation - no rales, rhonchi or wheezes, no use of accessory muscles, no retractions, respirations are unlabored, normal respiratory rate  CARDIOVASCULAR: regular rhythm, normal S1 with physiologic split S2, no S3 or S4 and no murmur, click or rub, precordium quiet with normal PMI.  ABDOMEN: soft, non tender, without hepatosplenomegaly, no masses palpable, bowel sounds normal,  EXTREMITIES: peripheral pulses normal, no edema, no bruits  NEURO: alert and oriented to person/place/time, normal speech, gait and affect  SKIN: no ecchymoses, no rashes    Labs:  CBC RESULTS:   Lab Results   Component Value Date    WBC 8.2 03/10/2020    WBC 8.4 11/21/2019    RBC 4.38 03/10/2020    RBC 4.53 11/21/2019    HGB 13.1 03/10/2020    HGB 13.7 11/21/2019    HCT 38.2 03/10/2020    HCT 39.4 11/21/2019    MCV 87 03/10/2020    MCV 87 11/21/2019    MCH 29.9 03/10/2020    MCH 30.2 11/21/2019    MCHC 34.3 03/10/2020    MCHC 34.8 11/21/2019    RDW 12.5 03/10/2020    RDW 12.5 11/21/2019     03/10/2020     11/21/2019       BMP RESULTS:  Lab Results   Component Value Date     03/10/2020     11/21/2019    POTASSIUM 3.5 03/10/2020    POTASSIUM 3.2 (L) 11/21/2019    CHLORIDE 107 03/10/2020    CHLORIDE 105 11/21/2019    CO2 23 03/10/2020    CO2 28 11/21/2019    ANIONGAP 9 03/10/2020    ANIONGAP 5 11/21/2019    GLC 84 03/10/2020    GLC 87 11/21/2019    BUN 8 03/10/2020    BUN 11 11/21/2019    CR 0.70 03/10/2020    CR 0.69 11/21/2019    GFRESTIMATED >60 03/10/2020    GFRESTIMATED >90 11/21/2019    GFRESTBLACK >60 03/10/2020    GFRESTBLACK >90 11/21/2019    CHANI 9.3 03/10/2020    CHANI 9.3 11/21/2019        INR RESULTS:  Lab Results   Component Value Date    INR 1.10 11/21/2019       Procedures:  PULMONARY FUNCTION TESTS:   No flowsheet data found.      ECHOCARDIOGRAM:   No results found for this or  any previous visit (from the past 8760 hour(s)).      Assessment and Plan:   1.  Postural orthostatic tachycardia syndrome-no longer taking medications due to plans for pregnancy  2.  Carlos-Danlos syndrome    Plan  1.  Withhold pharmacological approaches to POTS for the time being-reconsider if symptoms are intolerable  2.  Patient to contact us if she would like low-dose metoprolol trial  3 Follow-up after pregnancy in approximately 1 year-unless #2 needed    Total elapsed time today with chart review, clinic visit and documentation 40 minutes    I very much appreciated the opportunity to see and assess Kaitlynn Mendes in the clinic today. Please do not hesitate to contact my office if you have any questions or concerns.        Please do not hesitate to contact me if you have any questions/concerns.     Sincerely,     Milo Mendoza MD

## 2022-04-09 DIAGNOSIS — G63 POLYNEUROPATHY ASSOCIATED WITH UNDERLYING DISEASE (H): ICD-10-CM

## 2022-04-11 NOTE — TELEPHONE ENCOUNTER
Routing refill request to provider for review/approval because:  Drug not on the [facility] refill protocol   Controlled substance    Last Written Prescription Date:  1/26/2022  Last Fill Quantity: 60,  # refills: 1   Last office visit provider:  1/26/2022 (Yan)     Requested Prescriptions   Pending Prescriptions Disp Refills     gabapentin (NEURONTIN) 300 MG capsule [Pharmacy Med Name: GABAPENTIN 300MG CAPSULES] 60 capsule 1     Sig: TAKE 1 CAPSULE(300 MG) BY MOUTH TWICE DAILY       There is no refill protocol information for this order          Yael Cagle RN 04/11/22 4:28 PM

## 2022-04-12 RX ORDER — GABAPENTIN 300 MG/1
CAPSULE ORAL
Qty: 60 CAPSULE | Refills: 1 | Status: SHIPPED | OUTPATIENT
Start: 2022-04-12 | End: 2022-06-06

## 2022-04-25 ENCOUNTER — TELEPHONE (OUTPATIENT)
Dept: CARDIOLOGY | Facility: CLINIC | Age: 27
End: 2022-04-25
Payer: COMMERCIAL

## 2022-04-25 NOTE — TELEPHONE ENCOUNTER
Called by [facility] for sinus tachycardia 170-180 at 1:20 PM. She felt lightheaded at that time, and episode lasted 60 seconds. No other episodes since.  Attempted to call patient and spouse (numbers in the chart) multiple times, was sent to voice mail.    Will forward message to Dr. Reji Cheema MD  Cardiovascular disease fellow  [facility]  [phone]  04/25/2022 6:52 PM

## 2022-04-29 ENCOUNTER — TELEPHONE (OUTPATIENT)
Dept: CARDIOLOGY | Facility: CLINIC | Age: 27
End: 2022-04-29
Payer: COMMERCIAL

## 2022-04-29 NOTE — TELEPHONE ENCOUNTER
[facility] called, states pt met urgent criteria for tachycardia. Sinus Tachycardia with Heart Rate greater than 170-180bpm, they state that pt also felt SOB dizzy and heart racing at the time. Snapshot of [facility] reading below. Will route to EP-NP.   Guadalupe Rao RN on 4/29/2022 at 4:02 PM

## 2022-04-29 NOTE — TELEPHONE ENCOUNTER
Regine Li APRN CNP McDonald, Dana L, RN  Caller: Unspecified (Today,  4:06 PM)  No urgent intervention needed. Can wait for the final biotel monitor to be completed and then review all with Dr. Mendoza. He did offer pt PRN metoprolol for symptoms which can be considered but her desire to try to get pregnant also has to be considered. However, we can use some of these medications in pregnancy.

## 2022-05-05 DIAGNOSIS — G90.A POTS (POSTURAL ORTHOSTATIC TACHYCARDIA SYNDROME): ICD-10-CM

## 2022-10-15 ENCOUNTER — HEALTH MAINTENANCE LETTER (OUTPATIENT)
Age: 27
End: 2022-10-15

## 2022-10-27 ENCOUNTER — TELEPHONE (OUTPATIENT)
Dept: CARDIOLOGY | Facility: CLINIC | Age: 27
End: 2022-10-27

## 2022-11-08 ENCOUNTER — TELEPHONE (OUTPATIENT)
Dept: CARDIOLOGY | Facility: CLINIC | Age: 27
End: 2022-11-08

## 2022-11-08 NOTE — TELEPHONE ENCOUNTER
Left Voicemail (2nd Attempt) and Sent Mychart (2nd Attempt) for the patient to call back and schedule the following:    Appointment type: Cardiology- Return EP  Provider: Reji  Return date: 02/23/23  Specialty phone number: [phone]  Additional appointment(s) needed: none  Additonal Notes: none    Katie Cisneros, Visit Facilitator/MA.

## 2022-11-23 DIAGNOSIS — G63 POLYNEUROPATHY ASSOCIATED WITH UNDERLYING DISEASE (H): ICD-10-CM

## 2022-11-23 RX ORDER — GABAPENTIN 300 MG/1
CAPSULE ORAL
Qty: 180 CAPSULE | Refills: 1 | Status: SHIPPED | OUTPATIENT
Start: 2022-11-23 | End: 2024-09-25

## 2023-03-02 ENCOUNTER — TELEPHONE (OUTPATIENT)
Dept: ORTHOPEDICS | Facility: CLINIC | Age: 28
End: 2023-03-02
Payer: COMMERCIAL

## 2023-03-02 NOTE — TELEPHONE ENCOUNTER
Writer called and scheduled pt an appointment with Dr. Schwartz, per Dr. Olivarez.    Lisa Shelton LPN

## 2023-03-02 NOTE — TELEPHONE ENCOUNTER
[facility] Call Center    Phone Message    May a detailed message be left on voicemail: yes     Reason for Call: Other: Please had someone from the surgery scheduling team reach out to patient as she's ready to schedule procedure.     Action Taken: Message routed to:  [facility]o    Travel Screening: Not Applicable

## 2023-03-26 ENCOUNTER — HEALTH MAINTENANCE LETTER (OUTPATIENT)
Age: 28
End: 2023-03-26

## 2023-04-18 ENCOUNTER — ANCILLARY PROCEDURE (OUTPATIENT)
Dept: GENERAL RADIOLOGY | Facility: CLINIC | Age: 28
End: 2023-04-18
Attending: ORTHOPAEDIC SURGERY
Payer: COMMERCIAL

## 2023-04-18 ENCOUNTER — OFFICE VISIT (OUTPATIENT)
Dept: ORTHOPEDICS | Facility: CLINIC | Age: 28
End: 2023-04-18
Payer: COMMERCIAL

## 2023-04-18 VITALS — BODY MASS INDEX: 20.76 KG/M2 | WEIGHT: 137 LBS | HEIGHT: 68 IN

## 2023-04-18 DIAGNOSIS — M25.562 PAIN IN BOTH KNEES, UNSPECIFIED CHRONICITY: ICD-10-CM

## 2023-04-18 DIAGNOSIS — M25.562 PAIN IN BOTH KNEES, UNSPECIFIED CHRONICITY: Primary | ICD-10-CM

## 2023-04-18 DIAGNOSIS — G89.29 CHRONIC PAIN OF LEFT KNEE: Primary | ICD-10-CM

## 2023-04-18 DIAGNOSIS — M25.561 PAIN IN BOTH KNEES, UNSPECIFIED CHRONICITY: Primary | ICD-10-CM

## 2023-04-18 DIAGNOSIS — M25.562 CHRONIC PAIN OF LEFT KNEE: Primary | ICD-10-CM

## 2023-04-18 DIAGNOSIS — M25.561 PAIN IN BOTH KNEES, UNSPECIFIED CHRONICITY: ICD-10-CM

## 2023-04-18 DIAGNOSIS — Z98.890 S/P KNEE SURGERY: Primary | ICD-10-CM

## 2023-04-18 PROCEDURE — 99213 OFFICE O/P EST LOW 20 MIN: CPT | Performed by: ORTHOPAEDIC SURGERY

## 2023-04-18 PROCEDURE — 73560 X-RAY EXAM OF KNEE 1 OR 2: CPT | Mod: RT | Performed by: RADIOLOGY

## 2023-04-18 PROCEDURE — 77073 BONE LENGTH STUDIES: CPT | Performed by: STUDENT IN AN ORGANIZED HEALTH CARE EDUCATION/TRAINING PROGRAM

## 2023-04-18 NOTE — LETTER
4/18/2023         RE: Kaitlynn Mendes  [address]  [place] [address]        Dear Colleague,    Thank you for referring your patient, Kaitlynn Mendes, to the [facility] [place]. Please see a copy of my visit note below.    Pre-Op Teaching was done in person at the clinic.    Teaching Flowsheet   Relevant Diagnosis: Hardware removal  Teaching Topic: Pre-Operative Teaching     Person(s) involved in teaching:   Patient and Spouse     Motivation Level:  Asks Questions: Yes  Eager to Learn: Yes  Cooperative: Yes  Receptive (willing/able to accept information): Yes  Any cultural factors/Denominational beliefs that may influence understanding or compliance? No     Patient demonstrates understanding of the following:  Reason for the appointment, diagnosis and treatment plan: Yes  Knowledge of proper use of medications and conditions for which they are ordered (with special attention to potential side effects or drug interactions): Yes  Which situations necessitate calling provider and whom to contact: Yes- discussed the stoplight tool to help assist with this.      Teaching Concerns Addressed:      Proper use of surgical scrub explain and provided to patient.    Nutritional needs and diet plan: Yes  Pain management techniques: Yes  Wound Care: Yes  How and/when to access community resources: Yes     Instructional Materials Used/Given:  a surgery packet and surgical soap given to patient in clinic     - Important contact info/ phone numbers  - Map/ location of surgery  - Medications to avoid  - Showering instructions  - Stop light tool    Additionally the following was discussed with patient:  - Patient's spouse will be driving the patient to surgery and staying with them for 24 hours.        -Next step: Schedule a surgery date and schedule a Pre-Op appointment with PCP    Time spent with patient: 15 minutes.     Tara Holter, RNCC Elizabeth Anne Arendt, MD  Professor Orthopedic  Surgery  [facility]      Patient is a 28-year-old female who is status post derotation of her left and right tibia by Dr. Calvo.  Her right side was done in 217.  This was a derotation only and a scope.  The scope showed no cartilage wear.  Her metal has subsequently been removed    On her left side she had a tibial derotation osteotomy with the distalization of the tibial tubercle.  This tibial tubercle distalization was secondary to chronic knee swelling and irritation of the proximal patella tendon.  She healed well with that side and feels that both sides have had significant improvement in regards to her pain and function.    She does partake in significant activity and is able to go hiking and routinely goes to the dog park for long walks.  She has a job where she climbs a lot of steps and this is never a problem for her.    She does have some pins and needle and some burning sensation on the lateral leg and the dorsal aspect of her left leg.  This has been treated successfully with Neurontin, which she currently takes 300 mg twice daily.  She is slowly weaning off of this as she and her  would like to start to consider having children.    She had discussed taking the metal out with Dr. Calvo in the past, Dr. Calvo wanted to have a visit with me to see if there was any issues or further concerns about her left patellofemoral symptoms.  As far as the patient is concerned she has no current concerns about left knee pain or swelling.  She is never had any issues with instability or the lack of knee confidence.    Physical exam reveals a woman of thin body build.  BMI computing to 21  Examination of patient's left knee reveals well-healed surgical incisions.  Satisfactory alignment on visual exam.  Good straight leg raising effort without a lag kneecap tracks well through an active arc of motion without crepitus.  Passive patella mobility 1+ quadrant lateral mobility with a firm  endpoint.    Assessment: Excellent postoperative results in regards to her left surgery including derotation tibial osteotomy and distalization of the tibial tubercle osteotomy for symptoms of pain and swelling, not patella instability.    Plan: I see no reason not to proceed with removal of metal.  I would advise that she take out the screws in the tibial tubercle as well if they are easily accessible, as long as she is undergoing surgery for metal out.  This will be done by Dr. Calvo who did see her briefly with me today.    Di Schwartz MD  Professor Orthopedic Surgery  [facility]

## 2023-04-18 NOTE — PROGRESS NOTES
Pre-Op Teaching was done in person at the clinic.    Teaching Flowsheet   Relevant Diagnosis: Hardware removal  Teaching Topic: Pre-Operative Teaching     Person(s) involved in teaching:   Patient and Spouse     Motivation Level:  Asks Questions: Yes  Eager to Learn: Yes  Cooperative: Yes  Receptive (willing/able to accept information): Yes  Any cultural factors/Mu-ism beliefs that may influence understanding or compliance? No     Patient demonstrates understanding of the following:  Reason for the appointment, diagnosis and treatment plan: Yes  Knowledge of proper use of medications and conditions for which they are ordered (with special attention to potential side effects or drug interactions): Yes  Which situations necessitate calling provider and whom to contact: Yes- discussed the stoplight tool to help assist with this.      Teaching Concerns Addressed:      Proper use of surgical scrub explain and provided to patient.    Nutritional needs and diet plan: Yes  Pain management techniques: Yes  Wound Care: Yes  How and/when to access community resources: Yes     Instructional Materials Used/Given:  a surgery packet and surgical soap given to patient in clinic     - Important contact info/ phone numbers  - Map/ location of surgery  - Medications to avoid  - Showering instructions  - Stop light tool    Additionally the following was discussed with patient:  - Patient's spouse will be driving the patient to surgery and staying with them for 24 hours.        -Next step: Schedule a surgery date and schedule a Pre-Op appointment with PCP    Time spent with patient: 15 minutes.     Tara Holter, RNCC Elizabeth Anne Arendt, MD  Professor Orthopedic Surgery  [facility]

## 2023-04-18 NOTE — PROGRESS NOTES
Patient is a 28-year-old female who is status post derotation of her left and right tibia by Dr. Calvo.  Her right side was done in 217.  This was a derotation only and a scope.  The scope showed no cartilage wear.  Her metal has subsequently been removed    On her left side she had a tibial derotation osteotomy with the distalization of the tibial tubercle.  This tibial tubercle distalization was secondary to chronic knee swelling and irritation of the proximal patella tendon.  She healed well with that side and feels that both sides have had significant improvement in regards to her pain and function.    She does partake in significant activity and is able to go hiking and routinely goes to the dog park for long walks.  She has a job where she climbs a lot of steps and this is never a problem for her.    She does have some pins and needle and some burning sensation on the lateral leg and the dorsal aspect of her left leg.  This has been treated successfully with Neurontin, which she currently takes 300 mg twice daily.  She is slowly weaning off of this as she and her  would like to start to consider having children.    She had discussed taking the metal out with Dr. Calvo in the past, Dr. Calvo wanted to have a visit with me to see if there was any issues or further concerns about her left patellofemoral symptoms.  As far as the patient is concerned she has no current concerns about left knee pain or swelling.  She is never had any issues with instability or the lack of knee confidence.    Physical exam reveals a woman of thin body build.  BMI computing to 21  Examination of patient's left knee reveals well-healed surgical incisions.  Satisfactory alignment on visual exam.  Good straight leg raising effort without a lag kneecap tracks well through an active arc of motion without crepitus.  Passive patella mobility 1+ quadrant lateral mobility with a firm endpoint.    Assessment: Excellent postoperative results  in regards to her left surgery including derotation tibial osteotomy and distalization of the tibial tubercle osteotomy for symptoms of pain and swelling, not patella instability.    Plan: I see no reason not to proceed with removal of metal.  I would advise that she take out the screws in the tibial tubercle as well if they are easily accessible, as long as she is undergoing surgery for metal out.  This will be done by Dr. Calvo who did see her briefly with me today.    Di Schwartz MD  Professor Orthopedic Surgery  [facility]

## 2023-04-18 NOTE — NURSING NOTE
"Reason For Visit:   Chief Complaint   Patient presents with     Surgical Followup     Follow up Status post left tibia and fibula derotational osteotomy with tibial tubercle osteotomy performed 12/15/2017 with Dr. Olivarez. Discuss hardware removal       Primary MD: No Ref-Primary, Physician     ?  No  Occupation: Neuro diagnostic tech.  Currently working? Yes.  Work status?  Full time.  Date of surgery: 11/1/17, 12/15/19  Type of surgery: Left Tib/Fib Derotational osteotomy and Distalization of the tibial tubercle. DOS: 12/15/2017.   Right Tib/Fib Derotationl osteotomy. DOS:11/1/2017.  3/29/2019  1.  Right knee arthroscopy.   2.  Removal of locking screw, right proximal tibia.   3.  Removal of locking screw, right distal tibia.   4.  Removal of locking screw, left distal tibia.     Smoker: No  Request smoking cessation information: No    Ht 1.733 m (5' 8.23\")   Wt 62.1 kg (137 lb)   BMI 20.69 kg/m      Pain Assessment  Patient Currently in Pain: Denies (pain with extension and at night)    "

## 2023-04-19 ENCOUNTER — TELEPHONE (OUTPATIENT)
Dept: ORTHOPEDICS | Facility: CLINIC | Age: 28
End: 2023-04-19
Payer: COMMERCIAL

## 2023-04-19 NOTE — TELEPHONE ENCOUNTER
Phoned patient to schedule surgery with Dr Olivarez. I left her my direct number to call back when she is able. [phone]

## 2023-04-20 PROBLEM — M25.562 CHRONIC PAIN OF LEFT KNEE: Status: ACTIVE | Noted: 2023-04-20

## 2023-04-20 PROBLEM — G89.29 CHRONIC PAIN OF LEFT KNEE: Status: ACTIVE | Noted: 2023-04-20

## 2023-04-20 NOTE — TELEPHONE ENCOUNTER
Patient is scheduled for surgery with Dr. Olivarez    Spoke with: Patient    Date of Surgery: 5/10/23    [facility]: [facility]    Post op: 2 & 6 weeks    Pre op with Provider: Complete    H&P: Scheduled with PAC     Additional imaging/appointments: N/A    Surgery packet: Received in clinic     Additional comments: N/A        Teagan Castano MA on 4/20/2023 at 9:24 AM

## 2023-04-21 NOTE — TELEPHONE ENCOUNTER
FUTURE VISIT INFORMATION      SURGERY INFORMATION:    Date: 5/10/23    [facility]    Surgeon:  Dio Olivarez MD    Anesthesia Type:  choice    Procedure: Removal of hardware left lower leg    Consult: ov 23    RECORDS REQUESTED FROM:       Primary Care Provider: Janice    Pertinent Medical History: postural orthostatic tachycardia    Most recent EKG+ Tracin20- [facility]    Most recent ECHO: 10/30/17

## 2023-05-03 ENCOUNTER — PRE VISIT (OUTPATIENT)
Dept: SURGERY | Facility: CLINIC | Age: 28
End: 2023-05-03

## 2023-05-03 ENCOUNTER — VIRTUAL VISIT (OUTPATIENT)
Dept: SURGERY | Facility: CLINIC | Age: 28
End: 2023-05-03
Payer: COMMERCIAL

## 2023-05-03 ENCOUNTER — ANESTHESIA EVENT (OUTPATIENT)
Dept: SURGERY | Facility: AMBULATORY SURGERY CENTER | Age: 28
End: 2023-05-03
Payer: COMMERCIAL

## 2023-05-03 DIAGNOSIS — Z01.818 PREOP EXAMINATION: Primary | ICD-10-CM

## 2023-05-03 PROCEDURE — 99203 OFFICE O/P NEW LOW 30 MIN: CPT | Mod: VID | Performed by: PHYSICIAN ASSISTANT

## 2023-05-03 RX ORDER — SCOLOPAMINE TRANSDERMAL SYSTEM 1 MG/1
1 PATCH, EXTENDED RELEASE TRANSDERMAL ONCE
Status: CANCELLED | OUTPATIENT
Start: 2023-05-03 | End: 2023-05-03

## 2023-05-03 ASSESSMENT — LIFESTYLE VARIABLES: TOBACCO_USE: 0

## 2023-05-03 ASSESSMENT — ENCOUNTER SYMPTOMS: SEIZURES: 0

## 2023-05-03 NOTE — PATIENT INSTRUCTIONS
Preparing for Your Surgery      Name:  Kaitlynn Mendes   MRN:  [id number]   :  1995   Today's Date:  5/3/2023         Arriving for surgery:  Surgery date:  05/10/2023  Arrival time:  10:15 am    Restrictions due to COVID 19:    Effective 2022:  2 visitors may accompany patient and wait in the Surgery Waiting Room.  Please maintain social distance.  Masking is optional      parking is available for anyone with mobility limitations or disabilities. (Monday- Friday 7 am- 5 pm)    Please come to:    [facility]  [address] [place] [phone]    Please check in on the 5th floor at the Ambulatory Surgery Center.      What can I eat or drink?    -  You may eat and drink normally until 8 hours prior to arrival  time. (Until 2 am)  -  You may have clear liquids until 2 hours prior to arrival  time. (Until 8:15 am)    Examples of clear liquids:  Water  Clear broth  Juices (apple, white grape, white cranberry  and cider) without pulp  Noncarbonated, powder based beverages  (lemonade and Ketan-Aid)  Sodas (Sprite, 7-Up, ginger ale and seltzer)  Coffee or tea (without milk or cream)  Gatorade    --No alcohol for at least 24 hours before surgery.    Which medicines can I take?    Hold Aspirin ((Excedrin PM) for 7 days before surgery.     Hold Multivitamins for 7 days before surgery.  Hold Supplements for 7 days before surgery.  Hold Ibuprofen (Advil, Motrin) for 1 day before surgery--unless otherwise directed by surgeon.    Hold Naproxen (Aleve) for 4 days before surgery.        -  PLEASE TAKE the following medications the day of surgery:   Acetaminophen (tylenol) if needed  Gabapentin       How do I prepare myself?  - Please take 2 showers (one the night prior to surgery and one the morning of surgery) using Scrubcare or Hibiclens soap.    Use this soap only from the neck to your toes.     Leave the soap on your skin for one minute--then rinse thoroughly.      You may  use your own shampoo and conditioner. No other hair products.   - Please remove all jewelry and body piercings.  - No lotions, deodorants or fragrance.  - No makeup or fingernail polish.   - Bring your ID and insurance card.    -If you have a Deep Brain Stimulator, a Spinal Cord Stimulator, or any implanted Neuro Device, you must bring the remote to the Surgery Center.         ALL PATIENTS ARE REQUIRED TO HAVE A RESPONSIBLE ADULT TO DRIVE AND BE IN ATTENDANCE WITH THEM FOR 24 HOURS FOLLOWING SURGERY.     Covid testing policy as of 12/06/2022  Your surgeon will notify and schedule you for a COVID test if one is needed before surgery--please direct any questions or COVID symptoms to your surgeon      Questions or Concerns:    -For questions regarding the day of surgery, please contact the Ambulatory Surgery Center at [phone].    -If you have health changes between today and your surgery, please contact your surgeon.     - For questions after surgery, please contact your surgeon's office.

## 2023-05-03 NOTE — PROGRESS NOTES
Kaitlynn is a 28 year old who is being evaluated via a billable video visit.      How would you like to obtain your AVS? Simi Lynch   Kaitlynn is a 28 year old, presenting for the following health issues:  Pre-Op Exam    HPI           Review of Systems       Physical Exam     LEONARDO Ramirez LPN

## 2023-05-03 NOTE — H&P
Pre-Operative H & P     CC:  Preoperative exam to assess for increased cardiopulmonary risk while undergoing surgery and anesthesia.    Date of Encounter: 5/3/2023  Primary Care Physician:  Evie Heredia     Reason for Visit: Chronic pain of left knee    HPI  Kaitlynn Mendes is a 28 year old female who presents for pre-operative H & P in preparation for  Procedure Information     Case: [id number] Date/Time: 05/10/23 1140    Procedure: Removal of hardware left lower leg (Left: Ankle)    Anesthesia type: Choice    Diagnosis: Chronic pain of left knee [M25.562, G89.29]    Pre-op diagnosis: Chronic pain of left knee [M25.562, G89.29]    Location: [place] [address] / [facility]    Providers: Dio Olivarez MD          Patient is being evaluated for comorbid conditions of migraines, RLS, depression, kidney stone, skeletal dysplasia, Carlos-Danlos, POTS.     Ms. Mendes is s/p derotation of her left and right tibia by Dr. Calvo.  Her right side was done in 2017.  The metal has subsequently been removed on the right side. On her left side she had a tibial derotation osteotomy with the distalization of the tibial tubercle.  She does have some pins and needle and some burning sensation on the lateral leg and the dorsal aspect of her left leg.  This has been treated successfully with Neurontin, which she currently takes 300 mg twice daily.  She is slowly weaning off of this as she and her  would like to start to consider having children. She now presents for the above procedure.    History was obtained from patient & chart review.     Hx of abnormal bleeding or anti-platelet use: denies    Menstrual history: Patient's last menstrual period was 04/12/2023.:       Past Medical History  Past Medical History:   Diagnosis Date     Chronic pain of left knee      Carlos-Danlos syndrome      Motion sickness      POTS (postural orthostatic tachycardia syndrome)       Restless legs syndrome      Skeletal dysplasia        Past Surgical History  Past Surgical History:   Procedure Laterality Date     ARTHROSCOPY KNEE Right 3/29/2019    Procedure: Right Knee Arthroscopy;  Surgeon: Di Schwartz MD;  [facility]: [facility]     ARTHROSCOPY KNEE WITH TIBIAL TUBERCLE SHIFT Left 12/15/2017    Procedure: ARTHROSCOPY KNEE WITH TIBIAL TUBERCLE SHIFT;;  Surgeon: Di Schwartz MD;  Location: [facility] OR     OPEN REDUCTION INTERNAL FIXATION RODDING INTRAMEDULLARY TIBIA Left 12/15/2017    Procedure: OPEN REDUCTION INTERNAL FIXATION RODDING INTRAMEDULLARY TIBIA;  Left Knee Arthroscopy, Tibial Derotational Osteotomy with Intermedullary Rodding, Fibular Derotational Osteotmy, Distal Tibial Tubercle Osteotomy;  Surgeon: Dio Olivarez MD;  Location: [facility] OR     REMOVE HARDWARE LOWER EXTREMITY BILATERAL Bilateral 3/29/2019    Procedure: Bilateral Lower Leg Hardware Removal;  Surgeon: Di Schwartz MD;  [facility]: [facility] OR       Prior to Admission Medications  Current Outpatient Medications   Medication Sig Dispense Refill     acetaminophen (TYLENOL) 325 MG tablet Take 2 tablets (650 mg) by mouth every 4 hours as needed for other (mild pain) 100 tablet 0     diphenhydrAMINE-APAP, sleep, (EXCEDRIN PM PO) Take by mouth daily as needed       gabapentin (NEURONTIN) 300 MG capsule TAKE ONE CAPSULE BY MOUTH TWICE DAILY (Patient taking differently: Take 200 mg by mouth 2 times daily) 180 capsule 1     Naproxen Sodium (ALEVE PO) Take 2 tablets by mouth every 12 hours as needed for moderate pain       Prenatal Vit-Fe Fumarate-FA (PRENATAL MULTIVITAMIN W/IRON) 27-0.8 MG tablet Take 1 tablet by mouth every morning         Allergies  Allergies   Allergen Reactions     Egg [Chicken-Derived Products (Egg)] Anaphylaxis     Amoxicillin Hives     Zithromax [Azithromycin] Nausea and Vomiting     Compazine [Prochlorperazine] Other (See Comments)     delirious       Social History  Social History      Socioeconomic History     Marital status:      Spouse name: Not on file     Number of children: Not on file     Years of education: Not on file     Highest education level: Not on file   Occupational History     Not on file   Tobacco Use     Smoking status: Never     Passive exposure: Never     Smokeless tobacco: Never   Vaping Use     Vaping status: Not on file   Substance and Sexual Activity     Alcohol use: No     Drug use: No     Sexual activity: Yes     Partners: Male   Other Topics Concern     Not on file   Social History Narrative     Not on file     Social Determinants of Health     Financial Resource Strain: Not on file   Food Insecurity: Not on file   Transportation Needs: Not on file   Physical Activity: Not on file   Stress: Not on file   Social Connections: Not on file   Intimate Partner Violence: Not on file   Housing Stability: Not on file       Family History  Family History   Problem Relation Age of Onset     Osteoporosis Mother      Gastrointestinal Disease Mother         gastroparesis     Deep Vein Thrombosis (DVT) Father      Lupus Father      Anesthesia Reaction No family hx of        Review of Systems  The complete review of systems is negative other than noted in the HPI or here.     Anesthesia Evaluation   Pt has had prior anesthetic.     History of anesthetic complications  - motion sickness.      ROS/MED HX  ENT/Pulmonary: Comment: Had pneumonia 8/2022 requiring steroids & albuterol. Completely resolved.   (-) tobacco use, sleep apnea and recent URI   Neurologic: Comment: RLS    (+) migraines,  (-) no seizures and no CVA   Cardiovascular: Comment: POTS    Baseline -110. Increases to 190 bpm when walking around house, cooking, etc. Followed by Dr. Mendoza. Holter showed runs of tachycardia 9 months ago. Has wheezing w/ propanolol. Manages w/ electrolyte/ fluid monitoring & dietary salt.  Uses constrictive undergarments from toes to waist daily.        (+) -----Previous  cardiac testing   Echo: Date: 2017 Results:  Global and regional left ventricular function is normal with an EF of 55-60%.  Global right ventricular function is normal.  No significant valvular abnormalities were noted.  The inferior vena cava was normal in size with preserved respiratory  variability.  No pericardial effusion is present.  Previous study not available for comparison.    Stress Test: Date: Results:    ECG Reviewed: Date: Results:    Cath: Date: Results:      METS/Exercise Tolerance: 3 - Able to walk 1-2 blocks without stopping Comment: Able to vacuum & clean home, but rests intermittently due to tachycardia.   Hematologic:  - neg hematologic  ROS  (-) history of blood clots and history of blood transfusion   Musculoskeletal: Comment: Carlos-Danlos syndrome; hyperflexible. Femurs are slightly rotated, has hip pain.    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             Virtual visit -  No vitals were obtained    Physical Exam  Constitutional: Awake, alert, cooperative, no apparent distress, and appears stated age.  HENT: Normocephalic  Respiratory: non labored breathing   Neurologic: Awake, alert, oriented to name, place and time.   Neuropsychiatric: Calm, cooperative. Normal affect.      Prior Labs/Diagnostic Studies   All labs and imaging personally reviewed     Echocardiogram - please see in ROS above     XR KNEE BILATERAL 1/2 VIEWS  4/18/2023  IMPRESSION: Bilateral patella mt with preservation of  patellofemoral  joint spaces.      The patient's records and results personally reviewed by this provider.     Labs ordered for DOS:  BMP, CBC      Assessment      Kaitlynn Mendes is a 28 year old female seen as a PAC referral for risk assessment and optimization for anesthesia.    Plan/Recommendations  Pt will be optimized for the proposed procedure.  See below for details on the assessment, risk, and preoperative recommendations    NEUROLOGY  - No history of TIA, CVA or seizure  - Migraines, takes Excedrin prn  - Motion sickness, scopolamine patch ordered for DOS  -Post Op delirium risk factors:  No risk identified    ENT  - No current airway concerns.  Will need to be reassessed day of surgery.  Mallampati: Unable to assess  TM: Unable to assess    CARDIAC  - POTS.  Baseline -110. Increases to 190 bpm when walking around house, cooking, etc. Followed by Dr. Mendoza. Holter showed runs of tachycardia 9 months ago. Has wheezing w/ propanolol. Manages w/ electrolyte/ fluid monitoring & dietary salt.  Uses constrictive undergarments from toes to waist daily.    - METS (Metabolic Equivalents)  Able to vacuum & clean home, but rests intermittently due to tachycardia.    Patient CANNOT perform 4 METS exercise without symptoms            Total Score: 1    Functional Capacity: Unable to complete 4 METS      RCRI-Very low risk: Class 1 0.4% complication rate            Total Score: 0        PULMONARY  - Had pneumonia 8/2022 requiring steroids & albuterol. Completely resolved.    ZARIA Low Risk            Total Score: 0      - Denies asthma or inhaler use  - Tobacco History      History   Smoking Status     Never   Smokeless Tobacco     Never       GI  - GERD, takes prilosec prn   - Gastroparesis    PONV High Risk  Total Score: 4           1 AN PONV: Pt is Female    1 AN PONV: Patient is not a current smoker    1 AN PONV: Patient has history of PONV    1 AN PONV: Intended Post Op Opioids          ENDOCRINE    -  "BMI: Estimated body mass index is 20.69 kg/m  as calculated from the following:    Height as of 4/18/23: 1.733 m (5' 8.23\").    Weight as of 4/18/23: 62.1 kg (137 lb).  Healthy Weight (BMI 18.5-24.9)  - No history of Diabetes Mellitus    HEME  VTE Low Risk 0.5%            Total Score: 4    VTE: Family Hx of VTE      - No history of abnormal bleeding or antiplatelet use.      MSK  - Carlos-Danlos syndrome; hyperflexible. Femurs are slightly rotated, has hip pain.  - s/p derotation of B/L tibias  - Skeletal dysplasia  - Recommend careful positioning throughout the perioperative period to prevent injury or exacerbation of pain.        The patient is aware that the final anesthesia plan will be decided by the assigned anesthesia provider on the date of service.    The patient is optimized for their procedure. AVS with information on surgery time/arrival time, meds and NPO status given by nursing staff. No further diagnostic testing indicated.    Please refer to the physical examination documented by the anesthesiologist in the anesthesia record on the day of surgery.    Video-Visit Details    Type of service:  Video Visit    Provider received verbal consent for a Video Visit from the patient? Yes     Originating Location (pt. Location): Home    Distant Location (provider location):  Off-site  Mode of Communication:  Video Conference via [employer]  On the day of service:     Prep time: 13 minutes  Visit time: 16 minutes  Documentation time: 15 minutes  ------------------------------------------  Total time: 44 minutes      America Heaton PA-C  Preoperative Assessment Center  [facility]  Clinic and Surgery Center  Phone: [phone]  Fax: [phone]  "

## 2023-05-08 RX ORDER — OXYCODONE HYDROCHLORIDE 5 MG/1
5 TABLET ORAL
Status: CANCELLED | OUTPATIENT
Start: 2023-05-08

## 2023-05-08 RX ORDER — OXYCODONE HYDROCHLORIDE 5 MG/1
10 TABLET ORAL
Status: CANCELLED | OUTPATIENT
Start: 2023-05-08

## 2023-05-10 ENCOUNTER — ANCILLARY PROCEDURE (OUTPATIENT)
Dept: RADIOLOGY | Facility: AMBULATORY SURGERY CENTER | Age: 28
End: 2023-05-10
Attending: ORTHOPAEDIC SURGERY
Payer: COMMERCIAL

## 2023-05-10 ENCOUNTER — TELEPHONE (OUTPATIENT)
Dept: ORTHOPEDICS | Facility: CLINIC | Age: 28
End: 2023-05-10

## 2023-05-10 ENCOUNTER — ANESTHESIA (OUTPATIENT)
Dept: SURGERY | Facility: AMBULATORY SURGERY CENTER | Age: 28
End: 2023-05-10
Payer: COMMERCIAL

## 2023-05-10 ENCOUNTER — HOSPITAL ENCOUNTER (OUTPATIENT)
Facility: AMBULATORY SURGERY CENTER | Age: 28
Discharge: HOME | End: 2023-05-10
Attending: ORTHOPAEDIC SURGERY
Payer: COMMERCIAL

## 2023-05-10 VITALS
TEMPERATURE: 98 F | RESPIRATION RATE: 16 BRPM | HEART RATE: 85 BPM | OXYGEN SATURATION: 99 % | BODY MASS INDEX: 20.46 KG/M2 | DIASTOLIC BLOOD PRESSURE: 88 MMHG | HEIGHT: 68 IN | WEIGHT: 135 LBS | SYSTOLIC BLOOD PRESSURE: 105 MMHG

## 2023-05-10 DIAGNOSIS — M25.562 CHRONIC PAIN OF LEFT KNEE: ICD-10-CM

## 2023-05-10 DIAGNOSIS — Z98.890 S/P HARDWARE REMOVAL: ICD-10-CM

## 2023-05-10 DIAGNOSIS — G89.29 CHRONIC PAIN OF LEFT KNEE: ICD-10-CM

## 2023-05-10 LAB
HCG UR QL: NEGATIVE
INTERNAL QC OK POCT: NORMAL
POCT KIT EXPIRATION DATE: NORMAL
POCT KIT LOT NUMBER: NORMAL

## 2023-05-10 PROCEDURE — 81025 URINE PREGNANCY TEST: CPT | Performed by: PATHOLOGY

## 2023-05-10 PROCEDURE — 20680 REMOVAL OF IMPLANT DEEP: CPT | Mod: LT

## 2023-05-10 PROCEDURE — 20680 REMOVAL OF IMPLANT DEEP: CPT | Mod: LT | Performed by: ORTHOPAEDIC SURGERY

## 2023-05-10 RX ORDER — FLUMAZENIL 0.1 MG/ML
0.2 INJECTION, SOLUTION INTRAVENOUS
Status: DISCONTINUED | OUTPATIENT
Start: 2023-05-10 | End: 2023-05-10 | Stop reason: HOSPADM

## 2023-05-10 RX ORDER — FENTANYL CITRATE 50 UG/ML
INJECTION, SOLUTION INTRAMUSCULAR; INTRAVENOUS PRN
Status: DISCONTINUED | OUTPATIENT
Start: 2023-05-10 | End: 2023-05-10

## 2023-05-10 RX ORDER — LIDOCAINE 40 MG/G
CREAM TOPICAL
Status: DISCONTINUED | OUTPATIENT
Start: 2023-05-10 | End: 2023-05-10 | Stop reason: HOSPADM

## 2023-05-10 RX ORDER — OXYCODONE HYDROCHLORIDE 5 MG/1
5-10 TABLET ORAL EVERY 4 HOURS PRN
Qty: 16 TABLET | Refills: 0 | Status: SHIPPED | OUTPATIENT
Start: 2023-05-10 | End: 2024-01-02

## 2023-05-10 RX ORDER — SODIUM CHLORIDE, SODIUM LACTATE, POTASSIUM CHLORIDE, CALCIUM CHLORIDE 600; 310; 30; 20 MG/100ML; MG/100ML; MG/100ML; MG/100ML
INJECTION, SOLUTION INTRAVENOUS CONTINUOUS
Status: DISCONTINUED | OUTPATIENT
Start: 2023-05-10 | End: 2023-05-11 | Stop reason: HOSPADM

## 2023-05-10 RX ORDER — ONDANSETRON 2 MG/ML
4 INJECTION INTRAMUSCULAR; INTRAVENOUS EVERY 30 MIN PRN
Status: DISCONTINUED | OUTPATIENT
Start: 2023-05-10 | End: 2023-05-11 | Stop reason: HOSPADM

## 2023-05-10 RX ORDER — BUPIVACAINE HYDROCHLORIDE 2.5 MG/ML
INJECTION, SOLUTION EPIDURAL; INFILTRATION; INTRACAUDAL
Status: COMPLETED | OUTPATIENT
Start: 2023-05-10 | End: 2023-05-10

## 2023-05-10 RX ORDER — ACETAMINOPHEN 325 MG/1
650 TABLET ORAL
Status: DISCONTINUED | OUTPATIENT
Start: 2023-05-10 | End: 2023-05-11 | Stop reason: HOSPADM

## 2023-05-10 RX ORDER — BUPIVACAINE HYDROCHLORIDE 5 MG/ML
INJECTION, SOLUTION PERINEURAL PRN
Status: DISCONTINUED | OUTPATIENT
Start: 2023-05-10 | End: 2023-05-10 | Stop reason: HOSPADM

## 2023-05-10 RX ORDER — ACETAMINOPHEN 325 MG/1
975 TABLET ORAL ONCE
Status: COMPLETED | OUTPATIENT
Start: 2023-05-10 | End: 2023-05-10

## 2023-05-10 RX ORDER — DEXAMETHASONE SODIUM PHOSPHATE 4 MG/ML
INJECTION, SOLUTION INTRA-ARTICULAR; INTRALESIONAL; INTRAMUSCULAR; INTRAVENOUS; SOFT TISSUE PRN
Status: DISCONTINUED | OUTPATIENT
Start: 2023-05-10 | End: 2023-05-10

## 2023-05-10 RX ORDER — ONDANSETRON 2 MG/ML
INJECTION INTRAMUSCULAR; INTRAVENOUS PRN
Status: DISCONTINUED | OUTPATIENT
Start: 2023-05-10 | End: 2023-05-10

## 2023-05-10 RX ORDER — LABETALOL HYDROCHLORIDE 5 MG/ML
10 INJECTION, SOLUTION INTRAVENOUS
Status: DISCONTINUED | OUTPATIENT
Start: 2023-05-10 | End: 2023-05-11 | Stop reason: HOSPADM

## 2023-05-10 RX ORDER — OXYCODONE HYDROCHLORIDE 5 MG/1
5 TABLET ORAL
Status: COMPLETED | OUTPATIENT
Start: 2023-05-10 | End: 2023-05-10

## 2023-05-10 RX ORDER — NALOXONE HYDROCHLORIDE 0.4 MG/ML
0.2 INJECTION, SOLUTION INTRAMUSCULAR; INTRAVENOUS; SUBCUTANEOUS
Status: DISCONTINUED | OUTPATIENT
Start: 2023-05-10 | End: 2023-05-10 | Stop reason: HOSPADM

## 2023-05-10 RX ORDER — CEFAZOLIN SODIUM 2 G/50ML
2 SOLUTION INTRAVENOUS
Status: COMPLETED | OUTPATIENT
Start: 2023-05-10 | End: 2023-05-10

## 2023-05-10 RX ORDER — FENTANYL CITRATE 50 UG/ML
50 INJECTION, SOLUTION INTRAMUSCULAR; INTRAVENOUS ONCE
Status: COMPLETED | OUTPATIENT
Start: 2023-05-10 | End: 2023-05-10

## 2023-05-10 RX ORDER — PROPOFOL 10 MG/ML
INJECTION, EMULSION INTRAVENOUS CONTINUOUS PRN
Status: DISCONTINUED | OUTPATIENT
Start: 2023-05-10 | End: 2023-05-10

## 2023-05-10 RX ORDER — SCOLOPAMINE TRANSDERMAL SYSTEM 1 MG/1
1 PATCH, EXTENDED RELEASE TRANSDERMAL ONCE
Status: DISCONTINUED | OUTPATIENT
Start: 2023-05-10 | End: 2023-05-11 | Stop reason: HOSPADM

## 2023-05-10 RX ORDER — NALOXONE HYDROCHLORIDE 0.4 MG/ML
0.4 INJECTION, SOLUTION INTRAMUSCULAR; INTRAVENOUS; SUBCUTANEOUS
Status: DISCONTINUED | OUTPATIENT
Start: 2023-05-10 | End: 2023-05-10 | Stop reason: HOSPADM

## 2023-05-10 RX ORDER — HYDROMORPHONE HYDROCHLORIDE 1 MG/ML
0.2 INJECTION, SOLUTION INTRAMUSCULAR; INTRAVENOUS; SUBCUTANEOUS EVERY 5 MIN PRN
Status: DISCONTINUED | OUTPATIENT
Start: 2023-05-10 | End: 2023-05-11 | Stop reason: HOSPADM

## 2023-05-10 RX ORDER — SODIUM CHLORIDE, SODIUM LACTATE, POTASSIUM CHLORIDE, CALCIUM CHLORIDE 600; 310; 30; 20 MG/100ML; MG/100ML; MG/100ML; MG/100ML
INJECTION, SOLUTION INTRAVENOUS CONTINUOUS
Status: DISCONTINUED | OUTPATIENT
Start: 2023-05-10 | End: 2023-05-10 | Stop reason: HOSPADM

## 2023-05-10 RX ORDER — CEFAZOLIN SODIUM 2 G/50ML
2 SOLUTION INTRAVENOUS SEE ADMIN INSTRUCTIONS
Status: DISCONTINUED | OUTPATIENT
Start: 2023-05-10 | End: 2023-05-10 | Stop reason: HOSPADM

## 2023-05-10 RX ORDER — FENTANYL CITRATE 50 UG/ML
50 INJECTION, SOLUTION INTRAMUSCULAR; INTRAVENOUS EVERY 5 MIN PRN
Status: DISCONTINUED | OUTPATIENT
Start: 2023-05-10 | End: 2023-05-11 | Stop reason: HOSPADM

## 2023-05-10 RX ORDER — FENTANYL CITRATE 50 UG/ML
25-50 INJECTION, SOLUTION INTRAMUSCULAR; INTRAVENOUS
Status: DISCONTINUED | OUTPATIENT
Start: 2023-05-10 | End: 2023-05-10 | Stop reason: HOSPADM

## 2023-05-10 RX ORDER — PROPOFOL 10 MG/ML
INJECTION, EMULSION INTRAVENOUS PRN
Status: DISCONTINUED | OUTPATIENT
Start: 2023-05-10 | End: 2023-05-10

## 2023-05-10 RX ORDER — LIDOCAINE HYDROCHLORIDE 20 MG/ML
INJECTION, SOLUTION INFILTRATION; PERINEURAL PRN
Status: DISCONTINUED | OUTPATIENT
Start: 2023-05-10 | End: 2023-05-10

## 2023-05-10 RX ORDER — HYDROXYZINE HYDROCHLORIDE 25 MG/1
25 TABLET, FILM COATED ORAL
Status: COMPLETED | OUTPATIENT
Start: 2023-05-10 | End: 2023-05-10

## 2023-05-10 RX ORDER — HYDROMORPHONE HYDROCHLORIDE 1 MG/ML
0.4 INJECTION, SOLUTION INTRAMUSCULAR; INTRAVENOUS; SUBCUTANEOUS EVERY 5 MIN PRN
Status: DISCONTINUED | OUTPATIENT
Start: 2023-05-10 | End: 2023-05-11 | Stop reason: HOSPADM

## 2023-05-10 RX ORDER — HYDROXYZINE HYDROCHLORIDE 25 MG/1
25 TABLET, FILM COATED ORAL EVERY 8 HOURS PRN
Qty: 15 TABLET | Refills: 0 | Status: SHIPPED | OUTPATIENT
Start: 2023-05-10 | End: 2024-01-02

## 2023-05-10 RX ORDER — FENTANYL CITRATE 50 UG/ML
25 INJECTION, SOLUTION INTRAMUSCULAR; INTRAVENOUS EVERY 5 MIN PRN
Status: DISCONTINUED | OUTPATIENT
Start: 2023-05-10 | End: 2023-05-11 | Stop reason: HOSPADM

## 2023-05-10 RX ORDER — ONDANSETRON 4 MG/1
4 TABLET, ORALLY DISINTEGRATING ORAL EVERY 30 MIN PRN
Status: DISCONTINUED | OUTPATIENT
Start: 2023-05-10 | End: 2023-05-11 | Stop reason: HOSPADM

## 2023-05-10 RX ADMIN — LIDOCAINE HYDROCHLORIDE 60 MG: 20 INJECTION, SOLUTION INFILTRATION; PERINEURAL at 12:07

## 2023-05-10 RX ADMIN — PROPOFOL 150 MCG/KG/MIN: 10 INJECTION, EMULSION INTRAVENOUS at 12:07

## 2023-05-10 RX ADMIN — FENTANYL CITRATE 50 MCG: 50 INJECTION, SOLUTION INTRAMUSCULAR; INTRAVENOUS at 11:25

## 2023-05-10 RX ADMIN — DEXAMETHASONE SODIUM PHOSPHATE 4 MG: 4 INJECTION, SOLUTION INTRA-ARTICULAR; INTRALESIONAL; INTRAMUSCULAR; INTRAVENOUS; SOFT TISSUE at 12:52

## 2023-05-10 RX ADMIN — PROPOFOL 140 MG: 10 INJECTION, EMULSION INTRAVENOUS at 12:07

## 2023-05-10 RX ADMIN — CEFAZOLIN SODIUM 2 G: 2 SOLUTION INTRAVENOUS at 12:03

## 2023-05-10 RX ADMIN — BUPIVACAINE HYDROCHLORIDE 10 ML: 2.5 INJECTION, SOLUTION EPIDURAL; INFILTRATION; INTRACAUDAL at 11:20

## 2023-05-10 RX ADMIN — OXYCODONE HYDROCHLORIDE 5 MG: 5 TABLET ORAL at 13:13

## 2023-05-10 RX ADMIN — SODIUM CHLORIDE, SODIUM LACTATE, POTASSIUM CHLORIDE, CALCIUM CHLORIDE: 600; 310; 30; 20 INJECTION, SOLUTION INTRAVENOUS at 12:03

## 2023-05-10 RX ADMIN — ONDANSETRON 4 MG: 2 INJECTION INTRAMUSCULAR; INTRAVENOUS at 12:43

## 2023-05-10 RX ADMIN — ACETAMINOPHEN 975 MG: 325 TABLET ORAL at 11:11

## 2023-05-10 RX ADMIN — FENTANYL CITRATE 50 MCG: 50 INJECTION, SOLUTION INTRAMUSCULAR; INTRAVENOUS at 12:15

## 2023-05-10 RX ADMIN — FENTANYL CITRATE 50 MCG: 50 INJECTION, SOLUTION INTRAMUSCULAR; INTRAVENOUS at 13:06

## 2023-05-10 RX ADMIN — HYDROXYZINE HYDROCHLORIDE 25 MG: 25 TABLET, FILM COATED ORAL at 13:13

## 2023-05-10 RX ADMIN — SCOLOPAMINE TRANSDERMAL SYSTEM 1 PATCH: 1 PATCH, EXTENDED RELEASE TRANSDERMAL at 11:11

## 2023-05-10 NOTE — DISCHARGE INSTRUCTIONS
"[facility]  Home Care Following Anesthesia  For 24 hours after surgery:  Get plenty of rest.  A responsible adult must stay with you for at least 24 hours after you leave the surgery center.  Do not drive or use heavy equipment.  If you have weakness or tingling, don't drive or use heavy equipment until this feeling goes away.   Do not drink alcohol.   Avoid strenuous or risky activities.  Ask for help when climbing stairs.  You may feel lightheaded.  IF so, sit for a few minutes before standing.  Have someone help you get up.   If you have nausea (feel sick to your stomach): Drink only clear liquids such as apple juice, ginger ale, broth or 7-Up.  Rest may also help.  Be sure to drink enough fluids.  Move to a regular diet as you feel able.   You may have a slight fever.  Call the doctor if your fever is over 100 F (37.7 C) (taken under the tongue) or lasts longer than 24 hours.  You may have a dry mouth, a sore throat, muscle aches or trouble sleeping. These should go away after 24 hours.  Do not make important or legal decisions.   It is recommended to avoid smoking.        Today you received a Marcaine or bupivacaine block to numb the nerves near your surgery site.  This is a block using local anesthetic or \"numbing\" medication injected around the nerves to anesthetize or \"numb\" the area supplied by those nerves.  This block is injected into the muscle layer near your surgical site.  The medication may numb the location where you had surgery for 6-18 hours, but may last up to 24 hours.  If your surgical site is an arm or leg you should be careful with your affected limb, since it is possible to injure your limb without being aware of it due to the numbing.  Until full feeling returns, you should guard against bumping or hitting your limb, and avoid extreme hot or cold temperatures on the skin.  As the block wears off, the feeling will return as a tingling or prickly " sensation near your surgical site.  You will experience more discomfort from your incision as the feeling returns.  You may want to take a pain pill (a narcotic or Tylenol if this was prescribed by your surgeon) when you start to experience mild pain before the pain beccomes more severe.  If your pain medications do not control your pain you should notifiy your surgeon.    Tips for taking pain medications  To get the best pain relief possible, remember these points:  Take pain medications as directed, before pain becomes severe.  Pain medication can upset your stomach: taking it with food may help.  Constipation is a common side effect of pain medication. Drink plenty of  fluids.  Eat foods high in fiber. Take a stool softener if recommended by your doctor or pharmacist.  Do not drink alcohol, drive or operate machinery while taking pain medications.  Ask about other ways to control pain, such as with heat, ice or relaxation.    Tylenol/Acetaminophen Consumption  To help encourage the safe use of acetaminophen, the makers of TYLENOL  have lowered the maximum daily dose for single-ingredient Extra Strength TYLENOL  (acetaminophen) products sold in the [place] from 8 pills per day (4,000 mg) to 6 pills per day (3,000 mg). The dosing interval has also changed from 2 pills every 4-6 hours to 2 pills every 6 hours.  If you feel your pain relief is insufficient, you may take Tylenol/Acetaminophen in addition to your narcotic pain medication.   Be careful not to exceed 3,000 mg of Tylenol/Acetaminophen in a 24 hour period from all sources.  If you are taking extra strength Tylenol/acetaminophen (500 mg), the maximum dose is 6 tablets in 24 hours.  If you are taking regular strength acetaminophen (325 mg), the maximum dose is 9 tablets in 24 hours.    975 mg Tylenol taken at 11:11 AM, OK to take additional Tylenol after 5:11 PM. Follow package instructions.    Call a doctor for any of the following:  Signs of infection (fever,  growing tenderness at the surgery site, a large amount of drainage or bleeding, severe pain, foul-smelling drainage, redness, swelling).  It has been over 8 to 10 hours since surgery and you are still not able to urinate (pass water).  Headache for over 24 hours.  Signs of Covid-19 infection (temperature over 100 degrees, shortness of breath, cough, loss of taste/smell, generalized body aches, persistent headache, chills, sore throat, nausea/vomiting/diarrhea)  Your doctor is:   Dr. Dio Olivarez, Orthopaedics: [phone]               Or dial [phone] and ask for the resident on call for:  Orthopaedics  For emergency care, call the:  [facility] Emergency Department: [phone] (TTY for hearing impaired: [phone])

## 2023-05-10 NOTE — ANESTHESIA POSTPROCEDURE EVALUATION
Patient: Kaitlynn Mendes    Procedure: Procedure(s):  Removal of hardware left lower leg       Anesthesia Type:  General    Note:  Disposition: Outpatient   Postop Pain Control: Uneventful            Sign Out: Well controlled pain   PONV: No   Neuro/Psych: Uneventful            Sign Out: Acceptable/Baseline neuro status   Airway/Respiratory: Uneventful            Sign Out: Acceptable/Baseline resp. status   CV/Hemodynamics: Uneventful            Sign Out: Acceptable CV status; No obvious hypovolemia; No obvious fluid overload   Other NRE: NONE   DID A NON-ROUTINE EVENT OCCUR? No           Last vitals:  Vitals Value Taken Time   /88 05/10/23 1315   Temp 36.9  C (98.4  F) 05/10/23 1305   Pulse 100 05/10/23 1319   Resp 8 05/10/23 1319   SpO2 100 % 05/10/23 1319   Vitals shown include unvalidated device data.    Electronically Signed By: Javy Osuna MD, MD  May 10, 2023  1:41 PM

## 2023-05-10 NOTE — ANESTHESIA CARE TRANSFER NOTE
Patient: Kaitlynn Mendes    Procedure: Procedure(s):  Removal of hardware left lower leg       Diagnosis: Chronic pain of left knee [M25.562, G89.29]  Diagnosis Additional Information: No value filed.    Anesthesia Type:   General     Note:    Oropharynx: oropharynx clear of all foreign objects and spontaneously breathing  Level of Consciousness: awake  Oxygen Supplementation: room air    Independent Airway: airway patency satisfactory and stable  Dentition: dentition unchanged  Vital Signs Stable: post-procedure vital signs reviewed and stable  Report to RN Given: handoff report given  Patient transferred to: PACU    Handoff Report: Identifed the Patient, Identified the Reponsible Provider, Reviewed the pertinent medical history, Discussed the surgical course, Reviewed Intra-OP anesthesia mangement and issues during anesthesia, Set expectations for post-procedure period and Allowed opportunity for questions and acknowledgement of understanding      Vitals:  Vitals Value Taken Time   /74 05/10/23 1304   Temp     Pulse 91 05/10/23 1308   Resp 7 05/10/23 1308   SpO2 100 % 05/10/23 1308   Vitals shown include unvalidated device data.    Electronically Signed By: JUANY Doherty CRNA  May 10, 2023  1:09 PM

## 2023-05-10 NOTE — TELEPHONE ENCOUNTER
[facility] Call Center    Phone Message    May a detailed message be left on voicemail: yes     Reason for Call: Other: Pt calling about Bleeding through bandages, would like a call back regarding this     Action Taken: Other: [facility]    Travel Screening: Not Applicable

## 2023-05-10 NOTE — BRIEF OP NOTE
[facility] Brief Operative Note    Pre-operative diagnosis: Chronic pain of left knee [M25.562, G89.29]   Post-operative diagnosis Painful retained hardware   Procedure: Procedure(s):  Removal of hardware left lower leg   Surgeon(s): Surgeon(s) and Role:     * Dio Olivarez MD - Primary     * Edith Rao PA-C - Assisting   Estimated blood loss: 50 cc   Specimens: * No specimens in log *   Findings: See dictated operative note     Plan:  Same Day surgery discharge to home once criteria met.  Soft dressing on left knee.  Oxycodone for pain.  No dressing change on own.  Leave dressing on until 2 weeks follow up appointment.  F/U in clinic in 2 weeks    I was asked by Dr. Olivarez to assist with surgery. I positioned and prepped the patient. I retracted soft tissue.   I suctioned fluids when needed. I provided traction for dissection. I helped to ligate blood vessels. I helped Dr. Olivarez identify and protect important structures. The procedure was medically necessary for an assistant because Dr. Olivarez needed the operative exposure and assistance that I provided. This allowed him to safely and efficiently operate. It was also important that I help ligate blood vessels to maintain hemostasis and reduce the bleeding risk. I helped with the closure of the operative incisions as well as helping with the boot/cast/splint.  The assistance that I provided reduced operative time which meant less general anesthetic for the patient. No qualified residents were available to assist.    Edith Rao PA-C

## 2023-05-10 NOTE — OP NOTE
Procedure Date: 05/10/2023    PREOPERATIVE DIAGNOSIS:  Left lower leg painful retained hardware.    POSTOPERATIVE DIAGNOSIS:  Left lower leg painful retained hardware.    PROCEDURE:  Left lower leg hardware removal including 4 screws and an intramedullary nail.    SURGEON:  Dio Olivarez MD    ASSISTANT:  Edith Rao PA-C    COMPLICATIONS:  None.    DRAINS:  None.    ESTIMATED BLOOD LOSS:  Less than 20 mL.     ANESTHESIA:  General endotracheal.    INDICATIONS:  Please refer to clinic note for further details discussing indications of Ms. Mendes's case.    DESCRIPTION OF PROCEDURE:  On 05/10/2023, the patient was taken to surgery.  Preoperative antibiotics were administered to the patient prior to arrival to the OR.    After successful induction of general endotracheal anesthesia, she was placed supine on the operating table.  The left lower extremity was prepped and draped in sterile fashion.  After exsanguination by gravity, tourniquet cuff was inflated to 300 mmHg on the proximal third of the left thigh.    The pause for the cause was performed according to institution's policy, which confirmed laterality of the procedure.    An incision was made on the previous surgical incision.  We proceeded indication of the 3 screws across the tibial tubercle and 2 of them were extracted quite easily while a third screw was stripped and required to have partial excision of the tibia.  Eventually, the screw head was grasped with a plier and removed in 1 piece.    Through the same incision, we proceeded also with extraction of the most proximal locking screw, which was performed after visualization of the medial cortex of the tibia.    Finally, we proceeded with another incision on the medial border of patellar tendon.  Subcutaneous tissues were dissected.  With identification of the entry point for the nail, which again was along the most anterior and proximal portion of the tibial cortex and after engaging appropriate  and after the nail was extracted in 1 piece.    Copious irrigation was applied.  Tourniquet cuff was deflated.  Satisfactory hemostasis was accomplished.  Wound was closed in layers and sterile dressings were applied.  The patient was placed in a sterile dressing for the left knee and transferred in stable condition to PACU.    PLAN:  The patient will remain weightbearing as tolerated.  The patient will try to minimize knee flexion for the first 2 weeks from surgery.  At that time, sutures will be removed if indicated and she will proceed with no restrictions and a course of physical therapy for knee range of motion exercises and strengthening.    The patient will be granted the possibility for skipping the 6-week appointment, assuming that the leg and the knee are working up to her expectations.  In the presence of any issues and if she wishes to surgery, no x-rays will be obtained during the 6-week appointment.    Dio Olivarez MD        D: 05/10/2023   T: 05/10/2023   MT: meliton    Name:     OSIEL CROFT  MRN:      [id number]        Account:        [id number]   :      1995           Procedure Date: 05/10/2023     Document: [id number]

## 2023-05-10 NOTE — ANESTHESIA PREPROCEDURE EVALUATION
Anesthesia Pre-Procedure Evaluation    Patient: Kaitlynn Mendes   MRN: [id number] : 1995        Procedure : Procedure(s):  Removal of hardware left lower leg          Past Medical History:   Diagnosis Date     Chronic pain of left knee      Carlos-Danlos syndrome      Motion sickness      POTS (postural orthostatic tachycardia syndrome)      Restless legs syndrome      Skeletal dysplasia       Past Surgical History:   Procedure Laterality Date     ARTHROSCOPY KNEE Right 3/29/2019    Procedure: Right Knee Arthroscopy;  Surgeon: Di Schwartz MD;  Location: [facility] OR     ARTHROSCOPY KNEE WITH TIBIAL TUBERCLE SHIFT Left 12/15/2017    Procedure: ARTHROSCOPY KNEE WITH TIBIAL TUBERCLE SHIFT;;  Surgeon: Di Schwartz MD;  Location: [facility] OR     OPEN REDUCTION INTERNAL FIXATION RODDING INTRAMEDULLARY TIBIA Left 12/15/2017    Procedure: OPEN REDUCTION INTERNAL FIXATION RODDING INTRAMEDULLARY TIBIA;  Left Knee Arthroscopy, Tibial Derotational Osteotomy with Intermedullary Rodding, Fibular Derotational Osteotmy, Distal Tibial Tubercle Osteotomy;  Surgeon: Dio Olivarez MD;  Location: [facility] OR     REMOVE HARDWARE LOWER EXTREMITY BILATERAL Bilateral 3/29/2019    Procedure: Bilateral Lower Leg Hardware Removal;  Surgeon: Di Schwartz MD;  Location: [facility] OR      Allergies   Allergen Reactions     Amoxicillin Hives     Zithromax [Azithromycin] Nausea and Vomiting     Compazine [Prochlorperazine] Other (See Comments)     delirious      Social History     Tobacco Use     Smoking status: Never     Passive exposure: Never     Smokeless tobacco: Never   Vaping Use     Vaping status: Not on file   Substance Use Topics     Alcohol use: No      Wt Readings from Last 1 Encounters:   05/10/23 61.2 kg (135 lb)        Anesthesia Evaluation            ROS/MED HX  ENT/Pulmonary:     (+) asthma     Neurologic:  - neg neurologic ROS     Cardiovascular:  - neg cardiovascular ROS     METS/Exercise Tolerance:      Hematologic:  - neg hematologic  ROS     Musculoskeletal:  - neg musculoskeletal ROS     GI/Hepatic:  - neg GI/hepatic ROS     Renal/Genitourinary:     (+) renal disease,     Endo:  - neg endo ROS     Psychiatric/Substance Use:  - neg psychiatric ROS     Infectious Disease:  - neg infectious disease ROS     Malignancy:  - neg malignancy ROS     Other:               OUTSIDE LABS:  CBC:   Lab Results   Component Value Date    WBC 8.2 03/10/2020    WBC 8.4 11/21/2019    HGB 13.1 03/10/2020    HGB 13.7 11/21/2019    HCT 38.2 03/10/2020    HCT 39.4 11/21/2019     03/10/2020     11/21/2019     BMP:   Lab Results   Component Value Date     03/10/2020     11/21/2019    POTASSIUM 3.5 03/10/2020    POTASSIUM 3.2 (L) 11/21/2019    CHLORIDE 107 03/10/2020    CHLORIDE 105 11/21/2019    CO2 23 03/10/2020    CO2 28 11/21/2019    BUN 8 03/10/2020    BUN 11 11/21/2019    CR 0.70 03/10/2020    CR 0.69 11/21/2019    GLC 84 03/10/2020    GLC 87 11/21/2019     COAGS:   Lab Results   Component Value Date    PTT 30 11/21/2019    INR 1.10 11/21/2019     POC:   Lab Results   Component Value Date    BGM 96 12/15/2017    HCG Negative 05/10/2023     HEPATIC:   Lab Results   Component Value Date    ALBUMIN 4.2 11/21/2019    PROTTOTAL 8.2 11/21/2019    ALT 20 11/21/2019    AST 8 11/21/2019    ALKPHOS 78 11/21/2019    BILITOTAL 0.6 11/21/2019     OTHER:   Lab Results   Component Value Date    CHANI 9.3 03/10/2020    MAG 2.4 (H) 11/21/2019    TSH 0.93 11/21/2019    T4 1.07 11/21/2019       Anesthesia Plan    ASA Status:  2      Anesthesia Type: General.     - Airway: LMA              Consents    Anesthesia Plan(s) and associated risks, benefits, and realistic alternatives discussed. Questions answered and patient/representative(s) expressed understanding.     - Discussed: Risks, Benefits and Alternatives for BOTH SEDATION and the PROCEDURE were discussed     - Discussed with:  Patient      - Extended  Intubation/Ventilatory Support Discussed: No.      - Patient is DNR/DNI Status: No    Use of blood products discussed: No .     Postoperative Care    Pain management: IV analgesics, Oral pain medications.        Comments:                Javy Osuna MD, MD

## 2023-05-10 NOTE — ANESTHESIA PROCEDURE NOTES
Adductor canal Procedure Note    Pre-Procedure   Staff -        Anesthesiologist:  Paulino Cook MD       Resident/Fellow: Alfredo Augustin DO       Performed By: resident       Location: pre-op       Procedure Start/Stop Times: 5/10/2023 11:20 AM and 5/10/2023 11:30 AM       Pre-Anesthestic Checklist: patient identified, IV checked, site marked, risks and benefits discussed, informed consent, monitors and equipment checked, pre-op evaluation, at physician/surgeon's request and post-op pain management  Timeout:       Correct Patient: Yes        Correct Procedure: Yes        Correct Site: Yes        Correct Position: Yes        Correct Laterality: Yes        Site Marked: Yes  Procedure Documentation  Procedure: Adductor canal       Diagnosis: POST OPERATIVE PAIN       Laterality: left       Patient Position: supine       Patient Prep/Sterile Barriers: sterile gloves, mask       Skin prep: Chloraprep       Needle Type: short bevel       Needle Gauge: 21.        Needle Length (millimeters): 110        Ultrasound guided       1. Ultrasound was used to identify targeted nerve, plexus, vascular marker, or fascial plane and place a needle adjacent to it in real-time.       2. Ultrasound was used to visualize the spread of anesthetic in close proximity to the above referenced structure.       3. A permanent image is entered into the patient's record.    Assessment/Narrative         The placement was negative for: blood aspirated, painful injection and site bleeding       Paresthesias: No.       Bolus given via needle..        Secured via.        Insertion/Infusion Method: Single Shot       Complications: none       Injection made incrementally with aspirations every 5 mL.    Medication(s) Administered   Bupivacaine 0.25% PF (Infiltration) - Infiltration   10 mL - 5/10/2023 11:20:00 AM  Bupivacaine liposome (Exparel) 1.3% LA inj susp (Infiltration) - Infiltration   10 mL - 5/10/2023 11:20:00 AM  Medication  "Administration Time: 5/10/2023 11:20 AM     Comments:  133mg liposomal bupivacaine       FOR [facility] ONLY:   Pain Team Contact information: please page the Pain Team Via [employer]. Search \"Pain\". During daytime hours, please page the attending first. At night please page the resident first.      "

## 2023-05-10 NOTE — TELEPHONE ENCOUNTER
After speaking with our PA on site, I recommended to Kaitlynn that she reinforce the dressing site and wrap it with an ACE bandage for compression, then come in tomorrow morning for a dressing change. Hopefully that can get the bleeding to stop as we don't want to expose the wound just hours after surgery if we don't have to. Patient is happy and agreeable to the plan. She will come in tomorrow morning and I can change the dressings for her.  Chrissy Davis ATC

## 2023-05-11 ENCOUNTER — OFFICE VISIT (OUTPATIENT)
Dept: ORTHOPEDICS | Facility: CLINIC | Age: 28
End: 2023-05-11
Payer: COMMERCIAL

## 2023-05-11 DIAGNOSIS — Z98.890 S/P HARDWARE REMOVAL: Primary | ICD-10-CM

## 2023-05-11 PROCEDURE — 99024 POSTOP FOLLOW-UP VISIT: CPT

## 2023-05-11 NOTE — PROGRESS NOTES
Reason for visit:    Kaitlynn Mendes came in to the clinic for a two week post op check.    Her surgery was done 5/10/2023 by Dr Olivarez.  She had a Left tibia hardware removal.     Assessment:    Kaitlynn came into the clinic in for a dressing change, she is bleeding through her post op bandages and it has gotten on her clothing and foam elevating pillow. She was able to reinforce with additional guaze and a second ACE overnight until she could come in to see me this morning.    The Surgical dressings were removed, the original adaptic was left in place, and immediately redressed with sterile non adhesive guaze, abdominal pad, and guaze wrap. A new ACE was also applied. There did not appear to be anymore active bleeding. The original post op dressings were very heavily soaked with blood and were disposed of properly. Other than this, Kaitlynn is doing well.    Plan:     She was given a few additional guaze pads in case she needs to reinforce it again and told to contact us right away if that happens.     She has an appointment to see me in 2 weeks for her first post op appointment for a wound check and potential suture removal.    She has our phone number and will call with questions or problems.    Chrissy Rivers PA-C is the overseeing provider on site today.

## 2023-05-24 ENCOUNTER — TELEPHONE (OUTPATIENT)
Dept: ORTHOPEDICS | Facility: CLINIC | Age: 28
End: 2023-05-24
Payer: COMMERCIAL

## 2023-05-24 NOTE — TELEPHONE ENCOUNTER
[facility] Call Center    Phone Message    May a detailed message be left on voicemail: yes     Reason for Call: Other: Pt is calling regarding bandages, wants to know if she can change to a smaller bandage, please call regarding this     Action Taken: Other: [facility]    Travel Screening: Not Applicable

## 2023-05-24 NOTE — TELEPHONE ENCOUNTER
LVM for patient that it is ok to change to smaller bandages. Anything that keeps the incision clean dry and covered. Call back number provided if she should have further questions.  Chrissy Davis ATC

## 2023-05-31 ENCOUNTER — OFFICE VISIT (OUTPATIENT)
Dept: ORTHOPEDICS | Facility: CLINIC | Age: 28
End: 2023-05-31
Payer: COMMERCIAL

## 2023-05-31 DIAGNOSIS — Z98.890 S/P HARDWARE REMOVAL: Primary | ICD-10-CM

## 2023-05-31 PROCEDURE — 99024 POSTOP FOLLOW-UP VISIT: CPT

## 2023-05-31 NOTE — PROGRESS NOTES
Reason for visit:    Kaitlynn Mendes came in to the clinic for a two week post op check.    Her surgery was done 5/10/2023 by Dr Olivarez.  She had Removal of hardware left lower leg.     Assessment:    Kaitlynn came into the clinic WBAT.    The Surgical wounds were exposed and found to be well-healed; so the sutures were removed. Skin was c/d/i. Steri strips were applied. No swelling noted. Kaitlynn is doing well. The incision is a bit tender, but she has been going on hikes and doing things without restriction.    Plan:     She may continue to WBAT.      She has an appointment to see Dr. Olivarez at 6 weeks post op and at that time Dr. Olivarez will determine further restrictions. However, she may cancel this appointment if she is doing well.    She has our phone number and will call with questions or problems.    Chrissy Cook PA-C is the overseeing provider on site today.

## 2023-06-27 ENCOUNTER — OFFICE VISIT (OUTPATIENT)
Dept: ORTHOPEDICS | Facility: CLINIC | Age: 28
End: 2023-06-27
Payer: COMMERCIAL

## 2023-06-27 DIAGNOSIS — Z98.890 S/P HARDWARE REMOVAL: Primary | ICD-10-CM

## 2023-06-27 PROCEDURE — 99024 POSTOP FOLLOW-UP VISIT: CPT | Performed by: ORTHOPAEDIC SURGERY

## 2023-06-27 NOTE — NURSING NOTE
Reason For Visit:   Chief Complaint   Patient presents with     RECHECK     6 Weeks post op from left hardware removal. DOS: 05/10/2023. Patient states that they were going to cancel the appointment, but their knee still feels really bad and would like to discuss that.       LMP 04/12/2023     Pain Assessment  Patient Currently in Pain: Yes  0-10 Pain Scale: 4  Primary Pain Location: Knee (Left)    Arnoldo Clarke, EMT

## 2023-06-27 NOTE — PROGRESS NOTES
Chief complaint status post left tibia IM nail and proximal locking screw removal performed on May 10, 2023    Suzan presents today for further follow-up in the company of her boyfriend.  Reports to be doing well from a tibia pain perspective reports to still have a fair amount of knee pain especially if this is more painful with going up and down stairs    On today's exam she presents with a fairly unremarkable exam with a well-healed surgical incision no effusion full range of motion of the knee.    Assessment status post left tibia hardware removal    Plan discussed with the patient and her boyfriend that at this point I think that she needs to proceed with physical therapy for the left knee which I suspect that she is suffering from some component of weakness.  A prescription for that report was given to the patient    She will follow-up on a as needed basis.  We encouraged her to visit with us in 2-1/2 months if she is still having problems with the left knee.    All questions were answered.

## 2023-06-27 NOTE — NURSING NOTE
Reason For Visit:   Chief Complaint   Patient presents with     RECHECK     6 Weeks post op from left hardware removal. DOS: 05/10/2023.       LMP 04/12/2023          Chrissy Davis ATC

## 2023-07-03 ENCOUNTER — THERAPY VISIT (OUTPATIENT)
Dept: PHYSICAL THERAPY | Facility: CLINIC | Age: 28
End: 2023-07-03
Attending: ORTHOPAEDIC SURGERY
Payer: COMMERCIAL

## 2023-07-03 DIAGNOSIS — Z98.890 S/P HARDWARE REMOVAL: ICD-10-CM

## 2023-07-03 PROCEDURE — 97161 PT EVAL LOW COMPLEX 20 MIN: CPT | Mod: GP | Performed by: PHYSICAL THERAPIST

## 2023-07-03 PROCEDURE — 97112 NEUROMUSCULAR REEDUCATION: CPT | Mod: GP | Performed by: PHYSICAL THERAPIST

## 2023-07-03 NOTE — PROGRESS NOTES
PHYSICAL THERAPY EVALUATION  Type of Visit: Evaluation    See electronic medical record for Abuse and Falls Screening details.    Subjective      Presenting condition or subjective complaint:    Date of onset:      Relevant medical history:     Dates & types of surgery:      Prior diagnostic imaging/testing results:       Prior therapy history for the same diagnosis, illness or injury:        Prior Level of Function   Transfers: Independent  Ambulation: Independent  ADL: Independent  IADL: ascending stairs pain free     Living Environment  Social support:     Type of home:     Stairs to enter the home:         Ramp:     Stairs inside the home:         Help at home:    Equipment owned:       Employment:      Hobbies/Interests:      Patient goals for therapy:      Pain assessment: Pain present  Location: left patella ( retropatella and suprapatellar) /Ratin/10     Objective   KNEE EVALUATION  PAIN: tylenol  INTEGUMENTARY (edema, incisions): na  POSTURE: right knee genu recurvatum  GAIT:  Weightbearing Status: FWB  Assistive Device(s): None  Gait Deviations: Antalgic with mild extensor lag and decreased stance time left lower extremity  BALANCE/PROPRIOCEPTION:  NA   WEIGHTBEARING ALIGNMENTNON-WEIGHTBEARING ALIGNMENT:   ROM:   (Degrees) Left AROM Left PROM  Right AROM Right PROM   Knee Flexion 135 with pain       Knee Extension 0 0 3 degrees hyperextension 5 degrees hyperextension    Pain:   End feel:     STRENGTH:   Pain: - none + mild ++ moderate +++ severe  Strength Scale: 0-5/5 Left Right   Knee Flexion 4 5-   Knee Extension 4, + (mild) 5   Quad Set Poor with pain  fair   MMT: gluteus medius: 3+/5 (L), 4-5 (l), hip extensors: 3+/5 (L), 4/5 (R)  FLEXIBILITY:  Gastrocnemius length: 0 degrees , moderate left quadricep muscle tightness   SPECIAL TESTS: na  FUNCTIONAL TESTS: NA  PALPATION:  Left suprapatellar patellar facet  JOINT MOBILITY:    Left Right   Tib-Fib Proximal Hypomobile WNL   Patellofemoral Medial  Hypomobile WNL   Patellofemoral Lateral Hypomobile WNL   Patellofemoral Superior Hypomobile WNL   Patellofemoral Inferior Hypomobile WNL            Assessment & Plan   CLINICAL IMPRESSIONS   Medical Diagnosis:      Treatment Diagnosis:     Impression/Assessment: Patient is a 28 year old female with left lower leg complaints.  The following significant findings have been identified: Pain, Decreased ROM/flexibility, Decreased joint mobility, Decreased strength, Impaired gait and Impaired muscle performance. These impairments interfere with their ability to perform stairs as compared to previous level of function.     Clinical Decision Making (Complexity):   Clinical Presentation: Stable/Uncomplicated  Clinical Presentation Rationale: based on medical and personal factors listed in PT evaluation  Clinical Decision Making (Complexity): Low complexity    PLAN OF CARE  Treatment Interventions:  Modalities: Cryotherapy  Interventions: Gait Training, Manual Therapy, Neuromuscular Re-education, Therapeutic Activity, Therapeutic Exercise, Self-Care/Home Management    Long Term Goals            Frequency of Treatment:    Duration of Treatment:      Recommended Referrals to Other Professionals:  None needed  Education Assessment:        Risks and benefits of evaluation/treatment have been explained.   Patient/Family/caregiver agrees with Plan of Care.     Evaluation Time:            Signing Clinician: Di Cisneros, PT

## 2023-07-05 PROBLEM — Z98.890 STATUS POST HARDWARE REMOVAL: Status: ACTIVE | Noted: 2017-12-21

## 2023-12-11 ENCOUNTER — HOSPITAL ENCOUNTER (EMERGENCY)
Facility: CLINIC | Age: 28
Discharge: HOME | End: 2023-12-11
Attending: EMERGENCY MEDICINE | Admitting: EMERGENCY MEDICINE
Payer: COMMERCIAL

## 2023-12-11 ENCOUNTER — APPOINTMENT (OUTPATIENT)
Dept: MRI IMAGING | Facility: CLINIC | Age: 28
End: 2023-12-11
Attending: EMERGENCY MEDICINE
Payer: COMMERCIAL

## 2023-12-11 VITALS
DIASTOLIC BLOOD PRESSURE: 71 MMHG | HEART RATE: 92 BPM | RESPIRATION RATE: 18 BRPM | TEMPERATURE: 98.6 F | SYSTOLIC BLOOD PRESSURE: 124 MMHG | OXYGEN SATURATION: 99 %

## 2023-12-11 DIAGNOSIS — G43.009 MIGRAINE WITHOUT AURA AND WITHOUT STATUS MIGRAINOSUS, NOT INTRACTABLE: ICD-10-CM

## 2023-12-11 LAB
ANION GAP SERPL CALCULATED.3IONS-SCNC: 11 MMOL/L (ref 7–15)
BASOPHILS # BLD AUTO: 0 10E3/UL (ref 0–0.2)
BASOPHILS NFR BLD AUTO: 0 %
BUN SERPL-MCNC: 10.1 MG/DL (ref 6–20)
CALCIUM SERPL-MCNC: 9.6 MG/DL (ref 8.6–10)
CHLORIDE SERPL-SCNC: 102 MMOL/L (ref 98–107)
CREAT SERPL-MCNC: 0.68 MG/DL (ref 0.51–0.95)
DEPRECATED HCO3 PLAS-SCNC: 24 MMOL/L (ref 22–29)
EGFRCR SERPLBLD CKD-EPI 2021: >90 ML/MIN/1.73M2
EOSINOPHIL # BLD AUTO: 0.1 10E3/UL (ref 0–0.7)
EOSINOPHIL NFR BLD AUTO: 1 %
ERYTHROCYTE [DISTWIDTH] IN BLOOD BY AUTOMATED COUNT: 11.9 % (ref 10–15)
ERYTHROCYTE [SEDIMENTATION RATE] IN BLOOD BY WESTERGREN METHOD: 11 MM/HR (ref 0–20)
GLUCOSE SERPL-MCNC: 97 MG/DL (ref 70–99)
HCG SERPL QL: NEGATIVE
HCT VFR BLD AUTO: 41.2 % (ref 35–47)
HGB BLD-MCNC: 14.4 G/DL (ref 11.7–15.7)
HOLD SPECIMEN: NORMAL
IMM GRANULOCYTES # BLD: 0 10E3/UL
IMM GRANULOCYTES NFR BLD: 0 %
LYMPHOCYTES # BLD AUTO: 2.7 10E3/UL (ref 0.8–5.3)
LYMPHOCYTES NFR BLD AUTO: 27 %
MCH RBC QN AUTO: 30.1 PG (ref 26.5–33)
MCHC RBC AUTO-ENTMCNC: 35 G/DL (ref 31.5–36.5)
MCV RBC AUTO: 86 FL (ref 78–100)
MONOCYTES # BLD AUTO: 0.8 10E3/UL (ref 0–1.3)
MONOCYTES NFR BLD AUTO: 8 %
NEUTROPHILS # BLD AUTO: 6.2 10E3/UL (ref 1.6–8.3)
NEUTROPHILS NFR BLD AUTO: 64 %
NRBC # BLD AUTO: 0 10E3/UL
NRBC BLD AUTO-RTO: 0 /100
PLATELET # BLD AUTO: 350 10E3/UL (ref 150–450)
POTASSIUM SERPL-SCNC: 3.7 MMOL/L (ref 3.4–5.3)
RBC # BLD AUTO: 4.78 10E6/UL (ref 3.8–5.2)
SODIUM SERPL-SCNC: 137 MMOL/L (ref 135–145)
WBC # BLD AUTO: 9.9 10E3/UL (ref 4–11)

## 2023-12-11 PROCEDURE — 84703 CHORIONIC GONADOTROPIN ASSAY: CPT | Performed by: EMERGENCY MEDICINE

## 2023-12-11 PROCEDURE — 99285 EMERGENCY DEPT VISIT HI MDM: CPT | Mod: 25

## 2023-12-11 PROCEDURE — 70553 MRI BRAIN STEM W/O & W/DYE: CPT

## 2023-12-11 PROCEDURE — 80048 BASIC METABOLIC PNL TOTAL CA: CPT | Performed by: EMERGENCY MEDICINE

## 2023-12-11 PROCEDURE — 36415 COLL VENOUS BLD VENIPUNCTURE: CPT | Performed by: EMERGENCY MEDICINE

## 2023-12-11 PROCEDURE — 96360 HYDRATION IV INFUSION INIT: CPT

## 2023-12-11 PROCEDURE — 258N000003 HC RX IP 258 OP 636: Performed by: EMERGENCY MEDICINE

## 2023-12-11 PROCEDURE — 255N000002 HC RX 255 OP 636: Mod: JZ | Performed by: EMERGENCY MEDICINE

## 2023-12-11 PROCEDURE — 85652 RBC SED RATE AUTOMATED: CPT | Performed by: EMERGENCY MEDICINE

## 2023-12-11 PROCEDURE — 85025 COMPLETE CBC W/AUTO DIFF WBC: CPT | Performed by: EMERGENCY MEDICINE

## 2023-12-11 PROCEDURE — A9585 GADOBUTROL INJECTION: HCPCS | Mod: JZ | Performed by: EMERGENCY MEDICINE

## 2023-12-11 RX ORDER — GADOBUTROL 604.72 MG/ML
6 INJECTION INTRAVENOUS ONCE
Status: COMPLETED | OUTPATIENT
Start: 2023-12-11 | End: 2023-12-11

## 2023-12-11 RX ORDER — METHYLPREDNISOLONE 4 MG
TABLET, DOSE PACK ORAL
Qty: 21 TABLET | Refills: 0 | Status: SHIPPED | OUTPATIENT
Start: 2023-12-11 | End: 2024-01-02

## 2023-12-11 RX ADMIN — SODIUM CHLORIDE 1000 ML: 9 INJECTION, SOLUTION INTRAVENOUS at 21:19

## 2023-12-11 RX ADMIN — GADOBUTROL 6 ML: 604.72 INJECTION INTRAVENOUS at 19:40

## 2023-12-11 NOTE — ED TRIAGE NOTES
"Pt c/o Lt eye pain off and on for a few days, associated with vision changes/shadow/blurriness, headache (h/o migraines but \"doesn't feel like my usual migraines\") \"worst headache ever\" and word-finding difficulty. Provider paged for neuro eval in triage.    VSS but slightly tachy, ABCs intact. MD in triage for remainder of assessment.     Triage Assessment (Adult)       Row Name 12/11/23 0760          Triage Assessment    Airway WDL WDL        Respiratory WDL    Respiratory WDL WDL        Skin Circulation/Temperature WDL    Skin Circulation/Temperature WDL WDL        Cardiac WDL    Cardiac WDL WDL        Peripheral/Neurovascular WDL    Peripheral Neurovascular WDL WDL        Cognitive/Neuro/Behavioral WDL    Cognitive/Neuro/Behavioral WDL WDL                     "

## 2023-12-12 NOTE — ED PROVIDER NOTES
EMERGENCY DEPARTMENT ENCOUNTER      NAME: Kaitlynn Mendes  AGE: 28 year old female  YOB: 1995  MRN: [id number]  EVALUATION DATE & TIME: No admission date for patient encounter.    PCP: Evie Heredia    ED PROVIDER: Mary Jade MD      Chief Complaint   Patient presents with    Eye Problem         FINAL IMPRESSION:  1. Migraine without aura and without status migrainosus, not intractable          ED COURSE & MEDICAL DECISION MAKING:    Pertinent Labs & Imaging studies reviewed. (See chart for details)  28 year old female presents to the Emergency Department for evaluation of a cough.  She reports that over the last 10 days she has had 7 days where she has had pain that started from her left eye, radiated back into the left side of her head.  When she is having these episodes, she has blurry vision in her left eye, she has a difficult time concentrating.  However, by evening her symptoms normally resolved.  On my current exam, she has creased sensation to light touch in her left V1 and left V2 distribution, otherwise no focal neurologic deficits.  MRI with and without contrast of the brain and orbits is unremarkable.  I spoke with neurology, Dr. Lopez would recommend obtaining a sed rate and if normal discharge the patient home on a Medrol Dosepak with referral to follow-up with him if she has ongoing headaches.    5:54 PM I met with the patient for an initial encounter and evaluation.  8:51 PM I spoke with Neurology, Dr. Lopez, regarding patient's care and MRI.  10:40 PM I updated the patient on laboratory and imaging findings.  Normal sed rate.  Will discharge home with recommendations to start Medrol Dosepak, follow-up with neurology if her headaches persist or return to the emergency department if they acutely worsen.  10:44 PM I updated the patient that I also made a referral for next day follow-up with ophthalmology for left eye pain.  At the time of discharge, she is not  having acute pain.    At the conclusion of the encounter I discussed the results of all of the tests and the disposition. The questions were answered. The patient or family acknowledged understanding and was agreeable with the care plan.     Medical Decision Making    History:  Supplemental history from: Documented in chart, if applicable  External Record(s) reviewed: Documented in chart, if applicable.    Work Up:  Chart documentation includes differential considered and any EKGs or imaging independently interpreted by provider, where specified.  In additional to work up documented, I considered the following work up: Documented in chart, if applicable.    External consultation:  Discussion of management with another provider: Documented in chart, if applicable and Neurology    Complicating factors:  Care impacted by chronic illness: Chronic Migraines  Care affected by social determinants of health: Access to Medical Care    Disposition considerations: Discharge. I prescribed additional prescription strength medication(s) as charted. See documentation for any additional details.      MEDICATIONS GIVEN IN THE EMERGENCY:  Medications   sodium chloride 0.9% BOLUS 1,000 mL (1,000 mLs Intravenous $New Bag 12/11/23 2119)   gadobutrol (GADAVIST) injection 6 mL (6 mLs Intravenous $Given 12/11/23 1940)       NEW PRESCRIPTIONS STARTED AT TODAY'S ER VISIT  New Prescriptions    METHYLPREDNISOLONE (MEDROL DOSEPAK) 4 MG TABLET THERAPY PACK    Follow Package Directions          =================================================================    HPI    Patient information was obtained from: Patient    Use of : N/A       Kaitlynn Mendes is a 28 year old female with a pertinent history of restless legs syndrome, POTS, Carlos-Danlos syndrome, chronic migraines, who presents to this ED by private car for evaluation of an eye problem.    Patient reports an onset of unusual migraines which occurred on 12/1 (~10 days  "ago). She experienced seven episodes the past ten days. She felt normal the other three days of this duration. Patient endorses intermittent \"blurry\" left eye visual disturbance with difficulty word \"findings\". She feels as if her left eye pain would be \"severe\" and \"burning\", lasting between 30 minutes to 8 hours a day. She tolerates her pain until night when alleviated. She attempts to close her eyes to relieve her pain. Patient currently has minor nausea, dizziness, and decreased sensation on her left side facial in the ED. She  believes her speech is better now.  Patient has a history of chronic migraines causing pain around her eardrums and eyes but feels as if her current migraine is unrelated to her history. She hasn't sought care for this recently. Patient had a left eye surgery when she was 4 years old. No other medical concerns are expressed at this time.     PAST MEDICAL HISTORY:  Past Medical History:   Diagnosis Date    Chronic pain of left knee     Carlos-Danlos syndrome     Motion sickness     POTS (postural orthostatic tachycardia syndrome)     Restless legs syndrome     Skeletal dysplasia        PAST SURGICAL HISTORY:  Past Surgical History:   Procedure Laterality Date    ARTHROSCOPY KNEE Right 3/29/2019    Procedure: Right Knee Arthroscopy;  Surgeon: Di Schwartz MD;  [facility]    ARTHROSCOPY KNEE WITH TIBIAL TUBERCLE SHIFT Left 12/15/2017    Procedure: ARTHROSCOPY KNEE WITH TIBIAL TUBERCLE SHIFT;;  Surgeon: Di Schwartz MD;  [facility]: [facility]    OPEN REDUCTION INTERNAL FIXATION RODDING INTRAMEDULLARY TIBIA Left 12/15/2017    Procedure: OPEN REDUCTION INTERNAL FIXATION RODDING INTRAMEDULLARY TIBIA;  Left Knee Arthroscopy, Tibial Derotational Osteotomy with Intermedullary Rodding, Fibular Derotational Osteotmy, Distal Tibial Tubercle Osteotomy;  Surgeon: Dio Olivarez MD;  [facility]    REMOVE HARDWARE FOOT Left 5/10/2023    Procedure: Removal of hardware left " lower leg;  Surgeon: Dio Olivarez MD;  Location: [facility] OR    REMOVE HARDWARE LOWER EXTREMITY BILATERAL Bilateral 3/29/2019    Procedure: Bilateral Lower Leg Hardware Removal;  Surgeon: Di Schwratz MD;  [facility]           CURRENT MEDICATIONS:    methylPREDNISolone (MEDROL DOSEPAK) 4 MG tablet therapy pack  acetaminophen (TYLENOL) 325 MG tablet  diphenhydrAMINE-APAP, sleep, (EXCEDRIN PM PO)  gabapentin (NEURONTIN) 300 MG capsule  hydrOXYzine (ATARAX) 25 MG tablet  Naproxen Sodium (ALEVE PO)  oxyCODONE (ROXICODONE) 5 MG tablet  Prenatal Vit-Fe Fumarate-FA (PRENATAL MULTIVITAMIN W/IRON) 27-0.8 MG tablet        ALLERGIES:  Allergies   Allergen Reactions    Amoxicillin Hives    Zithromax [Azithromycin] Nausea and Vomiting    Compazine [Prochlorperazine] Other (See Comments)     delirious       FAMILY HISTORY:  Family History   Problem Relation Age of Onset    Osteoporosis Mother     Gastrointestinal Disease Mother         gastroparesis    Deep Vein Thrombosis (DVT) Father     Lupus Father     Anesthesia Reaction No family hx of        SOCIAL HISTORY:   Social History     Socioeconomic History    Marital status:    Tobacco Use    Smoking status: Never     Passive exposure: Never    Smokeless tobacco: Never   Substance and Sexual Activity    Alcohol use: No    Drug use: No    Sexual activity: Yes     Partners: Male       VITALS:  /81   Pulse 83   Temp 98.6  F (37  C) (Temporal)   Resp 18   SpO2 100%     PHYSICAL EXAM    Gen:  Alert, awake, NAD  HENT:  Head atraumatic, PERRL, EOMI, no meningismus  Respiratory:  CTA, normal respiratory rate  Cardiovascular:  Regular rate and rhythm, no murmur  Abdomen:  Soft, nontender, normoactive bowel sounds  Musculoskeletal:  FROM in all extremities with no tenderness  Integument:  No rash, warm, dry  Neuro:  GCS 15, A & O x 3, diminished sensation in the left V1 and left V2 distribution, otherwise CN II - XII intact, no dysdiadochokinesia,  negative Romberg, no pronator drift, no nystagmus, visual fields full to confrontation, normal gait, +5/5 strength in all extremities       LAB:  All pertinent labs reviewed and interpreted.  Results for orders placed or performed during the hospital encounter of 12/11/23   MR Brain and Orbits w/o & w Contrast    Impression    IMPRESSION:  HEAD MRI:  1.  Unremarkable MRI of the brain.    ORBIT MRI:  1.  Unremarkable MRI of the orbits.  2.  No evidence for optic neuritis.     Basic metabolic panel   Result Value Ref Range    Sodium 137 135 - 145 mmol/L    Potassium 3.7 3.4 - 5.3 mmol/L    Chloride 102 98 - 107 mmol/L    Carbon Dioxide (CO2) 24 22 - 29 mmol/L    Anion Gap 11 7 - 15 mmol/L    Urea Nitrogen 10.1 6.0 - 20.0 mg/dL    Creatinine 0.68 0.51 - 0.95 mg/dL    GFR Estimate >90 >60 mL/min/1.73m2    Calcium 9.6 8.6 - 10.0 mg/dL    Glucose 97 70 - 99 mg/dL   HCG qualitative Blood   Result Value Ref Range    hCG Serum Qualitative Negative Negative   Extra Blue Top Tube   Result Value Ref Range    Hold Specimen JIC    CBC with platelets and differential   Result Value Ref Range    WBC Count 9.9 4.0 - 11.0 10e3/uL    RBC Count 4.78 3.80 - 5.20 10e6/uL    Hemoglobin 14.4 11.7 - 15.7 g/dL    Hematocrit 41.2 35.0 - 47.0 %    MCV 86 78 - 100 fL    MCH 30.1 26.5 - 33.0 pg    MCHC 35.0 31.5 - 36.5 g/dL    RDW 11.9 10.0 - 15.0 %    Platelet Count 350 150 - 450 10e3/uL    % Neutrophils 64 %    % Lymphocytes 27 %    % Monocytes 8 %    % Eosinophils 1 %    % Basophils 0 %    % Immature Granulocytes 0 %    NRBCs per 100 WBC 0 <1 /100    Absolute Neutrophils 6.2 1.6 - 8.3 10e3/uL    Absolute Lymphocytes 2.7 0.8 - 5.3 10e3/uL    Absolute Monocytes 0.8 0.0 - 1.3 10e3/uL    Absolute Eosinophils 0.1 0.0 - 0.7 10e3/uL    Absolute Basophils 0.0 0.0 - 0.2 10e3/uL    Absolute Immature Granulocytes 0.0 <=0.4 10e3/uL    Absolute NRBCs 0.0 10e3/uL   Erythrocyte sedimentation rate auto   Result Value Ref Range    Erythrocyte Sedimentation  Rate 11 0 - 20 mm/hr       RADIOLOGY:  Reviewed all pertinent imaging. Please see official radiology report.  MR Brain and Orbits w/o & w Contrast   Final Result   IMPRESSION:   HEAD MRI:   1.  Unremarkable MRI of the brain.      ORBIT MRI:   1.  Unremarkable MRI of the orbits.   2.  No evidence for optic neuritis.               I, Markgeorge Suma, am serving as a scribe to document services personally performed by Mary Jade, based on my observation and the provider's statements to me. I, Mary Jade MD, attest that Shilpa Moise is acting in a scribe capacity, has observed my performance of the services and has documented them in accordance with my direction.    Mary Jade MD  Emergency Medicine  [facility] EMERGENCY ROOM  [id number] [id number] [phone]       Mary Jade MD  12/11/23 224       Mayr Jade MD  12/11/23 2242

## 2024-01-02 ENCOUNTER — OFFICE VISIT (OUTPATIENT)
Dept: FAMILY MEDICINE | Facility: CLINIC | Age: 29
End: 2024-01-02
Attending: FAMILY MEDICINE
Payer: COMMERCIAL

## 2024-01-02 VITALS
DIASTOLIC BLOOD PRESSURE: 78 MMHG | BODY MASS INDEX: 21.4 KG/M2 | WEIGHT: 141.2 LBS | HEART RATE: 109 BPM | HEIGHT: 68 IN | SYSTOLIC BLOOD PRESSURE: 114 MMHG

## 2024-01-02 DIAGNOSIS — Z12.4 SCREENING FOR CERVICAL CANCER: ICD-10-CM

## 2024-01-02 DIAGNOSIS — N97.9 FEMALE INFERTILITY: ICD-10-CM

## 2024-01-02 DIAGNOSIS — Z01.419 ENCOUNTER FOR GYNECOLOGICAL EXAMINATION WITHOUT ABNORMAL FINDING: Primary | ICD-10-CM

## 2024-01-02 PROBLEM — Z98.890 STATUS POST HARDWARE REMOVAL: Status: RESOLVED | Noted: 2017-12-21 | Resolved: 2024-01-02

## 2024-01-02 PROCEDURE — 99395 PREV VISIT EST AGE 18-39: CPT | Performed by: FAMILY MEDICINE

## 2024-01-02 PROCEDURE — G0145 SCR C/V CYTO,THINLAYER,RESCR: HCPCS | Performed by: FAMILY MEDICINE

## 2024-01-02 PROCEDURE — 99213 OFFICE O/P EST LOW 20 MIN: CPT | Performed by: FAMILY MEDICINE

## 2024-01-02 ASSESSMENT — ANXIETY QUESTIONNAIRES
5. BEING SO RESTLESS THAT IT IS HARD TO SIT STILL: NOT AT ALL
GAD7 TOTAL SCORE: 3
2. NOT BEING ABLE TO STOP OR CONTROL WORRYING: SEVERAL DAYS
3. WORRYING TOO MUCH ABOUT DIFFERENT THINGS: SEVERAL DAYS
IF YOU CHECKED OFF ANY PROBLEMS ON THIS QUESTIONNAIRE, HOW DIFFICULT HAVE THESE PROBLEMS MADE IT FOR YOU TO DO YOUR WORK, TAKE CARE OF THINGS AT HOME, OR GET ALONG WITH OTHER PEOPLE: NOT DIFFICULT AT ALL
1. FEELING NERVOUS, ANXIOUS, OR ON EDGE: SEVERAL DAYS
7. FEELING AFRAID AS IF SOMETHING AWFUL MIGHT HAPPEN: NOT AT ALL
GAD7 TOTAL SCORE: 3
6. BECOMING EASILY ANNOYED OR IRRITABLE: NOT AT ALL

## 2024-01-02 ASSESSMENT — PATIENT HEALTH QUESTIONNAIRE - PHQ9
5. POOR APPETITE OR OVEREATING: NOT AT ALL
SUM OF ALL RESPONSES TO PHQ QUESTIONS 1-9: 2

## 2024-01-02 NOTE — PROGRESS NOTES
"CC: Annual Visit      Kaitlynn is a 28 year old female who presents to clinic for a gynecologic exam. Last annual physical was on 4/3/2023 with Dr. Packer.     Ob/gyn history:  Age at menarche: 13  Length of menstrual cycle: every 30 days  Duration of menses: 5 days   Heavy bleeding: Yes: Day 1 & 2  Pain with menses: Mild  LMP: Patient's last menstrual period was 12/19/2023 (exact date).   Sexually active: Yes with male partner - he has never fathered a child  STIs: No history and no concerns  Pap smears: 1/3/2019 NIL  History of abnormal paps: No  OB history: G2 - 1 miscarriage at 8w5d, started bleeding (3/2020) & 1 chemical pregnancy (6/2020) around 5 weeks pregnant    Did workup at [facility] in 2022, they recommended IUI. She is not sure if she wants to proceed with IUI d/t cost. She is wondering about Clomid (her mom used Clomid to get pregnant). Kaitlynn does ovulate regularly. Monitors with LH strips. Has unproected sex during fertile window.     did a semen analysis and it was normal. Labs at [facility] were normal, including AMH 1.87.       OBJECTIVE:   /78   Pulse 109   Ht 1.727 m (5' 8\")   Wt 64 kg (141 lb 3.2 oz)   LMP 12/19/2023 (Exact Date)   BMI 21.47 kg/m     General: In NAD.  Cooperative and pleasant.  Gyn: Normal appearing external genitalia without lesion.  Vaginal mucosa pink and moist.  Cervix visualized and Pap performed.  Bimanual exam revealed no uterine or ovarian masses.       ASSESSMENT AND PLAN:   Kaitlynn was seen today for annual visit.    Diagnoses and all orders for this visit:    Encounter for gynecological examination without abnormal finding    Screening for cervical cancer  -     PAP screen reflex to HPV if ASCUS - recommended age 25 - 29 years    Female infertility    Health maintenance updates: Pap smear updated today.   Recommend continue care with [facility] for infertility and RPL. Discussed Clomid is used for ovulation induction and if she is ovulating regularly, would not be the " most appropriate choice for fertility. Would defer to CRM's recommendations.     Jaymie Watkins MD

## 2024-01-05 LAB
BKR LAB AP GYN ADEQUACY: NORMAL
BKR LAB AP GYN INTERPRETATION: NORMAL
BKR LAB AP HPV REFLEX: NORMAL
BKR LAB AP PREVIOUS ABNORMAL: NORMAL
PATH REPORT.COMMENTS IMP SPEC: NORMAL
PATH REPORT.COMMENTS IMP SPEC: NORMAL
PATH REPORT.RELEVANT HX SPEC: NORMAL

## 2024-06-14 ENCOUNTER — LAB REQUISITION (OUTPATIENT)
Dept: LAB | Facility: CLINIC | Age: 29
End: 2024-06-14
Payer: COMMERCIAL

## 2024-06-14 DIAGNOSIS — Z31.9 ENCOUNTER FOR PROCREATIVE MANAGEMENT, UNSPECIFIED: ICD-10-CM

## 2024-06-14 LAB
HCG INTACT+B SERPL-ACNC: 721 MIU/ML
HOLD SPECIMEN: NORMAL
PROGEST SERPL-MCNC: 4.8 NG/ML

## 2024-06-14 PROCEDURE — 84144 ASSAY OF PROGESTERONE: CPT | Mod: ORL | Performed by: OBSTETRICS & GYNECOLOGY

## 2024-06-14 PROCEDURE — 84702 CHORIONIC GONADOTROPIN TEST: CPT | Mod: ORL | Performed by: OBSTETRICS & GYNECOLOGY

## 2024-06-18 ENCOUNTER — LAB REQUISITION (OUTPATIENT)
Dept: LAB | Facility: CLINIC | Age: 29
End: 2024-06-18
Payer: COMMERCIAL

## 2024-06-18 DIAGNOSIS — Z32.01 ENCOUNTER FOR PREGNANCY TEST, RESULT POSITIVE: ICD-10-CM

## 2024-06-18 LAB — HCG INTACT+B SERPL-ACNC: 3843 MIU/ML

## 2024-06-18 PROCEDURE — 84702 CHORIONIC GONADOTROPIN TEST: CPT | Mod: ORL | Performed by: OBSTETRICS & GYNECOLOGY

## 2024-06-20 ENCOUNTER — LAB REQUISITION (OUTPATIENT)
Dept: LAB | Facility: CLINIC | Age: 29
End: 2024-06-20
Payer: COMMERCIAL

## 2024-06-20 DIAGNOSIS — Z32.01 ENCOUNTER FOR PREGNANCY TEST, RESULT POSITIVE: ICD-10-CM

## 2024-06-20 LAB — HCG INTACT+B SERPL-ACNC: 7877 MIU/ML

## 2024-06-20 PROCEDURE — 84702 CHORIONIC GONADOTROPIN TEST: CPT | Mod: ORL | Performed by: OBSTETRICS & GYNECOLOGY

## 2024-07-15 ENCOUNTER — LAB REQUISITION (OUTPATIENT)
Dept: LAB | Facility: CLINIC | Age: 29
End: 2024-07-15
Payer: COMMERCIAL

## 2024-07-15 ENCOUNTER — TRANSFERRED RECORDS (OUTPATIENT)
Dept: HEALTH INFORMATION MANAGEMENT | Facility: CLINIC | Age: 29
End: 2024-07-15

## 2024-07-15 DIAGNOSIS — Z34.01 ENCOUNTER FOR SUPERVISION OF NORMAL FIRST PREGNANCY, FIRST TRIMESTER: ICD-10-CM

## 2024-07-15 LAB
ABO/RH(D): NORMAL
ANTIBODY SCREEN: NEGATIVE
BASOPHILS # BLD AUTO: 0 10E3/UL (ref 0–0.2)
BASOPHILS NFR BLD AUTO: 0 %
EOSINOPHIL # BLD AUTO: 0.1 10E3/UL (ref 0–0.7)
EOSINOPHIL NFR BLD AUTO: 1 %
ERYTHROCYTE [DISTWIDTH] IN BLOOD BY AUTOMATED COUNT: 11.8 % (ref 10–15)
HBA1C MFR BLD: 5.3 %
HBV SURFACE AG SERPL QL IA: NONREACTIVE
HCT VFR BLD AUTO: 40.2 % (ref 35–47)
HCV AB SERPL QL IA: NONREACTIVE
HGB BLD-MCNC: 13.9 G/DL (ref 11.7–15.7)
HIV 1+2 AB+HIV1 P24 AG SERPL QL IA: NONREACTIVE
IMM GRANULOCYTES # BLD: 0 10E3/UL
IMM GRANULOCYTES NFR BLD: 0 %
LYMPHOCYTES # BLD AUTO: 2.9 10E3/UL (ref 0.8–5.3)
LYMPHOCYTES NFR BLD AUTO: 29 %
MCH RBC QN AUTO: 29.9 PG (ref 26.5–33)
MCHC RBC AUTO-ENTMCNC: 34.6 G/DL (ref 31.5–36.5)
MCV RBC AUTO: 87 FL (ref 78–100)
MONOCYTES # BLD AUTO: 0.8 10E3/UL (ref 0–1.3)
MONOCYTES NFR BLD AUTO: 8 %
NEUTROPHILS # BLD AUTO: 6 10E3/UL (ref 1.6–8.3)
NEUTROPHILS NFR BLD AUTO: 62 %
NRBC # BLD AUTO: 0 10E3/UL
NRBC BLD AUTO-RTO: 0 /100
PLATELET # BLD AUTO: 377 10E3/UL (ref 150–450)
RBC # BLD AUTO: 4.65 10E6/UL (ref 3.8–5.2)
SPECIMEN EXPIRATION DATE: NORMAL
WBC # BLD AUTO: 9.8 10E3/UL (ref 4–11)

## 2024-07-15 PROCEDURE — 87186 SC STD MICRODIL/AGAR DIL: CPT | Mod: ORL | Performed by: PHYSICIAN ASSISTANT

## 2024-07-15 PROCEDURE — 83036 HEMOGLOBIN GLYCOSYLATED A1C: CPT | Mod: ORL | Performed by: PHYSICIAN ASSISTANT

## 2024-07-15 PROCEDURE — 80081 OBSTETRIC PANEL INC HIV TSTG: CPT | Mod: ORL | Performed by: PHYSICIAN ASSISTANT

## 2024-07-15 PROCEDURE — 86803 HEPATITIS C AB TEST: CPT | Mod: ORL | Performed by: PHYSICIAN ASSISTANT

## 2024-07-15 PROCEDURE — 87086 URINE CULTURE/COLONY COUNT: CPT | Mod: ORL | Performed by: PHYSICIAN ASSISTANT

## 2024-07-16 ENCOUNTER — MEDICAL CORRESPONDENCE (OUTPATIENT)
Dept: HEALTH INFORMATION MANAGEMENT | Facility: CLINIC | Age: 29
End: 2024-07-16
Payer: COMMERCIAL

## 2024-07-16 LAB
RPR SER QL: NONREACTIVE
RUBV IGG SERPL QL IA: 4.07 INDEX
RUBV IGG SERPL QL IA: POSITIVE

## 2024-07-17 ENCOUNTER — TRANSCRIBE ORDERS (OUTPATIENT)
Dept: MATERNAL FETAL MEDICINE | Facility: HOSPITAL | Age: 29
End: 2024-07-17
Payer: COMMERCIAL

## 2024-07-17 DIAGNOSIS — O26.90 PREGNANCY RELATED CONDITION, ANTEPARTUM: Primary | ICD-10-CM

## 2024-07-17 LAB
BACTERIA UR CULT: ABNORMAL
BACTERIA UR CULT: ABNORMAL

## 2024-08-09 NOTE — PROGRESS NOTES
PROGRESS REPORT    Kaitlynn has been in therapy from October 3, 2017 to Apr 26, 2018 for treatment of s/p L TTT, derotation osteotomy tib/fib; R tib/fib derotation osteotomy.    Therapist Impression:  Kaitlynn is doing well in therapy.  The combination of Alter-G and leg press has been helpful to unload her knees to target gait pattern and strength.  We will continue this approach until she is off of her AD at home.    Subjective:  Having some heart issues today; very tachycardic.  Reports knees are feeling really good.  Feels like her limp is improving.  Reports being at 60%.  Her legs continue to get tired fast and has some twinges at her surgery sites.  Denies swelling in knees  Objective:   during Alter-G     On Alter G at 55% WBing  -Step length symmetrical  -R LE IR    Gait without crutch  -Decreased stance time on L  -Increased lateral trunk lean L during L stance  -Advised that she can walk very short distances without crutch as long as she continues to work on her L single leg balance     ASSESSMENT/PLAN  Updated problem list and treatment plan: Diagnoses of Pain in both knees, unspecified chronicity, Localized edema, S/P knee surgery, Abnormal gait, and Patellar instability of both knees were pertinent to this visit. Pain - HEP  Decreased function - HEP  Decreased strength - HEP  Impaired gait - HEP  STG/LTGs have been met or progress has been made towards goals:  Yes (See Goal flow sheet completed today.)  Assessment of Progress: The patient's condition is improving.  Self Management Plans:  Patient has been instructed in a home treatment program.  Patient  has been instructed in self management of symptoms.  I have re-evaluated this patient and find that the nature, scope, duration and intensity of the therapy is appropriate for the medical condition of the patient.  Kaitlynn continues to require the following intervention to meet STG and LTG's:  PT    Recommendations:  This patient would benefit from  Pt. States he has vomit  for 7 days.   continued therapy.     Frequency:  2 X week, once daily  Duration:  for 6 weeks              Please refer to the daily flowsheet for treatment today, total treatment time and time spent performing 1:1 timed codes.

## 2024-09-06 ENCOUNTER — LAB REQUISITION (OUTPATIENT)
Dept: LAB | Facility: CLINIC | Age: 29
End: 2024-09-06
Payer: COMMERCIAL

## 2024-09-06 DIAGNOSIS — Z36.1 ENCOUNTER FOR ANTENATAL SCREENING FOR RAISED ALPHAFETOPROTEIN LEVEL: ICD-10-CM

## 2024-09-06 DIAGNOSIS — R30.0 DYSURIA: ICD-10-CM

## 2024-09-06 PROCEDURE — 82105 ALPHA-FETOPROTEIN SERUM: CPT | Mod: ORL | Performed by: PHYSICIAN ASSISTANT

## 2024-09-06 PROCEDURE — 87086 URINE CULTURE/COLONY COUNT: CPT | Mod: ORL | Performed by: PHYSICIAN ASSISTANT

## 2024-09-08 LAB — BACTERIA UR CULT: NORMAL

## 2024-09-09 LAB
# FETUSES US: NORMAL
AFP MOM SERPL: 0.99
AFP SERPL-MCNC: 37 NG/ML
AGE - REPORTED: 29.9 YR
CURRENT SMOKER: NO
FAMILY MEMBER DISEASES HX: NO
GA METHOD: NORMAL
GA: NORMAL WK
IDDM PATIENT QL: NO
INTEGRATED SCN PATIENT-IMP: NORMAL
SPECIMEN DRAWN SERPL: NORMAL

## 2024-09-13 ENCOUNTER — PRE VISIT (OUTPATIENT)
Dept: MATERNAL FETAL MEDICINE | Facility: HOSPITAL | Age: 29
End: 2024-09-13
Payer: COMMERCIAL

## 2024-09-13 ENCOUNTER — TRANSFERRED RECORDS (OUTPATIENT)
Dept: HEALTH INFORMATION MANAGEMENT | Facility: CLINIC | Age: 29
End: 2024-09-13
Payer: COMMERCIAL

## 2024-09-18 ENCOUNTER — OFFICE VISIT (OUTPATIENT)
Dept: MATERNAL FETAL MEDICINE | Facility: HOSPITAL | Age: 29
End: 2024-09-18
Attending: STUDENT IN AN ORGANIZED HEALTH CARE EDUCATION/TRAINING PROGRAM
Payer: COMMERCIAL

## 2024-09-18 ENCOUNTER — ANCILLARY PROCEDURE (OUTPATIENT)
Dept: ULTRASOUND IMAGING | Facility: HOSPITAL | Age: 29
End: 2024-09-18
Attending: STUDENT IN AN ORGANIZED HEALTH CARE EDUCATION/TRAINING PROGRAM
Payer: COMMERCIAL

## 2024-09-18 DIAGNOSIS — O09.812 PREGNANCY RESULTING FROM IN VITRO FERTILIZATION IN SECOND TRIMESTER: Primary | ICD-10-CM

## 2024-09-18 DIAGNOSIS — O26.90 PREGNANCY RELATED CONDITION, ANTEPARTUM: ICD-10-CM

## 2024-09-18 PROCEDURE — 76817 TRANSVAGINAL US OBSTETRIC: CPT | Mod: 26 | Performed by: STUDENT IN AN ORGANIZED HEALTH CARE EDUCATION/TRAINING PROGRAM

## 2024-09-18 PROCEDURE — 76811 OB US DETAILED SNGL FETUS: CPT

## 2024-09-18 PROCEDURE — 76811 OB US DETAILED SNGL FETUS: CPT | Mod: 26 | Performed by: STUDENT IN AN ORGANIZED HEALTH CARE EDUCATION/TRAINING PROGRAM

## 2024-09-18 PROCEDURE — 99203 OFFICE O/P NEW LOW 30 MIN: CPT | Mod: 25 | Performed by: STUDENT IN AN ORGANIZED HEALTH CARE EDUCATION/TRAINING PROGRAM

## 2024-09-18 PROCEDURE — 76817 TRANSVAGINAL US OBSTETRIC: CPT

## 2024-09-18 NOTE — NURSING NOTE
Kaitlynn Mendes is a  at 18w0d who presents to [facility] for scheduled L2. SBAR given to Dr. CHAMP Avila, see note in Epic.

## 2024-09-18 NOTE — PROGRESS NOTES
"Please see \"Imaging\" tab under \"Chart Review\" for details of today's visit.    Edelmira Avila    "

## 2024-09-25 ENCOUNTER — OFFICE VISIT (OUTPATIENT)
Dept: FAMILY MEDICINE | Facility: CLINIC | Age: 29
End: 2024-09-25
Payer: COMMERCIAL

## 2024-09-25 VITALS
TEMPERATURE: 98.7 F | RESPIRATION RATE: 20 BRPM | DIASTOLIC BLOOD PRESSURE: 71 MMHG | OXYGEN SATURATION: 97 % | HEART RATE: 96 BPM | SYSTOLIC BLOOD PRESSURE: 105 MMHG

## 2024-09-25 DIAGNOSIS — R07.0 THROAT PAIN: Primary | ICD-10-CM

## 2024-09-25 LAB
DEPRECATED S PYO AG THROAT QL EIA: NEGATIVE
GROUP A STREP BY PCR: NOT DETECTED

## 2024-09-25 PROCEDURE — 87651 STREP A DNA AMP PROBE: CPT | Performed by: FAMILY MEDICINE

## 2024-09-25 PROCEDURE — 99213 OFFICE O/P EST LOW 20 MIN: CPT | Performed by: FAMILY MEDICINE

## 2024-09-25 RX ORDER — GABAPENTIN 100 MG/1
CAPSULE ORAL
COMMUNITY
Start: 2024-09-04

## 2024-09-25 RX ORDER — ONDANSETRON 4 MG/1
4 TABLET, ORALLY DISINTEGRATING ORAL
COMMUNITY
Start: 2024-07-03

## 2024-09-25 NOTE — PROGRESS NOTES
Assessment:       Throat pain  - Streptococcus A Rapid Screen w/Reflex to PCR - Clinic Collect         Plan:     Patient with sore throat, likely viral.  Rapid strep screen negative.  Follow-up if symptoms are getting worse or not improving over the next week.  Recommend symptomatic care.  No antibiotics indicated at this time.    MEDICATIONS:   Orders Placed This Encounter   Medications    ondansetron (ZOFRAN ODT) 4 MG ODT tab     Sig: Place 4 mg under the tongue.    gabapentin (NEURONTIN) 100 MG capsule     Sig: TAKE 2 CAPSULES BY MOUTH TWICE DAILY. NEED APPOINTMENT FOR ADDITIONAL REFILLS       Subjective:       29 year old female presents for evaluation of a 10-day history of sore throat and a couple day history of left ear pain.  She had a cold 2 weeks ago and this overall seem to get better but her sore throat was better at first and now has gotten worse.  She had a fever yesterday of 100.8.  Denies nasal congestion or cough.  No shortness of breath or wheezing.    Patient Active Problem List   Diagnosis    Unspecified abnormalities of gait and mobility    Carlos-Danlos syndrome    Major depression, single episode    Migraines    Mild intermittent asthma    Nephrolithiasis    Postural orthostatic tachycardia syndrome    Vitamin D deficiency    Chronic pain of left knee       Past Medical History:   Diagnosis Date    Chronic pain of left knee     Carlos-Danlos syndrome     Motion sickness     POTS (postural orthostatic tachycardia syndrome)     Restless legs syndrome     Skeletal dysplasia        Past Surgical History:   Procedure Laterality Date    ARTHROSCOPY KNEE Right 3/29/2019    Procedure: Right Knee Arthroscopy;  Surgeon: Di Schwartz MD;  Location:  OR    ARTHROSCOPY KNEE WITH TIBIAL TUBERCLE SHIFT Left 12/15/2017    Procedure: ARTHROSCOPY KNEE WITH TIBIAL TUBERCLE SHIFT;;  Surgeon: Di Schwartz MD;  Location:  OR    OPEN REDUCTION INTERNAL FIXATION RODDING INTRAMEDULLARY TIBIA Left  12/15/2017    Procedure: OPEN REDUCTION INTERNAL FIXATION RODDING INTRAMEDULLARY TIBIA;  Left Knee Arthroscopy, Tibial Derotational Osteotomy with Intermedullary Rodding, Fibular Derotational Osteotmy, Distal Tibial Tubercle Osteotomy;  Surgeon: Dio Olivarez MD;  [facility]    REMOVE HARDWARE FOOT Left 5/10/2023    Procedure: Removal of hardware left lower leg;  Surgeon: Dio Olivarez MD;  Location: [facility] OR    REMOVE HARDWARE LOWER EXTREMITY BILATERAL Bilateral 3/29/2019    Procedure: Bilateral Lower Leg Hardware Removal;  Surgeon: Di Schwartz MD;  Location:  OR       Current Outpatient Medications   Medication Sig Dispense Refill    gabapentin (NEURONTIN) 100 MG capsule TAKE 2 CAPSULES BY MOUTH TWICE DAILY. NEED APPOINTMENT FOR ADDITIONAL REFILLS      Prenatal Vit-Fe Fumarate-FA (PRENATAL MULTIVITAMIN W/IRON) 27-0.8 MG tablet Take 1 tablet by mouth every morning      acetaminophen (TYLENOL) 325 MG tablet Take 2 tablets (650 mg) by mouth every 4 hours as needed for other (mild pain) (Patient not taking: Reported on 9/25/2024) 100 tablet 0    ondansetron (ZOFRAN ODT) 4 MG ODT tab Place 4 mg under the tongue. (Patient not taking: Reported on 9/25/2024)       No current facility-administered medications for this visit.       Allergies   Allergen Reactions    Amoxicillin Hives and Shortness Of Breath     Other Reaction(s): *Unknown    Prochlorperazine Other (See Comments), Anaphylaxis and Anxiety     delirious    Other Reaction(s): *Unknown    Azithromycin Nausea and Vomiting and Hives     Other Reaction(s): *Unknown    Sulfa Antibiotics Hives       Family History   Problem Relation Age of Onset    Osteoporosis Mother     Gastrointestinal Disease Mother         gastroparesis    Deep Vein Thrombosis (DVT) Father     Lupus Father     Anesthesia Reaction No family hx of        Social History     Socioeconomic History    Marital status:      Spouse name: None    Number of  children: None    Years of education: None    Highest education level: None   Tobacco Use    Smoking status: Never     Passive exposure: Never    Smokeless tobacco: Never   Substance and Sexual Activity    Alcohol use: No    Drug use: No    Sexual activity: Yes     Partners: Male     Social Determinants of Health     Financial Resource Strain: Low Risk  (4/3/2023)    Received from [facility], [facility]    Financial Resource Strain     Difficulty of Paying Living Expenses: 3   Food Insecurity: No Food Insecurity (4/3/2023)    Received from [facility], [facility]    Food Insecurity     Worried About Running Out of Food in the Last Year: 1   Transportation Needs: No Transportation Needs (4/3/2023)    Received from [facility], [facility]    Transportation Needs     Lack of Transportation (Medical): 1    Received from [facility]    Social Connections   Housing Stability: Low Risk  (4/3/2023)    Received from [facility], [facility]    Housing Stability     Unable to Pay for Housing in the Last Year: 1         Review of Systems  Pertinent items are noted in HPI.      Objective:                 General Appearance:    /71   Pulse 96   Temp 98.7  F (37.1  C) (Tympanic)   Resp 20   LMP  (Exact Date)   SpO2 97%         Alert, pleasant, cooperative, no distress, appears stated age   Head:    Normocephalic, without obvious abnormality, atraumatic   Eyes:    Conjunctiva/corneas clear   Ears:    Normal TM's without erythema or bulging. Normal external ear canals, both ears   Nose:   Nares normal, septum midline, mucosa normal, no drainage    or sinus tenderness   Throat:   Lips, mucosa, and tongue normal; teeth and gums  normal.  No tonsilar hypertrophy or exudate.   Neck:   Supple, symmetrical, trachea midline, no adenopathy    Lungs:     Clear to auscultation bilaterally without wheezes, rales, or rhonchi, respirations unlabored    Heart:    Regular rate and rhythm, S1 and S2 normal, no murmur, rub or gallop       Extremities:   Extremities normal, atraumatic, no cyanosis or edema   Skin:   Skin color, texture, turgor normal, no rashes or lesions           Results for orders placed or performed in visit on 09/25/24   Streptococcus A Rapid Screen w/Reflex to PCR - Clinic Collect     Status: Normal    Specimen: Throat; Swab   Result Value Ref Range    Group A Strep antigen Negative Negative       This note has been dictated using voice recognition software. Any grammatical or context distortions are unintentional and inherent to the software

## 2024-10-21 ENCOUNTER — TRANSFERRED RECORDS (OUTPATIENT)
Dept: HEALTH INFORMATION MANAGEMENT | Facility: CLINIC | Age: 29
End: 2024-10-21
Payer: COMMERCIAL

## 2024-10-23 ENCOUNTER — ANCILLARY PROCEDURE (OUTPATIENT)
Dept: ULTRASOUND IMAGING | Facility: HOSPITAL | Age: 29
End: 2024-10-23
Attending: STUDENT IN AN ORGANIZED HEALTH CARE EDUCATION/TRAINING PROGRAM
Payer: COMMERCIAL

## 2024-10-23 ENCOUNTER — OFFICE VISIT (OUTPATIENT)
Dept: MATERNAL FETAL MEDICINE | Facility: HOSPITAL | Age: 29
End: 2024-10-23
Attending: STUDENT IN AN ORGANIZED HEALTH CARE EDUCATION/TRAINING PROGRAM
Payer: COMMERCIAL

## 2024-10-23 DIAGNOSIS — O09.812 PREGNANCY RESULTING FROM IN VITRO FERTILIZATION IN SECOND TRIMESTER: ICD-10-CM

## 2024-10-23 DIAGNOSIS — O09.812 PREGNANCY RESULTING FROM IN VITRO FERTILIZATION IN SECOND TRIMESTER: Primary | ICD-10-CM

## 2024-10-23 PROCEDURE — 93325 DOPPLER ECHO COLOR FLOW MAPG: CPT

## 2024-10-23 PROCEDURE — 99213 OFFICE O/P EST LOW 20 MIN: CPT | Mod: 25 | Performed by: STUDENT IN AN ORGANIZED HEALTH CARE EDUCATION/TRAINING PROGRAM

## 2024-10-23 PROCEDURE — 93325 DOPPLER ECHO COLOR FLOW MAPG: CPT | Mod: 26 | Performed by: STUDENT IN AN ORGANIZED HEALTH CARE EDUCATION/TRAINING PROGRAM

## 2024-10-23 PROCEDURE — 76827 ECHO EXAM OF FETAL HEART: CPT | Mod: 26 | Performed by: STUDENT IN AN ORGANIZED HEALTH CARE EDUCATION/TRAINING PROGRAM

## 2024-10-23 PROCEDURE — 99212 OFFICE O/P EST SF 10 MIN: CPT | Mod: 25 | Performed by: STUDENT IN AN ORGANIZED HEALTH CARE EDUCATION/TRAINING PROGRAM

## 2024-10-23 PROCEDURE — 76825 ECHO EXAM OF FETAL HEART: CPT | Mod: 26 | Performed by: STUDENT IN AN ORGANIZED HEALTH CARE EDUCATION/TRAINING PROGRAM

## 2024-10-23 NOTE — PROGRESS NOTES
The patient was seen for an ultrasound in the [facility] today.  For a detailed report of the ultrasound examination, please see the ultrasound report which can be found under the imaging tab.     If you have questions regarding today's evaluation or if we can be of further service, please contact the [facility].    Cecy Avila MD  Maternal Fetal Medicine

## 2024-10-23 NOTE — NURSING NOTE
Patient reports positive fetal movement, no pain, no contractions, leaking of fluid, or bleeding.  SBAR given to JESSE ARREGUIN, see their note in Epic.

## 2024-11-14 ENCOUNTER — HOSPITAL ENCOUNTER (OUTPATIENT)
Facility: HOSPITAL | Age: 29
Discharge: HOME | End: 2024-11-14
Attending: OBSTETRICS & GYNECOLOGY | Admitting: OBSTETRICS & GYNECOLOGY
Payer: COMMERCIAL

## 2024-11-14 VITALS
HEIGHT: 69 IN | OXYGEN SATURATION: 99 % | SYSTOLIC BLOOD PRESSURE: 116 MMHG | TEMPERATURE: 98.3 F | WEIGHT: 152 LBS | HEART RATE: 95 BPM | DIASTOLIC BLOOD PRESSURE: 85 MMHG | RESPIRATION RATE: 17 BRPM | BODY MASS INDEX: 22.51 KG/M2

## 2024-11-14 DIAGNOSIS — B96.89 BACTERIAL VAGINOSIS: Primary | ICD-10-CM

## 2024-11-14 DIAGNOSIS — N76.0 BACTERIAL VAGINOSIS: Primary | ICD-10-CM

## 2024-11-14 PROBLEM — Z36.89 ENCOUNTER FOR TRIAGE IN PREGNANT PATIENT: Status: ACTIVE | Noted: 2024-11-14

## 2024-11-14 LAB
ALBUMIN UR-MCNC: NEGATIVE MG/DL
AMORPH CRY #/AREA URNS HPF: ABNORMAL /HPF
APPEARANCE UR: CLEAR
BACTERIA #/AREA URNS HPF: ABNORMAL /HPF
BILIRUB UR QL STRIP: NEGATIVE
CLUE CELLS: PRESENT
COLOR UR AUTO: ABNORMAL
GLUCOSE UR STRIP-MCNC: NEGATIVE MG/DL
HGB UR QL STRIP: NEGATIVE
KETONES UR STRIP-MCNC: NEGATIVE MG/DL
LEUKOCYTE ESTERASE UR QL STRIP: NEGATIVE
MUCOUS THREADS #/AREA URNS LPF: PRESENT /LPF
NITRATE UR QL: NEGATIVE
PH UR STRIP: 6.5 [PH] (ref 5–7)
RBC URINE: 1 /HPF
RUPTURE OF FETAL MEMBRANES BY ROM PLUS: NEGATIVE
SP GR UR STRIP: 1.01 (ref 1–1.03)
SQUAMOUS EPITHELIAL: 1 /HPF
TRICHOMONAS, WET PREP: ABNORMAL
UROBILINOGEN UR STRIP-MCNC: <2 MG/DL
WBC URINE: 5 /HPF
WBC'S/HIGH POWER FIELD, WET PREP: ABNORMAL
YEAST, WET PREP: ABNORMAL

## 2024-11-14 PROCEDURE — 250N000013 HC RX MED GY IP 250 OP 250 PS 637: Performed by: OBSTETRICS & GYNECOLOGY

## 2024-11-14 PROCEDURE — 87210 SMEAR WET MOUNT SALINE/INK: CPT | Performed by: OBSTETRICS & GYNECOLOGY

## 2024-11-14 PROCEDURE — 81003 URINALYSIS AUTO W/O SCOPE: CPT | Performed by: OBSTETRICS & GYNECOLOGY

## 2024-11-14 PROCEDURE — G0463 HOSPITAL OUTPT CLINIC VISIT: HCPCS

## 2024-11-14 PROCEDURE — 84112 EVAL AMNIOTIC FLUID PROTEIN: CPT | Performed by: OBSTETRICS & GYNECOLOGY

## 2024-11-14 RX ORDER — METRONIDAZOLE 500 MG/1
500 TABLET ORAL 2 TIMES DAILY
Status: DISCONTINUED | OUTPATIENT
Start: 2024-11-14 | End: 2024-11-14 | Stop reason: HOSPADM

## 2024-11-14 RX ORDER — LIDOCAINE 40 MG/G
CREAM TOPICAL
Status: DISCONTINUED | OUTPATIENT
Start: 2024-11-14 | End: 2024-11-14 | Stop reason: HOSPADM

## 2024-11-14 RX ORDER — METRONIDAZOLE 500 MG/1
500 TABLET ORAL 2 TIMES DAILY
Qty: 13 TABLET | Refills: 0 | Status: SHIPPED | OUTPATIENT
Start: 2024-11-14

## 2024-11-14 RX ORDER — LIDOCAINE 40 MG/G
CREAM TOPICAL
Status: CANCELLED | OUTPATIENT
Start: 2024-11-14

## 2024-11-14 RX ADMIN — METRONIDAZOLE 500 MG: 500 TABLET ORAL at 04:03

## 2024-11-14 ASSESSMENT — ACTIVITIES OF DAILY LIVING (ADL)
ADLS_ACUITY_SCORE: 0
ADLS_ACUITY_SCORE: 0

## 2024-11-14 NOTE — H&P
[facility] LABOR & DELIVERY   [facility] TRIAGE    NAME:Kaitlynn Mendes  : 1995   MRN: [id number]   GESTATIONAL AGE: 26w1d    ADMISSION DATE: 2024     PCP:  Evie Heredia     CHIEF COMPLAINT:  uterine contractions    HPI: I am here to evaluate Kaitlynn Mendes for  uterine contractions. Patient is a 29 year old,  female at 26w1d gestation. History obtained through the patient and chart review. Patient reports that she has been experiencing contractions for the last couple of days that have been getting progressively stronger. She reports an increase in vaginal discharge.  Pregnancy is complicated by IVF pregnancy, maternal Erlos-Danlos syndrome, and concern for LGA.    DIAGNOSIS COMPLICATING PREGNANCY  IVF  Erlos Danlos  LGA    OBSTETRICAL HISTORY:         PAST MEDICAL HISTORY:  Past Medical History:   Diagnosis Date    Chronic pain of left knee     Carlos-Danlos syndrome     Motion sickness     POTS (postural orthostatic tachycardia syndrome)     Restless legs syndrome     Skeletal dysplasia         PAST SURGICAL HISTORY:  Past Surgical History:   Procedure Laterality Date    ARTHROSCOPY KNEE Right 3/29/2019    Procedure: Right Knee Arthroscopy;  Surgeon: Di Schwartz MD;  [facility]    ARTHROSCOPY KNEE WITH TIBIAL TUBERCLE SHIFT Left 12/15/2017    Procedure: ARTHROSCOPY KNEE WITH TIBIAL TUBERCLE SHIFT;;  Surgeon: Di Schwartz MD;  [facility]    OPEN REDUCTION INTERNAL FIXATION RODDING INTRAMEDULLARY TIBIA Left 12/15/2017    Procedure: OPEN REDUCTION INTERNAL FIXATION RODDING INTRAMEDULLARY TIBIA;  Left Knee Arthroscopy, Tibial Derotational Osteotomy with Intermedullary Rodding, Fibular Derotational Osteotmy, Distal Tibial Tubercle Osteotomy;  Surgeon: Dio Olivarez MD;  [facility]    REMOVE HARDWARE FOOT Left 5/10/2023    Procedure: Removal of hardware left lower leg;  Surgeon: Dio Olivarez MD;   [facility]    REMOVE HARDWARE LOWER EXTREMITY BILATERAL Bilateral 3/29/2019    Procedure: Bilateral Lower Leg Hardware Removal;  Surgeon: Di Schwartz MD;  Location:  OR        SOCIAL HISTORY:   reports that she has never smoked. She has never been exposed to tobacco smoke. She has never used smokeless tobacco. She reports that she does not drink alcohol and does not use drugs.     MEDICATIONS:  Current Facility-Administered Medications   Medication Dose Route Frequency Provider Last Rate Last Admin    lidocaine (LMX4) cream   Topical Q1H PRN Yael Buck A, DO        lidocaine 1 % 0.1-1 mL  0.1-1 mL Other Q1H PRN Yael Buck A, DO        sodium chloride (PF) 0.9% PF flush 3 mL  3 mL Intracatheter Q8H Yael Buck A, DO        sodium chloride (PF) 0.9% PF flush 3 mL  3 mL Intracatheter q1 min prn Yael Buck A, DO            ALLERGIES:  Allergies   Allergen Reactions    Amoxicillin Hives and Shortness Of Breath     Other Reaction(s): *Unknown    Prochlorperazine Other (See Comments), Anaphylaxis and Anxiety     delirious    Other Reaction(s): *Unknown    Azithromycin Nausea and Vomiting and Hives     Other Reaction(s): *Unknown    Sulfa Antibiotics Hives        REVIEW OF SYSTEMS   Negative except what is stated in the HPI    PHYSICAL EXAM:  /85 (BP Location: Right arm, Patient Position: Semi-Vargas's, Cuff Size: Adult Regular)   Pulse 95   Temp 98.3  F (36.8  C) (Oral)   Resp 17   LMP  (Exact Date)   SpO2 99%   Breastfeeding No   GEN: NAD; Alert and oriented, appropriate affect.  HEENT  negative  Heart       Lungs      Abdomen   Abdomen soft, non-tender. BS normal. No masses, organomegaly  Extremities  Normal, Warm, No cyanosis, no clubbing, No edema, and nontender  Vaginal exam: deferred  Membranes: intact    Fetal heart Rate Tracing: appropriate for gestational age  Contractions: irritability    LABS:  ROM + is negative  Wet Prep is + for Clue Cells    IMPRESSION:  29  year old  @ 26w1d with bacterial vaginosis    RECOMMENDATIONS:  Plan Flagyl  Return to prenatal care as planned        Yael Buck,  on 2024 at 3:36 AM

## 2024-11-14 NOTE — PROGRESS NOTES
PROGRESS NOTE:    Lab Results back at 0328.   MD Notified at 0329 of Positive Wet prep for BV.     ROM Plus Negative.   Urinalysis Negative.     MD Buck at bedside at 0349  First Dose of Flagyl Given at 0403.    Pt questions addressed. Pt and  agree with plan.   Discharge Orders placed.   Discharge Instructions reviewed with patient and SO.   Presumed adequate fetal oxygenation documented. Patient instructed to report change in fetal movement, vaginal leaking of fluid or bleeding, abdominal pain, or any concerns related to the pregnancy to provider/clinic.      Pt discharged home at 0410 via ambulatory.   Plan to treat BV outpatient and follow up with clinic at next scheduled apt. Patient verbalized understanding of education and agreement with plan.     Jaylin Pollack RN on 11/14/2024 at 4:10 AM

## 2024-11-14 NOTE — PROGRESS NOTES
PROGRESS NOTE:   26w1d.   IVF Pregnancy    Pt arrived to [facility] at 0215 with complaints of uterine cramping and contractions that started 36hrs ago and have been getting more painful and uncomfortable. Pt Reports Decreased fetal movement. Denies Vaginal bleeding but has been having increased watery vaginal discharge for the past 2 weeks that sometimes will soak her underwear.     Pt oriented to room. Used the restroom and was placed on EFM monitor with patient consent. Vital Signs taken and found to be WNL. Pt denies HA, Vision changes, RUQ pain, or SOB.     Pt Prenatal and Health History Reviewed.   MD Buck notified of patient arrival at 0235.   Plan for Wet Prep, ROM Plus and UA.   Orders placed via VORB/TORB.     Plan discussed with patient. Pt agrees. No questions at this time.   Swabs/Labs completed and sent to Lab.     Will update patient and MD with results.   Jaylin Pollack RN on 2024 at 3:05 AM

## 2024-11-14 NOTE — PROGRESS NOTES
NST Procedure note    Date: 2024    Indication:  uterine contractions    Procedure: Fetal non-stress test    Results: 150s appropriate for gestational age    Yael Buck DO  2024 3:44 AM

## 2024-11-25 ENCOUNTER — LAB REQUISITION (OUTPATIENT)
Dept: LAB | Facility: CLINIC | Age: 29
End: 2024-11-25
Payer: COMMERCIAL

## 2024-11-25 ENCOUNTER — MEDICAL CORRESPONDENCE (OUTPATIENT)
Dept: HEALTH INFORMATION MANAGEMENT | Facility: CLINIC | Age: 29
End: 2024-11-25

## 2024-11-25 DIAGNOSIS — Z3A.28 28 WEEKS GESTATION OF PREGNANCY: ICD-10-CM

## 2024-11-25 LAB
BASOPHILS # BLD AUTO: 0 10E3/UL (ref 0–0.2)
BASOPHILS NFR BLD AUTO: 0 %
EOSINOPHIL # BLD AUTO: 0.1 10E3/UL (ref 0–0.7)
EOSINOPHIL NFR BLD AUTO: 1 %
ERYTHROCYTE [DISTWIDTH] IN BLOOD BY AUTOMATED COUNT: 13.4 % (ref 10–15)
HCT VFR BLD AUTO: 40.5 % (ref 35–47)
HGB BLD-MCNC: 13.4 G/DL (ref 11.7–15.7)
IMM GRANULOCYTES # BLD: 0.2 10E3/UL
IMM GRANULOCYTES NFR BLD: 2 %
LYMPHOCYTES # BLD AUTO: 2.4 10E3/UL (ref 0.8–5.3)
LYMPHOCYTES NFR BLD AUTO: 22 %
MCH RBC QN AUTO: 30.9 PG (ref 26.5–33)
MCHC RBC AUTO-ENTMCNC: 33.1 G/DL (ref 31.5–36.5)
MCV RBC AUTO: 94 FL (ref 78–100)
MONOCYTES # BLD AUTO: 0.8 10E3/UL (ref 0–1.3)
MONOCYTES NFR BLD AUTO: 7 %
NEUTROPHILS # BLD AUTO: 7.5 10E3/UL (ref 1.6–8.3)
NEUTROPHILS NFR BLD AUTO: 68 %
NRBC # BLD AUTO: 0 10E3/UL
NRBC BLD AUTO-RTO: 0 /100
PLATELET # BLD AUTO: 268 10E3/UL (ref 150–450)
RBC # BLD AUTO: 4.33 10E6/UL (ref 3.8–5.2)
WBC # BLD AUTO: 11 10E3/UL (ref 4–11)

## 2024-11-25 PROCEDURE — 85025 COMPLETE CBC W/AUTO DIFF WBC: CPT | Mod: ORL | Performed by: OBSTETRICS & GYNECOLOGY

## 2024-11-25 PROCEDURE — 86780 TREPONEMA PALLIDUM: CPT | Mod: ORL | Performed by: OBSTETRICS & GYNECOLOGY

## 2024-11-26 LAB — T PALLIDUM AB SER QL: NONREACTIVE

## 2024-12-10 ENCOUNTER — LAB REQUISITION (OUTPATIENT)
Dept: LAB | Facility: CLINIC | Age: 29
End: 2024-12-10
Payer: COMMERCIAL

## 2024-12-10 DIAGNOSIS — R39.9 UNSPECIFIED SYMPTOMS AND SIGNS INVOLVING THE GENITOURINARY SYSTEM: ICD-10-CM

## 2024-12-10 PROCEDURE — 87086 URINE CULTURE/COLONY COUNT: CPT | Mod: ORL | Performed by: NURSE PRACTITIONER

## 2024-12-11 LAB — BACTERIA UR CULT: NORMAL

## 2025-01-08 ENCOUNTER — HOSPITAL ENCOUNTER (OUTPATIENT)
Facility: HOSPITAL | Age: 30
End: 2025-01-08
Admitting: OBSTETRICS & GYNECOLOGY
Payer: COMMERCIAL

## 2025-01-08 ENCOUNTER — HOSPITAL ENCOUNTER (OUTPATIENT)
Facility: HOSPITAL | Age: 30
Discharge: HOME | End: 2025-01-08
Attending: OBSTETRICS & GYNECOLOGY | Admitting: OBSTETRICS & GYNECOLOGY
Payer: COMMERCIAL

## 2025-01-08 VITALS
DIASTOLIC BLOOD PRESSURE: 82 MMHG | WEIGHT: 168 LBS | SYSTOLIC BLOOD PRESSURE: 126 MMHG | BODY MASS INDEX: 24.88 KG/M2 | HEIGHT: 69 IN | RESPIRATION RATE: 16 BRPM | TEMPERATURE: 98.4 F

## 2025-01-08 DIAGNOSIS — B96.89 BACTERIAL VAGINOSIS IN PREGNANCY: Primary | ICD-10-CM

## 2025-01-08 DIAGNOSIS — O23.43 URINARY TRACT INFECTION IN MOTHER DURING THIRD TRIMESTER OF PREGNANCY: ICD-10-CM

## 2025-01-08 DIAGNOSIS — O23.599 BACTERIAL VAGINOSIS IN PREGNANCY: Primary | ICD-10-CM

## 2025-01-08 PROBLEM — Z36.89 ENCOUNTER FOR TRIAGE IN PREGNANT PATIENT: Status: ACTIVE | Noted: 2024-11-14

## 2025-01-08 LAB
ALBUMIN UR-MCNC: NEGATIVE MG/DL
APPEARANCE UR: ABNORMAL
BACTERIA #/AREA URNS HPF: ABNORMAL /HPF
BILIRUB UR QL STRIP: NEGATIVE
CLUE CELLS: PRESENT
COLOR UR AUTO: YELLOW
GLUCOSE UR STRIP-MCNC: NEGATIVE MG/DL
HGB UR QL STRIP: NEGATIVE
KETONES UR STRIP-MCNC: NEGATIVE MG/DL
LEUKOCYTE ESTERASE UR QL STRIP: ABNORMAL
MUCOUS THREADS #/AREA URNS LPF: PRESENT /LPF
NITRATE UR QL: NEGATIVE
PH UR STRIP: 6 [PH] (ref 5–7)
RBC URINE: 1 /HPF
SP GR UR STRIP: 1.02 (ref 1–1.03)
SQUAMOUS EPITHELIAL: 3 /HPF
TRICHOMONAS, WET PREP: ABNORMAL
UROBILINOGEN UR STRIP-MCNC: <2 MG/DL
WBC URINE: 8 /HPF
WBC'S/HIGH POWER FIELD, WET PREP: ABNORMAL
YEAST, WET PREP: ABNORMAL

## 2025-01-08 PROCEDURE — 87210 SMEAR WET MOUNT SALINE/INK: CPT | Performed by: OBSTETRICS & GYNECOLOGY

## 2025-01-08 PROCEDURE — G0463 HOSPITAL OUTPT CLINIC VISIT: HCPCS

## 2025-01-08 PROCEDURE — 81001 URINALYSIS AUTO W/SCOPE: CPT | Performed by: OBSTETRICS & GYNECOLOGY

## 2025-01-08 PROCEDURE — 87653 STREP B DNA AMP PROBE: CPT | Performed by: OBSTETRICS & GYNECOLOGY

## 2025-01-08 RX ORDER — LIDOCAINE 40 MG/G
CREAM TOPICAL
Status: DISCONTINUED | OUTPATIENT
Start: 2025-01-08 | End: 2025-01-08 | Stop reason: HOSPADM

## 2025-01-08 RX ORDER — METRONIDAZOLE 7.5 MG/G
1 GEL VAGINAL DAILY
Qty: 25 G | Refills: 0 | Status: SHIPPED | OUTPATIENT
Start: 2025-01-08 | End: 2025-01-13

## 2025-01-08 RX ORDER — NITROFURANTOIN 25; 75 MG/1; MG/1
100 CAPSULE ORAL 2 TIMES DAILY
Qty: 14 CAPSULE | Refills: 0 | Status: SHIPPED | OUTPATIENT
Start: 2025-01-08 | End: 2025-01-15

## 2025-01-08 ASSESSMENT — ACTIVITIES OF DAILY LIVING (ADL)
ADLS_ACUITY_SCORE: 46
ADLS_ACUITY_SCORE: 46

## 2025-01-08 NOTE — PROCEDURES
NST Procedure note    Date: 1/8/2025    Indication: cramping/back pain    Procedure: Fetal non-stress test    Results: Reactive    Cindy Horne,   1/8/2025 1:08 PM

## 2025-01-08 NOTE — PROGRESS NOTES
Swabs obtained as ordered. Perineum slightly reddened exterior. Rn notes small amount cottage cheese white mucous on gloved fingers after vaginal exam. Cervix inner os closed. Outer os soft and slightly open. No vaginal bleeding.   Pt encouraged to take po fluids while await lab results.   Karen J Schoenberg, RN

## 2025-01-08 NOTE — PROGRESS NOTES
Data: Patient presented to [facility]: 2025 11:13 AM.  Reason for maternal/fetal assessment is uterine contractions. Patient reports Contractions since last evening that occasionally have given her nausea and vomiting. States these contractions are about 4 per hour. Patient denies vaginal bleeding, or rupture of membranes, but does state that she has an increased vaginal discharge recently, and a history of UTI and kidney stones. . Patient reports fetal movement is normal. Patient is a 34w0d .  Prenatal record reviewed. Pregnancy has been uncomplicated.    Vital signs wnl. Support person is present.     Action: Verbal consent for EFM. Triage assessment completed.     Response: Patient verbalized agreement with plan. Dr. Horne in [facility] given SBAR. Orders received and initiated.   Karen J Schoenberg, RN

## 2025-01-08 NOTE — PROGRESS NOTES
Data: Patient assessed in the [facility] for uterine contractions. Cervix other (see comments)  0 cm dilated and   not effaced. Cervix posterior.  Membranes intact. Contractions are occasionally present. Uterine irritability frequent and  last 20-40 seconds. Uterine assessment is mild by palpation during contractions and soft by palpation at rest. See flowsheets for fetal assessment documentation. Lab results show BV and UTI.     Action: Presumed adequate fetal oxygenation documented. Discharge instructions reviewed. Patient instructed to report change in fetal movement, vaginal leaking of fluid or bleeding, abdominal pain, or any concerns related to the pregnancy to provider/clinic.  Pt has clinic appointment tomorrow.     Response:  Prescriptions sent and follow up care plan in place. Orders to discharge home. Patient verbalized understanding of education, medications use,  and agreement with plan. Discharged to home at 1310.

## 2025-01-08 NOTE — H&P
[facility]    History and Physical: Triage evaluation     Date of Admission:  2025    History of Present Illness   Kaitlynn Mendes is a 29 year old female  34w0d  Estimated Date of Delivery: 2025 presents to triage with nausea, pelvic and back pain.      She has a history of interstitial cystitis, kidney stones and UTIs, however had this checked a month ago and it was negative chalking it up to an IC flare.       OB COMPLICATIONS:  Prenatal course was complicated by see below.      Past Medical History    Past Medical History:   Diagnosis Date    Chronic pain of left knee     Carlos-Danlos syndrome     Motion sickness     POTS (postural orthostatic tachycardia syndrome)     Restless legs syndrome     Skeletal dysplasia        Past Surgical History   Past Surgical History:   Procedure Laterality Date    ARTHROSCOPY KNEE Right 3/29/2019    Procedure: Right Knee Arthroscopy;  Surgeon: Di Schwartz MD;  [facility]    ARTHROSCOPY KNEE WITH TIBIAL TUBERCLE SHIFT Left 12/15/2017    Procedure: ARTHROSCOPY KNEE WITH TIBIAL TUBERCLE SHIFT;;  Surgeon: Di Schwartz MD;  [facility]    OPEN REDUCTION INTERNAL FIXATION RODDING INTRAMEDULLARY TIBIA Left 12/15/2017    Procedure: OPEN REDUCTION INTERNAL FIXATION RODDING INTRAMEDULLARY TIBIA;  Left Knee Arthroscopy, Tibial Derotational Osteotomy with Intermedullary Rodding, Fibular Derotational Osteotmy, Distal Tibial Tubercle Osteotomy;  Surgeon: Dio Olivarez MD;  [facility]    REMOVE HARDWARE FOOT Left 5/10/2023    Procedure: Removal of hardware left lower leg;  Surgeon: Dio Olivarez MD;  [facility]    REMOVE HARDWARE LOWER EXTREMITY BILATERAL Bilateral 3/29/2019    Procedure: Bilateral Lower Leg Hardware Removal;  Surgeon: Di Schwartz MD;  [facility]       OB History    Para Term  AB Living   2 0 0 0 1 0   SAB IAB Ectopic Multiple Live Births    1 0 0 0 0      # Outcome Date GA Lbr Ra/2nd Weight Sex Type Anes PTL Lv   2 Current            1 SAB 03/01/20              Social History   Social History     Tobacco Use    Smoking status: Never     Passive exposure: Never    Smokeless tobacco: Never   Substance Use Topics    Alcohol use: No    Drug use: No      Family History   Family History   Problem Relation Age of Onset    Osteoporosis Mother     Gastrointestinal Disease Mother         gastroparesis    Deep Vein Thrombosis (DVT) Father     Lupus Father     Anesthesia Reaction No family hx of         Prior to Admission Medications   Prior to Admission Medications   Prescriptions Last Dose Informant Patient Reported? Taking?   Prenatal Vit-Fe Fumarate-FA (PRENATAL MULTIVITAMIN W/IRON) 27-0.8 MG tablet 1/8/2025  Yes Yes   Sig: Take 1 tablet by mouth every morning   acetaminophen (TYLENOL) 325 MG tablet   No No   Sig: Take 2 tablets (650 mg) by mouth every 4 hours as needed for other (mild pain)   gabapentin (NEURONTIN) 100 MG capsule 1/8/2025  Yes Yes   Sig: TAKE 2 CAPSULES BY MOUTH TWICE DAILY. NEED APPOINTMENT FOR ADDITIONAL REFILLS   metroNIDAZOLE (FLAGYL) 500 MG tablet   No No   Sig: Take 1 tablet (500 mg) by mouth 2 times daily.   ondansetron (ZOFRAN ODT) 4 MG ODT tab   Yes No   Sig: Place 4 mg under the tongue.      Facility-Administered Medications: None     Allergies   Allergies   Allergen Reactions    Amoxicillin Hives and Shortness Of Breath     Other Reaction(s): *Unknown    Prochlorperazine Other (See Comments), Anaphylaxis and Anxiety     delirious    Other Reaction(s): *Unknown    Azithromycin Nausea and Vomiting and Hives     Other Reaction(s): *Unknown    Sulfa Antibiotics Hives       Physical Exam   Vital Signs with Ranges  Temp:  [98.4  F (36.9  C)] 98.4  F (36.9  C)  Resp:  [16] 16  BP: (126)/(82) 126/82    Gen: no acute distress, resting comfortably   CV: acyanotic   Heart: regular rate and rhythm   Pulm: unlabored respirations, clear to  ausculation bilaterally    Abd: gravid, soft, nontender   Extremities: soft, nontender     Recent Labs   Lab Test 07/15/24  0954 12/15/17  1331   ABO  --  O   RH  --  Pos   AS Negative Neg     Recent Labs   Lab Test 07/15/24  0954   HEPBANG Nonreactive   HIAGAB Nonreactive   RUQIGG Positive       Fetal Heart Tones: 130 baseline, moderate variablility, + accels, no decels, and Category I  TOCO:   frequency q 2 minutes  SVE: Cervix closed      Assessment & Plan   Kaitlynn Mendes is a 29 year old female who presents with pelvic and back pain who is found to have an acute UTI and bacterial vaginosis.  IUP @ 34w0d .  NST reactive.  Category  I    PLAN:   Rx for Macrobid and Metrogel sent to pharmacy.  Discharge home, follow up tomorrow at scheduled prenatal appt.      Cindy Horne DO, SUYAPA, FACOG  [facility]

## 2025-01-09 LAB — GP B STREP DNA SPEC QL NAA+PROBE: NEGATIVE

## 2025-01-13 ENCOUNTER — ANCILLARY PROCEDURE (OUTPATIENT)
Dept: CARDIOLOGY | Facility: CLINIC | Age: 30
End: 2025-01-13
Attending: INTERNAL MEDICINE
Payer: COMMERCIAL

## 2025-01-13 DIAGNOSIS — G90.A POTS (POSTURAL ORTHOSTATIC TACHYCARDIA SYNDROME): ICD-10-CM

## 2025-01-13 DIAGNOSIS — O09.90 HIGH-RISK PREGNANCY, UNSPECIFIED TRIMESTER: ICD-10-CM

## 2025-01-13 DIAGNOSIS — R69 DIAGNOSIS UNKNOWN: Primary | ICD-10-CM

## 2025-01-13 LAB — LVEF ECHO: NORMAL

## 2025-01-13 PROCEDURE — 93306 TTE W/DOPPLER COMPLETE: CPT | Performed by: INTERNAL MEDICINE

## 2025-01-31 ENCOUNTER — LAB REQUISITION (OUTPATIENT)
Dept: LAB | Facility: CLINIC | Age: 30
End: 2025-01-31
Payer: COMMERCIAL

## 2025-01-31 DIAGNOSIS — R03.0 ELEVATED BLOOD-PRESSURE READING, WITHOUT DIAGNOSIS OF HYPERTENSION: ICD-10-CM

## 2025-01-31 LAB
ALBUMIN MFR UR ELPH: 11.7 MG/DL
CREAT UR-MCNC: 134 MG/DL
ERYTHROCYTE [DISTWIDTH] IN BLOOD BY AUTOMATED COUNT: 13.4 % (ref 10–15)
HCT VFR BLD AUTO: 39 % (ref 35–47)
HGB BLD-MCNC: 13.1 G/DL (ref 11.7–15.7)
MCH RBC QN AUTO: 30.9 PG (ref 26.5–33)
MCHC RBC AUTO-ENTMCNC: 33.6 G/DL (ref 31.5–36.5)
MCV RBC AUTO: 92 FL (ref 78–100)
PLATELET # BLD AUTO: 204 10E3/UL (ref 150–450)
PROT/CREAT 24H UR: 0.09 MG/MG CR (ref 0–0.2)
RBC # BLD AUTO: 4.24 10E6/UL (ref 3.8–5.2)
WBC # BLD AUTO: 9.9 10E3/UL (ref 4–11)

## 2025-01-31 PROCEDURE — 82565 ASSAY OF CREATININE: CPT | Mod: ORL | Performed by: OBSTETRICS & GYNECOLOGY

## 2025-01-31 PROCEDURE — 85014 HEMATOCRIT: CPT | Performed by: OBSTETRICS & GYNECOLOGY

## 2025-01-31 PROCEDURE — 84460 ALANINE AMINO (ALT) (SGPT): CPT | Performed by: OBSTETRICS & GYNECOLOGY

## 2025-01-31 PROCEDURE — 84450 TRANSFERASE (AST) (SGOT): CPT | Mod: ORL | Performed by: OBSTETRICS & GYNECOLOGY

## 2025-01-31 PROCEDURE — 84156 ASSAY OF PROTEIN URINE: CPT | Performed by: OBSTETRICS & GYNECOLOGY

## 2025-01-31 PROCEDURE — 85041 AUTOMATED RBC COUNT: CPT | Performed by: OBSTETRICS & GYNECOLOGY

## 2025-02-01 LAB
ALT SERPL W P-5'-P-CCNC: 8 U/L (ref 0–50)
AST SERPL W P-5'-P-CCNC: 20 U/L (ref 0–45)
CREAT SERPL-MCNC: 0.65 MG/DL (ref 0.51–0.95)
EGFRCR SERPLBLD CKD-EPI 2021: >90 ML/MIN/1.73M2

## 2025-02-11 ENCOUNTER — ANESTHESIA EVENT (OUTPATIENT)
Dept: OBGYN | Facility: HOSPITAL | Age: 30
End: 2025-02-11
Payer: COMMERCIAL

## 2025-02-12 ENCOUNTER — ANESTHESIA (OUTPATIENT)
Dept: OBGYN | Facility: HOSPITAL | Age: 30
End: 2025-02-12
Payer: COMMERCIAL

## 2025-02-12 ENCOUNTER — HOSPITAL ENCOUNTER (INPATIENT)
Facility: HOSPITAL | Age: 30
End: 2025-02-12
Attending: OBSTETRICS & GYNECOLOGY | Admitting: OBSTETRICS & GYNECOLOGY
Payer: COMMERCIAL

## 2025-02-12 DIAGNOSIS — Z98.891 S/P CESAREAN SECTION: Primary | ICD-10-CM

## 2025-02-12 LAB
ABO + RH BLD: NORMAL
ATRIAL RATE - MUSE: 65 BPM
BLD GP AB SCN SERPL QL: NEGATIVE
DIASTOLIC BLOOD PRESSURE - MUSE: NORMAL MMHG
ERYTHROCYTE [DISTWIDTH] IN BLOOD BY AUTOMATED COUNT: 13.2 % (ref 10–15)
GLUCOSE BLDC GLUCOMTR-MCNC: 93 MG/DL (ref 70–99)
HCT VFR BLD AUTO: 36.2 % (ref 35–47)
HGB BLD-MCNC: 13.1 G/DL (ref 11.7–15.7)
INTERPRETATION ECG - MUSE: NORMAL
MCH RBC QN AUTO: 32 PG (ref 26.5–33)
MCHC RBC AUTO-ENTMCNC: 36.2 G/DL (ref 31.5–36.5)
MCV RBC AUTO: 89 FL (ref 78–100)
P AXIS - MUSE: 37 DEGREES
PLATELET # BLD AUTO: 214 10E3/UL (ref 150–450)
PR INTERVAL - MUSE: 142 MS
QRS DURATION - MUSE: 74 MS
QT - MUSE: 424 MS
QTC - MUSE: 440 MS
R AXIS - MUSE: 79 DEGREES
RBC # BLD AUTO: 4.09 10E6/UL (ref 3.8–5.2)
SPECIMEN EXP DATE BLD: NORMAL
SYSTOLIC BLOOD PRESSURE - MUSE: NORMAL MMHG
T AXIS - MUSE: 73 DEGREES
T PALLIDUM AB SER QL: NONREACTIVE
VENTRICULAR RATE- MUSE: 65 BPM
WBC # BLD AUTO: 11.6 10E3/UL (ref 4–11)

## 2025-02-12 PROCEDURE — 86901 BLOOD TYPING SEROLOGIC RH(D): CPT | Performed by: NURSE PRACTITIONER

## 2025-02-12 PROCEDURE — 36415 COLL VENOUS BLD VENIPUNCTURE: CPT | Performed by: NURSE PRACTITIONER

## 2025-02-12 PROCEDURE — 250N000009 HC RX 250: Performed by: NURSE PRACTITIONER

## 2025-02-12 PROCEDURE — 250N000011 HC RX IP 250 OP 636: Performed by: OBSTETRICS & GYNECOLOGY

## 2025-02-12 PROCEDURE — 250N000011 HC RX IP 250 OP 636: Performed by: NURSE ANESTHETIST, CERTIFIED REGISTERED

## 2025-02-12 PROCEDURE — 93010 ELECTROCARDIOGRAM REPORT: CPT | Performed by: INTERNAL MEDICINE

## 2025-02-12 PROCEDURE — 370N000017 HC ANESTHESIA TECHNICAL FEE, PER MIN: Performed by: OBSTETRICS & GYNECOLOGY

## 2025-02-12 PROCEDURE — 250N000013 HC RX MED GY IP 250 OP 250 PS 637: Performed by: OBSTETRICS & GYNECOLOGY

## 2025-02-12 PROCEDURE — 250N000011 HC RX IP 250 OP 636: Performed by: NURSE PRACTITIONER

## 2025-02-12 PROCEDURE — 120N000001 HC R&B MED SURG/OB

## 2025-02-12 PROCEDURE — 250N000011 HC RX IP 250 OP 636: Performed by: STUDENT IN AN ORGANIZED HEALTH CARE EDUCATION/TRAINING PROGRAM

## 2025-02-12 PROCEDURE — 272N000001 HC OR GENERAL SUPPLY STERILE: Performed by: OBSTETRICS & GYNECOLOGY

## 2025-02-12 PROCEDURE — 258N000003 HC RX IP 258 OP 636: Performed by: NURSE PRACTITIONER

## 2025-02-12 PROCEDURE — 86780 TREPONEMA PALLIDUM: CPT | Performed by: NURSE PRACTITIONER

## 2025-02-12 PROCEDURE — 85041 AUTOMATED RBC COUNT: CPT | Performed by: NURSE PRACTITIONER

## 2025-02-12 PROCEDURE — 999N000249 HC STATISTIC C-SECTION ON UNIT

## 2025-02-12 PROCEDURE — 258N000003 HC RX IP 258 OP 636: Performed by: NURSE ANESTHETIST, CERTIFIED REGISTERED

## 2025-02-12 PROCEDURE — 258N000003 HC RX IP 258 OP 636: Performed by: OBSTETRICS & GYNECOLOGY

## 2025-02-12 PROCEDURE — 93005 ELECTROCARDIOGRAM TRACING: CPT | Performed by: STUDENT IN AN ORGANIZED HEALTH CARE EDUCATION/TRAINING PROGRAM

## 2025-02-12 PROCEDURE — 93005 ELECTROCARDIOGRAM TRACING: CPT

## 2025-02-12 PROCEDURE — 99222 1ST HOSP IP/OBS MODERATE 55: CPT | Performed by: INTERNAL MEDICINE

## 2025-02-12 PROCEDURE — 250N000013 HC RX MED GY IP 250 OP 250 PS 637: Performed by: NURSE PRACTITIONER

## 2025-02-12 PROCEDURE — 710N000009 HC RECOVERY PHASE 1, LEVEL 1, PER MIN: Performed by: OBSTETRICS & GYNECOLOGY

## 2025-02-12 PROCEDURE — 360N000076 HC SURGERY LEVEL 3, PER MIN: Performed by: OBSTETRICS & GYNECOLOGY

## 2025-02-12 RX ORDER — GABAPENTIN 100 MG/1
200 CAPSULE ORAL 2 TIMES DAILY
Status: DISCONTINUED | OUTPATIENT
Start: 2025-02-12 | End: 2025-02-14 | Stop reason: HOSPADM

## 2025-02-12 RX ORDER — NALOXONE HYDROCHLORIDE 0.4 MG/ML
0.4 INJECTION, SOLUTION INTRAMUSCULAR; INTRAVENOUS; SUBCUTANEOUS
Status: DISCONTINUED | OUTPATIENT
Start: 2025-02-12 | End: 2025-02-14 | Stop reason: HOSPADM

## 2025-02-12 RX ORDER — LIDOCAINE 40 MG/G
CREAM TOPICAL
Status: DISCONTINUED | OUTPATIENT
Start: 2025-02-12 | End: 2025-02-12

## 2025-02-12 RX ORDER — AMOXICILLIN 250 MG
1 CAPSULE ORAL 2 TIMES DAILY
Status: DISCONTINUED | OUTPATIENT
Start: 2025-02-12 | End: 2025-02-14 | Stop reason: HOSPADM

## 2025-02-12 RX ORDER — BUPIVACAINE HYDROCHLORIDE 2.5 MG/ML
INJECTION, SOLUTION EPIDURAL; INFILTRATION; INTRACAUDAL
Status: DISCONTINUED | OUTPATIENT
Start: 2025-02-12 | End: 2025-02-12

## 2025-02-12 RX ORDER — MISOPROSTOL 200 UG/1
800 TABLET ORAL
Status: DISCONTINUED | OUTPATIENT
Start: 2025-02-12 | End: 2025-02-14 | Stop reason: HOSPADM

## 2025-02-12 RX ORDER — MISOPROSTOL 200 UG/1
400 TABLET ORAL
Status: DISCONTINUED | OUTPATIENT
Start: 2025-02-12 | End: 2025-02-14 | Stop reason: HOSPADM

## 2025-02-12 RX ORDER — IBUPROFEN 800 MG/1
800 TABLET, FILM COATED ORAL EVERY 6 HOURS
Status: DISCONTINUED | OUTPATIENT
Start: 2025-02-13 | End: 2025-02-14 | Stop reason: HOSPADM

## 2025-02-12 RX ORDER — HYDROCORTISONE 25 MG/G
CREAM TOPICAL 3 TIMES DAILY PRN
Status: DISCONTINUED | OUTPATIENT
Start: 2025-02-12 | End: 2025-02-14 | Stop reason: HOSPADM

## 2025-02-12 RX ORDER — TRANEXAMIC ACID 10 MG/ML
1 INJECTION, SOLUTION INTRAVENOUS EVERY 30 MIN PRN
Status: DISCONTINUED | OUTPATIENT
Start: 2025-02-12 | End: 2025-02-14 | Stop reason: HOSPADM

## 2025-02-12 RX ORDER — NALOXONE HYDROCHLORIDE 0.4 MG/ML
0.2 INJECTION, SOLUTION INTRAMUSCULAR; INTRAVENOUS; SUBCUTANEOUS
Status: DISCONTINUED | OUTPATIENT
Start: 2025-02-12 | End: 2025-02-14 | Stop reason: HOSPADM

## 2025-02-12 RX ORDER — OXYTOCIN/0.9 % SODIUM CHLORIDE 30/500 ML
340 PLASTIC BAG, INJECTION (ML) INTRAVENOUS CONTINUOUS PRN
Status: DISCONTINUED | OUTPATIENT
Start: 2025-02-12 | End: 2025-02-14 | Stop reason: HOSPADM

## 2025-02-12 RX ORDER — SODIUM CHLORIDE, SODIUM LACTATE, POTASSIUM CHLORIDE, CALCIUM CHLORIDE 600; 310; 30; 20 MG/100ML; MG/100ML; MG/100ML; MG/100ML
INJECTION, SOLUTION INTRAVENOUS CONTINUOUS
Status: DISCONTINUED | OUTPATIENT
Start: 2025-02-12 | End: 2025-02-14 | Stop reason: HOSPADM

## 2025-02-12 RX ORDER — OXYTOCIN 10 [USP'U]/ML
10 INJECTION, SOLUTION INTRAMUSCULAR; INTRAVENOUS
Status: DISCONTINUED | OUTPATIENT
Start: 2025-02-12 | End: 2025-02-14 | Stop reason: HOSPADM

## 2025-02-12 RX ORDER — MISOPROSTOL 200 UG/1
400 TABLET ORAL
Status: DISCONTINUED | OUTPATIENT
Start: 2025-02-12 | End: 2025-02-12

## 2025-02-12 RX ORDER — LIDOCAINE 40 MG/G
CREAM TOPICAL
Status: DISCONTINUED | OUTPATIENT
Start: 2025-02-12 | End: 2025-02-14 | Stop reason: HOSPADM

## 2025-02-12 RX ORDER — ACETAMINOPHEN 325 MG/1
975 TABLET ORAL EVERY 6 HOURS
Status: DISCONTINUED | OUTPATIENT
Start: 2025-02-12 | End: 2025-02-14 | Stop reason: HOSPADM

## 2025-02-12 RX ORDER — KETOROLAC TROMETHAMINE 30 MG/ML
15 INJECTION, SOLUTION INTRAMUSCULAR; INTRAVENOUS EVERY 6 HOURS
Status: COMPLETED | OUTPATIENT
Start: 2025-02-12 | End: 2025-02-13

## 2025-02-12 RX ORDER — LOPERAMIDE HYDROCHLORIDE 2 MG/1
2 CAPSULE ORAL
Status: DISCONTINUED | OUTPATIENT
Start: 2025-02-12 | End: 2025-02-14 | Stop reason: HOSPADM

## 2025-02-12 RX ORDER — CLINDAMYCIN PHOSPHATE 900 MG/50ML
900 INJECTION, SOLUTION INTRAVENOUS
Status: COMPLETED | OUTPATIENT
Start: 2025-02-12 | End: 2025-02-12

## 2025-02-12 RX ORDER — CITRIC ACID/SODIUM CITRATE 334-500MG
30 SOLUTION, ORAL ORAL
Status: COMPLETED | OUTPATIENT
Start: 2025-02-12 | End: 2025-02-12

## 2025-02-12 RX ORDER — ACETAMINOPHEN 325 MG/1
975 TABLET ORAL ONCE
Status: COMPLETED | OUTPATIENT
Start: 2025-02-12 | End: 2025-02-12

## 2025-02-12 RX ORDER — MISOPROSTOL 200 UG/1
800 TABLET ORAL
Status: DISCONTINUED | OUTPATIENT
Start: 2025-02-12 | End: 2025-02-12

## 2025-02-12 RX ORDER — PRENATAL VIT/IRON FUM/FOLIC AC 27MG-0.8MG
1 TABLET ORAL EVERY MORNING
Status: DISCONTINUED | OUTPATIENT
Start: 2025-02-12 | End: 2025-02-14 | Stop reason: HOSPADM

## 2025-02-12 RX ORDER — LOPERAMIDE HYDROCHLORIDE 2 MG/1
2 CAPSULE ORAL
Status: DISCONTINUED | OUTPATIENT
Start: 2025-02-12 | End: 2025-02-12

## 2025-02-12 RX ORDER — BISACODYL 10 MG
10 SUPPOSITORY, RECTAL RECTAL DAILY PRN
Status: DISCONTINUED | OUTPATIENT
Start: 2025-02-14 | End: 2025-02-14 | Stop reason: HOSPADM

## 2025-02-12 RX ORDER — METHYLERGONOVINE MALEATE 0.2 MG/ML
200 INJECTION INTRAVENOUS
Status: DISCONTINUED | OUTPATIENT
Start: 2025-02-12 | End: 2025-02-14 | Stop reason: HOSPADM

## 2025-02-12 RX ORDER — BUPIVACAINE HYDROCHLORIDE 7.5 MG/ML
INJECTION, SOLUTION INTRASPINAL
Status: DISCONTINUED | OUTPATIENT
Start: 2025-02-12 | End: 2025-02-12

## 2025-02-12 RX ORDER — KETOROLAC TROMETHAMINE 30 MG/ML
INJECTION, SOLUTION INTRAMUSCULAR; INTRAVENOUS PRN
Status: DISCONTINUED | OUTPATIENT
Start: 2025-02-12 | End: 2025-02-12

## 2025-02-12 RX ORDER — OXYTOCIN/0.9 % SODIUM CHLORIDE 30/500 ML
100-340 PLASTIC BAG, INJECTION (ML) INTRAVENOUS CONTINUOUS PRN
Status: DISCONTINUED | OUTPATIENT
Start: 2025-02-12 | End: 2025-02-14 | Stop reason: HOSPADM

## 2025-02-12 RX ORDER — DEXTROSE, SODIUM CHLORIDE, SODIUM LACTATE, POTASSIUM CHLORIDE, AND CALCIUM CHLORIDE 5; .6; .31; .03; .02 G/100ML; G/100ML; G/100ML; G/100ML; G/100ML
INJECTION, SOLUTION INTRAVENOUS CONTINUOUS
Status: DISCONTINUED | OUTPATIENT
Start: 2025-02-12 | End: 2025-02-14 | Stop reason: HOSPADM

## 2025-02-12 RX ORDER — LOPERAMIDE HYDROCHLORIDE 2 MG/1
4 CAPSULE ORAL
Status: DISCONTINUED | OUTPATIENT
Start: 2025-02-12 | End: 2025-02-14 | Stop reason: HOSPADM

## 2025-02-12 RX ORDER — SIMETHICONE 80 MG
80 TABLET,CHEWABLE ORAL 4 TIMES DAILY PRN
Status: DISCONTINUED | OUTPATIENT
Start: 2025-02-12 | End: 2025-02-14 | Stop reason: HOSPADM

## 2025-02-12 RX ORDER — METHYLERGONOVINE MALEATE 0.2 MG/ML
200 INJECTION INTRAVENOUS
Status: DISCONTINUED | OUTPATIENT
Start: 2025-02-12 | End: 2025-02-12

## 2025-02-12 RX ORDER — LOPERAMIDE HYDROCHLORIDE 2 MG/1
4 CAPSULE ORAL
Status: DISCONTINUED | OUTPATIENT
Start: 2025-02-12 | End: 2025-02-12

## 2025-02-12 RX ORDER — METOCLOPRAMIDE HYDROCHLORIDE 5 MG/ML
10 INJECTION INTRAMUSCULAR; INTRAVENOUS EVERY 6 HOURS PRN
Status: DISCONTINUED | OUTPATIENT
Start: 2025-02-12 | End: 2025-02-14 | Stop reason: HOSPADM

## 2025-02-12 RX ORDER — OXYCODONE HYDROCHLORIDE 5 MG/1
5 TABLET ORAL EVERY 4 HOURS PRN
Status: DISCONTINUED | OUTPATIENT
Start: 2025-02-12 | End: 2025-02-14 | Stop reason: HOSPADM

## 2025-02-12 RX ORDER — MORPHINE SULFATE 1 MG/ML
INJECTION, SOLUTION EPIDURAL; INTRATHECAL; INTRAVENOUS
Status: DISCONTINUED | OUTPATIENT
Start: 2025-02-12 | End: 2025-02-12

## 2025-02-12 RX ORDER — TRANEXAMIC ACID 10 MG/ML
1 INJECTION, SOLUTION INTRAVENOUS EVERY 30 MIN PRN
Status: DISCONTINUED | OUTPATIENT
Start: 2025-02-12 | End: 2025-02-12

## 2025-02-12 RX ORDER — METOCLOPRAMIDE 10 MG/1
10 TABLET ORAL EVERY 6 HOURS PRN
Status: DISCONTINUED | OUTPATIENT
Start: 2025-02-12 | End: 2025-02-14 | Stop reason: HOSPADM

## 2025-02-12 RX ORDER — SODIUM PHOSPHATE,MONO-DIBASIC 19G-7G/118
1 ENEMA (ML) RECTAL DAILY PRN
Status: DISCONTINUED | OUTPATIENT
Start: 2025-02-14 | End: 2025-02-14 | Stop reason: HOSPADM

## 2025-02-12 RX ORDER — CARBOPROST TROMETHAMINE 250 UG/ML
250 INJECTION, SOLUTION INTRAMUSCULAR
Status: DISCONTINUED | OUTPATIENT
Start: 2025-02-12 | End: 2025-02-12

## 2025-02-12 RX ORDER — CARBOPROST TROMETHAMINE 250 UG/ML
250 INJECTION, SOLUTION INTRAMUSCULAR
Status: DISCONTINUED | OUTPATIENT
Start: 2025-02-12 | End: 2025-02-14 | Stop reason: HOSPADM

## 2025-02-12 RX ORDER — FENTANYL CITRATE 50 UG/ML
INJECTION, SOLUTION INTRAMUSCULAR; INTRAVENOUS
Status: DISCONTINUED | OUTPATIENT
Start: 2025-02-12 | End: 2025-02-12

## 2025-02-12 RX ORDER — AMOXICILLIN 250 MG
2 CAPSULE ORAL 2 TIMES DAILY
Status: DISCONTINUED | OUTPATIENT
Start: 2025-02-12 | End: 2025-02-14 | Stop reason: HOSPADM

## 2025-02-12 RX ADMIN — BUPIVACAINE 20 ML: 13.3 INJECTION, SUSPENSION, LIPOSOMAL INFILTRATION at 08:25

## 2025-02-12 RX ADMIN — FENTANYL CITRATE 10 MCG: 50 INJECTION, SOLUTION INTRAMUSCULAR; INTRAVENOUS at 07:25

## 2025-02-12 RX ADMIN — KETOROLAC TROMETHAMINE 15 MG: 30 INJECTION, SOLUTION INTRAMUSCULAR at 08:30

## 2025-02-12 RX ADMIN — Medication 340 ML/HR: at 07:59

## 2025-02-12 RX ADMIN — GABAPENTIN 200 MG: 100 CAPSULE ORAL at 12:29

## 2025-02-12 RX ADMIN — GABAPENTIN 200 MG: 100 CAPSULE ORAL at 21:03

## 2025-02-12 RX ADMIN — ACETAMINOPHEN 975 MG: 325 TABLET ORAL at 23:51

## 2025-02-12 RX ADMIN — ACETAMINOPHEN 975 MG: 325 TABLET ORAL at 05:28

## 2025-02-12 RX ADMIN — PHENYLEPHRINE HYDROCHLORIDE 0.5 MCG/KG/MIN: 10 INJECTION INTRAVENOUS at 07:32

## 2025-02-12 RX ADMIN — SODIUM CHLORIDE, POTASSIUM CHLORIDE, SODIUM LACTATE AND CALCIUM CHLORIDE: 600; 310; 30; 20 INJECTION, SOLUTION INTRAVENOUS at 07:21

## 2025-02-12 RX ADMIN — Medication 0.15 MG: at 07:25

## 2025-02-12 RX ADMIN — ACETAMINOPHEN 975 MG: 325 TABLET ORAL at 17:54

## 2025-02-12 RX ADMIN — SODIUM CHLORIDE, POTASSIUM CHLORIDE, SODIUM LACTATE AND CALCIUM CHLORIDE: 600; 310; 30; 20 INJECTION, SOLUTION INTRAVENOUS at 07:20

## 2025-02-12 RX ADMIN — GENTAMICIN SULFATE 150 MG: 40 INJECTION, SOLUTION INTRAMUSCULAR; INTRAVENOUS at 06:04

## 2025-02-12 RX ADMIN — KETOROLAC TROMETHAMINE 15 MG: 30 INJECTION, SOLUTION INTRAMUSCULAR at 14:44

## 2025-02-12 RX ADMIN — SODIUM CITRATE AND CITRIC ACID MONOHYDRATE 30 ML: 500; 334 SOLUTION ORAL at 06:54

## 2025-02-12 RX ADMIN — KETOROLAC TROMETHAMINE 15 MG: 30 INJECTION, SOLUTION INTRAMUSCULAR at 21:05

## 2025-02-12 RX ADMIN — PHENYLEPHRINE HYDROCHLORIDE 100 MCG: 10 INJECTION INTRAVENOUS at 07:32

## 2025-02-12 RX ADMIN — CLINDAMYCIN PHOSPHATE 900 MG: 900 INJECTION, SOLUTION INTRAVENOUS at 06:38

## 2025-02-12 RX ADMIN — BUPIVACAINE HYDROCHLORIDE 20 ML: 2.5 INJECTION, SOLUTION EPIDURAL; INFILTRATION; INTRACAUDAL at 08:25

## 2025-02-12 RX ADMIN — ACETAMINOPHEN 975 MG: 325 TABLET ORAL at 12:31

## 2025-02-12 RX ADMIN — BUPIVACAINE HYDROCHLORIDE IN DEXTROSE 1.6 ML: 7.5 INJECTION, SOLUTION SUBARACHNOID at 07:25

## 2025-02-12 RX ADMIN — PRENATAL VIT W/ FE FUMARATE-FA TAB 27-0.8 MG 1 TABLET: 27-0.8 TAB at 12:30

## 2025-02-12 RX ADMIN — SODIUM CHLORIDE, POTASSIUM CHLORIDE, SODIUM LACTATE AND CALCIUM CHLORIDE 500 ML: 600; 310; 30; 20 INJECTION, SOLUTION INTRAVENOUS at 07:05

## 2025-02-12 RX ADMIN — SENNOSIDES AND DOCUSATE SODIUM 1 TABLET: 50; 8.6 TABLET ORAL at 12:31

## 2025-02-12 RX ADMIN — SENNOSIDES AND DOCUSATE SODIUM 1 TABLET: 50; 8.6 TABLET ORAL at 21:03

## 2025-02-12 ASSESSMENT — ACTIVITIES OF DAILY LIVING (ADL)
ADLS_ACUITY_SCORE: 36
ADLS_ACUITY_SCORE: 29
ADLS_ACUITY_SCORE: 26
ADLS_ACUITY_SCORE: 36
ADLS_ACUITY_SCORE: 46
ADLS_ACUITY_SCORE: 36
ADLS_ACUITY_SCORE: 26
ADLS_ACUITY_SCORE: 29
ADLS_ACUITY_SCORE: 36
ADLS_ACUITY_SCORE: 33
ADLS_ACUITY_SCORE: 29
ADLS_ACUITY_SCORE: 26
ADLS_ACUITY_SCORE: 36

## 2025-02-12 NOTE — PROGRESS NOTES
Patient with symptoms of anesthesia toxicity- spotty vision, ringing in the ears, and metallic taste in mouth. Patient states her father also has a bad reaction to some anesthesia as well.

## 2025-02-12 NOTE — PLAN OF CARE
Writer assumed care at noon. Pt reports seeing spots occasionally. Has constant faint ringing in both ears. Also has a metal taste that comes and goes in waves. Kimbrough was removed after arriving on unit. This was removed at 1600. Pt then stood at bedside. She reported feeling a little dizzy so she was placed back in bed. [facility]SC is in room since there is not a bathroom in her room. Spouse is at bedside with baby. She is breastfeeding and also has her colostrum in freezer in [facility]. Continue to monitor on tele overnight.  Pain is minimal around a 1. Incision is covered.     Problem: Adult Inpatient Plan of Care  Goal: Plan of Care Review  Description: The Plan of Care Review/Shift note should be completed every shift.  The Outcome Evaluation is a brief statement about your assessment that the patient is improving, declining, or no change.  This information will be displayed automatically on your shift  note.  Outcome: Progressing     Problem: Postpartum ( Delivery)  Goal: Successful Parent Role Transition  Outcome: Progressing  Intervention: Support Parent Role Transition  Recent Flowsheet Documentation  Taken 2025 1300 by Lisa Mckeon, RN  Supportive Measures: self-care encouraged  Taken 2025 1200 by Lisa Mckeon, RN  Supportive Measures: self-care encouraged  Parent-Child Attachment Promotion:   caring behavior modeled   interaction encouraged   parent/caregiver presence encouraged   participation in care promoted   rooming-in promoted   skin-to-skin contact encouraged   Goal Outcome Evaluation:

## 2025-02-12 NOTE — PLAN OF CARE
Pt VSS, on RA, bonding w/ baby. Minimal pain, taking scheduled Tylenol. OB nurse in room assuming postpartum cares. Pt has waves of seeing spots and ringing in ears from anesthesia, pt reports it feels much better now vs earlier on after c/s. Left foot has tingling and numbness, pt states it's chronic. On telemetry NSR.     Problem: Adult Inpatient Plan of Care  Goal: Optimal Comfort and Wellbeing  Outcome: Progressing  Intervention: Monitor Pain and Promote Comfort  Recent Flowsheet Documentation  Taken 2/12/2025 1615 by Bhargavi Carney, RN  Pain Management Interventions: rest  Intervention: Provide Person-Centered Care  Recent Flowsheet Documentation  Taken 2/12/2025 1615 by Bhargavi Carney, RN  Trust Relationship/Rapport:   care explained   choices provided   questions answered   questions encouraged

## 2025-02-12 NOTE — PROGRESS NOTES
Pt 4 hrs S/P  on Postpartum unit being Tele monitored via Tele pack & assigned Med Tele RN interim while we wait for a bed to open on tele unit. [facility] & [facility] managers aware of plan. Updated Pt & family of plan.      Edith Matias, RN  Charge Nurse

## 2025-02-12 NOTE — PROGRESS NOTES
Patient arrived to the unit at about 0510. Roomed into room 32. EFM monitors applied. See flowsheet. IV placed, chelsi wiped/clipped, SCDs placed, antibiotics given, fluids going, pre-op questions done, dhiraj hugger on, consents signed. Procedure explained and questions answered.

## 2025-02-12 NOTE — CONSULTS
[facility]  Consult Note - Hospitalist Service  Date of Admission:  2025  Consult Requested by: Myron Vo  Reason for Consult: Cardiac monitoring    Assessment & Plan   Kaitlynn Mendes is a 29 year old female with past medical history significant for POTS, Carlos-Danlos syndrome, depression, and migraines who was admitted on 2025 for  section. She had symptoms after her epidural and we were consulted to monitor.    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  -Continue PTA gabapentin       Clinically Significant Risk Factors Present on Admission                                 # Asthma: noted on problem list        Anisa Diaz MD  Hospitalist Service  Securely message with [employer] (more info)  Text page via [employer] Paging/Directory   ______________________________________________________________________    Chief Complaint   unwell feeling, warmth, spots in vision, and bilateral tinnitus after receiving epidural    History is obtained from the patient and electronic health record    History of Present Illness   Kaitlynn Mendes is a 29 year old female with past medical history significant for POTS, Carlos-Danlos syndrome, depression, and migraines who was admitted on 2025 for  section. She had symptoms after her epidural and we were  consulted to monitor.  After receiving her epidural with bupivacaine for her  she developed an unwell feeling, warmth, spots in her vision and bilateral tinnitus.  The unwell feeling, warmth, and spots in her vision have completely resolved.  She continues to have tinnitus but has improved significantly.  No evidence of arrhythmia noted on telemetry or EKG.  Will continue to monitor on cardiac monitoring overnight.  Could be related to her POTS syndrome which she has not been on medication for due to pregnancy and prior while she was trying to get pregnant.  Will check orthostatic vital signs in the morning.      Past Medical History    Past Medical History:   Diagnosis Date    Chronic pain of left knee     Carlos-Danlos syndrome     Motion sickness     POTS (postural orthostatic tachycardia syndrome)     Restless legs syndrome     Skeletal dysplasia        Past Surgical History   Past Surgical History:   Procedure Laterality Date    ARTHROSCOPY KNEE Right 3/29/2019    Procedure: Right Knee Arthroscopy;  Surgeon: Di Schwartz MD;  [facility]    ARTHROSCOPY KNEE WITH TIBIAL TUBERCLE SHIFT Left 12/15/2017    Procedure: ARTHROSCOPY KNEE WITH TIBIAL TUBERCLE SHIFT;;  Surgeon: Di Schwartz MD;  [facility]    OPEN REDUCTION INTERNAL FIXATION RODDING INTRAMEDULLARY TIBIA Left 12/15/2017    Procedure: OPEN REDUCTION INTERNAL FIXATION RODDING INTRAMEDULLARY TIBIA;  Left Knee Arthroscopy, Tibial Derotational Osteotomy with Intermedullary Rodding, Fibular Derotational Osteotmy, Distal Tibial Tubercle Osteotomy;  Surgeon: Dio Olivarez MD;  [facility]    REMOVE HARDWARE FOOT Left 5/10/2023    Procedure: Removal of hardware left lower leg;  Surgeon: Dio Olivarez MD;  [facility]    REMOVE HARDWARE LOWER EXTREMITY BILATERAL Bilateral 3/29/2019    Procedure: Bilateral Lower Leg Hardware Removal;  Surgeon: Di Schwartz MD;  [facility]       Medications    I have reviewed this patient's current medications  Medications Prior to Admission   Medication Sig Dispense Refill Last Dose/Taking    doxylamine (UNISOM) 25 MG TABS tablet Take 25 mg by mouth nightly as needed for sleep.   2/11/2025 Bedtime    esomeprazole (NEXIUM) 20 MG DR capsule Take 20 mg by mouth every morning (before breakfast). Take 30-60 minutes before eating.   2/11/2025 Morning    gabapentin (NEURONTIN) 100 MG capsule Take 200 mg by mouth 2 times daily.   2/11/2025 Evening    Prenatal Vit-Fe Fumarate-FA (PRENATAL MULTIVITAMIN W/IRON) 27-0.8 MG tablet Take 1 tablet by mouth every morning   2/11/2025 Evening          Review of Systems    The 10 point Review of Systems is negative other than noted in the HPI or here.     Social History   I have reviewed this patient's social history and updated it with pertinent information if needed.  Social History     Tobacco Use    Smoking status: Never     Passive exposure: Never    Smokeless tobacco: Never   Substance Use Topics    Alcohol use: No    Drug use: No         Family History   I have reviewed this patient's family history and updated it with pertinent information if needed.  Family History   Problem Relation Age of Onset    Osteoporosis Mother     Gastrointestinal Disease Mother         gastroparesis    Deep Vein Thrombosis (DVT) Father     Lupus Father     Anesthesia Reaction No family hx of          Allergies   Allergies   Allergen Reactions    Amoxicillin Hives and Shortness Of Breath     Other Reaction(s): *Unknown    Prochlorperazine Other (See Comments), Anaphylaxis and Anxiety     delirious    Other Reaction(s): *Unknown    Azithromycin Nausea and Vomiting and Hives     Other Reaction(s): *Unknown    Sulfa Antibiotics Hives    Ondansetron      Constipation        Physical Exam   Vital Signs: Temp: 98.4  F (36.9  C) Temp src: Oral BP: 125/59 Pulse: 87   Resp: 18 SpO2: 98 % O2 Device: None (Room air)    Weight: 180 lbs 0 oz    Constitutional: awake,  "alert, cooperative, no apparent distress, and appears stated age  Eyes: Lids and lashes normal, pupils equal, round, extra ocular muscles intact, sclera clear, conjunctiva normal  Respiratory: No increased work of breathing, good air exchange, clear to auscultation bilaterally, no crackles or wheezing  Cardiovascular: regular rate and rhythm, and no murmur noted  GI: normal bowel sounds, soft  Skin: normal skin color, texture, turgor, no rashes and no jaundice  Musculoskeletal: no lower extremity pitting edema present, tone is normal, no weakness noted  Neurologic: Awake, alert, no gross focal abnormalities   Neuropsychiatric: Appropriate mood and affect      Medical Decision Making       65 MINUTES SPENT BY ME on the date of service doing chart review, history, exam, documentation & further activities per the note.      Data     I have personally reviewed the following data over the past 24 hrs:    11.6 (H)  \   13.1   / 214     N/A N/A N/A /  93   N/A N/A N/A \       Imaging results reviewed over the past 24 hrs:   Recent Results (from the past 24 hours)   POC US Guidance Needle Placement    Narrative    Ultrasound was performed as guidance to an anesthesia procedure.  Click   \"PACS images\" hyperlink below to view any stored images.  For specific   procedure details, view procedure note authored by anesthesia.     "

## 2025-02-12 NOTE — OP NOTE
Section Operative Note    NAME:  Kaitlynn Mendes     RECORD # [id number]     2025    DATE OF SERVICE: 2025     PREOPERATIVE DIAGNOSIS:   1) IUP at 39+0 weeks  2) LGA  3) Carlos Danlos Syndrome  4) Elective  Section    POSTOPERATIVE DIAGNOSIS:   1) IUP at 39+0 weeks  2) LGA  3) Carlos Danlos Syndrome  4) Elective  Section    PROCEDURE: Low transverse  section    SURGEON:  Myron Gilbert M.D.    ASSISTANT: Dr. Inessa Hannon was present to help with retraction, delivery of the infant and closure of all layers.     ANESTHESIA: Spinal    ESTIMATED BLOOD LOSS: 500 cc    DRAINS: Kimbrough catheter.    COMPLICATIONS: None    INDICATIONS:  Ms. Kaitlynn Mendes is a 29 year old year old who presents for a primary  section given large for gestational age baby and EDS.    FINDINGS:  Live male infant born with Apgars of 8 at one minute, 9 at 5 minutes,  Weight 9#0oz.  Normal tubes, ovaries, and pelvic organs.    PROCEDURE:  Patient was met preoperatively where we discussed the procedure and the risks associated with the procedure.  She understood these to include but not limited to injury to adjacent organs including bowel, bladder, ureter, infection and bleeding. Understanding these risks her consents were signed.      After informed consent was obtained, the patient was taken to the operating room where she was given spinal anesthesia.  She was placed in the left lateral tilt position and prepped and draped in usual sterile fashion.  A timeout was undertaken.      A Pfannenstiel skin incision was made with the scalpel and taken down through the subcutaneous tissue.  The rectus fascia was identified and scored in the midline.  The rectus fascia was then opened bilaterally.  The rectus fascia was taken down from the underlying muscle superiorly and inferiorly.  The peritoneum was identified and entered sharply using scissors.  The bladder flap was created with the  Metzenbaum scissors.  The bladder blade was inserted.    A low segment transverse uterine incision was made with a scalpel.  The uterine incision was opened bluntly with my fingers.  The infant was delivered from a vertex position.  The head was delivered atraumatically.  The remainder of the body was delivered without issues.  The mouth and nares were suctioned.  The cord was doubly clamped and cut and the infant was handed to the pediatric team with the findings as noted above.  The placenta was then removed with gentle traction.  Uterine cavity was wiped free of clot and debris. The uterine incision was closed using 0 Vicryl in a running locked fashion.  A second imbricating layer was placed using 0 Monocryl.  he gutters were irrigated. Hemostasis was achieved.      The peritoneum was closed using 3-0 Vicryl. The rectus muscles were hemostatic.  The rectus fascia was closed using 0 vicryl, starting at the lateral edges meeting in the midline.  Skin margins were re-approximated using the Insorb staplers.      Sponge and needle counts were reported to me as correct X 2.  Mother and infant are stable.      Myron Gilbert M.D.

## 2025-02-12 NOTE — ANESTHESIA PROCEDURE NOTES
TAP Procedure Note    Pre-Procedure   Staff -        Anesthesiologist:  Ronak Connors MD       Performed By: anesthesiologist       Location: OB       Procedure Start/Stop Times: 2/12/2025 8:25 AM       Pre-Anesthestic Checklist: patient identified, IV checked, site marked, risks and benefits discussed, informed consent, monitors and equipment checked, pre-op evaluation, at physician/surgeon's request and post-op pain management  Timeout:       Correct Patient: Yes        Correct Procedure: Yes        Correct Site: Yes        Correct Position: Yes        Correct Laterality: Yes        Site Marked: Yes  Procedure Documentation  Procedure: TAP         Laterality: bilateral       Skin prep: Chloraprep       Needle Type: short bevel       Needle Gauge: 20.        Needle Length (Inches): 4        Ultrasound guided       1. Ultrasound was used to identify targeted nerve, plexus, vascular marker, or fascial plane and place a needle adjacent to it in real-time.       2. Ultrasound was used to visualize the spread of anesthetic in close proximity to the above referenced structure.       3. A permanent image is entered into the patient's record.       4. The visualized anatomic structures appeared normal.       5. There were no apparent abnormal pathologic findings.    Assessment/Narrative         The placement was negative for: blood aspirated and painful injection       Paresthesias: No.       Bolus given via needle..        Secured via.        Insertion/Infusion Method: Single Shot       Complications: none       Injection made incrementally with aspirations every 5 mL.    Medication(s) Administered   Bupivacaine 0.25% PF (Infiltration) - Infiltration   20 mL - 2/12/2025 8:25:00 AM  Bupivacaine liposome (Exparel) 1.3% LA inj susp (Infiltration) - Infiltration   20 mL - 2/12/2025 8:25:00 AM  Medication Administration Time: 2/12/2025 8:25 AM      FOR [facility] ([facility]) ONLY:   Pain Team Contact information: please page  "the Pain Team Via [facility]. Search \"Pain\". During daytime hours, please page the attending first. At night please page the resident first.      "

## 2025-02-12 NOTE — PROGRESS NOTES
Interim progress note    Shortly after case end, patient complained of spotty vision as well as metallic taste. Also endorsed some lightheadedness that was associated with sitting up, and soon self resolved after lying flat.     Bedside examination notable for normal mentation, good BUE strength, and no appreciable visual deficits. Patient brought to PACU where she was placed on telemetry and had 12-lead EKG done, both of which were unremarkable.    Low likelihood of LAST given low dose of local anesthetic and lack of intravascular injection during TAP block, however we cannot rule it out completely given the concerning symptoms, though they are mild and stable/improving. Will watch patient on telemetry overnight. OB has been told to look out for concerning symptoms and call anesthesia if they arise

## 2025-02-12 NOTE — ANESTHESIA PREPROCEDURE EVALUATION
Anesthesia Pre-Procedure Evaluation    Patient: Kaitlynn Mendes   MRN: [id number] : 1995        Procedure : Procedure(s):  PRIMARY  SECTION          Past Medical History:   Diagnosis Date    Chronic pain of left knee     Carlos-Danlos syndrome     Motion sickness     POTS (postural orthostatic tachycardia syndrome)     Restless legs syndrome     Skeletal dysplasia       Past Surgical History:   Procedure Laterality Date    ARTHROSCOPY KNEE Right 3/29/2019    Procedure: Right Knee Arthroscopy;  Surgeon: Di Schwartz MD;  Location: [facility] OR    ARTHROSCOPY KNEE WITH TIBIAL TUBERCLE SHIFT Left 12/15/2017    Procedure: ARTHROSCOPY KNEE WITH TIBIAL TUBERCLE SHIFT;;  Surgeon: Di Schwartz MD;  Location: [facility] OR    OPEN REDUCTION INTERNAL FIXATION RODDING INTRAMEDULLARY TIBIA Left 12/15/2017    Procedure: OPEN REDUCTION INTERNAL FIXATION RODDING INTRAMEDULLARY TIBIA;  Left Knee Arthroscopy, Tibial Derotational Osteotomy with Intermedullary Rodding, Fibular Derotational Osteotmy, Distal Tibial Tubercle Osteotomy;  Surgeon: Dio Olivarez MD;  Location: [facility] OR    REMOVE HARDWARE FOOT Left 5/10/2023    Procedure: Removal of hardware left lower leg;  Surgeon: Dio Olivarez MD;  Location: [facility] OR    REMOVE HARDWARE LOWER EXTREMITY BILATERAL Bilateral 3/29/2019    Procedure: Bilateral Lower Leg Hardware Removal;  Surgeon: Di Schwartz MD;  Location: [facility]      Allergies   Allergen Reactions    Amoxicillin Hives and Shortness Of Breath     Other Reaction(s): *Unknown    Prochlorperazine Other (See Comments), Anaphylaxis and Anxiety     delirious    Other Reaction(s): *Unknown    Azithromycin Nausea and Vomiting and Hives     Other Reaction(s): *Unknown    Sulfa Antibiotics Hives      Social History     Tobacco Use    Smoking status: Never     Passive exposure: Never    Smokeless tobacco: Never   Substance Use Topics    Alcohol use: No      Wt Readings from Last 1  Encounters:   02/12/25 81.6 kg (180 lb)        Anesthesia Evaluation   Pt has had prior anesthetic.     History of anesthetic complications (One episode of SVT during surgery. Unknown trigger. No episodes since then)       ROS/MED HX  ENT/Pulmonary:       Neurologic:       Cardiovascular: Comment: POTS      METS/Exercise Tolerance:     Hematologic:       Musculoskeletal:       GI/Hepatic:       Renal/Genitourinary:       Endo:       Psychiatric/Substance Use:       Infectious Disease:       Malignancy:       Other: Comment: Carlos Danheber           Physical Exam    Airway        Mallampati: II   TM distance: > 3 FB   Neck ROM: full     Respiratory Devices and Support         Dental       (+) Completely normal teeth      Cardiovascular   cardiovascular exam normal          Pulmonary   pulmonary exam normal                OUTSIDE LABS:  CBC:   Lab Results   Component Value Date    WBC 11.6 (H) 02/12/2025    WBC 9.9 01/31/2025    HGB 13.1 02/12/2025    HGB 13.1 01/31/2025    HCT 36.2 02/12/2025    HCT 39.0 01/31/2025     02/12/2025     01/31/2025     BMP:   Lab Results   Component Value Date     12/11/2023     03/10/2020    POTASSIUM 3.7 12/11/2023    POTASSIUM 3.5 03/10/2020    CHLORIDE 102 12/11/2023    CHLORIDE 107 03/10/2020    CO2 24 12/11/2023    CO2 23 03/10/2020    BUN 10.1 12/11/2023    BUN 8 03/10/2020    CR 0.65 01/31/2025    CR 0.68 12/11/2023    GLC 93 02/12/2025    GLC 97 12/11/2023     COAGS:   Lab Results   Component Value Date    PTT 30 11/21/2019    INR 1.10 11/21/2019     POC:   Lab Results   Component Value Date    BGM 96 12/15/2017    HCG Negative 05/10/2023    HCGS Negative 12/11/2023     HEPATIC:   Lab Results   Component Value Date    ALBUMIN 4.2 11/21/2019    PROTTOTAL 8.2 11/21/2019    ALT 8 01/31/2025    AST 20 01/31/2025    ALKPHOS 78 11/21/2019    BILITOTAL 0.6 11/21/2019     OTHER:   Lab Results   Component Value Date    A1C 5.3 07/15/2024    CHANI 9.6 12/11/2023     MAG 2.4 (H) 11/21/2019    TSH 0.93 11/21/2019    T4 1.07 11/21/2019    SED 11 12/11/2023       Anesthesia Plan    ASA Status:  3    NPO Status:  NPO Appropriate    Anesthesia Type: Spinal.              Consents    Anesthesia Plan(s) and associated risks, benefits, and realistic alternatives discussed. Questions answered and patient/representative(s) expressed understanding.     - Discussed: Risks, Benefits and Alternatives for BOTH SEDATION and the PROCEDURE were discussed     - Discussed with:  Patient            Postoperative Care    Pain management: Peripheral nerve block (Single Shot).        Comments:               Ronak Connors MD    I have reviewed the pertinent notes and labs in the chart from the past 30 days and (re)examined the patient.  Any updates or changes from those notes are reflected in this note.    Clinically Significant Risk Factors Present on Admission                                 # Asthma: noted on problem list

## 2025-02-12 NOTE — LACTATION NOTE
This note was copied from a baby's chart.  Initial Lactation Visit    Current age: 6 hours old    Gestational age at delivery: 39w0d     Diaper count (last 24 hours): 2 wet 0 soiled      Feedings (last 24 hours): 1 breast  3 Mother's expressed breastmilk 4-7mL    Weight Loss:     Breastfeeding goals:     Past breastfeeding experience: NA     Maternal risk that may affect breastfeeding: None    Home Pump: yes     Breast assessment: Breast: Round and Symmetrical,  Nipples: Everted with stimulation    Infant oral assessment: Oral: Midline,     Feeding assessment:  Mom was able to latch infant onto left breast with very little support from LC.   Infant was in cradle hold when he latched just being placed next to nipple.    Mom started to feel ill, attempted ended at breast.     Infant was supplemented reviewed bottle feeding technique with FOB.     Mom will start pumping after next feeding.        Feeding plan: 2-3 hours at least 8x in 24 hours  Offer both breast each feeding   Supplement if infant does not settle or does not breastfeed   Pump for any supplement     Education:   []  Feeding Patterns in the First Few Days/second Night     [] Maternal Risk Factors that Could Affect Milk Supply      [] Hand Expression   [x] Stages of Milk Production           [] Feeding Cues           [] LPI Feeding Behaviors  [] LBW Feeding Behaviors    [] Rousing Techniques          [x] Benefits of Skin to Skin               [] Breastfeeding Positions          [x] Tips to Maintain a Deep Latch               [] Nutritive vs Non-Nutritive Sucking           [x] Gentle Breast Compressions  []  Weight Loss   [] How to Know When Baby is Getting Enough to Eat  [x] Supplementation Methods        [x] Type of Supplement   [] Nipple Shield  [] Sore Nipples        [] Pacifier Use  [] Tight Frenulum           [] Breastfeeding Twins  [x] Pumping Plan  [] Breastpump Education   [] Engorgement/Reverse Pressure Softening           [x]  Inpatient Lactation Support      [] Outpatient Lactation Resources/Follow up    Follow up: LC will continue to follow up during hospital stay.

## 2025-02-12 NOTE — PROGRESS NOTES
Patient admitted to room 07 at approximately 1515 via cart from OB.  Reason for Admission:   Report received from:   Patient was accompanied by Significant other.  Discharge transportation provided by:  Patient ambulated/transferred:  3. air brianna.  Patient is alert and orientated x 4.  Outpatient Observation education provided to: (patient, family, friend)  MDRO Education done if applicable (MRSA, VRE, etc)  Safety risks were identified during admission:  LAST.   Yellow risk/fall band applied:  No  Home meds sent home: No  Home meds sent to pharmacy:No IF YES add 1/2 sheet laminated page reminder to chart/clipboard   Detailed Belongings:

## 2025-02-12 NOTE — PHARMACY-ADMISSION MEDICATION HISTORY
Pharmacist Admission Medication History    Admission medication history is complete. The information provided in this note is only as accurate as the sources available at the time of the update.    Information Source(s): Patient, Family member, and CareEverywhere/SureScripts via in-person    Pertinent Information: None    Changes made to PTA medication list:  Added: None  Deleted: None  Changed: None    Allergies reviewed with patient and updates made in EHR: yes    Medication History Completed By: Andrade Guerra RPH 2/12/2025 3:50 PM    PTA Med List   Medication Sig Last Dose/Taking    doxylamine (UNISOM) 25 MG TABS tablet Take 25 mg by mouth nightly as needed for sleep. 2/11/2025 Bedtime    esomeprazole (NEXIUM) 20 MG DR capsule Take 20 mg by mouth every morning (before breakfast). Take 30-60 minutes before eating. 2/11/2025 Morning    gabapentin (NEURONTIN) 100 MG capsule Take 200 mg by mouth 2 times daily. 2/11/2025 Evening    Prenatal Vit-Fe Fumarate-FA (PRENATAL MULTIVITAMIN W/IRON) 27-0.8 MG tablet Take 1 tablet by mouth every morning 2/11/2025 Evening

## 2025-02-12 NOTE — PROGRESS NOTES
Patient expressing desire to hold infant while in PACU. MDA requested at bedside to assess need for continued observation in PACU. Per MDA, patient may be transferred back to maternity and off telemetry but should be on telemetry overnight. Charge nurse notified, nurse manager notified, and approved for transfer. Patient arrived back to birthplace at approximately 1020.

## 2025-02-12 NOTE — ANESTHESIA CARE TRANSFER NOTE
Patient: Kaitlynn Mendes    Procedure: Procedure(s):  PRIMARY  SECTION       Diagnosis: Large for gestational age fetus affecting management of mother [O36.60X0]  Diagnosis Additional Information: No value filed.    Anesthesia Type:   Spinal     Note:    Oropharynx: oropharynx clear of all foreign objects  Level of Consciousness: awake  Oxygen Supplementation: room air    Independent Airway: airway patency satisfactory and stable  Dentition: dentition unchanged  Vital Signs Stable: post-procedure vital signs reviewed and stable  Report to RN Given: handoff report given  Patient transferred to: Labor and Delivery    Handoff Report: Identifed the Patient, Identified the Reponsible Provider, Reviewed the pertinent medical history, Discussed the surgical course, Reviewed Intra-OP anesthesia mangement and issues during anesthesia, Set expectations for post-procedure period and Allowed opportunity for questions and acknowledgement of understanding      Vitals:  Vitals Value Taken Time   /71 25 0839   Temp 98.2    Pulse 74    Resp 16    SpO2 98 % 25 0839   Vitals shown include unfiled device data.    Electronically Signed By: JUANY Velasquez CRNA  2025  8:40 AM

## 2025-02-12 NOTE — ANESTHESIA PROCEDURE NOTES
"Intrathecal injection Procedure Note    Pre-Procedure   Staff -        Anesthesiologist:  Ronak Connors MD       Performed By: anesthesiologist       Location: OB       Procedure Start/Stop Times: 2/12/2025 7:25 AM and 7/31/2025 8:53 AM       Pre-Anesthestic Checklist: patient identified, IV checked, risks and benefits discussed, informed consent, monitors and equipment checked, pre-op evaluation, at physician/surgeon's request and post-op pain management  Timeout:       Correct Patient: Yes        Correct Procedure: Yes        Correct Site: Yes        Correct Position: Yes   Procedure Documentation  Procedure: intrathecal injection         Patient Position: sitting       Skin prep: Chloraprep       Insertion Site: L2-3. (midline approach).       Needle Gauge: 25.        Needle Length (Inches): 3.5        Spinal Needle Type: Pencan       Introducer used       Introducer: 20 G       # of attempts: 2 and  # of redirects:  2    Assessment/Narrative         Paresthesias: No.       CSF fluid: clear.       Opening pressure was cmH2O while  Sitting.      Medication(s) Administered   0.75% Hyperbaric Bupivacaine (Intrathecal) - Intrathecal   1.6 mL - 2/12/2025 7:25:00 AM  Morphine PF 1 mg/mL (Intrathecal) - Intrathecal   0.15 mg - 2/12/2025 7:25:00 AM  Fentanyl PF (Intrathecal) - Intrathecal   10 mcg - 2/12/2025 7:25:00 AM  Medication Administration Time: 2/12/2025 7:25 AM      FOR [facility] ([facility]) ONLY:   Pain Team Contact information: please page the Pain Team Via [employer]. Search \"Pain\". During daytime hours, please page the attending first. At night please page the resident first.      "

## 2025-02-12 NOTE — OP NOTE
Procedure Note - Surgical Assistance    Attending Surgeon: Myron Gilbert MD  Assistant: Inessa Hannon MD    Indication for Surgery:  Section  Reason for Assistance: Due to the complexity of the procedure and the need for optimal exposure, retraction, and assistance with fetal delivery, I was present at the request of the primary surgeon to ensure safe and efficient completion of the  section.  Details of Surgical Assistance:  I was present for the entire procedure and performed the following:   Assisted with skin incision and dissection  Provided continuous retraction and exposure throughout the case  Assisted with delivery of the infant  Assisted with uterine repair and hemostasis  Participated in closure of all layers to ensure appropriate tissue approximation  The presence of an assistant was medically necessary to facilitate safe fetal delivery, maintain adequate exposure, and optimize surgical efficiency.     Outcome: The procedure was completed successfully without complications. The patient tolerated the surgery well.    Inessa Hannon MD 2025 10:16 AM

## 2025-02-12 NOTE — PROGRESS NOTES
"On assessment in recovery room, patient endorses tinnitus, metallic taste, and \"spotty\" vision. RN remaining at bedside continuously. Ambubag requested at bedside from surgical technologist. Requested Lakia GUERRERO RN called [facility] to report symptoms above and he stated he will come to bedside. Dermatomes at xiphoid process on left side and anterior/posterior thorax on right side. Patient able to move upper extremities against resistance but unable to move lower extremities. Vital signs are stable, fundus firm at umbilicus.     0859: MDA at bedside for assessment. Decision made to monitor telemetry in PACU. Patient reported SOB to MDA, not previously reported to RN. Per [facility], patient does not need to be on telemetry during transfer. Charge nurse notified of necessary transfer.    Patient transferred to PACU with assistance from surgical technologist. Arrived in PACU at approximately 0915. Report given to JESSICA Lynch. RN remaining at bedside continuously for fundal and incision assessments.  "

## 2025-02-12 NOTE — PROGRESS NOTES
Pt was transported from PP Room 16 via zoom cart by RN to Obs Room 7 for Med/tele care. Lisa Mckeon RN transported pt along with  via bassinet with FOB & NA.    Edith Matias, JESSICA  Charge RN

## 2025-02-12 NOTE — H&P
"OB HISTORY AND PHYSICAL UPDATE ADMISSION EXAM    Kaitlynn Mendes  1995  [id number]    HPI: 30 yo @39+0 weeks who presents for a  section.     Estimated Date of Delivery: 2025                       EGA 39w0d    OB History    Para Term  AB Living   2 0 0 0 1 0   SAB IAB Ectopic Multiple Live Births   1 0 0 0 0      # Outcome Date GA Lbr Ra/2nd Weight Sex Type Anes PTL Lv   2 Current            1 SAB 20               Prenatal Complications:  None    Exam:    /80 (BP Location: Right arm, Patient Position: Semi-Vargas's, Cuff Size: Adult Regular)   Temp 99.2  F (37.3  C) (Oral)   Resp 15   Ht 1.753 m (5' 9\")   Wt 81.6 kg (180 lb)   LMP  (Exact Date)   SpO2 98%   BMI 26.58 kg/m          HEENT          WNL              Heart              WNL               Lungs             WNL                      Abdomen        WNL                       Extremities     WNL                     Fetal Status   Category I    Assessment:  IUP at 39+0 weeks  Here for a primary  section    Plan: Planned  section    Myron Gilbert M.D.  "

## 2025-02-12 NOTE — PROGRESS NOTES
MD Dory at bedside- per MD he states it is okay for patient to leave PACU to see baby. Symptoms remain the same (as stated in previous note)- no worse.

## 2025-02-13 LAB
HGB BLD-MCNC: 9.3 G/DL (ref 11.7–15.7)
HOLD SPECIMEN: NORMAL

## 2025-02-13 PROCEDURE — 250N000011 HC RX IP 250 OP 636: Performed by: OBSTETRICS & GYNECOLOGY

## 2025-02-13 PROCEDURE — 36415 COLL VENOUS BLD VENIPUNCTURE: CPT | Performed by: OBSTETRICS & GYNECOLOGY

## 2025-02-13 PROCEDURE — 250N000013 HC RX MED GY IP 250 OP 250 PS 637: Performed by: OBSTETRICS & GYNECOLOGY

## 2025-02-13 PROCEDURE — 99233 SBSQ HOSP IP/OBS HIGH 50: CPT | Performed by: STUDENT IN AN ORGANIZED HEALTH CARE EDUCATION/TRAINING PROGRAM

## 2025-02-13 PROCEDURE — 85018 HEMOGLOBIN: CPT | Performed by: OBSTETRICS & GYNECOLOGY

## 2025-02-13 PROCEDURE — 120N000001 HC R&B MED SURG/OB

## 2025-02-13 RX ORDER — FERROUS SULFATE 325(65) MG
325 TABLET ORAL DAILY
Status: DISCONTINUED | OUTPATIENT
Start: 2025-02-13 | End: 2025-02-14 | Stop reason: HOSPADM

## 2025-02-13 RX ADMIN — GABAPENTIN 200 MG: 100 CAPSULE ORAL at 21:01

## 2025-02-13 RX ADMIN — IBUPROFEN 800 MG: 800 TABLET ORAL at 21:02

## 2025-02-13 RX ADMIN — KETOROLAC TROMETHAMINE 15 MG: 30 INJECTION, SOLUTION INTRAMUSCULAR at 03:15

## 2025-02-13 RX ADMIN — ACETAMINOPHEN 975 MG: 325 TABLET ORAL at 06:19

## 2025-02-13 RX ADMIN — PRENATAL VIT W/ FE FUMARATE-FA TAB 27-0.8 MG 1 TABLET: 27-0.8 TAB at 08:05

## 2025-02-13 RX ADMIN — SENNOSIDES AND DOCUSATE SODIUM 1 TABLET: 50; 8.6 TABLET ORAL at 08:04

## 2025-02-13 RX ADMIN — OXYCODONE HYDROCHLORIDE 5 MG: 5 TABLET ORAL at 18:36

## 2025-02-13 RX ADMIN — ACETAMINOPHEN 975 MG: 325 TABLET ORAL at 18:03

## 2025-02-13 RX ADMIN — IBUPROFEN 800 MG: 800 TABLET ORAL at 15:09

## 2025-02-13 RX ADMIN — ACETAMINOPHEN 975 MG: 325 TABLET ORAL at 12:39

## 2025-02-13 RX ADMIN — FERROUS SULFATE TAB 325 MG (65 MG ELEMENTAL FE) 325 MG: 325 (65 FE) TAB at 08:54

## 2025-02-13 RX ADMIN — IBUPROFEN 800 MG: 800 TABLET ORAL at 08:54

## 2025-02-13 RX ADMIN — GABAPENTIN 200 MG: 100 CAPSULE ORAL at 08:04

## 2025-02-13 RX ADMIN — SENNOSIDES, DOCUSATE SODIUM 2 TABLET: 50; 8.6 TABLET, FILM COATED ORAL at 21:01

## 2025-02-13 ASSESSMENT — ACTIVITIES OF DAILY LIVING (ADL)
ADLS_ACUITY_SCORE: 32
ADLS_ACUITY_SCORE: 33
ADLS_ACUITY_SCORE: 32
ADLS_ACUITY_SCORE: 32
ADLS_ACUITY_SCORE: 33
ADLS_ACUITY_SCORE: 32
ADLS_ACUITY_SCORE: 32
ADLS_ACUITY_SCORE: 33
ADLS_ACUITY_SCORE: 33
ADLS_ACUITY_SCORE: 32
ADLS_ACUITY_SCORE: 33
ADLS_ACUITY_SCORE: 32
ADLS_ACUITY_SCORE: 33
ADLS_ACUITY_SCORE: 32

## 2025-02-13 NOTE — PLAN OF CARE
Goal Outcome Evaluation:      Plan of Care Reviewed With: patient, spouse    Overall Patient Progress: improvingOverall Patient Progress: improving    Outcome Evaluation: Patients VSS, voiding, ambulating, ringing in ears has improved along with metallic taste, Vision has also improved. Incision CDI with small amount of drainage marked. Reporting pain 3/10 scheduled tylenol and toradol given        Anisa Giron RN on 2/13/2025 at 3:40 AM

## 2025-02-13 NOTE — LACTATION NOTE
Follow up Lactation Visit    Current age : 1 day 3  hours old    Gestational age at delivery: 39w0d     Diaper count (last 24 hours) : 5 wet 4 soiled      Feedings (last 24 hours): 9 breast 7 Mother's expressed breastmilk 5 -10 mL    Weight Loss: 6.4 % at 24 hours    Breastfeeding goals: 6-12 months    Past breastfeeding experience:NA     Maternal health risk that may affect breastfeeding: None    Home Pump: Spectra     Breast assessment: Breast: Round and Symmetrical, Nipple:Everted and Intact    Infant oral assessment: at breast when I came into room     Feeding assessment: Infant was at left breast in cradle hold, mom reported that infant has been latch well, but does take a few minutes for him to organize and latch.  Infant needed rousing to stay active and alert at breast, audible swallows noted about every 5-8 sucks.         Visit Summary: reviewed feeding and pumping plan.     Feeding plan:  Feed every 2-3 hours at least 8x in 24 hours   Offer both breast keeping infant active switching side when swallows lesson   Supplement 5-15 ml   Pump for any supplement        Education:   [x] [place] Feeding Patterns in the First Few Days/second Night     [] Maternal Risk Factors that Could Affect Milk Supply      [] Hand Expression   [x] Stages of Milk Production           [] Feeding Cues           [] LPI Feeding Behaviors  [] LBW Feeding Behaviors    [x] Rousing Techniques          [] Benefits of Skin to Skin               [x] Breastfeeding Positions          [] Tips to Maintain a Deep Latch               [x] Nutritive vs Non-Nutritive Sucking           [x] Gentle Breast Compressions  [x]  Weight Loss   [] How to Know When Baby is Getting Enough to Eat  [x] Supplementation Methods        [x] Type of Supplement   [] Nipple Shield  [] Sore Nipples        [] Pacifier Use  [] Tight Frenulum           [] Breastfeeding Twins  [x] Pumping Plan  [] Breastpump Education   [] Engorgement/Reverse Pressure  Softening           [x] Inpatient Lactation Support      [] Outpatient Lactation Resources/Follow up    Follow up: LC will continue to follow up during hospital stay.     (4) no impairment

## 2025-02-13 NOTE — ANESTHESIA POSTPROCEDURE EVALUATION
Patient: Kaitlynn Mendes    Procedure: Procedure(s):  PRIMARY  SECTION       Anesthesia Type:  Spinal    Note:  Disposition: Inpatient   Postop Pain Control: Uneventful            Sign Out: Well controlled pain   PONV: No   Neuro/Psych: Uneventful            Sign Out: Acceptable/Baseline neuro status   Airway/Respiratory: Uneventful            Sign Out: Acceptable/Baseline resp. status   CV/Hemodynamics: Uneventful            Sign Out: Acceptable CV status; No obvious hypovolemia; No obvious fluid overload   Other NRE: NONE   DID A NON-ROUTINE EVENT OCCUR? YES    Event details/Postop Comments:  Patient endorsed metallic taste and spotty vision soon after case end. No seizures or other neurological symptoms. Hemodynamics and respiratory status stable. EKG and telemetry unremarkable. There was a low likelihood for LAST, though it couldn't be ruled out completely so patient remained on telemetry overnight. This morning patient endorses that symptoms have subsided since the prior evening. She is ok to discontinue telemetry and continue the rest of postpartum care per OB's directive.           Last vitals:  Vitals Value Taken Time   /81 25 1015   Temp 35.4  C (95.72  F) 25 1010   Pulse 71 25 1015   Resp 15 25 1015   SpO2 100 % 25 1015   Vitals shown include unfiled device data.    Electronically Signed By: Ronak Connors MD  2025  9:50 AM

## 2025-02-13 NOTE — PLAN OF CARE
Problem: Adult Inpatient Plan of Care  Goal: Plan of Care Review  Description: The Plan of Care Review/Shift note should be completed every shift.  The Outcome Evaluation is a brief statement about your assessment that the patient is improving, declining, or no change.  This information will be displayed automatically on your shift  note.  Outcome: Progressing   Bandage is off incision. She reports pain and is only taking Tylenol and Ibuprofen. She reports her pain is increasing but she doesn't want to take Oxycodone quite yet. She arrived back from short stay around 10:30 AM. This nurse was there in short stay with her and continues to be her nurse here.   She is up independently. Reports all symptoms she had yesterday are gone.  She is breastfeeding baby and also giving baby her prior EBM.  Spouse is in room and supportive.     Problem: Adult Inpatient Plan of Care  Goal: Optimal Comfort and Wellbeing  Outcome: Progressing  Intervention: Monitor Pain and Promote Comfort  Recent Flowsheet Documentation  Taken 2/13/2025 1239 by Lisa Mckeon RN  Pain Management Interventions: medication (see MAR)  Taken 2/13/2025 0930 by Lisa Mckeon RN  Pain Management Interventions: rest  Taken 2/13/2025 0854 by Lisa Mckeon RN  Pain Management Interventions: medication (see MAR)  Taken 2/13/2025 0804 by Lisa Mckeon RN  Pain Management Interventions: medication (see MAR)  Taken 2/13/2025 0706 by Lisa Mckeon RN  Pain Management Interventions: cold applied  Intervention: Provide Person-Centered Care  Recent Flowsheet Documentation  Taken 2/13/2025 0815 by Lisa Mckeon RN  Trust Relationship/Rapport:   care explained   choices provided   questions answered   questions encouraged   Goal Outcome Evaluation:

## 2025-02-13 NOTE — PROGRESS NOTES
Obstetrics Post-Op Progress Note         Assessment and Plan:    Assessment: Kaitlynn Mendes is a 29 year old  Post-operative day #1 s/p Low transverse primary  section doing well. Cardiac monitoring normal and symptoms seem to be resolving.   Mild acute blood loss anemia  Plan:   Likely transfer back to Postpartum if ok with hospitalist team.     Will start Feosol       Interval History:   Doing well.  Pain is well-controlled.  No fevers.  No history of wound drainage, warmth or significant erythema.  Good appetite.  Denies chest pain, shortness of breath, nausea or vomiting.  Ambulatory.  Breastfeeding well.             Physical Exam:   Temp: 98.6  F (37  C) Temp src: Oral BP: 127/64 Pulse: 97   Resp: 18 SpO2: 97 % O2 Device: None (Room air)    Vitals:    25 0529   Weight: 81.6 kg (180 lb)     Vital Signs with Ranges  Temp:  [95.9  F (35.5  C)-98.6  F (37  C)] 98.6  F (37  C)  Pulse:  [65-98] 97  Resp:  [13-18] 18  BP: (103-131)/(54-83) 127/64  SpO2:  [97 %-100 %] 97 %  I/O last 3 completed shifts:  In:  [I.V.:1300]  Out:  [Urine:1300; Blood:689]    Uterine fundus is firm, non-tender and at the level of the umbilicus  Incision C/D/I          Data:     Recent Results (from the past 24 hours)   ECG 12-LEAD WITH MUSE (LHE)    Collection Time: 25  9:30 AM   Result Value Ref Range    Systolic Blood Pressure  mmHg    Diastolic Blood Pressure  mmHg    Ventricular Rate 65 BPM    Atrial Rate 65 BPM    NC Interval 142 ms    QRS Duration 74 ms     ms    QTc 440 ms    P Axis 37 degrees    R AXIS 79 degrees    T Axis 73 degrees    Interpretation ECG       Sinus rhythm with sinus arrhythmia  Junctional ST depression, probably normal  Borderline ECG  When compared with ECG of 2025 09:30,  No significant change was found  Confirmed by MIK WALL MD LOC:JN ([id number]) on 2025 3:18:58 PM     Hemoglobin    Collection Time: 25  6:20 AM   Result Value Ref Range     Hemoglobin 9.3 (L) 11.7 - 15.7 g/dL     Recent Labs   Lab Test 02/12/25  0524 07/15/24  0954 12/15/17  1331   ABO  --   --  O   RH  --   --  Pos   AS Negative   < > Neg    < > = values in this interval not displayed.     Recent Labs   Lab Test 02/13/25  0620 02/12/25  0524   HGB 9.3* 13.1     Recent Labs   Lab Test 07/15/24  0954   RUQIGG Positive       Franck Garcia MD

## 2025-02-13 NOTE — PROGRESS NOTES
[place] [facility] [place] [facility]  Reason for transfer: Tele discontinued and appropriate for PP care  Report given to: OB nurse at bedside (Lisa PATIÑO).  Family Notified: Father of baby at bedside

## 2025-02-13 NOTE — PROGRESS NOTES
[facility]    Medicine Progress Note - Hospitalist Service    Date of Admission:  2025    Assessment & Plan   Kaitlynn Mendes is a 29 year old female with past medical history significant for POTS, Carlos-Danlos syndrome, depression, and migraines who was admitted on 2025 for  section. She had symptoms after her epidural and we were consulted to monitor.     #Local anesthetic systemic toxicity from bupivacaine epidural (LAST)  Acutely developed feelings of malaise, flushing/warmth, lightheadedness, spots in vision, and bilateral tinnitus, and a metallic taste in her mouth after bupivacaine was administered for an epidural prior to . LAST is rare though when it is seen it tends to happen in infants and the elderly, but pregnancy is also a risk factor. The patient's symptoms and clinical history are highly consistent with LAST, which can present with a large variety of CNS symptoms. At its extreme, LAST can cause seizures and cardiac arrest acutely, but thankfully these did not occur. As of  the patient's symptoms are resolved and the anesthetic should have left her system.  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  -Continue PTA gabapentin    Hospitalist will sign off, but please reach out with any further questions or if we can be of assistance             Diet: Room Service  Regular Diet Adult    DVT Prophylaxis: Per obstetrics team  Kimbrough Catheter: Not present  Lines: None     Cardiac Monitoring: None  Code Status: Full Code      Clinically Significant Risk Factors                                   # Asthma: noted on problem list        Social Drivers of Health     Received from [facility]    Social Connections          Disposition Plan                  HERMAN MCCAIN MD  Hospitalist Service  [facility]  Securely message with [employer] (more info)  Text page via [employer] Paging/Directory   ______________________________________________________________________    Interval History   The patient is doing well this morning. Her symptoms from yesterday are resolved. The metallic taste in her mouth and tinnitus lasted the longest but resolved last night.    Physical Exam   Vital Signs: Temp: 98.6  F (37  C) Temp src: Oral BP: 127/64 Pulse: 97   Resp: 18 SpO2: 97 % O2 Device: None (Room air)    Weight: 180 lbs 0 oz    General: No overt distress, conversational, non-toxic appearing  HEENT: MMM  Pulmonary: CTAB; no crackles, wheezing, or rhonchi, normal effort  Cardiac: RRR. No m/r/g  Abdomen: Soft. NT, ND.  Extremities: No bilateral LE edema  Neuro: alert and awake, Ox4      Medical Decision Making       >50 MINUTES SPENT BY ME on the date of service doing chart review, history, exam, documentation & further activities per the note.      Data     I have personally reviewed the following data over the past 24 hrs:    N/A  \   9.3 (L)   / N/A     N/A N/A N/A /  N/A   N/A N/A N/A \       Imaging results reviewed over the past 24 hrs:   No results found for this or any previous visit (from the past 24 hours).

## 2025-02-13 NOTE — PLAN OF CARE
Pt cleared to transfer back to PP care. Tele orders dc'd. Staff member and OB nurse will transfer pt and baby to [facility].    Problem: Adult Inpatient Plan of Care  Goal: Optimal Comfort and Wellbeing  Intervention: Provide Person-Centered Care  Recent Flowsheet Documentation  Taken 2/13/2025 0855 by Bharagvi Carney, RN  Trust Relationship/Rapport:   questions answered   questions encouraged

## 2025-02-13 NOTE — PLAN OF CARE
Problem: Adult Inpatient Plan of Care  Goal: Optimal Comfort and Wellbeing  Intervention: Monitor Pain and Promote Comfort  Recent Flowsheet Documentation  Taken 2/13/2025 0500 by Chanda Delatorre RN  Pain Management Interventions: cold applied   Goal Outcome Evaluation:      Plan of Care Reviewed With: patient    Overall Patient Progress: improvingOverall Patient Progress: improving    Pt A&O. VSS in RA. Spot vision & bilateral tenitus resolved. Surgical site pain manage with sched pain meds & Ice packs. Tele - NSR. Had Delta Hgb 9.3 informed hospitalist. Ambulates to bathroom SBA.     Chanda Delatorre, RN

## 2025-02-14 VITALS
RESPIRATION RATE: 18 BRPM | WEIGHT: 180 LBS | DIASTOLIC BLOOD PRESSURE: 67 MMHG | HEIGHT: 69 IN | SYSTOLIC BLOOD PRESSURE: 123 MMHG | BODY MASS INDEX: 26.66 KG/M2 | HEART RATE: 98 BPM | OXYGEN SATURATION: 97 % | TEMPERATURE: 98 F

## 2025-02-14 VITALS
WEIGHT: 180 LBS | BODY MASS INDEX: 26.66 KG/M2 | RESPIRATION RATE: 18 BRPM | DIASTOLIC BLOOD PRESSURE: 71 MMHG | TEMPERATURE: 98 F | SYSTOLIC BLOOD PRESSURE: 111 MMHG | HEIGHT: 69 IN | HEART RATE: 100 BPM | OXYGEN SATURATION: 98 %

## 2025-02-14 PROCEDURE — 250N000013 HC RX MED GY IP 250 OP 250 PS 637: Performed by: OBSTETRICS & GYNECOLOGY

## 2025-02-14 RX ORDER — ACETAMINOPHEN 325 MG/1
975 TABLET ORAL EVERY 6 HOURS
COMMUNITY
Start: 2025-02-14

## 2025-02-14 RX ORDER — OXYCODONE HYDROCHLORIDE 5 MG/1
5 TABLET ORAL EVERY 4 HOURS PRN
Qty: 12 TABLET | Refills: 0 | Status: SHIPPED | OUTPATIENT
Start: 2025-02-14

## 2025-02-14 RX ADMIN — ACETAMINOPHEN 975 MG: 325 TABLET ORAL at 06:23

## 2025-02-14 RX ADMIN — PRENATAL VIT W/ FE FUMARATE-FA TAB 27-0.8 MG 1 TABLET: 27-0.8 TAB at 08:21

## 2025-02-14 RX ADMIN — ACETAMINOPHEN 975 MG: 325 TABLET ORAL at 00:24

## 2025-02-14 RX ADMIN — OXYCODONE HYDROCHLORIDE 5 MG: 5 TABLET ORAL at 04:09

## 2025-02-14 RX ADMIN — GABAPENTIN 200 MG: 100 CAPSULE ORAL at 08:24

## 2025-02-14 RX ADMIN — SENNOSIDES AND DOCUSATE SODIUM 1 TABLET: 50; 8.6 TABLET ORAL at 08:21

## 2025-02-14 RX ADMIN — ACETAMINOPHEN 975 MG: 325 TABLET ORAL at 13:11

## 2025-02-14 RX ADMIN — IBUPROFEN 800 MG: 800 TABLET ORAL at 03:12

## 2025-02-14 RX ADMIN — OXYCODONE HYDROCHLORIDE 5 MG: 5 TABLET ORAL at 11:52

## 2025-02-14 RX ADMIN — IBUPROFEN 800 MG: 800 TABLET ORAL at 08:21

## 2025-02-14 RX ADMIN — FERROUS SULFATE TAB 325 MG (65 MG ELEMENTAL FE) 325 MG: 325 (65 FE) TAB at 08:21

## 2025-02-14 ASSESSMENT — ACTIVITIES OF DAILY LIVING (ADL)
ADLS_ACUITY_SCORE: 32

## 2025-02-14 NOTE — PROGRESS NOTES
[facility] RN Care Coordinator Note    Kaitlynn Mendes  [id number]  1995    Chart reviewed, discharge plan discussed with bedside RN, needs assessed. Patient requests home care visit as ordered, nurse visit planned Sunday, 2/16/25. [facility] Intake contacted by this writer; patient added to [facility] schedule. Follow-up post-delivery appointments planned in 2 & 6 weeks at [facility].    RN Care Coordinator will continue to follow and assist as needed with discharge plan.

## 2025-02-14 NOTE — PLAN OF CARE
Pt is on scheduled Tylenol and Ibuprofen. She is taking Oxycodone as needed. Her incision is GEOVANY and intact.  Fundus is firm with scant amount of lochia. Spouse is in room and is supportive of mom and baby. Mom is breastfeeding baby and also pumping along with HDM.  They will have home care on Sunday.    Problem: Adult Inpatient Plan of Care  Goal: Plan of Care Review  Description: The Plan of Care Review/Shift note should be completed every shift.  The Outcome Evaluation is a brief statement about your assessment that the patient is improving, declining, or no change.  This information will be displayed automatically on your shift  note.  Outcome: Progressing     Problem: Adult Inpatient Plan of Care  Goal: Optimal Comfort and Wellbeing  Outcome: Progressing  Intervention: Monitor Pain and Promote Comfort  Recent Flowsheet Documentation  Taken 2/14/2025 0907 by Lisa Mckeon RN  Pain Management Interventions: rest  Taken 2/14/2025 0824 by Lisa Mckeon RN  Pain Management Interventions: rest  Taken 2/14/2025 0821 by Lisa Mckeon RN  Pain Management Interventions: medication (see MAR)  Intervention: Provide Person-Centered Care  Recent Flowsheet Documentation  Taken 2/14/2025 0751 by Lisa Mckeon RN  Trust Relationship/Rapport:   care explained   choices provided   questions answered   questions encouraged   Goal Outcome Evaluation:

## 2025-02-14 NOTE — PROGRESS NOTES
Obstetrics Post-Op Progress Note         Assessment and Plan:    Assessment: Kaitlynn Mendes is a 29 year old  Post-operative day #2 s/p Low transverse primary  section doing well.     L&D complications: LGA  Carlos Danlos  POTS  Reaction to bupivacaine in spinal, s/p cardiac monitoring  Acute blood loss anemia, asymptomatic          Plan:     Resume prenatal with iron on discharge  Discharge today           Interval History:   Doing well.  Pain is well-controlled.  No fevers.  No history of wound drainage, warmth or significant erythema.  Good appetite.  Denies chest pain, shortness of breath, nausea or vomiting.  Ambulatory.  Breastfeeding well.             Physical Exam:   Temp: 98  F (36.7  C) Temp src: Oral BP: 123/67 Pulse: 98   Resp: 18 SpO2: 97 % O2 Device: None (Room air)    Vitals:    25 05   Weight: 81.6 kg (180 lb)     Vital Signs with Ranges  Temp:  [98  F (36.7  C)-98.8  F (37.1  C)] 98  F (36.7  C)  Pulse:  [] 98  Resp:  [18] 18  BP: (111-125)/(63-74) 123/67  SpO2:  [97 %-98 %] 97 %  I/O last 3 completed shifts:  In: 800 [P.O.:800]  Out: -     Uterine fundus is firm, non-tender and at the level of the umbilicus  Incision C/D/I          Data:   No results found for this or any previous visit (from the past 24 hours).  Recent Labs   Lab Test 25  0524 07/15/24  0954 12/15/17  1331   ABO  --   --  O   RH  --   --  Pos   AS Negative   < > Neg    < > = values in this interval not displayed.     Recent Labs   Lab Test 25  0620 25  0524   HGB 9.3* 13.1     Recent Labs   Lab Test 07/15/24  0954   RUQIGG Positive       Hazel Edgar MD

## 2025-02-14 NOTE — DISCHARGE SUMMARY
Discharge Summary    Kaitlynn Mendes MRN# [id number]   Age: 29 year old YOB: 1995     Date of Admission:  2025  Date of Discharge::  2025  Admitting Physician:  Myron Gilbert MD  Discharge Physician:  Hazel Edgar MD     Home clinic: [facility]          Admission Diagnoses:   POTS  Suspected macrosomia  Carlos Danlos  Intrauterine pregnancy at 39w2d          Discharge Diagnosis:   Same   Acute blood loss anemia, asymptomatic  Intravascular bupivicaine administration with monitoring after  S/p  delivery          Procedures:     Procedure(s): Low transverse  delivery via Pfannenstiel incision                Medications Prior to Admission:     Medications Prior to Admission   Medication Sig Dispense Refill Last Dose/Taking    doxylamine (UNISOM) 25 MG TABS tablet Take 25 mg by mouth nightly as needed for sleep.   2025 Bedtime    esomeprazole (NEXIUM) 20 MG DR capsule Take 20 mg by mouth every morning (before breakfast). Take 30-60 minutes before eating.   2025 Morning    gabapentin (NEURONTIN) 100 MG capsule Take 200 mg by mouth 2 times daily.   2025 Evening    Prenatal Vit-Fe Fumarate-FA (PRENATAL MULTIVITAMIN W/IRON) 27-0.8 MG tablet Take 1 tablet by mouth every morning   2025 Evening             Discharge Medications:        Review of your medicines        START taking        Dose / Directions   acetaminophen 325 MG tablet  Commonly known as: TYLENOL      Dose: 975 mg  Take 3 tablets (975 mg) by mouth every 6 hours.  Refills: 0     oxyCODONE 5 MG tablet  Commonly known as: ROXICODONE      Dose: 5 mg  Take 1 tablet (5 mg) by mouth every 4 hours as needed.  Quantity: 12 tablet  Refills: 0            CONTINUE these medicines which have NOT CHANGED        Dose / Directions   doxylamine 25 MG Tabs tablet  Commonly known as: UNISOM      Dose: 25 mg  Take 25 mg by mouth nightly as needed for sleep.  Refills: 0     esomeprazole  20 MG DR capsule  Commonly known as: NexIUM      Dose: 20 mg  Take 20 mg by mouth every morning (before breakfast). Take 30-60 minutes before eating.  Refills: 0     gabapentin 100 MG capsule  Commonly known as: NEURONTIN      Dose: 200 mg  Take 200 mg by mouth 2 times daily.  Refills: 0     prenatal multivitamin w/iron 27-0.8 MG tablet      Dose: 1 tablet  Take 1 tablet by mouth every morning  Refills: 0               Where to get your medicines        These medications were sent to [facility] [address]  [address] [place] [id number]      Phone: [phone]   oxyCODONE 5 MG tablet       Some of these will need a paper prescription and others can be bought over the counter. Ask your nurse if you have questions.    You don't need a prescription for these medications  acetaminophen 325 MG tablet               Consultations:   Medicine for cardiac monitoring           Brief History of Labor:   Kaitlynn Mendes is a 29 year old  who presented for scheduled elective  for Carlos Danlos. Had intravascular administration of bupivicaine and had tinnitus, metallic taste in mouth and some neuro changes. Was on cardiac monitoring after delivery.            Hospital Course:   The patient's hospital course was unremarkable after the spinal. She recovered as anticipated and experienced no post-operative complications.  On discharge, her pain was well controlled. Vaginal bleeding is similar to peak menstrual flow.  Voiding without difficulty.  Ambulating well and tolerating a normal diet.  No fever or significant wound drainage.  Breastfeeding.  Infant is stable.  She was discharged on post-partum day #2. She will resume her prenatal with iron.    Post-partum hemoglobin:   Hemoglobin   Date Value Ref Range Status   2025 9.3 (L) 11.7 - 15.7 g/dL Final   2019 13.7 11.7 - 15.7 g/dL Final       Day of discharge assessment:   BP  "123/67 (BP Location: Left arm)   Pulse 98   Temp 98  F (36.7  C) (Oral)   Resp 18   Ht 1.753 m (5' 9\")   Wt 81.6 kg (180 lb)   LMP  (Exact Date)   SpO2 97%   BMI 26.58 kg/m    Abdomen: Soft, fundus firm, incision C/D/I.          Discharge Instructions and Follow-Up:     Discharge diet: Regular   Discharge activity: Your activity upon discharge: no lifting >15 lbs or strenuous exercise for 6 weeks, no driving for 2 weeks or until you can slam on the breaks w/o pain, nothing in the vagina for 6 weeks (no tampons, intercourse, douching).    Discharge follow-up: Two weeks for incision check  6 weeks for postpartum visit.   Wound care: Keep incision clean and dry.           Discharge Disposition:     Discharged to home      Attestation:  I have reviewed today's vital signs, notes, medications, labs and imaging.  Amount of time performed on this discharge summary: 10 minutes.  Total time: 20 minutes    Hazel Edgar MD           "

## 2025-02-14 NOTE — PLAN OF CARE
Goal Outcome Evaluation:             D:  Patient desires discharge home.  Discharge orders received and entered by provider.  A:  Discharge instructions reviewed with the patient.  All questions and concerns addressed.  R:  Discharge criteria met.  4 Part ID bands double checked.   discharged in car seat with parents.  The nursing assistant escorted patient to car .

## 2025-02-14 NOTE — DISCHARGE INSTRUCTIONS
*You have a Home Care nurse visit planned for Sunday, 2/16/25. The nurse will contact you after discharge to confirm the appointment time. If you do not hear from the nurse by Sunday morning, please call [phone].   (Please do not schedule a clinic appointment on the same day as home nurse visit.)      Warning Signs after Having a Baby    Keep this paper on your fridge or somewhere else where you can see it.    Call your provider if you have any of these symptoms up to 12 weeks after having your baby.    Thoughts of hurting yourself or your baby  Pain in your chest or trouble breathing  Severe headache not helped by pain medicine  Eyesight concerns (blurry vision, seeing spots or flashes of light, other changes to eyesight)  Fainting, shaking or other signs of a seizure    Call 9-1-1 if you feel that it is an emergency.     The symptoms below can happen to anyone after giving birth. They can be very serious. Call your provider if you have any of these warning signs.    My provider s phone number: _______________________    Losing too much blood (hemorrhage)    Call your provider if you soak through a pad in less than an hour or pass blood clots bigger than a golf ball. These may be signs that you are bleeding too much.    Blood clots in the legs or lungs    After you give birth, your body naturally clots its blood to help prevent blood loss. Sometimes this increased clotting can happen in other areas of the body, like the legs or lungs. This can block your blood flow and be very dangerous.     Call your provider if you:  Have a red, swollen spot on the back of your leg that is warm or painful when you touch it.   Are coughing up blood.     Infection    Call your provider if you have any of these symptoms:  Fever of 100.4 F (38 C) or higher.  Pain or redness around your stitches if you had an incision.   Any yellow, white, or green fluid coming from places where you had stitches or surgery.    Mood Problems  (postpartum depression)    Many people feel sad or have mood changes after having a baby. But for some people, these mood swings are worse.     Call your provider right away if you feel so anxious or nervous that you can't care for yourself or your baby.    Preeclampsia (high blood pressure)    Even if you didn't have high blood pressure when you were pregnant, you are at risk for the high blood pressure disease called preeclampsia. This risk can last up to 12 weeks after giving birth.     Call your provider if you have:   Pain on your right side under your rib cage  Sudden swelling in the hands and face    Remember: You know your body. If something doesn't feel right, get medical help.     For informational purposes only. Not to replace the advice of your health care provider. Copyright 2020 [facility]. All rights reserved. Clinically reviewed by Veronique Lang RNC-OB, MSN. [employer] 693928 - Rev 02/23.

## 2025-02-14 NOTE — PLAN OF CARE
"  Problem: Adult Inpatient Plan of Care  Goal: Plan of Care Review  Description: The Plan of Care Review/Shift note should be completed every shift.  The Outcome Evaluation is a brief statement about your assessment that the patient is improving, declining, or no change.  This information will be displayed automatically on your shift  note.  Outcome: Progressing     Problem: Postpartum ( Delivery)  Goal: Optimal Pain Control and Function  Outcome: Progressing  Intervention: Prevent or Manage Pain  Recent Flowsheet Documentation  Taken 2025 by Diana Nash, RN  Pain Management Interventions:   declines   repositioned   rest  Taken 2025 by Diana Nash, RN  Perineal Care: absorbent brief/pad changed  Taken 2025 by Diana Nash, RN  Pain Management Interventions: medication (see MAR)  Perineal Care: absorbent brief/pad changed   Goal Outcome Evaluation:                    Patient's VSS. Fundus firm and midline umbilicus with scant rubra lochia.  No clots or foul odor present. Patient is up ad corrie and performing self cares independently. Reporting 3/10 pain being treated with tylenol, ibuprofen, and oxycodone. Pt will continue to rate pain less than 3/10 throughout shift. Addressed any questions and concerns. Will continue to monitor.Patient's  is in the room with infant, attentive/supportive person and participating in cares.     Patient fill out birth certificate, and PPMA. Has completed them.    /63 (BP Location: Left arm)   Pulse 97   Temp 98.8  F (37.1  C) (Oral)   Resp 18   Ht 1.753 m (5' 9\")   Wt 81.6 kg (180 lb)   LMP  (Exact Date)   SpO2 98%   BMI 26.58 kg/m          "

## 2025-02-14 NOTE — PLAN OF CARE
Problem: Adult Inpatient Plan of Care  Goal: Optimal Comfort and Wellbeing  Outcome: Progressing     Problem: Postpartum ( Delivery)  Goal: Successful Parent Role Transition  Outcome: Progressing       Goal Outcome Evaluation:      Plan of Care Reviewed With: patient    Overall Patient Progress: improvingOverall Patient Progress: improving    Outcome Evaluation: Pt VSS. Up ad corrie. Medications given per MAR for pain. Bonding well with baby.

## 2025-02-14 NOTE — LACTATION NOTE
This note was copied from a baby's chart.  Follow up Lactation Visit    Current age : 48 hours old    Gestational age at delivery: 39w2d     Diaper count (last 24 hours) : 4 wet 4 soiled      Feedings (last 24 hours): 5 breast 5 Human donor breastmilk 10-15 mL    Weight Loss: 7.82% at 48 hours    Breastfeeding goals: 6-12 months    Past breastfeeding experience: First Infant     Maternal health risk that may affect breastfeeding: None    Home Pump: Spectra     Breast assessment: Breast: Round, Symmetrical, and Filing , Nipple:Everted, Intact, and Tender    Infant oral assessment: did not assess infant was at the breast     Feeding assessment: Infant was at the breast. Swallows noted at the breast every 2-3 suckles. Discussed latching and maintaining the deep latch. Reviewed general breast feeding information with parent on engorgement and breast stimulation.      Feeding plan: Breastfeeding Plan:     Offer breast every 2-3 hours or sooner if baby is showing hunger cues  Massage breast to encourage milk flow   Strive for a deep and comfortable latch (Should feel like a tugging at the nipple)   Positioning reminders:  Line up baby's nose to nipple   Baby's chin should be tilted into the breast tissue and baby's nose gentle touching the breast   Ear, shoulder, hip, nice straight line   Chin of baby should not be resting on the baby's chest.   Your thumb lined up like baby's mustache, fingers under breast like a baby's beard  Baby's cheeks should be touching breast equally on both sides   Switch sides when swallows slow, baby pauses lengthen and  breast compressions do not increase the swallows or activity at the breast     Education:     [x] Maternal Risk Factors that Could Affect Milk Supply      [x] Hand Expression   [x] Stages of Milk Production           [x] Feeding Cues                 [x] Tips to Maintain a Deep Latch               [x] Breastpump Education   [x] Engorgement/Reverse Pressure  Softening             [x] Outpatient Lactation Resources/Follow up

## 2025-02-15 ENCOUNTER — PATIENT OUTREACH (OUTPATIENT)
Dept: CARE COORDINATION | Facility: CLINIC | Age: 30
End: 2025-02-15
Payer: COMMERCIAL

## 2025-02-15 NOTE — PROGRESS NOTES
[facility]: Transitions of Care Outreach  Chief Complaint   Patient presents with    Clinic Care Coordination - Post Hospital       Most Recent Admission Date: 2025   Most Recent Admission Diagnosis: Large for gestational age fetus affecting management of mother - O36.60X0   delivery delivered - O82     Most Recent Discharge Date: 2025   Most Recent Discharge Diagnosis: S/P  section - Z98.891   delivery delivered - O82     Transitions of Care Assessment    Discharge Assessment  How are you doing now that you are home?: Really good.  How are your symptoms? (Red Flag symptoms escalate to triage hotline per guidelines): New  Do you know how to contact your clinic care team if you have future questions or changes to your health status? : Yes (OB)  Does the patient have their discharge instructions? : Yes  Does the patient have questions regarding their discharge instructions? : No  Were you started on any new medications or were there changes to any of your previous medications? : Yes  Does the patient have all of their medications?: Yes  Do you have questions regarding any of your medications? : No  Do you have all of your needed medical supplies or equipment (DME)?  (i.e. oxygen tank, CPAP, cane, etc.): Yes    Post-op ([facility] Only)  If the patient had a surgery or procedure, do they have any questions for a nurse?: No    Follow up Plan     Discharge Follow-Up  Discharge follow up appointment scheduled in alignment with recommended follow up timeframe or Transitions of Risk Category? (Low = within 30 days; Moderate= within 14 days; High= within 7 days): Yes  Discharge Follow Up Appointment Date: 25 (6 weeks post partum)  Discharge Follow Up Appointment Scheduled with?: Primary Care Provider    Future Appointments   Date Time Provider Department Center   3/27/2025  4:00 PM Suzan Calderon NP [facility]       Outpatient Plan as outlined on AVS reviewed  with patient.    For any urgent concerns, please contact our 24 hour nurse triage line: [phone] ([phone])       Mamie Hodgson MA

## 2025-02-22 ENCOUNTER — HEALTH MAINTENANCE LETTER (OUTPATIENT)
Age: 30
End: 2025-02-22

## 2025-02-24 RX ORDER — PANTOPRAZOLE SODIUM 40 MG/1
1 TABLET, DELAYED RELEASE ORAL
COMMUNITY
Start: 2024-10-31 | End: 2025-02-25

## 2025-02-25 ENCOUNTER — LAB REQUISITION (OUTPATIENT)
Dept: LAB | Facility: CLINIC | Age: 30
End: 2025-02-25
Payer: COMMERCIAL

## 2025-02-25 ENCOUNTER — OFFICE VISIT (OUTPATIENT)
Dept: MIDWIFE SERVICES | Facility: CLINIC | Age: 30
End: 2025-02-25
Attending: OBSTETRICS & GYNECOLOGY
Payer: COMMERCIAL

## 2025-02-25 VITALS — SYSTOLIC BLOOD PRESSURE: 100 MMHG | OXYGEN SATURATION: 99 % | HEART RATE: 81 BPM | DIASTOLIC BLOOD PRESSURE: 68 MMHG

## 2025-02-25 DIAGNOSIS — D64.9 ANEMIA, UNSPECIFIED: ICD-10-CM

## 2025-02-25 DIAGNOSIS — O92.79 OTHER DISORDERS OF LACTATION: Primary | ICD-10-CM

## 2025-02-25 LAB — HGB BLD-MCNC: 13.2 G/DL (ref 11.7–15.7)

## 2025-02-25 PROCEDURE — 82728 ASSAY OF FERRITIN: CPT | Mod: ORL | Performed by: OBSTETRICS & GYNECOLOGY

## 2025-02-25 PROCEDURE — 85018 HEMOGLOBIN: CPT | Mod: ORL | Performed by: OBSTETRICS & GYNECOLOGY

## 2025-02-25 PROCEDURE — 3078F DIAST BP <80 MM HG: CPT | Performed by: ADVANCED PRACTICE MIDWIFE

## 2025-02-25 PROCEDURE — 99204 OFFICE O/P NEW MOD 45 MIN: CPT | Performed by: ADVANCED PRACTICE MIDWIFE

## 2025-02-25 PROCEDURE — 3074F SYST BP LT 130 MM HG: CPT | Performed by: ADVANCED PRACTICE MIDWIFE

## 2025-02-25 ASSESSMENT — EDINBURGH POSTNATAL DEPRESSION SCALE (EPDS)
I HAVE BEEN ANXIOUS OR WORRIED FOR NO GOOD REASON: YES, SOMETIMES
THE THOUGHT OF HARMING MYSELF HAS OCCURRED TO ME: NEVER
TOTAL SCORE: 7
I HAVE FELT SCARED OR PANICKY FOR NO GOOD REASON: YES, SOMETIMES
THINGS HAVE BEEN GETTING ON TOP OF ME: NO, MOST OF THE TIME I HAVE COPED QUITE WELL
I HAVE BEEN SO UNHAPPY THAT I HAVE HAD DIFFICULTY SLEEPING: NOT AT ALL
I HAVE FELT SAD OR MISERABLE: NO, NOT AT ALL
I HAVE BEEN SO UNHAPPY THAT I HAVE BEEN CRYING: NO, NEVER
I HAVE BLAMED MYSELF UNNECESSARILY WHEN THINGS WENT WRONG: YES, SOME OF THE TIME
I HAVE LOOKED FORWARD WITH ENJOYMENT TO THINGS: AS MUCH AS I EVER DID
I HAVE BEEN ABLE TO LAUGH AND SEE THE FUNNY SIDE OF THINGS: AS MUCH AS I ALWAYS COULD

## 2025-02-25 NOTE — PATIENT INSTRUCTIONS
Use good positioning for deep latch, with baby held close to body and baby's head/shoulders/hips in good alignment.  When in a seated position, use a pillow to help bring baby close to breasts, and stepstool to elevate your knees above hips.    When bringing your baby to your breast, compress your breast vertically and in line with baby's mouth--this will help them to get a larger mouthful of breast and a deeper latch. Babies latch best to the breast by bringing their chin in first, so point your nipple towards baby's nose, tuck the chin in close, and then wait for his mouth to open.  When his mouth opens, bring his head in deeply.  Baby's chin should be snugged deeply in your breast, their upper cheeks should be touching the breast, and their nose just out of the breast.   If there is pinching or pain, try using a finger to give a little gentle pressure on his chin to help him open more widely and take in more of your breast.  If it is still painful, use a finger to break the suction, remove baby from the breast and try again until there is no pain with nursing.  There is sometimes a little pain when the baby first begins sucking, but after the first few seconds there should be no pain--only a tugging feeling.   For the fast milk letdown that makes it difficult for Florence to stay latched to the breast or causes him to cough/sputter while nursing, try using the laid-back or sidelying nursing positions.   You can also try taking baby off of breast when the strong milk letdown starts, and allow milk to flow out into burp cloth, re-latching baby after flow has slowed.   Continue to nurse baby on cue, 8-12 times each day.  Once your baby has returned to their birthweight, you can trust them to awaken when they need to eat--you do not need to awaken them on a schedule.  When you nurse, feed on one side until baby finishes swallowing.  Once swallowing slows, use breast compression to encourage more intake, but once baby  "is sleeping, coming off the breast, or just \"nibbling,\" you can take baby off the breast and move to the other side.  Offer both breasts at each feeding, but if he takes only one side, just begin with the other side at the next feeding.    Florence needs about 24 oz of milk each day to grow well, or about 2.5 oz each feeding.  If he nurses at home as he did in the office today, about 10 times/day, he is getting plenty of milk and does not need extra in bottles after a breastfeeding.  Once babies are about a month old, they need about 25-30 oz/day (or 3- 4 oz each feeding if they eat about 8 times/day) and this where their needs stay for their entire first year;  you don't need to keep producing more and more milk as they grow.    If you would like to give an occasional bottle for convenience, this is fine to do. You can pump to get the milk for this bottle at any time that is convenient for you--many people like to pump after the first morning feeding because supply tends to be highest at that time of day.  You can then give that milk in a bottle later in the day.  Use the paced feeding method when giving bottles, to help babies learn to go more easily between breast and bottle, and avoid overfeeding (more possible at the bottle than the breast).  Right now about 2.5 oz is a good amount to give.  Be cautious with the use of silver nipple caps, because these can put pressure on your areola and result in some nipple swelling, particularly in the first few weeks when your breasts are full.  Additionally, they can collect milk and your nipples end up soaking in milk, which can cause skin breakdown.  Consider using soft breast pads as an alternative.     Follow up with lactation as needed, and pediatric provider as planned.  [employer] can be used for brief questions, but it's important to know that messages are not seen Friday through Sunday. If urgent help is needed, Monday through Friday you can call [phone] and one of " "our lactation consultants will get the message and respond; if you need a rapid response over a weekend or holiday, it is best to call your on-call maternity or pediatric provider.  Please feel free to schedule a return visit if the concern is more detailed;  telephone visits are also an option if you don't feel you need to be seen in person.   _____________________    Video of sidelying breastfeeding  [email or web address] Software [email or web address]/watch?v=[email or web address]     ____________    For good videos on the laid-back breastfeeding position:  [email or web address]  [email or web address]     ______________________________________    \"Plugged Ducts,\" breast inflammation, mastitis and milk blebs    Sometimes an area of inflammation develops in the breast, with a lump or fullness, pain and sometimes redness.  This is often called a \"plugged duct,\" even though it is not actually a physical clogging of one milk duct.  It is comes from the milk ducts being narrowed by inflammation.  The main things to do are directed at decreasing inflammation:  use ice on that area if it is comfortable for you, and take ibuprofen for pain and its anti-inflammatory effects.  If heat makes the area more comfortable, especially during feeding, you can use that;  if it is helpful you can also try some very gentle massage. The massage should be extremely light, like petting a cat, and go from the center of your breast outwards towards your armpit.  This helps the swelling and extra fluid move out of your breast.  Do not do firm massage--this is not helpful (because there is no actual \"plug\" to \"squeeze out\"), and it just further injures and inflames the breast tissue.  You can also add a lecithin supplement, 1200 mg four times daily, to help with milk flow and reduce inflammation.  Continue to nurse your baby as you have been--you do not need to nurse more on that side, because increasing the milk supply will actually just worsen the problem.  If you " are pumping, just pump the amount that your little one needs.      Occasionally if the inflammation increases, women can feel tired, achy or have a fever--this is called inflammatory mastitis, and it generally goes away within 24 hours as you do the things mentioned above.  If it doesn't get better, though, and you are feeling ill or have a fever for more than 24 hours, then you may have a bacterial infection that needs medication--so you should call your midwife or doctor about a possible prescription.    Milk blebs (small white or creamy-colored painful dots on the nipple) are also very common, and frequently associated with breast inflammation and pain.  These blebs, or milk blisters, come from inflammatory cells in the milk ducts that have migrated to the surface of the nipple and lodged there, causing pain.  The same types of things will be helpful with these:  Use a lecithin supplement 1200mg three times daily, to help with milk flow and prevent recurrent blebs, and take ibuprofen 600-800mg three times daily for 1-2 weeks to reduce inflammation and help with pain.    Soaking in saline or Epsom salt water, although previously recommended and very popular, has not been found to be helpful, and can inflame the skin due to the continued moisture.  Using a needle to release the bleb is also not helpful, as they can become infected or recur.  If the above measures are unsuccessful for relieving the bleb after a week or two, we can use a prescription steroid ointment that decreases inflammation at the nipple itself.      _______________________________    Good breastfeeding resources:    Websites:  www.[employer]  www.[employer]Lingu[employer].ly/blog/  www.llli.org/breastfeeding-info/    Instagram:    [email or web address]  [email or web address]    Books:   The Womanly Art of Breastfeeding by [employer]  Breastfeeding Doesn't Need to Suck, by Ana Lilia Benson      For help with using baby  carriers:  https://babywearingtwincities.org/

## 2025-02-25 NOTE — PROGRESS NOTES
Assessment:    Thirteen day old infant gaining weight on exclusive breastfeedin.5 oz over last day  Good latch and suck, with excellent milk transfer during observed feeding in office today  Mother with ample milk supply    Plan:    Use good positioning for deep latch, with baby held close to body and baby's head/shoulders/hips in good alignment.  When in a seated position, use a pillow to help bring baby close to breasts, and stepstool to elevate your knees above hips.    When bringing your baby to your breast, compress your breast vertically and in line with baby's mouth--this will help them to get a larger mouthful of breast and a deeper latch. Babies latch best to the breast by bringing their chin in first, so point your nipple towards baby's nose, tuck the chin in close, and then wait for his mouth to open.  When his mouth opens, bring his head in deeply.  Baby's chin should be snugged deeply in your breast, their upper cheeks should be touching the breast, and their nose just out of the breast.   If there is pinching or pain, try using a finger to give a little gentle pressure on his chin to help him open more widely and take in more of your breast.  If it is still painful, use a finger to break the suction, remove baby from the breast and try again until there is no pain with nursing.  There is sometimes a little pain when the baby first begins sucking, but after the first few seconds there should be no pain--only a tugging feeling.   For the fast milk letdown that makes it difficult for Florence to stay latched to the breast or causes him to cough/sputter while nursing, try using the laid-back or sidelying nursing positions.   You can also try taking baby off of breast when the strong milk letdown starts, and allow milk to flow out into burp cloth, re-latching baby after flow has slowed.   Continue to nurse baby on cue, 8-12 times each day.  Once your baby has returned to their birthweight, you can trust  "them to awaken when they need to eat--you do not need to awaken them on a schedule.  When you nurse, feed on one side until baby finishes swallowing.  Once swallowing slows, use breast compression to encourage more intake, but once baby is sleeping, coming off the breast, or just \"nibbling,\" you can take baby off the breast and move to the other side.  Offer both breasts at each feeding.     Florence needs about 24 oz of milk each day to grow well, or about 2.5 oz each feeding.  If he nurses at home as he did in the office today, about 10 times/day, he is getting plenty of milk and does not need extra in bottles after a breastfeeding.  Once babies are about a month old, they need about 25-30 oz/day (or 3- 4 oz each feeding if they eat about 8 times/day) and this where their needs stay for their entire first year;  you don't need to keep producing more and more milk as they grow.    If you would like to give an occasional bottle for convenience, this is fine to do. You can pump to get the milk for this bottle at any time that is convenient for you--many people like to pump after the first morning feeding because supply tends to be highest at that time of day.  You can then give that milk in a bottle later in the day.  Use the paced feeding method when giving bottles, to help babies learn to go more easily between breast and bottle, and avoid overfeeding (more possible at the bottle than the breast).  Right now about 2.5 oz is a good amount to give.  Be cautious with the use of silver nipple caps, because these can put pressure on your areola and result in some nipple swelling, particularly in the first few weeks when your breasts are full.  Additionally, they can collect milk and your nipples end up soaking in milk, which can cause skin breakdown.  Consider using soft breast pads as an alternative.     Follow up with lactation as needed, and pediatric provider as planned.  MyChart can be used for brief questions, but " it's important to know that messages are not seen Friday through . If urgent help is needed, Monday through Friday you can call [phone] and one of our lactation consultants will get the message and respond; if you need a rapid response over a weekend or holiday, it is best to call your on-call maternity or pediatric provider.  Please feel free to schedule a return visit if the concern is more detailed;  telephone visits are also an option if you don't feel you need to be seen in person.     Subjective: Kaitlynn and Alonso are here today referred by Dr. Edgar with the following concerns:  Right sided nipple pain, especially with initial latch.  Uses nipple shield for some feedings for this, which works well.  Using silver nipple caps and saline spray on nipple as well, which she finds helpful  Baby seems to be struggle with fast milk flow, especially on the right, with coughing/sputtering/choking.  Known to have largyngomalacia.  Development of some lumpiness in right breast, and is using ice and ibuprofen with some relief, although she notices this frequently returns the next day  Offering a bottle of expressed milk once daily or every other day for rest purposes; pumping about once/day with no difficulty using Spectra pump.  Pumping just the amounts that are needed for baby (about 2 oz)    Hospital Course:  Elective  for anticipated LGA infant;  uncomplicated procedure.  Seen by hospital IBCLC for routine support;  doing well.  Receiving donor milk supplements.    Mother's Relevant Med/Surg History:  Migraines; POTS;  Carlos-Danlos Syndrome with hx of multiple lower extremity surgeries and neuropathy, managed with gabapentin;  infertility x 4 years, primarily male factor.  Conceived via IVF in [place].    Breastfeeding Goals: continued exclusive breastfeeding    Previous Breastfeeding Experience: first baby    Infant's name: Florence  Infant's bday: 25  Gestational age: 39 wk  Infant's birth  weight: 9 # 0.3 oz   Discharge weight: 8 # 5 oz     Pediatric Provider: [facility], Dr. Johnson.  Kaitlynn gives her permission for today's note to be forwarded to Dr. Johnson.  AGUILA signed and filed in Kaitlynn's chart as Florence has no local active pediatric chart.   Recent weights:  25:  8 # 13 oz      Peq Lactation Visit Questionnaire    2025  3:54 PM CST - Filed by Patient   What is your main concern today? noisy breathing/feeding   Your baby's first name: Florence   Your baby's last name: Cinthya   Type of Birth    Your doctor/midwife: Myron Gilebrt   Baby's doctor or nurse practitioner: Elizabeth   Baby's birthday: 2025   Birth weight: 9lb1oz   Baby's weight just before leaving the hospital: 8lb 3oz   Baby's most recent weight: 8lb 13oz   Date: 2242   How often does your baby eat? 2 to 3 hours   How long does each feeding last?  20 to 40min   How much of the time does your baby take both breasts when nursing? 70%   Can you hear the baby swallowing during nursing? Yes   How many times does your baby feed in 24 hours? 10   How many times does your baby urinate (pee) in 24 hours? 10   How many stools (poops) does your baby have in 24 hours? 6   Describe the color and consistency of the poop: yellow runny   Do you give your baby extra milk in addition to or instead of breastfeeding? Breastmilk   How much extra do you usually give? once dsily to every other day   How do you give extra milk? Bottle   Are you pumping your breasts? Yes   How often? 1 per day   How much is pumped? 2oz   When you were pregnant did your breasts grow larger? Yes   Did your areola (the dark area around your nipple) grow larger or darker? No   Did you notice your breasts fill when your baby was 3-5 days old? Yes   Have you had any breast surgeries? No   Please select any of the following medical conditions you have been previously diagnosed with or are currently being treated for:  "Infertility   What else would you like the lactation consultant to know?           Objective/Physical exam:   Her nipples are everted, the areola is compressible, the breast is soft and full.     Sore nipples: tender on right   EPDS: 7, with \"never\" marked for question on thoughts of self-harm     Assessment of infant: 65.28s% Weight for age percentile   Age today: 13 days  Today's weight: 8 # 14.6 oz  Amount of milk transferred from LEFT side: 1.4 oz  Amount of milk transferred from RIGHT side: 2.4 oz      Baby has full flexion of arms and legs, normal tone, behavior is alert and active, respirations are normal, skin is normal, hydration is normal, jaw is normal size and alignment, palate is normal, frenulum is normal, baby can lateralize tongue, has adequate tongue lift, and tongue can protrude past bottom gum line. Upper labial frenulum is normal.    Suck exam:  Baby has slightly weak but coordinated suck with good tongue cupping    Baby thrush: none    Jaundice: none      Feeding assessment: Baby can hold suction with tongue while at the breast. Latch initially slightly shallow, improved with positioning adjustments.    Alignment: The baby was flex relaxed. Baby's head was aligned with its trunk. Baby did face mother. Baby was in cross cradle position today.   Areolar Grasp: Baby was able to open mouth widely. Baby's lips were not pursed. Baby's lips did flange outward. Tongue was visible over bottom gum. Baby had complete seal.     Areolar Compression: Baby made rhythmic motion. There were no clicking or smacking sounds. There was no severe nipple discomfort. Nipples appeared rounded after feeding.  Audible swallowing: Baby made quiet sounds of swallowing: There was an increase in frequency after milk ejection reflex. The milk ejection reflex is normal and milk supply is normal.     /68 (BP Location: Right arm, Patient Position: Sitting, Cuff Size: Adult Regular)   Pulse 81   LMP  (Exact Date)   SpO2 " 99%   Breastfeeding Yes   OB History    Para Term  AB Living   2 1 1 0 1 1   SAB IAB Ectopic Multiple Live Births   1 0 0 0 1      # Outcome Date GA Lbr Ra/2nd Weight Sex Type Anes PTL Lv   2 Term 25 39w0d  4.09 kg (9 lb 0.3 oz) M CS-LTranv Spinal  STACY      Name: Florence Mendes      Apgar1: 8  Apgar5: 9   1 SAB 20               Current Outpatient Medications:     acetaminophen (TYLENOL) 325 MG tablet, Take 3 tablets (975 mg) by mouth every 6 hours., Disp: , Rfl:     gabapentin (NEURONTIN) 100 MG capsule, Take 200 mg by mouth 2 times daily., Disp: , Rfl:     Prenatal Vit-Fe Fumarate-FA (PRENATAL MULTIVITAMIN W/IRON) 27-0.8 MG tablet, Take 1 tablet by mouth every morning, Disp: , Rfl:   Past Medical History:   Diagnosis Date    Chronic pain of left knee     Carlos-Danlos syndrome     Motion sickness     POTS (postural orthostatic tachycardia syndrome)     Restless legs syndrome     Skeletal dysplasia      Past Surgical History:   Procedure Laterality Date    ARTHROSCOPY KNEE Right 3/29/2019    Procedure: Right Knee Arthroscopy;  Surgeon: Di Schwartz MD;  Location:  OR    ARTHROSCOPY KNEE WITH TIBIAL TUBERCLE SHIFT Left 12/15/2017    Procedure: ARTHROSCOPY KNEE WITH TIBIAL TUBERCLE SHIFT;;  Surgeon: Di Schwartz MD;  Location: [facility] OR     SECTION N/A 2025    Procedure: PRIMARY  SECTION;  Surgeon: Myron Gilbert MD;  Location: [facility] OR    OPEN REDUCTION INTERNAL FIXATION RODDING INTRAMEDULLARY TIBIA Left 12/15/2017    Procedure: OPEN REDUCTION INTERNAL FIXATION RODDING INTRAMEDULLARY TIBIA;  Left Knee Arthroscopy, Tibial Derotational Osteotomy with Intermedullary Rodding, Fibular Derotational Osteotmy, Distal Tibial Tubercle Osteotomy;  Surgeon: Dio Olivarez MD;  Location: [facility] OR    REMOVE HARDWARE FOOT Left 5/10/2023    Procedure: Removal of hardware left lower leg;  Surgeon: Dio Olivarez MD;  Location: [facility] OR    REMOVE  HARDWARE LOWER EXTREMITY BILATERAL Bilateral 3/29/2019    Procedure: Bilateral Lower Leg Hardware Removal;  Surgeon: Di Schwartz MD;  Location: [facility] OR     Family History   Problem Relation Age of Onset    Osteoporosis Mother     Gastrointestinal Disease Mother         gastroparesis    Deep Vein Thrombosis (DVT) Father     Lupus Father     Anesthesia Reaction No family hx of        Time spent on day of service:    Face-to-face visit:   50 min   Documentation:  9 min   Total time spent: 59 min    JUANY Longoria, LICHA, IBCLC

## 2025-02-25 NOTE — LETTER
2025      Kaitlynn Mendes  [address]  [place] [address]      Dear Dr. Edgar:      I saw Kaitlynn with her  and baby for [facility] Outpatient Lactation services at the [facility] today--thank you for the referral!  Please find a copy of my note below.    I look forward to following this pleasant family with you as needed.            Tomeka Vides, APRN, CNM, IBCLC     Electronically signed        Assessment:    Thirteen day old infant gaining weight on exclusive breastfeedin.5 oz over last day  Good latch and suck, with excellent milk transfer during observed feeding in office today  Mother with ample milk supply    Plan:    Use good positioning for deep latch, with baby held close to body and baby's head/shoulders/hips in good alignment.  When in a seated position, use a pillow to help bring baby close to breasts, and stepstool to elevate your knees above hips.    When bringing your baby to your breast, compress your breast vertically and in line with baby's mouth--this will help them to get a larger mouthful of breast and a deeper latch. Babies latch best to the breast by bringing their chin in first, so point your nipple towards baby's nose, tuck the chin in close, and then wait for his mouth to open.  When his mouth opens, bring his head in deeply.  Baby's chin should be snugged deeply in your breast, their upper cheeks should be touching the breast, and their nose just out of the breast.   If there is pinching or pain, try using a finger to give a little gentle pressure on his chin to help him open more widely and take in more of your breast.  If it is still painful, use a finger to break the suction, remove baby from the breast and try again until there is no pain with nursing.  There is sometimes a little pain when the baby first begins sucking, but after the first few seconds there should be no pain--only a tugging feeling.   For the fast milk letdown that  "makes it difficult for Florence to stay latched to the breast or causes him to cough/sputter while nursing, try using the laid-back or sidelying nursing positions.   You can also try taking baby off of breast when the strong milk letdown starts, and allow milk to flow out into burp cloth, re-latching baby after flow has slowed.   Continue to nurse baby on cue, 8-12 times each day.  Once your baby has returned to their birthweight, you can trust them to awaken when they need to eat--you do not need to awaken them on a schedule.  When you nurse, feed on one side until baby finishes swallowing.  Once swallowing slows, use breast compression to encourage more intake, but once baby is sleeping, coming off the breast, or just \"nibbling,\" you can take baby off the breast and move to the other side.  Offer both breasts at each feeding.     Florence needs about 24 oz of milk each day to grow well, or about 2.5 oz each feeding.  If he nurses at home as he did in the office today, about 10 times/day, he is getting plenty of milk and does not need extra in bottles after a breastfeeding.  Once babies are about a month old, they need about 25-30 oz/day (or 3- 4 oz each feeding if they eat about 8 times/day) and this where their needs stay for their entire first year;  you don't need to keep producing more and more milk as they grow.    If you would like to give an occasional bottle for convenience, this is fine to do. You can pump to get the milk for this bottle at any time that is convenient for you--many people like to pump after the first morning feeding because supply tends to be highest at that time of day.  You can then give that milk in a bottle later in the day.  Use the paced feeding method when giving bottles, to help babies learn to go more easily between breast and bottle, and avoid overfeeding (more possible at the bottle than the breast).  Right now about 2.5 oz is a good amount to give.  Be cautious with the use of " silver nipple caps, because these can put pressure on your areola and result in some nipple swelling, particularly in the first few weeks when your breasts are full.  Additionally, they can collect milk and your nipples end up soaking in milk, which can cause skin breakdown.  Consider using soft breast pads as an alternative.     Follow up with lactation as needed, and pediatric provider as planned.  [email or web address] can be used for brief questions, but it's important to know that messages are not seen Friday through . If urgent help is needed, Monday through Friday you can call [phone] and one of our lactation consultants will get the message and respond; if you need a rapid response over a weekend or holiday, it is best to call your on-call maternity or pediatric provider.  Please feel free to schedule a return visit if the concern is more detailed;  telephone visits are also an option if you don't feel you need to be seen in person.     Subjective: Kaitlynn and Alonso are here today referred by Dr. Edgar with the following concerns:  Right sided nipple pain, especially with initial latch.  Uses nipple shield for some feedings for this, which works well.  Using silver nipple caps and saline spray on nipple as well, which she finds helpful  Baby seems to be struggle with fast milk flow, especially on the right, with coughing/sputtering/choking.  Known to have largyngomalacia.  Development of some lumpiness in right breast, and is using ice and ibuprofen with some relief, although she notices this frequently returns the next day  Offering a bottle of expressed milk once daily or every other day for rest purposes; pumping about once/day with no difficulty using Spectra pump.  Pumping just the amounts that are needed for baby (about 2 oz)    Hospital Course:  Elective  for anticipated LGA infant;  uncomplicated procedure.  Seen by hospital IBCLC for routine support;  doing well.  Receiving donor milk  "supplements.    Mother's Relevant Med/Surg History:  Migraines; POTS;  Carlos-Danlos Syndrome with hx of multiple lower extremity surgeries and neuropathy, managed with gabapentin;  infertility x 4 years, primarily male factor.  Conceived via IVF in [place].    Breastfeeding Goals: continued exclusive breastfeeding    Previous Breastfeeding Experience: first baby    Infant's name: Florence  Infant's bday: [birth year]  Gestational age: 39 wk  Infant's birth weight: 9 # 0.3 oz   Discharge weight: 8 # 5 oz     Pediatric Provider: Pediatric [facility], Dr. Johnson.  Kaitlynn gives her permission for today's note to be forwarded to Dr. Johnson.  AGUILA signed and filed in Kaitlynn's chart as Florence has no local active pediatric chart.   Recent weights:  2/24/25:  8 # 13 oz    Objective/Physical exam:   Her nipples are everted, the areola is compressible, the breast is soft and full.     Sore nipples: tender on right   EPDS: 7, with \"never\" marked for question on thoughts of self-harm     Assessment of infant: 65.28s% Weight for age percentile   Age today: 13 days  Today's weight: 8 # 14.6 oz  Amount of milk transferred from LEFT side: 1.4 oz  Amount of milk transferred from RIGHT side: 2.4 oz      Baby has full flexion of arms and legs, normal tone, behavior is alert and active, respirations are normal, skin is normal, hydration is normal, jaw is normal size and alignment, palate is normal, frenulum is normal, baby can lateralize tongue, has adequate tongue lift, and tongue can protrude past bottom gum line. Upper labial frenulum is normal.    Suck exam:  Baby has slightly weak but coordinated suck with good tongue cupping    Baby thrush: none    Jaundice: none      Feeding assessment: Baby can hold suction with tongue while at the breast. Latch initially slightly shallow, improved with positioning adjustments.    Alignment: The baby was flex relaxed. Baby's head was aligned with its trunk. Baby did face " mother. Baby was in cross cradle position today.   Areolar Grasp: Baby was able to open mouth widely. Baby's lips were not pursed. Baby's lips did flange outward. Tongue was visible over bottom gum. Baby had complete seal.     Areolar Compression: Baby made rhythmic motion. There were no clicking or smacking sounds. There was no severe nipple discomfort. Nipples appeared rounded after feeding.  Audible swallowing: Baby made quiet sounds of swallowing: There was an increase in frequency after milk ejection reflex. The milk ejection reflex is normal and milk supply is normal.     /68 (BP Location: Right arm, Patient Position: Sitting, Cuff Size: Adult Regular)   Pulse 81   LMP  (Exact Date)   SpO2 99%   Breastfeeding Yes     Tomeka Vides, APRN, CNM, IBCLC

## 2025-02-26 LAB — FERRITIN SERPL-MCNC: 88 NG/ML (ref 6–175)

## 2025-03-05 ENCOUNTER — TELEPHONE (OUTPATIENT)
Dept: CARDIOLOGY | Facility: CLINIC | Age: 30
End: 2025-03-05
Payer: COMMERCIAL

## 2025-03-05 NOTE — TELEPHONE ENCOUNTER
Left Voicemail (1st Attempt) and Sent Mychart (1st Attempt) for the patient to call back and schedule the following:    Appointment type: RTN EP  Provider: SAMANTHA  Return date: 6/27/2025  Specialty phone number: [phone] OPT 1  Additional appointment(s) needed: N/A  Additonal Notes: VV, 6 MONTH FOLLOW-UP

## 2025-03-13 ENCOUNTER — OFFICE VISIT (OUTPATIENT)
Dept: INTERNAL MEDICINE | Facility: CLINIC | Age: 30
End: 2025-03-13
Payer: COMMERCIAL

## 2025-03-13 VITALS
BODY MASS INDEX: 23.7 KG/M2 | TEMPERATURE: 98.5 F | RESPIRATION RATE: 12 BRPM | WEIGHT: 160 LBS | SYSTOLIC BLOOD PRESSURE: 102 MMHG | DIASTOLIC BLOOD PRESSURE: 64 MMHG | HEIGHT: 69 IN | HEART RATE: 78 BPM | OXYGEN SATURATION: 100 %

## 2025-03-13 DIAGNOSIS — N64.89 OCCLUSION OF BREAST DUCT: ICD-10-CM

## 2025-03-13 RX ORDER — FLUOXETINE 10 MG/1
CAPSULE ORAL
Qty: 42 CAPSULE | Refills: 0 | Status: SHIPPED | OUTPATIENT
Start: 2025-03-13 | End: 2025-04-10

## 2025-03-13 ASSESSMENT — PATIENT HEALTH QUESTIONNAIRE - PHQ9
10. IF YOU CHECKED OFF ANY PROBLEMS, HOW DIFFICULT HAVE THESE PROBLEMS MADE IT FOR YOU TO DO YOUR WORK, TAKE CARE OF THINGS AT HOME, OR GET ALONG WITH OTHER PEOPLE: VERY DIFFICULT
SUM OF ALL RESPONSES TO PHQ QUESTIONS 1-9: 20
SUM OF ALL RESPONSES TO PHQ QUESTIONS 1-9: 20

## 2025-03-13 ASSESSMENT — ASTHMA QUESTIONNAIRES
HOSPITALIZATION_OVERNIGHT_LAST_YEAR_TOTAL: ONE
ACT_TOTALSCORE: 24
QUESTION_3 LAST FOUR WEEKS HOW OFTEN DID YOUR ASTHMA SYMPTOMS (WHEEZING, COUGHING, SHORTNESS OF BREATH, CHEST TIGHTNESS OR PAIN) WAKE YOU UP AT NIGHT OR EARLIER THAN USUAL IN THE MORNING: NOT AT ALL
ACT_TOTALSCORE: 24
EMERGENCY_ROOM_LAST_YEAR_TOTAL: ONE
QUESTION_5 LAST FOUR WEEKS HOW WOULD YOU RATE YOUR ASTHMA CONTROL: WELL CONTROLLED
QUESTION_4 LAST FOUR WEEKS HOW OFTEN HAVE YOU USED YOUR RESCUE INHALER OR NEBULIZER MEDICATION (SUCH AS ALBUTEROL): NOT AT ALL
QUESTION_1 LAST FOUR WEEKS HOW MUCH OF THE TIME DID YOUR ASTHMA KEEP YOU FROM GETTING AS MUCH DONE AT WORK, SCHOOL OR AT HOME: NONE OF THE TIME
QUESTION_2 LAST FOUR WEEKS HOW OFTEN HAVE YOU HAD SHORTNESS OF BREATH: NOT AT ALL

## 2025-03-13 ASSESSMENT — PAIN SCALES - GENERAL: PAINLEVEL_OUTOF10: NO PAIN (0)

## 2025-03-13 NOTE — PROGRESS NOTES
Assessment & Plan   Problem List Items Addressed This Visit        delivery delivered     Other Visit Diagnoses       Post partum depression    -  Primary    Relevant Medications    FLUoxetine (PROZAC) 10 MG capsule    Occlusion of breast duct               Post partum depression  Continue with therapy  Start fluoxetine 10 mg daily x 2 weeks then increase to 20 mg daily  Follow up in 6 weeks     delivery  Incision is healing well     Right breast duct occlusion  Continue with supportive cares: hot packing, feed baby on right breast first, pumping if needed  Monitor for s/s mastitis       Return in about 6 weeks (around 2025).      Syed Calderón is a 29 year old, presenting for the following health issues:  Hospital F/U (25)        3/13/2025     1:26 PM   Additional Questions   Roomed by  Tati Mckeon   Accompanied by Self     HPI      Hospital Follow-up Visit:  Hospital/Nursing Home/IP Rehab Facility: [facility]  Date of Admission:   Date of Discharge:   Reason(s) for Admission:  delivery   Was the patient in the ICU or did the patient experience delirium during hospitalization?  No  Do you have any other stressors you would like to discuss with your provider? No  Problems taking medications regularly:  None  Medication changes since discharge: None  Problems adhering to non-medication therapy:  None    She had a planned  section on 2025 due to her history of Carlos-Danlos and delivered a baby boy.  She saw her surgeon 2 weeks after the .    She still has some mild vaginal bleeding, 1 pad per day. Has some burning at the end of urination but no urinary frequency or dysuria. She has been having regular bowel movements.     She does endorse feeling depressed.  Denies thoughts of self-harm or harm to her baby.  She has a history of depression when she was in high school which was treated with therapy and for citalopram  "(headaches) and later Prozac which seemed to work well for her.  She has also taken amitriptyline and nortriptyline in the past for pain.  Amitriptyline and nortriptyline worked well for her mood but caused a high heart rate so she had to discontinue the medication.    The right breast feels firm and she has a plum size lump.  About 4 days ago she was feeling chilled and achy but the symptoms have resolved and she has been afebrile.  There is no redness or warmth of the right breast.        2024     2:44 PM 3/13/2025     1:18 PM   PHQ   PHQ-9 Total Score 2 20    Q9: Thoughts of better off dead/self-harm past 2 weeks Not at all Not at all       Patient-reported             Objective    /64   Pulse 78   Temp 98.5  F (36.9  C) (Oral)   Resp 12   Ht 1.753 m (5' 9\")   Wt 72.6 kg (160 lb)   LMP  (LMP Unknown)   SpO2 100%   Breastfeeding Unknown   BMI 23.63 kg/m    Body mass index is 23.63 kg/m .    Physical Exam   GENERAL: alert and no distress  Breasts: right breast with a firm lump which is tender to palpation. No erythema or warmth.   ABDOMEN: soft, nontender, normal bowel sounds. Lower abdominal  section incision is healing well.   NEURO: Normal strength and tone, mentation intact and speech normal  PSYCH: Affect normal/bright but she seems on the verge of tears in discussion of her mood           The longitudinal plan of care for the diagnosis(es)/condition(s) as documented were addressed during this visit. Due to the added complexity in care, I will continue to support Kaitlynn in the subsequent management and with ongoing continuity of care.  Signed Electronically by: Suzan Calderon NP  "

## 2025-04-07 ENCOUNTER — E-VISIT (OUTPATIENT)
Dept: URGENT CARE | Facility: CLINIC | Age: 30
End: 2025-04-07
Payer: COMMERCIAL

## 2025-04-07 DIAGNOSIS — N61.0 BREAST INFECTION: Primary | ICD-10-CM

## 2025-04-07 PROCEDURE — 99207 PR NON-BILLABLE SERV PER CHARTING: CPT | Performed by: NURSE PRACTITIONER

## 2025-04-07 NOTE — PATIENT INSTRUCTIONS
Dear Kaitlynn Mendes,    We are sorry you are not feeling well. Please reach out to your OBGYN or be seen in person in one of urgent care clinics.   You will not be charged for this Visit. Thank you for trusting us with your care.    JUANY AGRAWAL CNP

## 2025-04-16 ENCOUNTER — MYC MEDICAL ADVICE (OUTPATIENT)
Dept: INTERNAL MEDICINE | Facility: CLINIC | Age: 30
End: 2025-04-16
Payer: COMMERCIAL

## 2025-04-23 ENCOUNTER — OFFICE VISIT (OUTPATIENT)
Dept: INTERNAL MEDICINE | Facility: CLINIC | Age: 30
End: 2025-04-23
Payer: COMMERCIAL

## 2025-04-23 VITALS
HEIGHT: 69 IN | BODY MASS INDEX: 22.93 KG/M2 | HEART RATE: 93 BPM | RESPIRATION RATE: 20 BRPM | SYSTOLIC BLOOD PRESSURE: 105 MMHG | DIASTOLIC BLOOD PRESSURE: 72 MMHG | OXYGEN SATURATION: 97 % | WEIGHT: 154.8 LBS | TEMPERATURE: 98.1 F

## 2025-04-23 DIAGNOSIS — J45.20 MILD INTERMITTENT ASTHMA WITHOUT COMPLICATION: ICD-10-CM

## 2025-04-23 PROCEDURE — 1126F AMNT PAIN NOTED NONE PRSNT: CPT | Performed by: NURSE PRACTITIONER

## 2025-04-23 PROCEDURE — 3078F DIAST BP <80 MM HG: CPT | Performed by: NURSE PRACTITIONER

## 2025-04-23 PROCEDURE — G2211 COMPLEX E/M VISIT ADD ON: HCPCS | Performed by: NURSE PRACTITIONER

## 2025-04-23 PROCEDURE — 3074F SYST BP LT 130 MM HG: CPT | Performed by: NURSE PRACTITIONER

## 2025-04-23 PROCEDURE — 99214 OFFICE O/P EST MOD 30 MIN: CPT | Performed by: NURSE PRACTITIONER

## 2025-04-23 RX ORDER — FLUOXETINE 10 MG/1
10 CAPSULE ORAL DAILY
Qty: 30 CAPSULE | Refills: 2 | Status: SHIPPED | OUTPATIENT
Start: 2025-04-23

## 2025-04-23 ASSESSMENT — ANXIETY QUESTIONNAIRES
8. IF YOU CHECKED OFF ANY PROBLEMS, HOW DIFFICULT HAVE THESE MADE IT FOR YOU TO DO YOUR WORK, TAKE CARE OF THINGS AT HOME, OR GET ALONG WITH OTHER PEOPLE?: SOMEWHAT DIFFICULT
3. WORRYING TOO MUCH ABOUT DIFFERENT THINGS: SEVERAL DAYS
4. TROUBLE RELAXING: SEVERAL DAYS
7. FEELING AFRAID AS IF SOMETHING AWFUL MIGHT HAPPEN: MORE THAN HALF THE DAYS
6. BECOMING EASILY ANNOYED OR IRRITABLE: MORE THAN HALF THE DAYS
GAD7 TOTAL SCORE: 11
1. FEELING NERVOUS, ANXIOUS, OR ON EDGE: MORE THAN HALF THE DAYS
7. FEELING AFRAID AS IF SOMETHING AWFUL MIGHT HAPPEN: MORE THAN HALF THE DAYS
IF YOU CHECKED OFF ANY PROBLEMS ON THIS QUESTIONNAIRE, HOW DIFFICULT HAVE THESE PROBLEMS MADE IT FOR YOU TO DO YOUR WORK, TAKE CARE OF THINGS AT HOME, OR GET ALONG WITH OTHER PEOPLE: SOMEWHAT DIFFICULT
GAD7 TOTAL SCORE: 11
5. BEING SO RESTLESS THAT IT IS HARD TO SIT STILL: SEVERAL DAYS
GAD7 TOTAL SCORE: 11
2. NOT BEING ABLE TO STOP OR CONTROL WORRYING: MORE THAN HALF THE DAYS

## 2025-04-23 ASSESSMENT — PATIENT HEALTH QUESTIONNAIRE - PHQ9
10. IF YOU CHECKED OFF ANY PROBLEMS, HOW DIFFICULT HAVE THESE PROBLEMS MADE IT FOR YOU TO DO YOUR WORK, TAKE CARE OF THINGS AT HOME, OR GET ALONG WITH OTHER PEOPLE: SOMEWHAT DIFFICULT
SUM OF ALL RESPONSES TO PHQ QUESTIONS 1-9: 15
SUM OF ALL RESPONSES TO PHQ QUESTIONS 1-9: 15

## 2025-04-23 ASSESSMENT — PAIN SCALES - GENERAL: PAINLEVEL_OUTOF10: NO PAIN (0)

## 2025-04-23 NOTE — PROGRESS NOTES
Internal Medicine Office Visit  [address] [place] [id number]  Phone: [phone]  Fax: [phone]          Patient Name: Kaitlynn Mendes  Patient Age: 30 year old  YOB: 1995  MRN: [id number]    Date of Visit: 2025  Primary Provider: No Ref-Primary, Physician    Subjective   Patient presents with:  Depression: Post partum follow up, condition improving per patient but still room for improvement. Noticing jaw side effects.         2025     4:16 PM   Additional Questions   Roomed by GABRIEL Sesay     HPI:  ***          2024     2:44 PM 3/13/2025     1:18 PM 2025     4:12 PM   PHQ   PHQ-9 Total Score 2 20  15    Q9: Thoughts of better off dead/self-harm past 2 weeks Not at all Not at all Not at all       Patient-reported         2024     2:44 PM 2025     4:14 PM   KEYLA-7 SCORE   Total Score  11 (moderate anxiety)   Total Score 3 11        Patient-reported         Patient Active Problem List   Diagnosis    Unspecified abnormalities of gait and mobility    Carlos-Danlos syndrome    Major depression, single episode    Migraines    Mild intermittent asthma    Nephrolithiasis    Postural orthostatic tachycardia syndrome    Vitamin D deficiency    Chronic pain of left knee    Encounter for triage in pregnant patient     delivery delivered     Current Scheduled Meds:  Current Outpatient Medications   Medication Sig Dispense Refill    acetaminophen (TYLENOL) 325 MG tablet Take 3 tablets (975 mg) by mouth every 6 hours.      FLUoxetine (PROZAC) 20 MG capsule Take 1 capsule (20 mg) by mouth daily. 30 capsule 0    gabapentin (NEURONTIN) 100 MG capsule Take 200 mg by mouth 2 times daily.      Prenatal Vit-Fe Fumarate-FA (PRENATAL MULTIVITAMIN W/IRON) 27-0.8 MG tablet Take 1 tablet by mouth every morning           Objective   Physical Examination:  Vitals:    25 1620   BP: 105/72   Pulse: 93   Resp: 20  "  Temp: 98.1  F (36.7  C)   TempSrc: Oral   SpO2: 97%   Weight: 70.2 kg (154 lb 12.8 oz)   Height: 1.753 m (5' 9\")     Wt Readings from Last 5 Encounters:   04/23/25 70.2 kg (154 lb 12.8 oz)   03/13/25 72.6 kg (160 lb)   02/12/25 81.6 kg (180 lb)   01/08/25 76.2 kg (168 lb)   12/27/24 77.6 kg (171 lb)     Body mass index is 22.86 kg/m .   ***  Constitutional: In no apparent distress  Eyes: Conjunctivae clear. Non-icteric.   Respiratory: Clear to auscultation bilaterally. Normal inspiratory and expiratory effort  Cardiovascular: Regular rate and rhythm. No murmurs, rubs, or gallops. No edema.  Psych: Alert and oriented x3.     Diagnoses managed today:  No diagnosis found.     Assessment & Plan   ***      I am having Kaitlynn Seaytzel maintain her prenatal multivitamin w/iron, gabapentin, acetaminophen, and FLUoxetine.        No orders of the defined types were placed in this encounter.      Follow up: No follow-ups on file. Sooner if needed.    The longitudinal plan of care for the diagnosis(es)/condition(s) as documented were addressed during this visit. Due to the added complexity in care, I will continue to support Kaitlynn in the subsequent management and with ongoing continuity of care.    Suzan Calderon, DNP, APRN, CNP   Electronically signed   " encounter.      Follow up: Return in about 6 weeks (around 6/4/2025). Sooner if needed.    The longitudinal plan of care for the diagnosis(es)/condition(s) as documented were addressed during this visit. Due to the added complexity in care, I will continue to support Kaitlynn in the subsequent management and with ongoing continuity of care.    Suzan Calderon, DNP, APRN, CNP   Electronically signed

## 2025-04-23 NOTE — PROGRESS NOTES
"  {PROVIDER CHARTING PREFERENCE:470658}    Syed Calderón is a 30 year old, presenting for the following health issues:  Depression (Post partum follow up, condition improving per patient but still room for improvement. Noticing jaw side effects.)      4/23/2025     4:16 PM   Additional Questions   Roomed by GABRIEL Sesay     History of Present Illness       Mental Health Follow-up:  Patient presents to follow-up on Depression & Anxiety.Patient's depression since last visit has been:  Medium  The patient is not having other symptoms associated with depression.  Patient's anxiety since last visit has been:  Better  The patient is not having other symptoms associated with anxiety.  Any significant life events: No  Patient is feeling anxious or having panic attacks.  Patient has no concerns about alcohol or drug use.    She eats 0-1 servings of fruits and vegetables daily.She consumes 0 sweetened beverage(s) daily.She exercises with enough effort to increase her heart rate 20 to 29 minutes per day.  She exercises with enough effort to increase her heart rate 6 days per week.   She is taking medications regularly.        {MA/LPN/RN Pre-Provider Visit Orders- hCG/UA/Strep (Optional):947472}  {SUPERLIST (Optional):202229}  {additonal problems for provider to add (Optional):975144}    {ROS Picklists (Optional):995536}      Objective    /72   Pulse 93   Temp 98.1  F (36.7  C) (Oral)   Resp 20   Ht 1.753 m (5' 9\")   Wt 70.2 kg (154 lb 12.8 oz)   LMP  (LMP Unknown)   SpO2 97%   Breastfeeding Yes   BMI 22.86 kg/m    Body mass index is 22.86 kg/m .  Physical Exam   {Exam List (Optional):920667}    {Diagnostic Test Results (Optional):027398}        Signed Electronically by: Suzan Calderon NP  {Email feedback regarding this note to [email or web address]   :754813}  "

## 2025-04-23 NOTE — LETTER
April 23, 2025      Kaitlynn Mendes  [address]  [place] [address]        To Whom It May Concern:    Kaitlynn Mendes was seen in our clinic. She is advised to remain out of work through May 6th. This is to allow time for medical treatments to become therapeutic.       Sincerely,      Suzan Calderon, QUITA, APRN, CNP   04/23/25      Electronically signed

## 2025-05-05 ENCOUNTER — TELEPHONE (OUTPATIENT)
Dept: INTERNAL MEDICINE | Facility: CLINIC | Age: 30
End: 2025-05-05
Payer: COMMERCIAL

## 2025-05-05 NOTE — TELEPHONE ENCOUNTER
Fax received from [employer] requesting OV notes from 03/14/25-05/01/25.    Signed AGUILA attached. OV notes faxed out.    Copies made for monthly hold bin and sent to scan.

## 2025-05-08 ENCOUNTER — TELEPHONE (OUTPATIENT)
Dept: CARDIOLOGY | Facility: CLINIC | Age: 30
End: 2025-05-08
Payer: COMMERCIAL

## 2025-05-08 NOTE — TELEPHONE ENCOUNTER
Left Voicemail (1st Attempt) and Sent Mychart (1st Attempt) for the patient to call back and schedule the following:    Appointment type: Return EP  Provider: Dr. Mendoza  Return date: Next available  Specialty phone number: [phone] opt 1  Additional appointment(s) needed: NA  Additonal Notes: reschedule the 9/11 appt - Virtual Visit

## 2025-05-21 ENCOUNTER — VIRTUAL VISIT (OUTPATIENT)
Dept: INTERNAL MEDICINE | Facility: CLINIC | Age: 30
End: 2025-05-21
Payer: COMMERCIAL

## 2025-05-21 DIAGNOSIS — M79.661 PAIN OF RIGHT LOWER LEG: ICD-10-CM

## 2025-05-21 DIAGNOSIS — F32.2 CURRENT SEVERE EPISODE OF MAJOR DEPRESSIVE DISORDER WITHOUT PSYCHOTIC FEATURES WITHOUT PRIOR EPISODE (H): Primary | ICD-10-CM

## 2025-05-21 PROCEDURE — 96127 BRIEF EMOTIONAL/BEHAV ASSMT: CPT | Mod: 95 | Performed by: NURSE PRACTITIONER

## 2025-05-21 PROCEDURE — 98006 SYNCH AUDIO-VIDEO EST MOD 30: CPT | Performed by: NURSE PRACTITIONER

## 2025-05-21 RX ORDER — FLUOXETINE HYDROCHLORIDE 40 MG/1
40 CAPSULE ORAL DAILY
Qty: 90 CAPSULE | Refills: 0 | Status: SHIPPED | OUTPATIENT
Start: 2025-05-21

## 2025-05-21 ASSESSMENT — PATIENT HEALTH QUESTIONNAIRE - PHQ9
SUM OF ALL RESPONSES TO PHQ QUESTIONS 1-9: 14
5. POOR APPETITE OR OVEREATING: MORE THAN HALF THE DAYS

## 2025-05-21 ASSESSMENT — ANXIETY QUESTIONNAIRES
5. BEING SO RESTLESS THAT IT IS HARD TO SIT STILL: MORE THAN HALF THE DAYS
6. BECOMING EASILY ANNOYED OR IRRITABLE: SEVERAL DAYS
7. FEELING AFRAID AS IF SOMETHING AWFUL MIGHT HAPPEN: SEVERAL DAYS
2. NOT BEING ABLE TO STOP OR CONTROL WORRYING: MORE THAN HALF THE DAYS
GAD7 TOTAL SCORE: INCOMPLETE
IF YOU CHECKED OFF ANY PROBLEMS ON THIS QUESTIONNAIRE, HOW DIFFICULT HAVE THESE PROBLEMS MADE IT FOR YOU TO DO YOUR WORK, TAKE CARE OF THINGS AT HOME, OR GET ALONG WITH OTHER PEOPLE: SOMEWHAT DIFFICULT
3. WORRYING TOO MUCH ABOUT DIFFERENT THINGS: MORE THAN HALF THE DAYS
1. FEELING NERVOUS, ANXIOUS, OR ON EDGE: MORE THAN HALF THE DAYS
GAD7 TOTAL SCORE: 12
GAD7 TOTAL SCORE: 12

## 2025-05-21 NOTE — PROGRESS NOTES
"Kaitlynn is a 30 year old who is being evaluated via a billable video visit.    How would you like to obtain your AVS? MyChart  If the video visit is dropped, the invitation should be resent by: Text to cell phone: [phone]  Will anyone else be joining your video visit? No  {If patient encounters technical issues they should call [phone] :601381}    {PROVIDER CHARTING PREFERENCE:063965}    Subjective   Kaitlynn is a 30 year old, presenting for the following health issues:  Mental Health Problem (Follow up)        5/21/2025     3:46 PM   Additional Questions   Roomed by GABRIEL Sesay     Mental Health Problem    History of Present Illness       Mental Health Follow-up:  Patient presents to follow-up on Depression & Anxiety.Patient's depression since last visit has been:  Medium  The patient is not having other symptoms associated with depression.  Patient's anxiety since last visit has been:  Bad  The patient is not having other symptoms associated with anxiety.  Any significant life events: other  Patient is feeling anxious or having panic attacks.  Patient has no concerns about alcohol or drug use.    She eats 2-3 servings of fruits and vegetables daily.She consumes 6 sweetened beverage(s) daily.She exercises with enough effort to increase her heart rate 60 or more minutes per day.  She exercises with enough effort to increase her heart rate 3 or less days per week.   She is taking medications regularly.        {SUPERLIST (Optional):769764}  {additonal problems for provider to add (Optional):561204}    {ROS Picklists (Optional):426905}      Objective           Vitals:  No vitals were obtained today due to virtual visit.    Physical Exam   {video visit exam brief selected:452535}    {Diagnostic Test Results (Optional):489430}      Video-Visit Details    Type of service:  Video Visit   Originating Location (pt. Location): {video visit patient location:625831::\"Home\"}  {PROVIDER LOCATION On-site should be selected " "for visits conducted from your clinic location or adjoining [facility] hospital, academic office, or other nearby [facility] building. Off-site should be selected for all other provider locations, including home:688281}  Distant Location (provider location):  {virtual location provider:376772}  Platform used for Video Visit: {Virtual Visit Platforms:684333::\"[employer]\"}  Signed Electronically by: Suzan Calderon NP  {Email feedback regarding this note to [email or web address]   :578314}  "

## 2025-05-21 NOTE — PROGRESS NOTES
Internal Medicine Video Visit  [address]  [place] [address]  Phone: [phone]  Fax: [phone]      Patient Name: Kaitlynn Mendes  Patient Age: 30 year old  YOB: 1995  MRN: [id number]    Date of Video Visit: 5/21/2025  Primary Provider: No Ref-Primary, Physician    Subjective   Patient presents with:  Mental Health Problem: Follow up         5/21/2025     3:46 PM   Additional Questions   Roomed by GABRIEL Sesay     HPI:  Kaitlynn Mendes, 30 years old, female    - Emotions more consistent, but occasional feelings of heightened anxiety and overstimulation, especially in situations like a baby crying or having a lot to do.  - Occasional panicky feelings, generally managed well.  - Grinding teeth remains unchanged.  - Returned to work 2 weeks ago, experiencing good and bad aspects, with some challenges in memory and performance. Her boss has been accommodating.   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   - Peroneal nerve injury worsened during pregnancy, inability to feel or move three toes, and shooting pains into toes.  - Initially treated with physical therapy and gabapentin, tapered off during pregnancy and breastfeeding.  - Gabapentin currently prescribed at 200 mg twice daily.          3/13/2025     1:18 PM 4/23/2025     4:12 PM 5/21/2025     3:50 PM   PHQ   PHQ-9 Total Score 20  15  14   Q9: Thoughts of better off dead/self-harm past 2 weeks Not at all Not at all Not at all       Patient-reported         4/23/2025     4:14 PM 5/21/2025     3:47 PM 5/21/2025     3:50 PM   KEYLA-7 SCORE   Total Score 11 (moderate anxiety) Incomplete    Total Score 11   12       Patient-reported           Patient Active Problem List   Diagnosis    Unspecified abnormalities of gait and mobility    Carlos-Danlos syndrome    Major depression, single  episode    Migraines    Mild intermittent asthma    Nephrolithiasis    Postural orthostatic tachycardia syndrome    Vitamin D deficiency    Chronic pain of left knee    Encounter for triage in pregnant patient     delivery delivered     Current Scheduled Meds:  Current Outpatient Medications   Medication Sig Dispense Refill    acetaminophen (TYLENOL) 325 MG tablet Take 3 tablets (975 mg) by mouth every 6 hours.      FLUoxetine (PROZAC) 40 MG capsule Take 1 capsule (40 mg) by mouth daily. 90 capsule 0    gabapentin (NEURONTIN) 100 MG capsule Take 200 mg by mouth 2 times daily.      Prenatal Vit-Fe Fumarate-FA (PRENATAL MULTIVITAMIN W/IRON) 27-0.8 MG tablet Take 1 tablet by mouth every morning           Objective   Physical Examination:  Wt Readings from Last 5 Encounters:   25 70.2 kg (154 lb 12.8 oz)   25 72.6 kg (160 lb)   25 81.6 kg (180 lb)   25 76.2 kg (168 lb)   24 77.6 kg (171 lb)     There is no height or weight on file to calculate BMI.     GENERAL: alert and no distress  EYES: Eyes grossly normal to inspection.  No discharge or erythema, or obvious scleral/conjunctival abnormalities.  RESP: No audible wheeze, cough, or visible cyanosis.    SKIN: Visible skin clear. No significant rash, abnormal pigmentation or lesions.  NEURO: Cranial nerves grossly intact.  Mentation and speech appropriate for age.  PSYCH: Appropriate affect, tone, and pace of words    Diagnoses managed today:  1. Current severe episode of major depressive disorder without psychotic features without prior episode (H)    2. Pain of right lower leg         Assessment & Plan     Current severe episode of major depressive disorder without psychotic features without prior episode (H)  - Improvement noted since starting medication, but not fully resolved. No problematic side effects reported. Teeth grinding persists.  - Increase prozac to 40 mg daily. Prescription sent to [facility]. Follow-up in 4 to  6 weeks to assess improvement and consider further dosage adjustment if necessary.    Pain of right lower leg  - Pain related to peroneal nerve injury from previous surgery. Symptoms worsened during pregnancy. No new injury reported.  - Referral to physical therapy to address nerve pain. Consider EMG nerve conduction study if symptoms do not improve or worsen.        I have discontinued Kaitlynn Seaytzel's FLUoxetine and FLUoxetine. I am also having her start on FLUoxetine. Additionally, I am having her maintain her prenatal multivitamin w/iron, gabapentin, and acetaminophen.        Orders Placed This Encounter   Procedures    Physical Therapy  Referral     Video-Visit Details   Type of service:  Video Visit   Video Start Time: 4:03PM  Video End Time:4:21 PM  Originating Location (pt. Location): Home  Distant Location (provider location):  On-site  Platform used for Video Visit: [employer]  Signed Electronically by: Suzan Calderon NP  Follow up: Return in about 6 weeks (around 7/2/2025) for depression/anxiety. Sooner if needed.    The longitudinal plan of care for the diagnosis(es)/condition(s) as documented were addressed during this visit. Due to the added complexity in care, I will continue to support Kaitlynn in the subsequent management and with ongoing continuity of care.    Suzan Calderon, QUITA, APRN, CNP   Electronically signed

## 2025-09-02 ENCOUNTER — VIRTUAL VISIT (OUTPATIENT)
Dept: URGENT CARE | Facility: CLINIC | Age: 30
End: 2025-09-02
Payer: COMMERCIAL

## 2025-09-02 DIAGNOSIS — H00.025 HORDEOLUM INTERNUM OF LEFT LOWER EYELID: Primary | ICD-10-CM

## 2025-09-02 PROCEDURE — 98005 SYNCH AUDIO-VIDEO EST LOW 20: CPT

## 2025-09-02 RX ORDER — BACITRACIN 500 [USP'U]/G
0.5 OINTMENT OPHTHALMIC
Qty: 3.5 G | Refills: 0 | Status: SHIPPED | OUTPATIENT
Start: 2025-09-02 | End: 2025-09-09

## 2025-09-04 DIAGNOSIS — H00.025 HORDEOLUM INTERNUM LEFT LOWER EYELID: Primary | ICD-10-CM

## 2025-09-04 RX ORDER — POLYMYXIN B SULFATE AND TRIMETHOPRIM 1; 10000 MG/ML; [USP'U]/ML
1-2 SOLUTION OPHTHALMIC EVERY 4 HOURS
Qty: 10 ML | Refills: 0 | Status: SHIPPED | OUTPATIENT
Start: 2025-09-04 | End: 2025-09-11

## (undated) DEVICE — STPL SKIN SUBCUTICULAR INSORB  2030

## (undated) DEVICE — CUSTOM PACK C-SECTION LHE

## (undated) DEVICE — BLADE SAW SAGITTAL STRK 13X90X0.89MM HD SYS 6 6113-089-090

## (undated) DEVICE — PACK ACL SUPPLEMENT STD

## (undated) DEVICE — TUBING ARTHRO CONMED/LINVATEC PUMP BLUE INFLOW 10K100

## (undated) DEVICE — SUCTION MANIFOLD NEPTUNE 2 SYS 1 PORT 702-025-000

## (undated) DEVICE — GLOVE BIOGEL PI MICRO INDICATOR UNDERGLOVE SZ 8.0 48980

## (undated) DEVICE — GLOVE UNDER INDICATOR PI SZ 7.0 LF 41670

## (undated) DEVICE — TOURNIQUET CUFF 24" REPRO GREEN 60-7070-104

## (undated) DEVICE — SUCTION TIP YANKAUER W/O VENT K86

## (undated) DEVICE — PACK MINOR SINGLE BASIN SSK3001

## (undated) DEVICE — STRAP KNEE/BODY 31143004

## (undated) DEVICE — CAST PADDING 6" STERILE 9046S

## (undated) DEVICE — Device

## (undated) DEVICE — SOL NACL 0.9% IRRIG 1000ML BOTTLE 2F7124

## (undated) DEVICE — LINEN ORTHO PACK 5446

## (undated) DEVICE — PAD BLADDER CNTRL SM BL 4IN X 9.75IN  1100B

## (undated) DEVICE — GLOVE PROTEXIS W/NEU-THERA 8.0  2D73TE80

## (undated) DEVICE — GLOVE BIOGEL PI MICRO SZ 6.5 48565

## (undated) DEVICE — LINEN TOWEL PACK X5 5464

## (undated) DEVICE — GLOVE BIOGEL PI MICRO SZ 7.5 48575

## (undated) DEVICE — ESU GROUND PAD ADULT W/CORD E7507

## (undated) DEVICE — SU ETHIBOND 1 OS-4 30" X518H

## (undated) DEVICE — BLADE KNIFE SURG 10 371110

## (undated) DEVICE — DRSG STERI STRIP 1/2X4" R1547

## (undated) DEVICE — SU MONOCRYL 3-0 PS-1 27" Y936H

## (undated) DEVICE — SU ETHIBOND 1 CT-1 30" X425H

## (undated) DEVICE — ESU GROUND PAD ADULT REM W/15' CORD E7507DB

## (undated) DEVICE — GLOVE PROTEXIS MICRO 6.5  2D73PM65

## (undated) DEVICE — SU VICRYL+ 3-0 CT1 36IN UND VCP944H

## (undated) DEVICE — GOWN XLG DISP 9545

## (undated) DEVICE — GLOVE PROTEXIS W/NEU-THERA 7.5  2D73TE75

## (undated) DEVICE — BNDG ESMARK 6" STERILE 836-3612

## (undated) DEVICE — PREP CHLORAPREP 26ML TINTED HI-LITE ORANGE 930815

## (undated) DEVICE — SOL NACL 0.9% IRRIG 500ML BOTTLE 2F7123

## (undated) DEVICE — DRAPE C-ARM W/STRAPS 42X72" 07-CA104

## (undated) DEVICE — SUTURE MONOCRYL+ 0 CT-1 UND 18 MCP946H

## (undated) DEVICE — GLOVE PROTEXIS BLUE W/NEU-THERA 8.0  2D73EB80

## (undated) DEVICE — GLOVE PI ULTRATCH M LF SZ 6.5 PF CUFF TEXT STRL LF 42665

## (undated) DEVICE — PACK LOWER EXTREMITY CUSTOM ASC

## (undated) DEVICE — PREP CHLORAPREP 26ML TINTED ORANGE  260815

## (undated) DEVICE — SU MONOCRYL 3-0 PS-2 18" UND Y497G

## (undated) DEVICE — CAST PADDING 4" STERILE 9044S

## (undated) DEVICE — SU VICRYL 2-0 CT-2 27" UND J269H

## (undated) DEVICE — SOL WATER IRRIG 1000ML BOTTLE 2F7114

## (undated) DEVICE — SU DERMABOND PRINEO CLR602US

## (undated) DEVICE — DRAPE EXTREMITY BILAT

## (undated) DEVICE — BNDG TUBIGRIP G LATEX 1453

## (undated) DEVICE — DRSG ABDOMINAL 07 1/2X8" 7197D

## (undated) DEVICE — PACK ARTHROSCOPY CUSTOM ASC

## (undated) DEVICE — GLOVE GAMMEX NEOPRENE ULTRA SZ 6.5 LF 8513

## (undated) DEVICE — SUCTION MANIFOLD NEPTUNE 2 SYS 4 PORT 0702-020-000

## (undated) DEVICE — LINEN GOWN XLG 5407

## (undated) DEVICE — CATH SUCTION 10FR W/TRAP DYND44110

## (undated) DEVICE — TUBING SYSTEM ARTHREX PUMP REDEUCE AR-6411

## (undated) DEVICE — DRAPE C-ARMOR 5 SIDED 5523

## (undated) DEVICE — BAG BIOHAZARD RED SMALL 24X23" A4823PR

## (undated) DEVICE — SU VICRYL 0 CT 36" J358H

## (undated) DEVICE — GLOVE GAMMEX NEOPRENE ULTRA SZ 7 LF 8514

## (undated) DEVICE — SUTURE VICRYL+ 0 36IN CTX VIOLET VCP978H

## (undated) DEVICE — SOL NACL 0.9% IRRIG 3000ML BAG 2B7477

## (undated) DEVICE — GLOVE BIOGEL PI MICRO INDICATOR UNDERGLOVE SZ 7.0 48970

## (undated) DEVICE — UNDERPAD 30X30 BULK 949B10

## (undated) DEVICE — GLOVE PROTEXIS POWDER FREE SMT 6.5  2D72PT65X

## (undated) DEVICE — COVER CAMERA IN-LIGHT DISP LT-C02

## (undated) DEVICE — SU VICRYL 2-0 CT-1 27" UND J259H

## (undated) DEVICE — SU VICRYL 1 CT-1 36" UND J947H

## (undated) DEVICE — BLADE SAW SAGITTAL STRK 34.5X11.5X0.64MM 2108-148-000S8

## (undated) DEVICE — BNDG ELASTIC 4"X5YDS UNSTERILE 6611-40

## (undated) DEVICE — DRSG PRIMAPORE 04X11 3/4"

## (undated) DEVICE — SPONGE LAP 18X18" X8435

## (undated) DEVICE — SU VICRYL+ 2-0 XLH 27IN VIO VCP581G

## (undated) DEVICE — IMP SCR SYN CORTEX 3.5X34MM SELF TAP SS 204.834: Type: IMPLANTABLE DEVICE | Site: TIBIA | Status: NON-FUNCTIONAL

## (undated) DEVICE — IMM LEG ELEVATOR 79-90191

## (undated) DEVICE — SUCTION MANIFOLD DORNOCH ULTRA CART UL-CL500

## (undated) RX ORDER — FENTANYL CITRATE 50 UG/ML
INJECTION, SOLUTION INTRAMUSCULAR; INTRAVENOUS
Status: DISPENSED
Start: 2017-12-15

## (undated) RX ORDER — LIDOCAINE HYDROCHLORIDE 20 MG/ML
INJECTION, SOLUTION EPIDURAL; INFILTRATION; INTRACAUDAL; PERINEURAL
Status: DISPENSED
Start: 2017-12-15

## (undated) RX ORDER — ACETAMINOPHEN 325 MG/1
TABLET ORAL
Status: DISPENSED
Start: 2023-05-10

## (undated) RX ORDER — LIDOCAINE HYDROCHLORIDE 20 MG/ML
INJECTION, SOLUTION EPIDURAL; INFILTRATION; INTRACAUDAL; PERINEURAL
Status: DISPENSED
Start: 2019-03-29

## (undated) RX ORDER — PROPOFOL 10 MG/ML
INJECTION, EMULSION INTRAVENOUS
Status: DISPENSED
Start: 2017-12-15

## (undated) RX ORDER — OXYCODONE HYDROCHLORIDE 5 MG/1
TABLET ORAL
Status: DISPENSED
Start: 2019-03-29

## (undated) RX ORDER — FENTANYL CITRATE 50 UG/ML
INJECTION, SOLUTION INTRAMUSCULAR; INTRAVENOUS
Status: DISPENSED
Start: 2025-02-12

## (undated) RX ORDER — ACETAMINOPHEN 325 MG/1
TABLET ORAL
Status: DISPENSED
Start: 2017-12-15

## (undated) RX ORDER — GABAPENTIN 300 MG/1
CAPSULE ORAL
Status: DISPENSED
Start: 2017-12-15

## (undated) RX ORDER — ONDANSETRON 2 MG/ML
INJECTION INTRAMUSCULAR; INTRAVENOUS
Status: DISPENSED
Start: 2019-03-29

## (undated) RX ORDER — HYDROMORPHONE HYDROCHLORIDE 1 MG/ML
INJECTION, SOLUTION INTRAMUSCULAR; INTRAVENOUS; SUBCUTANEOUS
Status: DISPENSED
Start: 2017-12-15

## (undated) RX ORDER — CEFAZOLIN SODIUM 2 G/50ML
SOLUTION INTRAVENOUS
Status: DISPENSED
Start: 2023-05-10

## (undated) RX ORDER — OXYCODONE HYDROCHLORIDE 5 MG/1
TABLET ORAL
Status: DISPENSED
Start: 2023-05-10

## (undated) RX ORDER — LIDOCAINE HYDROCHLORIDE 20 MG/ML
JELLY TOPICAL
Status: DISPENSED
Start: 2020-12-11

## (undated) RX ORDER — PROPOFOL 10 MG/ML
INJECTION, EMULSION INTRAVENOUS
Status: DISPENSED
Start: 2019-03-29

## (undated) RX ORDER — PROPOFOL 10 MG/ML
INJECTION, EMULSION INTRAVENOUS
Status: DISPENSED
Start: 2023-05-10

## (undated) RX ORDER — DEXAMETHASONE SODIUM PHOSPHATE 4 MG/ML
INJECTION, SOLUTION INTRA-ARTICULAR; INTRALESIONAL; INTRAMUSCULAR; INTRAVENOUS; SOFT TISSUE
Status: DISPENSED
Start: 2019-03-29

## (undated) RX ORDER — SCOLOPAMINE TRANSDERMAL SYSTEM 1 MG/1
PATCH, EXTENDED RELEASE TRANSDERMAL
Status: DISPENSED
Start: 2019-03-29

## (undated) RX ORDER — PHENYLEPHRINE HYDROCHLORIDE 10 MG/ML
INJECTION INTRAVENOUS
Status: DISPENSED
Start: 2025-02-12

## (undated) RX ORDER — FENTANYL CITRATE 50 UG/ML
INJECTION, SOLUTION INTRAMUSCULAR; INTRAVENOUS
Status: DISPENSED
Start: 2019-03-29

## (undated) RX ORDER — MORPHINE SULFATE 1 MG/ML
INJECTION, SOLUTION EPIDURAL; INTRATHECAL; INTRAVENOUS
Status: DISPENSED
Start: 2025-02-12

## (undated) RX ORDER — ONDANSETRON 2 MG/ML
INJECTION INTRAMUSCULAR; INTRAVENOUS
Status: DISPENSED
Start: 2025-02-12

## (undated) RX ORDER — HYDROXYZINE HYDROCHLORIDE 25 MG/1
TABLET, FILM COATED ORAL
Status: DISPENSED
Start: 2023-05-10

## (undated) RX ORDER — FENTANYL CITRATE 50 UG/ML
INJECTION, SOLUTION INTRAMUSCULAR; INTRAVENOUS
Status: DISPENSED
Start: 2023-05-10

## (undated) RX ORDER — SCOLOPAMINE TRANSDERMAL SYSTEM 1 MG/1
PATCH, EXTENDED RELEASE TRANSDERMAL
Status: DISPENSED
Start: 2023-05-10

## (undated) RX ORDER — KETOROLAC TROMETHAMINE 30 MG/ML
INJECTION, SOLUTION INTRAMUSCULAR; INTRAVENOUS
Status: DISPENSED
Start: 2019-03-29

## (undated) RX ORDER — METOCLOPRAMIDE HYDROCHLORIDE 5 MG/ML
INJECTION INTRAMUSCULAR; INTRAVENOUS
Status: DISPENSED
Start: 2017-12-15

## (undated) RX ORDER — SCOLOPAMINE TRANSDERMAL SYSTEM 1 MG/1
PATCH, EXTENDED RELEASE TRANSDERMAL
Status: DISPENSED
Start: 2017-12-15

## (undated) RX ORDER — ONDANSETRON 2 MG/ML
INJECTION INTRAMUSCULAR; INTRAVENOUS
Status: DISPENSED
Start: 2017-12-15

## (undated) RX ORDER — CLINDAMYCIN PHOSPHATE 600 MG/50ML
INJECTION, SOLUTION INTRAVENOUS
Status: DISPENSED
Start: 2019-03-29

## (undated) RX ORDER — MORPHINE SULFATE 2 MG/ML
INJECTION, SOLUTION INTRAMUSCULAR; INTRAVENOUS
Status: DISPENSED
Start: 2017-12-15

## (undated) RX ORDER — DEXAMETHASONE SODIUM PHOSPHATE 4 MG/ML
INJECTION, SOLUTION INTRA-ARTICULAR; INTRALESIONAL; INTRAMUSCULAR; INTRAVENOUS; SOFT TISSUE
Status: DISPENSED
Start: 2017-12-15

## (undated) RX ORDER — ACETAMINOPHEN 325 MG/1
TABLET ORAL
Status: DISPENSED
Start: 2019-03-29

## (undated) RX ORDER — CLINDAMYCIN PHOSPHATE 900 MG/50ML
INJECTION, SOLUTION INTRAVENOUS
Status: DISPENSED
Start: 2017-12-15

## (undated) RX ORDER — ONDANSETRON 2 MG/ML
INJECTION INTRAMUSCULAR; INTRAVENOUS
Status: DISPENSED
Start: 2023-05-10

## (undated) RX ORDER — CELECOXIB 200 MG/1
CAPSULE ORAL
Status: DISPENSED
Start: 2017-12-15

## (undated) RX ORDER — GABAPENTIN 300 MG/1
CAPSULE ORAL
Status: DISPENSED
Start: 2019-03-29